# Patient Record
Sex: MALE | Race: WHITE | NOT HISPANIC OR LATINO | Employment: OTHER | URBAN - METROPOLITAN AREA
[De-identification: names, ages, dates, MRNs, and addresses within clinical notes are randomized per-mention and may not be internally consistent; named-entity substitution may affect disease eponyms.]

---

## 2017-05-01 ENCOUNTER — GENERIC CONVERSION - ENCOUNTER (OUTPATIENT)
Dept: OTHER | Facility: OTHER | Age: 77
End: 2017-05-01

## 2017-05-12 ENCOUNTER — ALLSCRIPTS OFFICE VISIT (OUTPATIENT)
Dept: OTHER | Facility: OTHER | Age: 77
End: 2017-05-12

## 2018-01-12 VITALS
HEIGHT: 63 IN | BODY MASS INDEX: 25.18 KG/M2 | SYSTOLIC BLOOD PRESSURE: 110 MMHG | WEIGHT: 142.13 LBS | DIASTOLIC BLOOD PRESSURE: 68 MMHG | HEART RATE: 68 BPM

## 2018-01-17 NOTE — RESULT NOTES
Verified Results  ECHO COMPLETE WITH CONTRAST IF INDICATED [id number] 01:49PM Elisabeth Lopez     Test Name Result Flag Reference   ECHO COMPLETE WITH CONTRAST IF INDICATED (Report)     [facility]   [address]  [place], [address]   [phone]     Transthoracic Echocardiogram   2D, M-mode, Doppler, and Color Doppler     Study date: 2016     Patient: Jerry Bernal   MR number: [id number]   Account number: [de-identified]   : 1940   Age: 67 years   Gender: Male   Status: Outpatient   Location: [address] [place] [address]   Height: 63 in   Weight: 13 lb   BP: 120/ 80 mmHg     Indications: Follow up aortic stenosis  Evaluate suspected aortic   insufficiency  Diagnoses: I35 0 - Nonrheumatic aortic (valve) stenosis     Sonographer: MARLENA Mancuso   Primary Physician: Petey Jeffries MD   Referring Physician: Yobany Lantigua MD   Group: [facility]   Interpreting Physician: Yobany Lantigua MD     SUMMARY     LEFT VENTRICLE:   Systolic function was normal  Ejection fraction was estimated in the range of   55 % to 65 %  There were no regional wall motion abnormalities  Wall thickness was mildly increased  MITRAL VALVE:   There was mild regurgitation  AORTIC VALVE:   The valve was trileaflet  Leaflets exhibited moderately to markedly increased   thickness  There was moderate to severe stenosis  Peak AVV is 3 8 M/S  Sara Lightning SON 1 2 cm2   There was mild regurgitation  TRICUSPID VALVE:   There was mild regurgitation  PULMONIC VALVE:   There was mild regurgitation  HISTORY: PRIOR HISTORY: Patient has no history of cardiovascular disease  PROCEDURE: The study was performed in the [address] [place] [address]  This   was a routine study  The transthoracic approach was used  The study included   complete 2D imaging, M-mode, complete spectral Doppler, and color Doppler  The   heart rate was 55 bpm, at the start of the study  Images were obtained from the   parasternal, apical, subcostal, and suprasternal notch acoustic windows  Echocardiographic views were limited due to poor acoustic window availability,   decreased penetration, and lung interference  This was a technically difficult   study  LEFT VENTRICLE: Size was normal  Systolic function was normal  Ejection   fraction was estimated in the range of 55 % to 65 %  There were no regional   wall motion abnormalities  Wall thickness was mildly increased  DOPPLER: There   was an increased relative contribution of atrial contraction to ventricular   filling  The deceleration time of the early transmitral flow velocity was   increased  RIGHT VENTRICLE: The size was normal  Systolic function was normal  Wall   thickness was normal      LEFT ATRIUM: Size was normal      RIGHT ATRIUM: Size was normal      MITRAL VALVE: There was annular calcification  DOPPLER: There was no evidence   for stenosis  There was mild regurgitation  AORTIC VALVE: The valve was trileaflet  Leaflets exhibited moderately to   markedly increased thickness  DOPPLER: There was moderate to severe stenosis  Peak AVV is 3 8 M/S  Yamini Cunning SON 1 2 cm2 There was mild regurgitation  TRICUSPID VALVE: The valve structure was normal  There was normal leaflet   separation  DOPPLER: The transtricuspid velocity was within the normal range  There was no evidence for stenosis  There was mild regurgitation  Pulmonary   artery systolic pressure was within the normal range  Estimated peak PA   pressure was 25 mmHg  PULMONIC VALVE: Leaflets exhibited normal thickness, no calcification, and   normal cuspal separation  DOPPLER: The transpulmonic velocity was within the   normal range  There was mild regurgitation  PERICARDIUM: There was no pericardial effusion  The pericardium was normal in   appearance  AORTA: The root exhibited normal size       SYSTEMIC VEINS: IVC: The inferior vena cava was not well visualized  SYSTEM MEASUREMENT TABLES     2D   Ao Diam: 3 5 cm   IVSd: 1 1 cm   LA Diam: 3 1 cm   LVIDd: 4 2 cm   LVIDs: 2 7 cm   LVPWd: 1 1 cm     CW   AR Dec Telfair: 1 4 m/s2   AR Dec Time:  1 ms   AR PHT: 605 ms   AR Vmax: 3 m/s   AR maxP 5 mmHg   AV Env  Ti: 304 5 ms   AV VTI: 78 cm   AV Vmax: 3 8 m/s   AV Vmean: 2 6 m/s   AV maxP 7 mmHg   AV meanP 9 mmHg     PW   SON (VTI): 1 3 cm2   SON Vmax: 1 2 cm2   E/E': 17 1   LVOT Env  Ti: 323 ms   LVOT VTI: 31 8 cm   LVOT Vmax: 1 4 m/s   LVOT Vmean: 1 m/s   LVOT maxP 7 mmHg   LVOT meanP 4 mmHg   LVSV Dopp: 100 6 ml   MV A Jaime: 1 1 m/s   MV Dec Telfair: 2 6 m/s2   MV DecT: 282 ms   MV E Jaime: 0 7 m/s   MV E/A Ratio: 0 6     [employer] Accredited Echocardiography Laboratory     Prepared and electronically signed by     Carmita Veloz MD   Signed [id number] 15:22:49

## 2018-04-29 DIAGNOSIS — I35.0 NONRHEUMATIC AORTIC VALVE STENOSIS: Primary | ICD-10-CM

## 2018-05-25 ENCOUNTER — HOSPITAL ENCOUNTER (OUTPATIENT)
Dept: NON INVASIVE DIAGNOSTICS | Facility: CLINIC | Age: 78
Discharge: HOME | End: 2018-05-25
Payer: MEDICARE

## 2018-05-25 ENCOUNTER — OFFICE VISIT (OUTPATIENT)
Dept: CARDIOLOGY CLINIC | Facility: CLINIC | Age: 78
End: 2018-05-25
Payer: MEDICARE

## 2018-05-25 VITALS
BODY MASS INDEX: 25.76 KG/M2 | HEART RATE: 68 BPM | RESPIRATION RATE: 16 BRPM | WEIGHT: 140 LBS | HEIGHT: 62 IN | SYSTOLIC BLOOD PRESSURE: 112 MMHG | DIASTOLIC BLOOD PRESSURE: 62 MMHG

## 2018-05-25 DIAGNOSIS — I35.0 NONRHEUMATIC AORTIC VALVE STENOSIS: ICD-10-CM

## 2018-05-25 DIAGNOSIS — R00.2 PALPITATIONS: ICD-10-CM

## 2018-05-25 DIAGNOSIS — I35.0 NONRHEUMATIC AORTIC VALVE STENOSIS: Primary | ICD-10-CM

## 2018-05-25 PROBLEM — N40.0 BPH (BENIGN PROSTATIC HYPERPLASIA): Status: ACTIVE | Noted: 2018-05-25

## 2018-05-25 PROBLEM — G40.109: Status: ACTIVE | Noted: 2018-05-25

## 2018-05-25 PROBLEM — M81.0 OSTEOPOROSIS: Status: ACTIVE | Noted: 2018-05-25

## 2018-05-25 PROCEDURE — 93306 TTE W/DOPPLER COMPLETE: CPT

## 2018-05-25 PROCEDURE — 93306 TTE W/DOPPLER COMPLETE: CPT | Performed by: INTERNAL MEDICINE

## 2018-05-25 PROCEDURE — 99213 OFFICE O/P EST LOW 20 MIN: CPT | Performed by: INTERNAL MEDICINE

## 2018-05-25 RX ORDER — LEVETIRACETAM 250 MG/1
250 TABLET ORAL 2 TIMES DAILY
COMMUNITY

## 2018-05-25 RX ORDER — LANOLIN ALCOHOL/MO/W.PET/CERES
1 CREAM (GRAM) TOPICAL DAILY
COMMUNITY
End: 2020-02-03

## 2018-05-25 RX ORDER — ALENDRONATE SODIUM 70 MG/1
70 TABLET ORAL
COMMUNITY
Start: 2018-04-04 | End: 2019-11-08

## 2018-05-25 RX ORDER — CHLORHEXIDINE/GLYCERIN/HE-CELL
1 JELLY (GRAM) TOPICAL DAILY
COMMUNITY
End: 2020-02-03

## 2018-05-25 RX ORDER — ASPIRIN 81 MG/1
81 TABLET ORAL DAILY
COMMUNITY
End: 2020-01-11 | Stop reason: HOSPADM

## 2018-05-25 RX ORDER — FINASTERIDE 5 MG/1
5 TABLET, FILM COATED ORAL
COMMUNITY
Start: 2018-04-04

## 2018-05-25 RX ORDER — TAMSULOSIN HYDROCHLORIDE 0.4 MG/1
0.4 CAPSULE ORAL
COMMUNITY
Start: 2018-04-04

## 2018-05-25 NOTE — PROGRESS NOTES
Cardiology Follow Up    Sydney Martinez  1940  [id number]  [facility] [id number]    Reason for visit: Patient here for FU of AS and palpitations  1  Nonrheumatic aortic valve stenosis     2  Palpitations         Interval History: Since the patient last visit he does get some exertional fatigue which is intermittent  He denies chest pain, shortness of breath or peripheral edema  He does get some lightheadedness at times  He has palpitations which occur more with activity  Patient Active Problem List   Diagnosis    BPH (benign prostatic hyperplasia)    Osteoporosis    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[facility] [address] )     No past medical history on file  Social History     Social History    Marital status: /Civil Union     Spouse name: N/A    Number of children: N/A    Years of education: N/A     Occupational History    Not on file  Social History Main Topics    Smoking status: Never Smoker    Smokeless tobacco: Never Used    Alcohol use Not on file    Drug use: Unknown    Sexual activity: Not on file     Other Topics Concern    Not on file     Social History Narrative    No narrative on file      No family history on file  No past surgical history on file      Current Outpatient Prescriptions:     levETIRAcetam (KEPPRA) 250 mg tablet, Take 250 mg by mouth 2 (two) times a day, Disp: , Rfl:     alendronate (FOSAMAX) 70 mg tablet, , Disp: , Rfl:     aspirin (ECOTRIN LOW STRENGTH) 81 mg EC tablet, Take 1 tablet by mouth daily, Disp: , Rfl:     calcium citrate-vitamin D (CITRACAL+D) 315-200 MG-UNIT per tablet, Take by mouth, Disp: , Rfl:     finasteride (PROSCAR) 5 mg tablet, , Disp: , Rfl:     Multiple Vitamin (DAILY VALUE MULTIVITAMIN PO), Take 1 tablet by mouth daily, Disp: , Rfl:     Omega-3 Fatty Acids ([employer] FISH OIL) 1000 MG CAPS, Take by mouth, Disp: , Rfl:     tamsulosin (FLOMAX) 0 4 mg, , Disp: , Rfl:   No Known Allergies      Review of Systems:  Review of Systems   Constitutional: Positive for fatigue  Respiratory: Negative for cough and shortness of breath  Cardiovascular: Positive for palpitations  Negative for chest pain and leg swelling  Genitourinary: Positive for frequency  Neurological: Positive for light-headedness  Psychiatric/Behavioral: Negative for agitation, behavioral problems, confusion and decreased concentration  Physical Exam:  Vitals:    05/25/18 1345   BP: 112/62   Pulse: 68   Resp: 16   Weight: 63 5 kg (140 lb)   Height: 5' 2" (1 575 m)       Physical Exam   Constitutional: He appears well-developed and well-nourished  No distress  HENT:   Head: Normocephalic and atraumatic  Mouth/Throat: No oropharyngeal exudate  Eyes: Conjunctivae are normal  No scleral icterus  Neck: Neck supple  Decreased carotid pulses present  No JVD present  Carotid bruit is present (transmitted murmur)  No thyromegaly present  Cardiovascular: Normal rate, regular rhythm and intact distal pulses  Exam reveals no gallop and no friction rub  Murmur heard  Crescendo decrescendo systolic murmur is present with a grade of 3/6    No diastolic murmur is present   Pulmonary/Chest: Breath sounds normal  He has no wheezes  He has no rhonchi  He has no rales  Abdominal: Soft  He exhibits no mass  There is no hepatosplenomegaly  There is no tenderness  Musculoskeletal: He exhibits no edema  Discussion/Summary:  1  Aortic stenosis  Approaching severity however not much changed from 2016  Having no clear-cut symptoms of critical aortic stenosis  Review the symptoms with the patient today  He will report increasing dyspnea on exertion or the occurrence of angina with activity  Will repeat echocardiogram in 1 year with office visit   2  Palpitations  Relatively quiet and fairly limited to activities      Follow-up 1 year with echo     Louis Durham MD

## 2018-06-21 LAB — HBA1C MFR BLD HPLC: 6.1 %

## 2018-10-09 ENCOUNTER — TRANSCRIBE ORDERS (OUTPATIENT)
Dept: ADMINISTRATIVE | Facility: HOSPITAL | Age: 78
End: 2018-10-09

## 2018-10-09 ENCOUNTER — HOSPITAL ENCOUNTER (OUTPATIENT)
Dept: RADIOLOGY | Facility: IMAGING CENTER | Age: 78
Discharge: HOME | End: 2018-10-09
Payer: MEDICARE

## 2018-10-09 DIAGNOSIS — M25.512 LEFT SHOULDER PAIN, UNSPECIFIED CHRONICITY: ICD-10-CM

## 2018-10-09 DIAGNOSIS — M25.512 LEFT SHOULDER PAIN, UNSPECIFIED CHRONICITY: Primary | ICD-10-CM

## 2018-10-09 PROCEDURE — 73030 X-RAY EXAM OF SHOULDER: CPT

## 2018-10-12 ENCOUNTER — TRANSCRIBE ORDERS (OUTPATIENT)
Dept: ADMINISTRATIVE | Facility: HOSPITAL | Age: 78
End: 2018-10-12

## 2018-10-12 DIAGNOSIS — S43.422S SPRAIN OF LEFT ROTATOR CUFF CAPSULE, SEQUELA: Primary | ICD-10-CM

## 2018-10-15 ENCOUNTER — HOSPITAL ENCOUNTER (OUTPATIENT)
Dept: RADIOLOGY | Facility: IMAGING CENTER | Age: 78
Discharge: HOME | End: 2018-10-15
Payer: MEDICARE

## 2018-10-15 DIAGNOSIS — S43.422S SPRAIN OF LEFT ROTATOR CUFF CAPSULE, SEQUELA: ICD-10-CM

## 2018-10-15 PROCEDURE — 73221 MRI JOINT UPR EXTREM W/O DYE: CPT

## 2018-10-16 ENCOUNTER — EVALUATION (OUTPATIENT)
Dept: PHYSICAL THERAPY | Facility: REHABILITATION | Age: 78
End: 2018-10-16
Payer: MEDICARE

## 2018-10-16 DIAGNOSIS — M25.512 ACUTE PAIN OF LEFT SHOULDER: Primary | ICD-10-CM

## 2018-10-16 DIAGNOSIS — S43.422A SPRAIN OF LEFT ROTATOR CUFF CAPSULE, INITIAL ENCOUNTER: ICD-10-CM

## 2018-10-16 PROCEDURE — G8990 OTHER PT/OT CURRENT STATUS: HCPCS | Performed by: PHYSICAL THERAPIST

## 2018-10-16 PROCEDURE — G8991 OTHER PT/OT GOAL STATUS: HCPCS | Performed by: PHYSICAL THERAPIST

## 2018-10-16 PROCEDURE — 97161 PT EVAL LOW COMPLEX 20 MIN: CPT | Performed by: PHYSICAL THERAPIST

## 2018-10-16 PROCEDURE — 97112 NEUROMUSCULAR REEDUCATION: CPT | Performed by: PHYSICAL THERAPIST

## 2018-10-16 NOTE — PROGRESS NOTES
PT Evaluation     Today's date: 10/16/2018  Patient name: Maxim Snyder  : 1940  MRN: [id number]  Referring provider: Christina Weaver MD  Dx:   Encounter Diagnosis     ICD-10-CM    1  Acute pain of left shoulder M25 512    2  Sprain of left rotator cuff capsule, initial encounter S43 422A                   Assessment  Impairments: abnormal or restricted ROM, abnormal movement, impaired physical strength, lacks appropriate home exercise program, pain with function and poor posture   Functional limitations: unable to sleep through the night, unable to dress without pain, unable to bath without pain  Assessment details: Pt is a 77y o  year old male that presents to PT with a chief complaint of L shoulder pain secondary to a L supraspinatus full thickness tear  PT eval revealed the above mentioned impairments which is consistent with referring diagnosis and MRI results  He is limited by pain with the majority of his movements, despite his increased pain with movement he was able to complete ROM with minimal to moderate losses t/o range  Pt demonstrated poor posture t/o session  Pt was instructed on a comprehensive HEP that focuses on postural correction with strengthening of the LUE  Pt verbalized and demonstrated understanding  Pt would benefit from skilled outpatient PT to address pain, strength, and ROM in order to maximize his level of function  Understanding of Dx/Px/POC: good   Prognosis: good    Goals  ST  Independent with HEP in 2 weeks  2  Pt will have verbal report of improvement in symptoms by >/=25% in 2 weeks   3  Pt will improve LUE ROM measuremeants >/= 5 deg in 2 weeks      LT  Pt will improve FOTO score by >/= 4 points in 6 weeks  2  Pt will improve FOTO score to >/= 70 by visit # 11  3  Pt will be able to reach a shelf that is at shoulder height with little to no difficulty in 6 weeks   4  Pt will be able to sleep through the night with </= 1 disturbances in 6 weeks   5  Pt will be able to independently dress with little to no difficulty in 6 weeks       Plan  Patient would benefit from: skilled physical therapy  Planned modality interventions: thermotherapy: hydrocollator packs and cryotherapy  Other planned modality interventions: other modalities prn   Planned therapy interventions: home exercise program, therapeutic exercise, patient education, neuromuscular re-education, joint mobilization, manual therapy, massage, Molina taping, postural training, strengthening, stretching, functional ROM exercises and flexibility  Frequency: 2x week  Duration in weeks: 8  Plan of Care beginning date: 10/16/2018  Treatment plan discussed with: patient        Subjective Evaluation    History of Present Illness  Date of onset: 10/6/2018  Mechanism of injury: trauma  Mechanism of injury: Pt reports that he fell down off a ladder and landed on his left shoulder  Which he had an MRI for yesterday   Today he got the results of a full thickness supraspinatus tear  Pain  Current pain ratin  At best pain ratin  At worst pain ratin  Location: LUT to mid arm   Quality: dull ache  Relieving factors: ice  Aggravating factors: overhead activity  Progression: improved      Diagnostic Tests  X-ray: abnormal  MRI studies: abnormal  Patient Goals  Patient goals for therapy: decreased pain and independence with ADLs/IADLs  Patient goal: to be able to reach overhead, dress without pain, sleep through the night         Objective     Observations     Additional Observation Details  L shoulder depressed     Active Range of Motion   Left Shoulder   Flexion: 120 degrees with pain  Extension: 75 degrees with pain  Abduction: 115 degrees with pain  External rotation BTH: C2   Internal rotation BTB: T9     Right Shoulder   Flexion: 170 degrees   Extension: 88 degrees   Abduction: 128 degrees   External rotation BTH: C4   Internal rotation BTB: T9     Passive Range of Motion     Additional Passive Range of Motion Details  Unable to get PROM measurements due to high muscle guarding    Strength/Myotome Testing     Left Shoulder     Planes of Motion   Flexion: 4-   Extension: 5   Abduction: 4-   External rotation at 0°: 4-   Internal rotation at 0°: 4-     Right Shoulder     Planes of Motion   Flexion: 4   Extension: 5   Abduction: 4+   External rotation at 0°: 5   Internal rotation at 0°: 4     Additional Strength Details  All motions painful       Flowsheet Rows      Most Recent Value   PT/OT G-Codes   Current Score  50   Projected Score  70   FOTO information reviewed  Yes   Assessment Type  Evaluation   G code set  Other PT/OT Primary   Other PT Primary Current Status ()  CK   Other PT Primary Goal Status ()  CJ          Precautions: osteoporosis       Manuals 10/16                                                                                                           Exercise Diary                         UBE                        posterior shoulder rolls x30                     scap retraction x30                     Bicep curls  #3 3x10                      AAROM standing 1x0                      TB ext                         TB rows                         TB ER                        wrist flex/ext #3 3x10                                                                                                                                                                     Modalities                                                                                             X= performed

## 2018-10-18 ENCOUNTER — OFFICE VISIT (OUTPATIENT)
Dept: PHYSICAL THERAPY | Facility: REHABILITATION | Age: 78
End: 2018-10-18
Payer: MEDICARE

## 2018-10-18 DIAGNOSIS — S43.422A SPRAIN OF LEFT ROTATOR CUFF CAPSULE, INITIAL ENCOUNTER: ICD-10-CM

## 2018-10-18 DIAGNOSIS — M25.512 ACUTE PAIN OF LEFT SHOULDER: Primary | ICD-10-CM

## 2018-10-18 PROCEDURE — 97140 MANUAL THERAPY 1/> REGIONS: CPT | Performed by: PHYSICAL THERAPIST

## 2018-10-18 PROCEDURE — 97112 NEUROMUSCULAR REEDUCATION: CPT | Performed by: PHYSICAL THERAPIST

## 2018-10-18 PROCEDURE — 97110 THERAPEUTIC EXERCISES: CPT | Performed by: PHYSICAL THERAPIST

## 2018-10-18 NOTE — PROGRESS NOTES
Daily Note     Today's date: 10/18/2018  Patient name: Lillian Matias  : 1940  MRN: [id number]  Referring provider: Huan Hines MD  Dx:   Encounter Diagnosis     ICD-10-CM    1  Acute pain of left shoulder M25 512    2  Sprain of left rotator cuff capsule, initial encounter K93 941U                   Subjective: Pt reports that he was sore after last time and that the exercises didn't really help that he still has pain  Objective: See treatment diary below      Assessment: Pt was educated on the healing process and that it will take some time with practice of the exercises to have some relief  Pt verbalized understanding  Pt had muscular fatigue with exercises and required frequent rest breaks  Pt was challenged with scapular strengthening and required frequent tactile Pt was less guarded today and PROM was able to be completed  PROM is full at this point in time  Pt had some relief with ice post tx session  Pt would benefit from skilled outpatient PT to address pain, strength, and posture in order to maximize his level of function  Plan: Continue per plan of care  Progress treatment as tolerated        Precautions: osteoporosis       Manuals 10/16 10/18                   PROM  KAB                                                                                     Exercise Diary                         UBE    x10 min                    posterior shoulder rolls x30  x30                   scap retraction x30  x30 6 s holds                   Bicep curls  #3 3x10  #3 3x10                    AAROM standing x10  x10                     TB ext     Y TB 2X10                    TB rows     Y TB  2x10                    TB ER looped band   Y TB  2x10                    wrist flex/ext #3 3x10  #3 3x10                                                                                                                                                                   Modalities                       Ice to L shoulder  x10 min                                                                    X= performed

## 2018-10-22 ENCOUNTER — OFFICE VISIT (OUTPATIENT)
Dept: PHYSICAL THERAPY | Facility: REHABILITATION | Age: 78
End: 2018-10-22
Payer: MEDICARE

## 2018-10-22 DIAGNOSIS — M25.512 ACUTE PAIN OF LEFT SHOULDER: Primary | ICD-10-CM

## 2018-10-22 DIAGNOSIS — S43.422A SPRAIN OF LEFT ROTATOR CUFF CAPSULE, INITIAL ENCOUNTER: ICD-10-CM

## 2018-10-22 PROCEDURE — 97110 THERAPEUTIC EXERCISES: CPT | Performed by: PHYSICAL THERAPIST

## 2018-10-22 PROCEDURE — 97150 GROUP THERAPEUTIC PROCEDURES: CPT | Performed by: PHYSICAL THERAPIST

## 2018-10-22 PROCEDURE — 97112 NEUROMUSCULAR REEDUCATION: CPT | Performed by: PHYSICAL THERAPIST

## 2018-10-22 PROCEDURE — 97140 MANUAL THERAPY 1/> REGIONS: CPT | Performed by: PHYSICAL THERAPIST

## 2018-10-22 NOTE — PROGRESS NOTES
Daily Note     Today's date: 10/22/2018  Patient name: Ady Duncan  : 1940  MRN: [id number]  Referring provider: Christina Silverman MD  Dx:   Encounter Diagnosis     ICD-10-CM    1  Acute pain of left shoulder M25 512    2  Sprain of left rotator cuff capsule, initial encounter D16 456T                   Subjective: Pt reports that he was sore after last time  He continues to have trouble sleeping at night  Objective: See treatment diary below      Assessment:  Pt had muscular fatigue with exercises and required frequent rest breaks  He was able to tolerate progressions in exercise program with no additional c/o of pain  AROM was only performed to 90 degrees to decrease UT substitution  Pt had  relief with ice post tx session  Pt would benefit from skilled outpatient PT to address pain, strength, and posture in order to maximize his level of function  Plan: Continue per plan of care  Progress treatment as tolerated        Precautions: osteoporosis       Manuals 10/16 10/18  10/22                 PROM  KAB  KAB                                                                                   Exercise Diary                         UBE    x10 min  x10 min backwards                  posterior shoulder rolls x30  x30  x30                 scap retraction x30  x30 6 s holds  x30                 Bicep curls  #3 3x10  #3 3x10  #3 3x10                  AAROM standing x10  x10   x15 and abd                  TB ext     Y TB 2X10  Y TB 2X10                  TB rows     Y TB  2x10  Y TB  2x10                  TB ER looped band   Y TB  2x10  Y TB  2x10                  wrist flex/ext #3 3x10  #3 3x10  #3 3x10                 AROM flex/abd to 90 deg      #1 2x10                                                                                                                                         Modalities                       Ice to L shoulder  x10 min  x10 min                                                                  X= performed

## 2018-10-25 ENCOUNTER — OFFICE VISIT (OUTPATIENT)
Dept: PHYSICAL THERAPY | Facility: REHABILITATION | Age: 78
End: 2018-10-25
Payer: MEDICARE

## 2018-10-25 DIAGNOSIS — S43.422A SPRAIN OF LEFT ROTATOR CUFF CAPSULE, INITIAL ENCOUNTER: ICD-10-CM

## 2018-10-25 DIAGNOSIS — M25.512 ACUTE PAIN OF LEFT SHOULDER: Primary | ICD-10-CM

## 2018-10-25 PROCEDURE — 97110 THERAPEUTIC EXERCISES: CPT

## 2018-10-25 PROCEDURE — 97112 NEUROMUSCULAR REEDUCATION: CPT

## 2018-10-25 NOTE — PROGRESS NOTES
Daily Note     Today's date: 10/25/2018  Patient name: Deana Kaplan  : 1940  MRN: [id number]  Referring provider: Ankit Moreno MD  Dx:   Encounter Diagnosis     ICD-10-CM    1  Acute pain of left shoulder M25 512    2  Sprain of left rotator cuff capsule, initial encounter S43 422A                   Subjective: Pt reports that he had an injection 2 days ago & is feeling a little better  He has been sleeping better    Objective: See treatment diary below      Assessment:  Sore with ex, feels better after ice    Plan: Continue per plan of care  Progress treatment as tolerated        Precautions: osteoporosis       Manuals 10/16 10/18  10/22  10-25               PROM  KAB  KAB  x                                                                                 Exercise Diary                         UBE    x10 min  x10 min backwards  x                posterior shoulder rolls x30  x30  x30  x               scap retraction x30  x30 6 s holds  x30  x               Bicep curls  #3 3x10  #3 3x10  #3 3x10  x                AAROM standing x10  x10   x15 and abd  x                TB ext     Y TB 2X10  Y TB 2X10  x                TB rows     Y TB  2x10  Y TB  2x10  x                TB ER looped 10-25band   Y TB  2x10  Y TB  2x10  x                wrist flex/ext #3 3x10  #3 3x10  #3 3x10  x               AROM flex/abd to 90 deg      #1 2x10  x                                                                                                                                       Modalities                       Ice to L shoulder  x10 min  x10 min  x                                                                X= performed

## 2018-10-29 ENCOUNTER — OFFICE VISIT (OUTPATIENT)
Dept: PHYSICAL THERAPY | Facility: REHABILITATION | Age: 78
End: 2018-10-29
Payer: MEDICARE

## 2018-10-29 DIAGNOSIS — M25.512 ACUTE PAIN OF LEFT SHOULDER: Primary | ICD-10-CM

## 2018-10-29 DIAGNOSIS — S43.422A SPRAIN OF LEFT ROTATOR CUFF CAPSULE, INITIAL ENCOUNTER: ICD-10-CM

## 2018-10-29 PROCEDURE — 97112 NEUROMUSCULAR REEDUCATION: CPT | Performed by: PHYSICAL THERAPIST

## 2018-10-29 PROCEDURE — G8991 OTHER PT/OT GOAL STATUS: HCPCS | Performed by: PHYSICAL THERAPIST

## 2018-10-29 PROCEDURE — 97110 THERAPEUTIC EXERCISES: CPT | Performed by: PHYSICAL THERAPIST

## 2018-10-29 PROCEDURE — 97140 MANUAL THERAPY 1/> REGIONS: CPT | Performed by: PHYSICAL THERAPIST

## 2018-10-29 PROCEDURE — G8990 OTHER PT/OT CURRENT STATUS: HCPCS | Performed by: PHYSICAL THERAPIST

## 2018-10-29 NOTE — PROGRESS NOTES
Daily Note     Today's date: 10/29/2018  Patient name: Maxim Snyder  : 1940  MRN: [id number]  Referring provider: Christina Weaver MD  Dx:   Encounter Diagnosis     ICD-10-CM    1  Acute pain of left shoulder M25 512    2  Sprain of left rotator cuff capsule, initial encounter I45 813I                   Subjective: Pt reports that he was sore from over the weekend  He was able to do more house work with his affected extremity such as washing windows and carrying outdoor furniture  Objective: See treatment diary below    Assessment:  Pt was able to tolerate progressions in exercises with no c/o of pain  He was able to progress his flexion/abd to past 90 degrees with no UT substitution  His PROM at this point is most limited in ER  Will integrate more ER stretches into next session  Pt had verbal reports of improvement of pain post exercises  Pt would benefit from skilled outpatient PT to address pain, strength, and posture in order to maximize his level of function  Plan: Continue per plan of care  Progress treatment as tolerated        Precautions: osteoporosis       Manuals 10/16 10/18  10/22  10-25  10/29             PROM  KAB  KAB  x  KAB                                                                               Exercise Diary                         UBE backwards    x10 min  x10 min backwards  x  x10 min              posterior shoulder rolls x30  x30  x30  x  HEP             scap retraction x30  x30 6 s holds  x30  x  HEP             Bicep curls  #3 3x10  #3 3x10  #3 3x10  x  #4 3x10              AAROM standing x10  x10   x15 and abd  x  x15 abd/ flex              TB ext     Y TB 2X10  Y TB 2X10  x  R TB 3x10              TB rows     Y TB  2x10  Y TB  2x10  x  R TB 3x10              TB ER looped band   Y TB  2x10  Y TB  2x10  x  R TB  2x10              wrist flex/ext #3 3x10  #3 3x10  #3 3x10  x  #5 3x10             AROM flex/abd to 90 deg      #1 2x10  x  #1 2x10              AAROM ER         NV                                                                                                             Modalities                       Ice to L shoulder  x10 min  x10 min  x  x10 min                                                              X= performed

## 2018-11-01 ENCOUNTER — OFFICE VISIT (OUTPATIENT)
Dept: PHYSICAL THERAPY | Facility: REHABILITATION | Age: 78
End: 2018-11-01
Payer: MEDICARE

## 2018-11-01 DIAGNOSIS — M25.512 ACUTE PAIN OF LEFT SHOULDER: Primary | ICD-10-CM

## 2018-11-01 DIAGNOSIS — S43.422A SPRAIN OF LEFT ROTATOR CUFF CAPSULE, INITIAL ENCOUNTER: ICD-10-CM

## 2018-11-01 PROCEDURE — 97150 GROUP THERAPEUTIC PROCEDURES: CPT | Performed by: PHYSICAL THERAPIST

## 2018-11-01 PROCEDURE — 97140 MANUAL THERAPY 1/> REGIONS: CPT | Performed by: PHYSICAL THERAPIST

## 2018-11-01 PROCEDURE — 97112 NEUROMUSCULAR REEDUCATION: CPT | Performed by: PHYSICAL THERAPIST

## 2018-11-01 PROCEDURE — 97110 THERAPEUTIC EXERCISES: CPT | Performed by: PHYSICAL THERAPIST

## 2018-11-01 NOTE — PROGRESS NOTES
Daily Note     Today's date: 2018  Patient name: Albert Bassett  : 1940  MRN: [id number]  Referring provider: Héctor Mendez MD  Dx:   Encounter Diagnosis     ICD-10-CM    1  Acute pain of left shoulder M25 512    2  Sprain of left rotator cuff capsule, initial encounter R52 [id number]                   Subjective: Pt reports that he was not to sore from last session  However he noted that last night he was in pain  Pt stated "sometimes I have good days, and sometimes I have bad days  I always feel better after therapy though"  Objective: See treatment diary below    Assessment:  Pt was fatigued at end of session with the progression of exercises  Pt had no c/o of pain during exercises  No c/o of soreness post session  He is most challenged with overhead lifting, will continue to increase as able  ROM continued to be maintain most restrictions in ER  ER stretch with PVC was added today to address this  Pt would benefit from skilled outpatient PT to address pain, strength, and posture in order to maximize his level of function  Plan: Continue per plan of care  Progress treatment as tolerated        Precautions: osteoporosis       Manuals 10/16 10/18  10/22  10-25  10/29  11/1           PROM  KAB  KAB  x  KAB  KAB                                                                             Exercise Diary                         UBE backwards    x10 min  x10 min backwards  x  x10 min x10 min            posterior shoulder rolls x30  x30  x30  x  HEP             scap retraction x30  x30 6 s holds  x30  x  HEP             Bicep curls  #3 3x10  #3 3x10  #3 3x10  x  #4 3x10   #4 3x10            AAROM standing x10  x10   x15 and abd  x  x15 abd/ flex              TB ext     Y TB 2X10  Y TB 2X10  x  R TB 3x10  R TB 3x10            TB rows     Y TB  2x10  Y TB  2x10  x  R TB 3x10  R TB 3x10            TB ER looped band   Y TB  2x10  Y TB  2x10  x  R TB  2x10  R TB 3x10            wrist flex/ext #3 3x10  #3 3x10  #3 3x10  x  #5 3x10  #5 3x10           AROM flex/abd to 90 deg      #1 2x10  x  #1 2x10   #1 2x10            AAROM ER         NV  x30            supine serratus punches           #1 x20            bent over rows           #5 x15            serratus ball push on wall clock/ counter           To fatigue each way                                   Modalities                       Ice to L shoulder  x10 min  x10 min  x  x10 min  x10 min                                                            X= performed

## 2018-11-06 ENCOUNTER — APPOINTMENT (OUTPATIENT)
Dept: PHYSICAL THERAPY | Facility: REHABILITATION | Age: 78
End: 2018-11-06
Payer: MEDICARE

## 2018-11-06 ENCOUNTER — OFFICE VISIT (OUTPATIENT)
Dept: SURGERY | Facility: CLINIC | Age: 78
End: 2018-11-06
Payer: MEDICARE

## 2018-11-06 VITALS
HEART RATE: 76 BPM | HEIGHT: 63 IN | RESPIRATION RATE: 16 BRPM | SYSTOLIC BLOOD PRESSURE: 112 MMHG | WEIGHT: 138.5 LBS | BODY MASS INDEX: 24.54 KG/M2 | DIASTOLIC BLOOD PRESSURE: 74 MMHG

## 2018-11-06 DIAGNOSIS — K40.90 RIGHT INGUINAL HERNIA: Primary | ICD-10-CM

## 2018-11-06 PROCEDURE — 99204 OFFICE O/P NEW MOD 45 MIN: CPT | Performed by: SPECIALIST

## 2018-11-06 RX ORDER — METHOCARBAMOL 750 MG/1
750 TABLET, FILM COATED ORAL EVERY 8 HOURS PRN
Refills: 0 | COMMUNITY
Start: 2018-10-12 | End: 2018-11-15

## 2018-11-06 RX ORDER — TRAMADOL HYDROCHLORIDE 50 MG/1
TABLET ORAL
Refills: 0 | COMMUNITY
Start: 2018-10-12 | End: 2018-11-15

## 2018-11-06 NOTE — H&P
Chief Complaint:  Right inguinal hernia      History of Present Illness:   Patient is a 67-year-old white male born in [facility] in [facility] which were my people or from  He has a long history of a right inguinal hernia  He was recently seen in the office in 2006 but was essentially asymptomatic and was not her to have it repaired  Return to the office 2009 same thing  Return to the office 2011 and once again he chose not to have repaired  In 2015 the situation was similar although it started to cause him some discomfort occasionally  At that time we showed him how to reduce it because it would occasionally be difficult to reduce  At that time we fairly strongly recommended that Siria repaired and is here today November 2018 to I believe it talk seriously about having it repaired  It is getting larger, becoming more difficult to reduce, and starting to cause him some discomfort  He denies nausea vomiting diarrhea constipation  He is quite healthy spends half the year in [place] and would like to have this reduced prior to leaving for [place] after [place]  Past Medical History:   Past Medical History:   Diagnosis Date    Osteoporosis          Past Surgical History:  No past surgical history on file        Allergies:  No Known Allergies      Medications:    Current Outpatient Prescriptions:     alendronate (FOSAMAX) 70 mg tablet, , Disp: , Rfl:     aspirin (ECOTRIN LOW STRENGTH) 81 mg EC tablet, Take 1 tablet by mouth daily, Disp: , Rfl:     calcium citrate-vitamin D (CITRACAL+D) 315-200 MG-UNIT per tablet, Take by mouth, Disp: , Rfl:     finasteride (PROSCAR) 5 mg tablet, , Disp: , Rfl:     levETIRAcetam (KEPPRA) 250 mg tablet, Take 250 mg by mouth 2 (two) times a day, Disp: , Rfl:     Multiple Vitamin (DAILY VALUE MULTIVITAMIN PO), Take 1 tablet by mouth daily, Disp: , Rfl:     Omega-3 Fatty Acids ([employer] FISH OIL) 1000 MG CAPS, Take by mouth, Disp: , Rfl:     tamsulosin (FLOMAX) 0 4 mg, , Disp: , Rfl:     methocarbamol (ROBAXIN) 750 mg tablet, Take 750 mg by mouth every 8 (eight) hours as needed, Disp: , Rfl: 0    traMADol (ULTRAM) 50 mg tablet, TAKE 1 TABLET BY MOUTH EVERY 8 HOURS AS NEEDED FOR SHOULDER PAIN , Disp: , Rfl: 0      Social History:  Social History     History   Alcohol Use    Yes     Comment: occ     History   Drug Use No     History   Smoking Status    Never Smoker   Smokeless Tobacco    Never Used         Family History:    Family History   Problem Relation Age of Onset    Coronary artery disease Family          Review of Systems:    negative    Vitals:  Vitals:    11/06/18 1130   BP: 112/74   Pulse: 76   Resp: 16       Physical Exam:  Elderly white male speaks with an [place] accent who is quite healthy appearing who is awake alert no distress  Vital signs as above  Skin warm dry  Head normocephalic and atraumatic  Eyes VICENTE a m  intact  Ears and nose within normal normal limits  Throat gag reflex intact  Neck no masses thyromegaly  Back no CVA or spinal tenderness  Lungs clear to a and P  Cor regular rate and rhythm  The patient has a holosystolic murmur across the precordium  This is projected up into the carotids  No obvious carotid bruits are noted  Abdomen soft flat firm nontender left groin shows no hernia  Right groin shows an obvious hernia is reducible primarily with the patient in supine position  It is minimally tender to palpation  Extremities negative CCE  Neurologically A&O x3 cranial nerves 2-12 intact      Lab Results: I have personally reviewed pertinent reports  See below  Imaging: I have personally reviewed pertinent reports  EKG, Pathology, and Other Studies: I have personally reviewed pertinent reports  No visits with results within 1 Day(s) from this visit     Latest known visit with results is:   Orders Only on 06/21/2018   Component Date Value    Hemoglobin A1C 06/20/2018 6 1          Impression:  Persistent right inguinal hernia    Plan: At this point he would like to undergo repair  Will try to perform this prior to his leaving for [place] in December  We planned local with IV sedation in mesh    Course she has shown a piece of mesh and he understands

## 2018-11-07 PROBLEM — K40.90 RIGHT INGUINAL HERNIA: Status: ACTIVE | Noted: 2018-11-07

## 2018-11-07 PROCEDURE — 1124F ACP DISCUSS-NO DSCNMKR DOCD: CPT | Performed by: SPECIALIST

## 2018-11-09 ENCOUNTER — OFFICE VISIT (OUTPATIENT)
Dept: PHYSICAL THERAPY | Facility: REHABILITATION | Age: 78
End: 2018-11-09
Payer: MEDICARE

## 2018-11-09 DIAGNOSIS — M25.512 ACUTE PAIN OF LEFT SHOULDER: Primary | ICD-10-CM

## 2018-11-09 DIAGNOSIS — S43.422A SPRAIN OF LEFT ROTATOR CUFF CAPSULE, INITIAL ENCOUNTER: ICD-10-CM

## 2018-11-09 PROCEDURE — 97112 NEUROMUSCULAR REEDUCATION: CPT | Performed by: PHYSICAL THERAPIST

## 2018-11-09 PROCEDURE — 97110 THERAPEUTIC EXERCISES: CPT | Performed by: PHYSICAL THERAPIST

## 2018-11-09 NOTE — PROGRESS NOTES
Daily Note     Today's date: 2018  Patient name: Donavon Nair  : 1940  MRN: [id number]  Referring provider: Elizabeth Guillen MD  Dx:   Encounter Diagnosis     ICD-10-CM    1  Acute pain of left shoulder M25 512    2  Sprain of left rotator cuff capsule, initial encounter A83 212I                   Subjective: Pt reports he feels that he is getting stronger but will continue to have pain near the elbow without doing anything  He does note although he is still getting pain he is getting it less often now  Objective: See treatment diary below    Assessment:  Pt was fatigued with progressions and additions to program  He continued to need verbal and tactile cues for appropriate exercise completion  Pt was able to complete exercises with no c/o of pain  His ER was improved from last session  Will continue to integrate overhead activities as able  Pt would benefit from skilled outpatient PT to address pain, strength, and posture in order to maximize his level of function  Plan: Continue per plan of care  Progress treatment as tolerated        Precautions: osteoporosis       Manuals 10/16 10/18  10/22  10-25  10/29  11/1  11/9         PROM  KAB  KAB  x  KAB  KAB  KAB                                                                           Exercise Diary                         UBE backwards    x10 min  x10 min backwards  x  x10 min x10 min  x10min          posterior shoulder rolls x30  x30  x30  x  HEP             scap retraction x30  x30 6 s holds  x30  x  HEP             Bicep curls  #3 3x10  #3 3x10  #3 3x10  x  #4 3x10   #4 3x10  #4 3x10          AAROM standing x10  x10   x15 and abd  x  x15 abd/ flex              TB ext     Y TB 2X10  Y TB 2X10  x  R TB 3x10  R TB 3x10  R TB 3x10          TB rows     Y TB  2x10  Y TB  2x10  x  R TB 3x10  R TB 3x10  R TB 3x10          TB ER looped band   Y TB  2x10  Y TB  2x10  x  R TB  2x10  R TB 3x10  R TB 3x10          wrist flex/ext #3 3x10  #3 3x10  #3 3x10  x  #5 3x10  #5 3x10  #5 3x10         AROM flex/abd to 90 deg      #1 2x10  x  #1 2x10   #1 2x10  #1 2x10          AAROM ER         NV  x30  x30          supine serratus punches           #1 x20  #1 x30          bent over rows           #5 x15  #5 x20          serratus ball push on wall clock/ counter           To fatigue each way  To fatigue each way          prone I's & T's              x10 each         Lifting weights to cabinet shelf       NV                   Modalities                       Ice to L shoulder  x10 min  x10 min  x  x10 min  x10 min  x10 min                                                          X= performed

## 2018-11-13 ENCOUNTER — OFFICE VISIT (OUTPATIENT)
Dept: PHYSICAL THERAPY | Facility: REHABILITATION | Age: 78
End: 2018-11-13
Payer: MEDICARE

## 2018-11-13 DIAGNOSIS — M25.512 ACUTE PAIN OF LEFT SHOULDER: Primary | ICD-10-CM

## 2018-11-13 DIAGNOSIS — S43.422A SPRAIN OF LEFT ROTATOR CUFF CAPSULE, INITIAL ENCOUNTER: ICD-10-CM

## 2018-11-13 PROCEDURE — 97110 THERAPEUTIC EXERCISES: CPT

## 2018-11-13 PROCEDURE — 97140 MANUAL THERAPY 1/> REGIONS: CPT

## 2018-11-13 PROCEDURE — 97112 NEUROMUSCULAR REEDUCATION: CPT

## 2018-11-13 NOTE — PROGRESS NOTES
Daily Note     Today's date: 2018  Patient name: Oren Valerio  : 1940  MRN: [id number]  Referring provider: Sue Herndon MD  Dx:   Encounter Diagnosis     ICD-10-CM    1  Acute pain of left shoulder M25 512    2   Sprain of left rotator cuff capsule, initial encounter S43 422A                   Subjective: Pt reports lifting things is difficult yet    Objective: See treatment diary below    Assessment:  He struggles with scap control  Plan:  Cont to progress strength     Precautions: osteoporosis       Manuals 10/16 10/18  10/22  10-25  10/29  11/1  11/9  11-13       PROM  KAB  KAB  x  KAB  KAB  KAB  x                                                                         Exercise Diary                         UBE backwards    x10 min  x10 min backwards  x  x10 min x10 min  x10min  x        posterior shoulder rolls x30  x30  x30  x  HEP             scap retraction x30  x30 6 s holds  x30  x  HEP             Bicep curls  #3 3x10  #3 3x10  #3 3x10  x  #4 3x10   #4 3x10  #4 3x10  x        AAROM standing x10  x10   x15 and abd  x  x15 abd/ flex              TB ext     Y TB 2X10  Y TB 2X10  x  R TB 3x10  R TB 3x10  R TB 3x10  x        TB rows     Y TB  2x10  Y TB  2x10  x  R TB 3x10  R TB 3x10  R TB 3x10  x        TB ER looped band   Y TB  2x10  Y TB  2x10  x  R TB  2x10  R TB 3x10  R TB 3x10  x        wrist flex/ext #3 3x10  #3 3x10  #3 3x10  x  #5 3x10  #5 3x10  #5 3x10  x       AROM flex/abd to 90 deg      #1 2x10  x  #1 2x10   #1 2x10  #1 2x10  x        AAROM ER         NV  x30  x30  x        supine serratus punches           #1 x20  #1 x30  2# 20x        bent over rows           #5 x15  #5 x20  x        serratus ball push on wall clock/ counter           To fatigue each way  To fatigue each way  x        prone I's & T's              x10 each  x       Lifting weights to cabinet shelf       NV 2#   2 levels 2x5                  Modalities                       Ice to L shoulder  x10 min  x10 min  x  x10 min  x10 min  x10 min  x                                                        X= performed

## 2018-11-15 ENCOUNTER — APPOINTMENT (OUTPATIENT)
Dept: PHYSICAL THERAPY | Facility: REHABILITATION | Age: 78
End: 2018-11-15
Payer: MEDICARE

## 2018-11-15 ENCOUNTER — ANESTHESIA EVENT (OUTPATIENT)
Dept: PERIOP | Facility: HOSPITAL | Age: 78
End: 2018-11-15
Payer: MEDICARE

## 2018-11-15 NOTE — ANESTHESIA PREPROCEDURE EVALUATION
Review of Systems/Medical History  Patient summary reviewed  Chart reviewed      Cardiovascular  Valvular heart disease (area 1 0 cm pt denies cp, sycope, palpiations) , aortic valve stenosis, No angina ,    Pulmonary  Negative pulmonary ROS        GI/Hepatic  Negative GI/hepatic ROS          Prostatic disorder, benign prostatic hyperplasia       Endo/Other  Diabetes (prediabetes) ,      GYN       Hematology  Negative hematology ROS      Musculoskeletal    Comment: Osteoporosis, left shoulder pain secondary to fall (rotator cuff injury)      Neurology  Seizures (on keppra) well controlled,     Psychology         Physical Exam    Airway    Mallampati score: II  TM Distance: >3 FB  Neck ROM: full     Dental       Cardiovascular  Rhythm: regular, Rate: normal, Murmur (4/6 PADMINI),     Pulmonary  Breath sounds clear to auscultation,     Other Findings        Anesthesia Plan  ASA Score- 3     Anesthesia Type- IV sedation with anesthesia with ASA Monitors  Additional Monitors:   Airway Plan:         Plan Factors-    Induction-     Postoperative Plan-     Informed Consent- Anesthetic plan and risks discussed with patient (risks of anesthesia explained including stroke, cardiac arrythmias, ICU care )  I personally reviewed this patient with the CRNA  Discussed and agreed on the Anesthesia Plan with the CRNA  Marina Ham

## 2018-11-16 ENCOUNTER — APPOINTMENT (OUTPATIENT)
Dept: PHYSICAL THERAPY | Facility: REHABILITATION | Age: 78
End: 2018-11-16
Payer: MEDICARE

## 2018-11-16 ENCOUNTER — ANESTHESIA (OUTPATIENT)
Dept: PERIOP | Facility: HOSPITAL | Age: 78
End: 2018-11-16
Payer: MEDICARE

## 2018-11-16 ENCOUNTER — HOSPITAL ENCOUNTER (OUTPATIENT)
Facility: HOSPITAL | Age: 78
Setting detail: OUTPATIENT SURGERY
Discharge: HOME | End: 2018-11-16
Attending: SPECIALIST | Admitting: SPECIALIST
Payer: MEDICARE

## 2018-11-16 VITALS
HEIGHT: 63 IN | SYSTOLIC BLOOD PRESSURE: 125 MMHG | BODY MASS INDEX: 24.45 KG/M2 | RESPIRATION RATE: 20 BRPM | DIASTOLIC BLOOD PRESSURE: 57 MMHG | HEART RATE: 49 BPM | OXYGEN SATURATION: 95 % | TEMPERATURE: 97.7 F | WEIGHT: 138 LBS

## 2018-11-16 DIAGNOSIS — K40.90 RIGHT INGUINAL HERNIA: Primary | ICD-10-CM

## 2018-11-16 PROCEDURE — 49505 PRP I/HERN INIT REDUC >5 YR: CPT | Performed by: SPECIALIST

## 2018-11-16 PROCEDURE — C1781 MESH (IMPLANTABLE): HCPCS | Performed by: SPECIALIST

## 2018-11-16 DEVICE — POLYPROPYLENE NONABSORBABLE SYNTHETIC SURGICAL MESH
Type: IMPLANTABLE DEVICE | Site: INGUINAL | Status: FUNCTIONAL
Brand: PROLENE

## 2018-11-16 RX ORDER — MIDAZOLAM HYDROCHLORIDE 1 MG/ML
INJECTION INTRAMUSCULAR; INTRAVENOUS AS NEEDED
Status: DISCONTINUED | OUTPATIENT
Start: 2018-11-16 | End: 2018-11-16 | Stop reason: SURG

## 2018-11-16 RX ORDER — FENTANYL CITRATE 50 UG/ML
INJECTION, SOLUTION INTRAMUSCULAR; INTRAVENOUS AS NEEDED
Status: DISCONTINUED | OUTPATIENT
Start: 2018-11-16 | End: 2018-11-16 | Stop reason: SURG

## 2018-11-16 RX ORDER — KETOROLAC TROMETHAMINE 30 MG/ML
INJECTION, SOLUTION INTRAMUSCULAR; INTRAVENOUS AS NEEDED
Status: DISCONTINUED | OUTPATIENT
Start: 2018-11-16 | End: 2018-11-16 | Stop reason: SURG

## 2018-11-16 RX ORDER — BUPIVACAINE HYDROCHLORIDE 5 MG/ML
INJECTION, SOLUTION PERINEURAL AS NEEDED
Status: DISCONTINUED | OUTPATIENT
Start: 2018-11-16 | End: 2018-11-16 | Stop reason: HOSPADM

## 2018-11-16 RX ORDER — MAGNESIUM HYDROXIDE 1200 MG/15ML
LIQUID ORAL AS NEEDED
Status: DISCONTINUED | OUTPATIENT
Start: 2018-11-16 | End: 2018-11-16 | Stop reason: HOSPADM

## 2018-11-16 RX ORDER — PROPOFOL 10 MG/ML
INJECTION, EMULSION INTRAVENOUS CONTINUOUS PRN
Status: DISCONTINUED | OUTPATIENT
Start: 2018-11-16 | End: 2018-11-16 | Stop reason: SURG

## 2018-11-16 RX ORDER — GLYCOPYRROLATE 0.2 MG/ML
INJECTION INTRAMUSCULAR; INTRAVENOUS AS NEEDED
Status: DISCONTINUED | OUTPATIENT
Start: 2018-11-16 | End: 2018-11-16 | Stop reason: SURG

## 2018-11-16 RX ORDER — ONDANSETRON 2 MG/ML
INJECTION INTRAMUSCULAR; INTRAVENOUS AS NEEDED
Status: DISCONTINUED | OUTPATIENT
Start: 2018-11-16 | End: 2018-11-16 | Stop reason: SURG

## 2018-11-16 RX ORDER — DIPHENHYDRAMINE HYDROCHLORIDE 50 MG/ML
12.5 INJECTION INTRAMUSCULAR; INTRAVENOUS ONCE AS NEEDED
Status: DISCONTINUED | OUTPATIENT
Start: 2018-11-16 | End: 2018-11-16 | Stop reason: HOSPADM

## 2018-11-16 RX ORDER — PROPOFOL 10 MG/ML
INJECTION, EMULSION INTRAVENOUS AS NEEDED
Status: DISCONTINUED | OUTPATIENT
Start: 2018-11-16 | End: 2018-11-16 | Stop reason: SURG

## 2018-11-16 RX ORDER — HYDROMORPHONE HCL/PF 1 MG/ML
0.5 SYRINGE (ML) INJECTION
Status: DISCONTINUED | OUTPATIENT
Start: 2018-11-16 | End: 2018-11-16 | Stop reason: HOSPADM

## 2018-11-16 RX ORDER — FENTANYL CITRATE/PF 50 MCG/ML
25 SYRINGE (ML) INJECTION
Status: DISCONTINUED | OUTPATIENT
Start: 2018-11-16 | End: 2018-11-16 | Stop reason: HOSPADM

## 2018-11-16 RX ORDER — LIDOCAINE HYDROCHLORIDE 10 MG/ML
INJECTION, SOLUTION INFILTRATION; PERINEURAL AS NEEDED
Status: DISCONTINUED | OUTPATIENT
Start: 2018-11-16 | End: 2018-11-16 | Stop reason: SURG

## 2018-11-16 RX ORDER — TRAMADOL HYDROCHLORIDE 50 MG/1
50 TABLET ORAL EVERY 6 HOURS PRN
Qty: 20 TABLET | Refills: 0 | Status: SHIPPED | OUTPATIENT
Start: 2018-11-16 | End: 2018-11-26

## 2018-11-16 RX ORDER — SODIUM CHLORIDE, SODIUM LACTATE, POTASSIUM CHLORIDE, CALCIUM CHLORIDE 600; 310; 30; 20 MG/100ML; MG/100ML; MG/100ML; MG/100ML
125 INJECTION, SOLUTION INTRAVENOUS CONTINUOUS
Status: DISCONTINUED | OUTPATIENT
Start: 2018-11-16 | End: 2018-11-16 | Stop reason: HOSPADM

## 2018-11-16 RX ORDER — LIDOCAINE HYDROCHLORIDE 10 MG/ML
INJECTION, SOLUTION EPIDURAL; INFILTRATION; INTRACAUDAL; PERINEURAL AS NEEDED
Status: DISCONTINUED | OUTPATIENT
Start: 2018-11-16 | End: 2018-11-16 | Stop reason: HOSPADM

## 2018-11-16 RX ORDER — TRAMADOL HYDROCHLORIDE 50 MG/1
50 TABLET ORAL EVERY 4 HOURS PRN
Status: DISCONTINUED | OUTPATIENT
Start: 2018-11-16 | End: 2018-11-16 | Stop reason: HOSPADM

## 2018-11-16 RX ADMIN — SODIUM CHLORIDE, POTASSIUM CHLORIDE, SODIUM LACTATE AND CALCIUM CHLORIDE: 600; 310; 30; 20 INJECTION, SOLUTION INTRAVENOUS at 10:33

## 2018-11-16 RX ADMIN — ONDANSETRON HYDROCHLORIDE 4 MG: 2 INJECTION, SOLUTION INTRAMUSCULAR; INTRAVENOUS at 09:36

## 2018-11-16 RX ADMIN — LIDOCAINE HYDROCHLORIDE 50 MG: 10 INJECTION, SOLUTION INFILTRATION; PERINEURAL at 09:03

## 2018-11-16 RX ADMIN — SODIUM CHLORIDE, POTASSIUM CHLORIDE, SODIUM LACTATE AND CALCIUM CHLORIDE 125 ML/HR: 600; 310; 30; 20 INJECTION, SOLUTION INTRAVENOUS at 08:44

## 2018-11-16 RX ADMIN — KETOROLAC TROMETHAMINE 15 MG: 30 INJECTION, SOLUTION INTRAMUSCULAR; INTRAVENOUS at 10:31

## 2018-11-16 RX ADMIN — SODIUM CHLORIDE, POTASSIUM CHLORIDE, SODIUM LACTATE AND CALCIUM CHLORIDE: 600; 310; 30; 20 INJECTION, SOLUTION INTRAVENOUS at 08:59

## 2018-11-16 RX ADMIN — MIDAZOLAM HYDROCHLORIDE 2 MG: 1 INJECTION, SOLUTION INTRAMUSCULAR; INTRAVENOUS at 09:01

## 2018-11-16 RX ADMIN — CEFAZOLIN SODIUM 2000 MG: 2 SOLUTION INTRAVENOUS at 09:04

## 2018-11-16 RX ADMIN — GLYCOPYRROLATE 0.1 MG: 0.2 INJECTION, SOLUTION INTRAMUSCULAR; INTRAVENOUS at 09:50

## 2018-11-16 RX ADMIN — PROPOFOL 75 MCG/KG/MIN: 10 INJECTION, EMULSION INTRAVENOUS at 09:03

## 2018-11-16 RX ADMIN — FENTANYL CITRATE 50 MCG: 50 INJECTION INTRAMUSCULAR; INTRAVENOUS at 09:03

## 2018-11-16 RX ADMIN — PROPOFOL 20 MG: 10 INJECTION, EMULSION INTRAVENOUS at 09:27

## 2018-11-16 RX ADMIN — PROPOFOL 30 MG: 10 INJECTION, EMULSION INTRAVENOUS at 09:03

## 2018-11-16 NOTE — NURSING NOTE
Patient returned from procedure  No noted distress  Denies discomfort  Surgical tape intact less than dime sized drainage noted

## 2018-11-16 NOTE — H&P (VIEW-ONLY)
Chief Complaint:  Right inguinal hernia      History of Present Illness:   Patient is a 48-year-old white male born in [facility] in [facility] which were my people or from  He has a long history of a right inguinal hernia  He was recently seen in the office in 2006 but was essentially asymptomatic and was not her to have it repaired  Return to the office 2009 same thing  Return to the office 2011 and once again he chose not to have repaired  In 2015 the situation was similar although it started to cause him some discomfort occasionally  At that time we showed him how to reduce it because it would occasionally be difficult to reduce  At that time we fairly strongly recommended that Siria repaired and is here today November 2018 to I believe it talk seriously about having it repaired  It is getting larger, becoming more difficult to reduce, and starting to cause him some discomfort  He denies nausea vomiting diarrhea constipation  He is quite healthy spends half the year in [place] and would like to have this reduced prior to leaving for [place] after [place]  Past Medical History:   Past Medical History:   Diagnosis Date    Osteoporosis          Past Surgical History:  No past surgical history on file        Allergies:  No Known Allergies      Medications:    Current Outpatient Prescriptions:     alendronate (FOSAMAX) 70 mg tablet, , Disp: , Rfl:     aspirin (ECOTRIN LOW STRENGTH) 81 mg EC tablet, Take 1 tablet by mouth daily, Disp: , Rfl:     calcium citrate-vitamin D (CITRACAL+D) 315-200 MG-UNIT per tablet, Take by mouth, Disp: , Rfl:     finasteride (PROSCAR) 5 mg tablet, , Disp: , Rfl:     levETIRAcetam (KEPPRA) 250 mg tablet, Take 250 mg by mouth 2 (two) times a day, Disp: , Rfl:     Multiple Vitamin (DAILY VALUE MULTIVITAMIN PO), Take 1 tablet by mouth daily, Disp: , Rfl:     Omega-3 Fatty Acids ([employer]S FISH OIL) 1000 MG CAPS, Take by mouth, Disp: , Rfl:     tamsulosin (FLOMAX) 0 4 mg, , Disp: , Rfl:     methocarbamol (ROBAXIN) 750 mg tablet, Take 750 mg by mouth every 8 (eight) hours as needed, Disp: , Rfl: 0    traMADol (ULTRAM) 50 mg tablet, TAKE 1 TABLET BY MOUTH EVERY 8 HOURS AS NEEDED FOR SHOULDER PAIN , Disp: , Rfl: 0      Social History:  Social History     History   Alcohol Use    Yes     Comment: occ     History   Drug Use No     History   Smoking Status    Never Smoker   Smokeless Tobacco    Never Used         Family History:    Family History   Problem Relation Age of Onset    Coronary artery disease Family          Review of Systems:    negative    Vitals:  Vitals:    11/06/18 1130   BP: 112/74   Pulse: 76   Resp: 16       Physical Exam:  Elderly white male speaks with an [place] accent who is quite healthy appearing who is awake alert no distress  Vital signs as above  Skin warm dry  Head normocephalic and atraumatic  Eyes VICENTE a m  intact  Ears and nose within normal normal limits  Throat gag reflex intact  Neck no masses thyromegaly  Back no CVA or spinal tenderness  Lungs clear to a and P  Cor regular rate and rhythm  The patient has a holosystolic murmur across the precordium  This is projected up into the carotids  No obvious carotid bruits are noted  Abdomen soft flat firm nontender left groin shows no hernia  Right groin shows an obvious hernia is reducible primarily with the patient in supine position  It is minimally tender to palpation  Extremities negative CCE  Neurologically A&O x3 cranial nerves 2-12 intact      Lab Results: I have personally reviewed pertinent reports  See below  Imaging: I have personally reviewed pertinent reports  EKG, Pathology, and Other Studies: I have personally reviewed pertinent reports  No visits with results within 1 Day(s) from this visit     Latest known visit with results is:   Orders Only on 06/21/2018   Component Date Value    Hemoglobin A1C 06/20/2018 6 1          Impression:  Persistent right inguinal hernia    Plan: At this point he would like to undergo repair  Will try to perform this prior to his leaving for [place] in December  We planned local with IV sedation in mesh    Course she has shown a piece of mesh and he understands

## 2018-11-16 NOTE — ANESTHESIA POSTPROCEDURE EVALUATION
Post-Op Assessment Note      CV Status:  Stable    Mental Status:  Somnolent    Hydration Status:  Stable    PONV Controlled:  None    Airway Patency:  Patent    Post Op Vitals Reviewed: Yes          Staff: CRNA           BP 93/53 (11/16/18 1035)    Temp (!) 97 4 °F (36 3 °C) (11/16/18 1035)    Pulse (!) 46 (11/16/18 1035)   Resp 20 (11/16/18 1035)    SpO2 96 % (11/16/18 1035)

## 2018-11-16 NOTE — DISCHARGE INSTRUCTIONS
Inguinal Hernia   WHAT YOU NEED TO KNOW:   An inguinal hernia happens when organs or abdominal tissue push through a weak spot in the abdominal wall  The abdominal wall is made of fat and muscle  It holds the intestines in place  The hernia may contain fluid, tissue from the abdomen, or part of an organ (such as an intestine)  DISCHARGE INSTRUCTIONS:   Return to the emergency department if:   · You have severe abdominal pain with nausea and vomiting  · Your abdomen is larger than usual      · Your hernia gets bigger or is purple or blue  · You see blood in your bowel movements  · You feel weak, dizzy, or faint  Contact your healthcare provider if:   · You have a fever  · You have questions or concerns about your condition or care  Medicine: You may  need the following:  · NSAIDs , such as ibuprofen, help decrease swelling, pain, and fever  NSAIDs can cause stomach bleeding or kidney problems in certain people  If you take blood thinner medicine, always ask your healthcare provider if NSAIDs are safe for you  Always read the medicine label and follow directions  · Take your medicine as directed  Contact your healthcare provider if you think your medicine is not helping or if you have side effects  Tell him or her if you are allergic to any medicine  Keep a list of the medicines, vitamins, and herbs you take  Include the amounts, and when and why you take them  Bring the list or the pill bottles to follow-up visits  Carry your medicine list with you in case of an emergency  Follow up with your healthcare provider as directed:  Write down your questions so you remember to ask them during your visits  Manage your symptoms and prevent another hernia:   · Do not lift anything heavy  Heavy lifting can make your hernia worse or cause another hernia  Ask your healthcare provider how much is safe for you to lift  · Drink liquids as directed    Liquids may prevent constipation and straining during a bowel movement  Ask how much liquid to drink each day and which liquids are best for you  · Eat foods high in fiber  Fiber may prevent constipation and straining during a bowel movement  Foods that contain fiber include fruits, vegetables, beans, lentils, and whole grains  · Maintain a healthy weight  If you are overweight, weight loss may prevent your hernia from getting worse  It may also prevent another hernia  Talk to your healthcare provider about exercise and how to lose weight safely if you are overweight  · Do not smoke  Nicotine and other chemicals in cigarettes and cigars can weaken the abdominal wall  This may increase your risk for another hernia  Ask your healthcare provider for information if you currently smoke and need help to quit  E-cigarettes or smokeless tobacco still contain nicotine  Talk to your healthcare provider before you use these products  © 2017 [employer] Information is for End User's use only and may not be sold, redistributed or otherwise used for commercial purposes  All illustrations and images included in CareNotes® are the copyrighted property of [employer]  or [employer]  The above information is an  only  It is not intended as medical advice for individual conditions or treatments  Talk to your doctor, nurse or pharmacist before following any medical regimen to see if it is safe and effective for you

## 2018-11-16 NOTE — OP NOTE
OPERATIVE REPORT  PATIENT NAME: Andrés Griggs    :  1940  MRN: [id number]  Pt Location:  OR ROOM 07    SURGERY DATE: 2018    Surgeon(s) and Role:     * Luz Marina Pringle MD - Primary    Preop Diagnosis:  Right inguinal hernia [K40 90]    Post-Op Diagnosis Codes:     * Right inguinal hernia [K40 90]    Procedure(s) (LRB):  REPAIR RIGHT INGUINAL HERNIA WITH MESH (Right)    Specimen(s):  * No specimens in log *    Estimated Blood Loss:   Minimal    Drains:       Anesthesia Type:   IV Sedation with Anesthesia    Operative Indications:  Right inguinal hernia [K40 90]      Operative Findings:      Complications:   None    Procedure and Technique:  Patient brought to the operating room placed operative table in a supine position  Under adequate IV sedation in the right lower quadrant and groin were shaved prepped and draped in usual sterile fashion  1% lidocaine was used to infiltrate the ilioinguinal nerve in the right inguinal area  A line of incision was infiltrated over the inguinal canal   A 15  Scalp lesions neck incision directly over this  His carried on subcutaneous tissue using the Bovie  Bleeders were cauterized at that time  Valerie's fascia was identified and divided direction of the incision  Rich is retractors were inserted in the external oblique aponeurosis was identified  He was cleared of overlying fascia and then infiltrated with some once 1% lidocaine  Inguinal canal was then opened with a scalpel and Metzenbaum scissors  This radical was carefully dissected in surrounding tissue and loop with a soft Penrose drain  There appeared to be a large direct inguinal hernia adherent to the cord was carefully dissected free  The cord was inspected for any indirect component and then was present  At this time the direct inguinal hernia was addressed  It was cleared of the surrounding tissue in freed up    The transversalis fascia was incised circumferentially fascia at the base of the bulge   The preperitoneal fat was then liberated and reduced back into the preperitoneal space  The sponges placed it to further developed the preperitoneal space  At that point  Chest mesh was obtained  The sponges movement was passed through the defect on the preperitoneal component was deployed in the usual fashion  This effectively repair of the hernia  The overlying fat was then freed up with past under the external oblique aponeurosis above the internal ring covering this area  A nick was made in the patch and records test to this  The mesh was reapproximated with 2 applied suture  The mesh was on hold and cover the entire floor of the canal quite well  The distal levels is sutured in the general sent to the pubic tubercle with a 2 0 Prolene suture  Mesh covered the entire floor of the canal at that time  There was inspected for bleeding no bleeding was noted  There was infiltrated with 0 5% Marcaine  Once again the areas vitamin-D no bleeding was noted  Tenderness drain was then removed from the cord structures and the port was placed back into the into the inguinal canal   The external oblique aponeurosis was reapproximated with 3 0 Vicryl in a running fashion  Valerie's fascia and subcu was reapproximated 3 0 Vicryl interrupted fashion  Skin was closed for Monocryl in running subcuticular fashion  Benzoin Steri-Strips were applied    Patient tolerated the procedure well he was delivered to the recovery room stable condition   I was present for the entire procedure    Patient Disposition:  PACU     SIGNATURE: Omega Nicholas MD  DATE: November 16, 2018  TIME: 10:44 AM

## 2018-11-17 ENCOUNTER — TELEPHONE (OUTPATIENT)
Dept: OTHER | Facility: OTHER | Age: 78
End: 2018-11-17

## 2018-11-17 NOTE — TELEPHONE ENCOUNTER
Had Surgery yesterday I am in a lot of pain    @1000 Paged   LCJOVQ@ 1020 Paged Dr Rojas Short Patients Phone number, he will call back

## 2018-11-27 ENCOUNTER — OFFICE VISIT (OUTPATIENT)
Dept: PHYSICAL THERAPY | Facility: REHABILITATION | Age: 78
End: 2018-11-27
Payer: MEDICARE

## 2018-11-27 DIAGNOSIS — S43.422A SPRAIN OF LEFT ROTATOR CUFF CAPSULE, INITIAL ENCOUNTER: ICD-10-CM

## 2018-11-27 DIAGNOSIS — M25.512 ACUTE PAIN OF LEFT SHOULDER: Primary | ICD-10-CM

## 2018-11-27 PROCEDURE — 97112 NEUROMUSCULAR REEDUCATION: CPT | Performed by: PHYSICAL THERAPIST

## 2018-11-27 PROCEDURE — G8990 OTHER PT/OT CURRENT STATUS: HCPCS | Performed by: PHYSICAL THERAPIST

## 2018-11-27 PROCEDURE — 97110 THERAPEUTIC EXERCISES: CPT | Performed by: PHYSICAL THERAPIST

## 2018-11-27 PROCEDURE — G8991 OTHER PT/OT GOAL STATUS: HCPCS | Performed by: PHYSICAL THERAPIST

## 2018-11-27 NOTE — PROGRESS NOTES
Daily Note     Today's date: 2018  Patient name: Bianca Kumar  : 1940  MRN: [id number]  Referring provider: Dario Mortensen MD  Dx:   Encounter Diagnosis     ICD-10-CM    1  Acute pain of left shoulder M25 512    2  Sprain of left rotator cuff capsule, initial encounter D93 289K                   Subjective: Pt reports that his shoulder is doing well  He notes that he sometimes gets pain into his mid arm but other than that no other c/o  Pt     Objective: See treatment diary below    Assessment:  Session was modified secondary to pt had recently undergone a hernia repair and was painful with different movements  Most likely why FOTO scores decreased  He was challenged with OH reaching with some pain in mid arm  Cervical retraction was completed which decreased arm pain with OH reaching  Will continue to integrate more OH as able  Will continue to treat conservatively until fully healed from surgery  Pt would benefit from skilled outpatient PT to address pain, strength, and posture in order to maximize his level of function      Plan:  Cont to progress strength     Precautions: osteoporosis       Manuals 10/16 10/18  10/22  10-25  10/29  11/1  11/9  11-13  11/27     PROM  KAB  KAB  x  KAB  KAB  KAB  x                                                                         Exercise Diary                         UBE backwards    x10 min  x10 min backwards  x  x10 min x10 min  x10min  x  x10min       posterior shoulder rolls x30  x30  x30  x  HEP             scap retraction x30  x30 6 s holds  x30  x  HEP             Bicep curls  #3 3x10  #3 3x10  #3 3x10  x  #4 3x10   #4 3x10  #4 3x10  x  #5 x30      AAROM standing x10  x10   x15 and abd  x  x15 abd/ flex              TB ext     Y TB 2X10  Y TB 2X10  x  R TB 3x10  R TB 3x10  R TB 3x10  x  G TB 3x10      TB rows     Y TB  2x10  Y TB  2x10  x  R TB 3x10  R TB 3x10  R TB 3x10  x   G TB 3x10      TB ER looped band   Y TB  2x10  Y TB  2x10  x  R TB  2x10  R TB 3x10  R TB 3x10  x   G TB 3x10      wrist flex/ext #3 3x10  #3 3x10  #3 3x10  x  #5 3x10  #5 3x10  #5 3x10  x       AROM flex/abd to 90 deg      #1 2x10  x  #1 2x10   #1 2x10  #1 2x10  x        AAROM ER         NV  x30  x30  x        supine serratus punches           #1 x20  #1 x30  2# 20x        bent over rows           #5 x15  #5 x20  x  #5 x30      serratus ball push on wall clock/ counter           To fatigue each way  To fatigue each way  x  To fatigue each way      prone I's & T's              x10 each  x       Lifting weights to cabinet shelf       NV 2#   2 levels 2x5 #4 2 levels x10    Cathay carry L arm          #10 30ft x2    Modalities                       Ice to L shoulder  x10 min  x10 min  x  x10 min  x10 min  x10 min  x                                                        X= performed

## 2018-11-28 ENCOUNTER — OFFICE VISIT (OUTPATIENT)
Dept: SURGERY | Facility: CLINIC | Age: 78
End: 2018-11-28

## 2018-11-28 VITALS
BODY MASS INDEX: 24.98 KG/M2 | DIASTOLIC BLOOD PRESSURE: 84 MMHG | HEART RATE: 76 BPM | RESPIRATION RATE: 16 BRPM | WEIGHT: 141 LBS | HEIGHT: 63 IN | SYSTOLIC BLOOD PRESSURE: 118 MMHG

## 2018-11-28 DIAGNOSIS — K40.90 RIGHT INGUINAL HERNIA: Primary | ICD-10-CM

## 2018-11-28 PROCEDURE — 99024 POSTOP FOLLOW-UP VISIT: CPT | Performed by: SPECIALIST

## 2018-11-28 NOTE — PROGRESS NOTES
Owen Engel presents 12 days postop from repair of right inguinal hernia with mesh  He has a little discomfort over the area  If he turns the wrong way he has some discomfort  Other than that he is doing well  He is tolerating his diet has normal GI function  Physical exam:  Elderly [place] white male awake alert no distress    Abdomen:  Soft flat firm nontender  Left groin no hernia  Right groin has a visible fairly fresh scar present  Steri-Strips removed  Incision is healing well  Normal postoperative edema is noted  No evidence of infection or recurrence  Excellent cosmetic result  Impression:  Status post right inguinal hernia with mesh    Plan:  Return 2 weeks for re-evaluation  Slowly increase his activity  He has questions about how much he can lift  20 lb is the cut off for now

## 2018-12-03 ENCOUNTER — OFFICE VISIT (OUTPATIENT)
Dept: PHYSICAL THERAPY | Facility: REHABILITATION | Age: 78
End: 2018-12-03
Payer: MEDICARE

## 2018-12-03 DIAGNOSIS — M25.512 ACUTE PAIN OF LEFT SHOULDER: Primary | ICD-10-CM

## 2018-12-03 DIAGNOSIS — S43.422A SPRAIN OF LEFT ROTATOR CUFF CAPSULE, INITIAL ENCOUNTER: ICD-10-CM

## 2018-12-03 PROCEDURE — 97112 NEUROMUSCULAR REEDUCATION: CPT | Performed by: PHYSICAL THERAPIST

## 2018-12-03 PROCEDURE — 97110 THERAPEUTIC EXERCISES: CPT | Performed by: PHYSICAL THERAPIST

## 2018-12-03 NOTE — PROGRESS NOTES
PT Re-Evaluation     Today's date: 12/3/2018  Patient name: Aleksander Merchant  : 1940  MRN: [id number]  Referring provider: Lois Marcus MD  Dx:   Encounter Diagnosis     ICD-10-CM    1  Acute pain of left shoulder M25 512    2  Sprain of left rotator cuff capsule, initial encounter Z68 795Y                   Assessment/Plan Pt has made good functional progress thus far as demonstrated by improved MMT grades and improved ROM measurements  He continues to have pain with OH activity  He likely has postural musculature weakness however is unable to maintain the MMT position secondary to hernia repair surgery  He does report that he has been feeling better since starting PT  However cannot differentiate if he feels better from the therapy or from the injection that he last received  He would benefit form continued PT to progress his functional strengthening and postural strengthening to decrease pain and improve his overall function of affected UE in order to perform his ADLs with no difficulty  Plan  Patient would benefit from: skilled physical therapy  Planned modality interventions: thermotherapy: hydrocollator packs and cryotherapy  Other planned modality interventions: other modalities prn   Planned therapy interventions: home exercise program, therapeutic exercise, patient education, neuromuscular re-education, joint mobilization, manual therapy, massage, Molina taping, postural training, strengthening, stretching, functional ROM exercises and flexibility  Frequency: 2x week  Duration in weeks: 4  Plan of Care beginning date: 10/16/2018  Treatment plan discussed with: patient      Subjective     Pain  Current pain ratin  At best pain ratin  At worst pain ratin  Location: LUT to mid arm   Quality: dull ache  Relieving factors: ice  Aggravating factors: overhead activity  Progression: improved    Goals  ST  Independent with HEP in 2 weeks - ONGOING   2   Pt will have verbal report of improvement in symptoms by >/=25% in 2 weeks - MET  3  Pt will improve LUE ROM measuremeants >/= 5 deg in 2 weeks  - MET     LT  Pt will improve FOTO score by >/= 4 points in 6 weeks - MET   2  Pt will improve FOTO score to >/= 70 by visit # 11 - NOT MET   3  Pt will be able to reach a shelf that is at shoulder height with little to no difficulty in 6 weeks - PARTIALLY MET  4  Pt will be able to sleep through the night with </= 1 disturbances in 6 weeks - MET   5  Pt will be able to independently dress with little to no difficulty in 6 weeks - MET    PROGRESS TO   1  Pt will be able to complete heavy household chores with little to no difficulty  2  Pt will be able to carry an object over 10lbs with affected UE  In order to complete household chores/ activities with little to no difficulty  3   Pt will be able to reach shelves that are at shoulder height for ease of daily activities around the home with no difficulty     Patient Goals  Patient goals for therapy: decreased pain and independence with ADLs/IADLs  Patient goal: to be able to reach overhead, dress without pain, sleep through the night     Objective       Active Range of Motion   Left Shoulder   Flexion: 145 degrees with little pain  Extension: 80 degrees with pain  Abduction: 145 degrees with pain  External rotation BTH: C2   Internal rotation BTB: T8     Passive Range of Motion     Full L PROM    Strength/Myotome Testing     Left Shoulder     Planes of Motion   Flexion: 4   Extension: 5   Abduction: 4  External rotation at 0°: 4   Internal rotation at 0°: 5       Precautions: osteoporosis       Manuals 10/16 10/18  10/22  10-25  10/29  11/1  11/9  11-13  11/27  12/3   PROM  KAB  KAB  x  KAB  KAB  KAB  x                                                                         Exercise Diary                         UBE backwards    x10 min  x10 min backwards  x  x10 min x10 min  x10min  x  x10min   x10 min    posterior shoulder rolls x30  x30  x30  x  HEP             scap retraction x30  x30 6 s holds  x30  x  HEP             Bicep curls  #3 3x10  #3 3x10  #3 3x10  x  #4 3x10   #4 3x10  #4 3x10  x  #5 x30  #10 3x10    AAROM standing x10  x10   x15 and abd  x  x15 abd/ flex              TB ext     Y TB 2X10  Y TB 2X10  x  R TB 3x10  R TB 3x10  R TB 3x10  x  G TB 3x10  G TB 2x10    TB rows     Y TB  2x10  Y TB  2x10  x  R TB 3x10  R TB 3x10  R TB 3x10  x   G TB 3x10  G TB 2x10    TB ER looped band   Y TB  2x10  Y TB  2x10  x  R TB  2x10  R TB 3x10  R TB 3x10  x   G TB 3x10  G TB 2x10    wrist flex/ext #3 3x10  #3 3x10  #3 3x10  x  #5 3x10  #5 3x10  #5 3x10  x       AROM flex/abd to 90 deg      #1 2x10  x  #1 2x10   #1 2x10  #1 2x10  x    #1 3x10    AAROM ER         NV  x30  x30  x        supine serratus punches           #1 x20  #1 x30  2# 20x    #2 x30    bent over rows           #5 x15  #5 x20  x  #5 x30  #10 x30    serratus ball push on wall clock/ counter           To fatigue each way  To fatigue each way  x  To fatigue each way  To fatigue each way    prone I's & T's              x10 each  x       Lifting weights to cabinet shelf       NV 2#   2 levels 2x5 #4 2 levels x10 #4 2 levels 2x10   Dowling carry L arm          #10 30ft x2 #10 30ft x2   Modalities                       Ice to L shoulder  x10 min  x10 min  x  x10 min  x10 min  x10 min  x                                                        X= performed

## 2018-12-03 NOTE — LETTER
December 3, 2018    Gwen Aguilar, [address] [address] [address]    Patient: Sugey Anne   YOB: 1940   Date of Visit: 12/3/2018     Encounter Diagnosis     ICD-10-CM    1  Acute pain of left shoulder M25 512    2  Sprain of left rotator cuff capsule, initial encounter N17 288B        Dear Dr Lindsey Moreno:    Please review the attached Plan of Care from [address] [place] [address] recent visit  Please verify that you agree therapy should continue by signing the attached document and sending it back to our office  If you have any questions or concerns, please don't hesitate to call  Sincerely,    Alberto Quinonez PT      Referring Provider:      I certify that I have read the below Plan of Care and certify the need for these services furnished under this plan of treatment while under my care  Gwen Aguilar MD  [place] [address]  VIA Facsimile: [phone]          PT Re-Evaluation     Today's date: 12/3/2018  Patient name: Sugey Anne  : 1940  MRN: [id number]  Referring provider: Kodi Stover MD  Dx:   Encounter Diagnosis     ICD-10-CM    1  Acute pain of left shoulder M25 512    2  Sprain of left rotator cuff capsule, initial encounter D57 561K                   Assessment/Plan Pt has made good functional progress thus far as demonstrated by improved MMT grades and improved ROM measurements  He continues to have pain with OH activity  He likely has postural musculature weakness however is unable to maintain the MMT position secondary to hernia repair surgery  He does report that he has been feeling better since starting PT  However cannot differentiate if he feels better from the therapy or from the injection that he last received   He would benefit form continued PT to progress his functional strengthening and postural strengthening to decrease pain and improve his overall function of affected UE in order to perform his ADLs with no difficulty  Plan  Patient would benefit from: skilled physical therapy  Planned modality interventions: thermotherapy: hydrocollator packs and cryotherapy  Other planned modality interventions: other modalities prn   Planned therapy interventions: home exercise program, therapeutic exercise, patient education, neuromuscular re-education, joint mobilization, manual therapy, massage, Molina taping, postural training, strengthening, stretching, functional ROM exercises and flexibility  Frequency: 2x week  Duration in weeks: 4  Plan of Care beginning date: 10/16/2018  Treatment plan discussed with: patient      Subjective     Pain  Current pain ratin  At best pain ratin  At worst pain ratin  Location: LUT to mid arm   Quality: dull ache  Relieving factors: ice  Aggravating factors: overhead activity  Progression: improved    Goals  ST  Independent with HEP in 2 weeks - ONGOING   2  Pt will have verbal report of improvement in symptoms by >/=25% in 2 weeks - MET  3  Pt will improve LUE ROM measuremeants >/= 5 deg in 2 weeks  - MET     LT  Pt will improve FOTO score by >/= 4 points in 6 weeks - MET   2  Pt will improve FOTO score to >/= 70 by visit # 11 - NOT MET   3  Pt will be able to reach a shelf that is at shoulder height with little to no difficulty in 6 weeks - PARTIALLY MET  4  Pt will be able to sleep through the night with </= 1 disturbances in 6 weeks - MET   5  Pt will be able to independently dress with little to no difficulty in 6 weeks - MET    PROGRESS TO   1  Pt will be able to complete heavy household chores with little to no difficulty  2  Pt will be able to carry an object over 10lbs with affected UE  In order to complete household chores/ activities with little to no difficulty  3   Pt will be able to reach shelves that are at shoulder height for ease of daily activities around the home with no difficulty     Patient Goals  Patient goals for therapy: decreased pain and independence with ADLs/IADLs  Patient goal: to be able to reach overhead, dress without pain, sleep through the night     Objective       Active Range of Motion   Left Shoulder   Flexion: 145 degrees with little pain  Extension: 80 degrees with pain  Abduction: 145 degrees with pain  External rotation BTH: C2   Internal rotation BTB: T8     Passive Range of Motion     Full L PROM    Strength/Myotome Testing     Left Shoulder     Planes of Motion   Flexion: 4   Extension: 5   Abduction: 4  External rotation at 0°:  4   Internal rotation at 0°:  5       Precautions: osteoporosis       Manuals 10/16 10/18  10/22  10-25  10/29  11/1  11/9  11-13  11/27  12/3   PROM  KAB  KAB  x  KAB  KAB  KAB  x                                                                         Exercise Diary                         UBE backwards    x10 min  x10 min backwards  x  x10 min x10 min  x10min  x  x10min   x10 min    posterior shoulder rolls x30  x30  x30  x  HEP             scap retraction x30  x30 6 s holds  x30  x  HEP             Bicep curls  #3 3x10  #3 3x10  #3 3x10  x  #4 3x10   #4 3x10  #4 3x10  x  #5 x30  #10 3x10    AAROM standing x10  x10   x15 and abd  x  x15 abd/ flex              TB ext     Y TB 2X10  Y TB 2X10  x  R TB 3x10  R TB 3x10  R TB 3x10  x  G TB 3x10  G TB 2x10    TB rows     Y TB  2x10  Y TB  2x10  x  R TB 3x10  R TB 3x10  R TB 3x10  x   G TB 3x10  G TB 2x10    TB ER looped band   Y TB  2x10  Y TB  2x10  x  R TB  2x10  R TB 3x10  R TB 3x10  x   G TB 3x10  G TB 2x10    wrist flex/ext #3 3x10  #3 3x10  #3 3x10  x  #5 3x10  #5 3x10  #5 3x10  x       AROM flex/abd to 90 deg      #1 2x10  x  #1 2x10   #1 2x10  #1 2x10  x    #1 3x10    AAROM ER         NV  x30  x30  x        supine serratus punches           #1 x20  #1 x30  2# 20x    #2 x30    bent over rows           #5 x15  #5 x20  x  #5 x30  #10 x30    serratus ball push on wall clock/ counter           To fatigue each way  To fatigue each way  x  To fatigue each way  To fatigue each way    prone I's & T's              x10 each  x       Lifting weights to cabinet shelf       NV 2#   2 levels 2x5 #4 2 levels x10 #4 2 levels 2x10   Floweree carry L arm          #10 30ft x2 #10 30ft x2   Modalities                       Ice to L shoulder  x10 min  x10 min  x  x10 min  x10 min  x10 min  x                                                        X= performed

## 2018-12-06 ENCOUNTER — OFFICE VISIT (OUTPATIENT)
Dept: PHYSICAL THERAPY | Facility: REHABILITATION | Age: 78
End: 2018-12-06
Payer: MEDICARE

## 2018-12-06 ENCOUNTER — TRANSCRIBE ORDERS (OUTPATIENT)
Dept: PHYSICAL THERAPY | Facility: REHABILITATION | Age: 78
End: 2018-12-06

## 2018-12-06 DIAGNOSIS — M25.512 ACUTE PAIN OF LEFT SHOULDER: Primary | ICD-10-CM

## 2018-12-06 DIAGNOSIS — S43.422A SPRAIN OF LEFT ROTATOR CUFF CAPSULE, INITIAL ENCOUNTER: ICD-10-CM

## 2018-12-06 PROCEDURE — 97110 THERAPEUTIC EXERCISES: CPT

## 2018-12-06 PROCEDURE — 97112 NEUROMUSCULAR REEDUCATION: CPT

## 2018-12-06 NOTE — PROGRESS NOTES
Daily Note     Today's date: 2018  Patient name: Andrés Griggs  : 1940  MRN: [id number]  Referring provider: Morris Macedo MD  Dx:   Encounter Diagnosis     ICD-10-CM    1  Acute pain of left shoulder M25 512    2  Sprain of left rotator cuff capsule, initial encounter D13 804G                   Subjective:  Saw MD, he does not have to return to MD again  He is to cont PT & HEP      Objective: See treatment diary below    Precautions: osteop    Daily Treatment Diary     Manual  12-6                                                                                 Exercise Diary  12-6            UBE BW 10'            TB: row Gr 20x            ext Gr 20x            ER Gr 20x                         Elbow curls 10# 20x            AROM flx, abd 1# 30x            Ball on wall to fatigue 2#  2x            FW bent row 10# 20x            Wt to shelves 4# 30x            [place] carry 10# 2x                         Supine punch 2# 20x            T stand facing wall 20x                                                                                              Modalities                                                       X=same as last visit    Assessment: Tolerated treatment well  Patient would benefit from continued PT  Cont to need frequent cuing  He is primarily R handed with ADL      Plan: Continue per plan of care

## 2018-12-10 ENCOUNTER — APPOINTMENT (OUTPATIENT)
Dept: PHYSICAL THERAPY | Facility: REHABILITATION | Age: 78
End: 2018-12-10
Payer: MEDICARE

## 2018-12-11 ENCOUNTER — OFFICE VISIT (OUTPATIENT)
Dept: PHYSICAL THERAPY | Facility: REHABILITATION | Age: 78
End: 2018-12-11
Payer: MEDICARE

## 2018-12-11 DIAGNOSIS — M25.512 ACUTE PAIN OF LEFT SHOULDER: Primary | ICD-10-CM

## 2018-12-11 DIAGNOSIS — S43.422A SPRAIN OF LEFT ROTATOR CUFF CAPSULE, INITIAL ENCOUNTER: ICD-10-CM

## 2018-12-11 PROCEDURE — 97110 THERAPEUTIC EXERCISES: CPT | Performed by: PHYSICAL THERAPIST

## 2018-12-11 PROCEDURE — 97112 NEUROMUSCULAR REEDUCATION: CPT | Performed by: PHYSICAL THERAPIST

## 2018-12-11 NOTE — PROGRESS NOTES
Daily Note     Today's date: 2018  Patient name: Albert Bassett  : 1940  MRN: [id number]  Referring provider: Héctor Mendez MD  Dx:   Encounter Diagnosis     ICD-10-CM    1  Acute pain of left shoulder M25 512    2  Sprain of left rotator cuff capsule, initial encounter C79 625Y                   Subjective:  Pt reports that he was mildy sore after last visit  Objective: See treatment diary below    Precautions: osteop    Daily Treatment Diary     Manual                                                                                  Exercise Diary             UBE BW 10' 10'           TB: row Gr 20x Gr x30           ext Gr 20x Gr x30           ER Gr 20x Gr x30                        Elbow curls 10# 20x 10# 20x           AROM flx, abd 1# 30x #2 x20           Ball on wall to fatigue 2#  2x 2#  2x           FW bent row 10# 20x 10# 30x           Wt to shelves 4# 30x 4# 30x           Bertin Raphael carry 10# 2x #10 30ft x4                        Supine punch 2# 20x #2x20           T stand facing wall 20x x20                                                                                             Modalities                                                       X=same as last visit    Assessment: Pt was challenged with active abduction with the #2lb weight and had UT substitution  When motion was decreased he did not have UT substitution  He was able to tolerate additions in his program with no c/o of additional pain just had some muscular soreness post tx session  Plan: Continue per plan of care  Progress treatment as tolerated

## 2018-12-12 ENCOUNTER — OFFICE VISIT (OUTPATIENT)
Dept: SURGERY | Facility: CLINIC | Age: 78
End: 2018-12-12

## 2018-12-12 VITALS
BODY MASS INDEX: 25.95 KG/M2 | SYSTOLIC BLOOD PRESSURE: 122 MMHG | DIASTOLIC BLOOD PRESSURE: 66 MMHG | WEIGHT: 141 LBS | HEIGHT: 62 IN | RESPIRATION RATE: 16 BRPM | HEART RATE: 76 BPM

## 2018-12-12 DIAGNOSIS — K40.90 RIGHT INGUINAL HERNIA: Primary | ICD-10-CM

## 2018-12-12 PROCEDURE — 99024 POSTOP FOLLOW-UP VISIT: CPT | Performed by: SPECIALIST

## 2018-12-12 NOTE — PROGRESS NOTES
Owen Engel presents today for 2nd follow-up visit status post repair of a large right inguinal hernia with mesh  Today in the office she has no pain in the area  He has an occasional twinge  Other that he is tolerating his diet is good GI function  Physical exam:  Elderly white male awake alert no distress    Abdomen soft flat firm nontender right groin is healing with no evidence of infection or recurrence  Some mild residual edema is noted  He has excellent cosmetic result  Impression:  Continues to do well status post right inguinal herniorrhaphy with mesh  Plan: At this point he is discharged the office told turned her p r n  He is told notify the office any other surgical issues need to be addressed  He is always welcome in the office  He is a fellow [employer]

## 2018-12-13 ENCOUNTER — OFFICE VISIT (OUTPATIENT)
Dept: PHYSICAL THERAPY | Facility: REHABILITATION | Age: 78
End: 2018-12-13
Payer: MEDICARE

## 2018-12-13 DIAGNOSIS — S43.422A SPRAIN OF LEFT ROTATOR CUFF CAPSULE, INITIAL ENCOUNTER: ICD-10-CM

## 2018-12-13 DIAGNOSIS — M25.512 ACUTE PAIN OF LEFT SHOULDER: Primary | ICD-10-CM

## 2018-12-13 PROCEDURE — 97110 THERAPEUTIC EXERCISES: CPT | Performed by: PHYSICAL THERAPIST

## 2018-12-13 PROCEDURE — 97112 NEUROMUSCULAR REEDUCATION: CPT | Performed by: PHYSICAL THERAPIST

## 2018-12-17 ENCOUNTER — OFFICE VISIT (OUTPATIENT)
Dept: PHYSICAL THERAPY | Facility: REHABILITATION | Age: 78
End: 2018-12-17
Payer: MEDICARE

## 2018-12-17 DIAGNOSIS — S43.422A SPRAIN OF LEFT ROTATOR CUFF CAPSULE, INITIAL ENCOUNTER: ICD-10-CM

## 2018-12-17 DIAGNOSIS — M25.512 ACUTE PAIN OF LEFT SHOULDER: Primary | ICD-10-CM

## 2018-12-17 PROCEDURE — 97112 NEUROMUSCULAR REEDUCATION: CPT | Performed by: PHYSICAL THERAPIST

## 2018-12-17 PROCEDURE — 97110 THERAPEUTIC EXERCISES: CPT | Performed by: PHYSICAL THERAPIST

## 2018-12-17 NOTE — PROGRESS NOTES
Daily Note     Today's date: 2018  Patient name: Albert Bassett  : 1940  MRN: [id number]  Referring provider: Héctor Mendez MD  Dx:   Encounter Diagnosis     ICD-10-CM    1  Acute pain of left shoulder M25 512    2  Sprain of left rotator cuff capsule, initial encounter [id number]                   Subjective:  Pt reports that he is about 80% better  Since adding the neck exercise he has been able to sleep the last 2 nights with no difficulty  Objective: See treatment diary below    Precautions: osteop    Daily Treatment Diary     Manual                                                                                Exercise Diary           UBE BW 10' 10' 10' 10'         TB: row Gr 20x Gr x30 Blue x30 Blue x30         ext Gr 20x Gr x30 Blue x30 Blue x30         ER Gr 20x Gr x30 Gr x30 Gr x30                      Elbow curls 10# 20x 10# 20x #10 3x10 #10 3x10         AROM flx, abd 1# 30x #2 x20 #2 x20 #2 x20         Ball on wall to fatigue 2#  2x 2#  2x 2#  2x 2#  2x         FW bent row 10# 20x 10# 30x 10# 30x 10# 30x         Wt to shelves 4# 30x 4# 30x 4# 30x #5 x30         [place]lap carry 10# 2x #10 30ft x4 #10 30ft x6 #15 30ft x6                      Supine punch 2# 20x #2x20 #2x20 #3x30         T stand facing wall 20x x20           Cervical ret self OP    x15 x15                                                                              Modalities              Ice at end L shoulder   x10min x10min                                     X=same as last visit    Assessment: Pt continued to have a favorable response to cervical retraction biased movements, pt was instructed to continue to complete these at home  Pt was able to tolerate progressions with no increases in pain  It was discussed with patient that he will be d/c to home program next visit  Pt verbalized understanding and is ok with the transition         Plan: D/C to home program [place]

## 2018-12-20 ENCOUNTER — OFFICE VISIT (OUTPATIENT)
Dept: PHYSICAL THERAPY | Facility: REHABILITATION | Age: 78
End: 2018-12-20
Payer: MEDICARE

## 2018-12-20 DIAGNOSIS — M25.512 ACUTE PAIN OF LEFT SHOULDER: Primary | ICD-10-CM

## 2018-12-20 DIAGNOSIS — S43.422A SPRAIN OF LEFT ROTATOR CUFF CAPSULE, INITIAL ENCOUNTER: ICD-10-CM

## 2018-12-20 PROCEDURE — 97110 THERAPEUTIC EXERCISES: CPT

## 2018-12-20 PROCEDURE — G8991 OTHER PT/OT GOAL STATUS: HCPCS

## 2018-12-20 PROCEDURE — 97112 NEUROMUSCULAR REEDUCATION: CPT

## 2018-12-20 PROCEDURE — G8990 OTHER PT/OT CURRENT STATUS: HCPCS

## 2018-12-27 NOTE — PROGRESS NOTES
PT Discharge    Today's date: 2018  Patient name: Maxim Snyder  : 1940  MRN: [id number]  Referring provider: Christina Weaver MD  Dx:   Encounter Diagnosis     ICD-10-CM    1  Acute pain of left shoulder M25 512    2  Sprain of left rotator cuff capsule, initial encounter S43 422A        Start Time: 1020  Stop Time: 1115  Total time in clinic (min): 55 minutes    Assessment/Plan Patient has met their goals for PT, improved FOTO scores, and has a verbal report of improvement  he will be d/c to a home program  Pt was instructed that if any questions come up that they can contact our office       Subjective    Objective    Flowsheet Rows      Most Recent Value   PT/OT G-Codes   Current Score  71   Projected Score  70   FOTO information reviewed  Yes   Assessment Type  Re-evaluation   G code set  Other PT/OT Primary   Other PT Primary Current Status ()  CJ   Other PT Primary Goal Status ()  CJ

## 2019-05-29 PROBLEM — I35.0 NONRHEUMATIC AORTIC VALVE STENOSIS: Status: ACTIVE | Noted: 2019-05-29

## 2019-05-29 PROBLEM — R00.2 PALPITATIONS: Status: ACTIVE | Noted: 2019-05-29

## 2019-05-30 ENCOUNTER — HOSPITAL ENCOUNTER (OUTPATIENT)
Dept: NON INVASIVE DIAGNOSTICS | Facility: CLINIC | Age: 79
Discharge: HOME | End: 2019-05-30
Payer: MEDICARE

## 2019-05-30 ENCOUNTER — OFFICE VISIT (OUTPATIENT)
Dept: CARDIOLOGY CLINIC | Facility: CLINIC | Age: 79
End: 2019-05-30
Payer: MEDICARE

## 2019-05-30 VITALS
OXYGEN SATURATION: 96 % | DIASTOLIC BLOOD PRESSURE: 84 MMHG | HEIGHT: 62 IN | HEART RATE: 48 BPM | WEIGHT: 131.6 LBS | BODY MASS INDEX: 24.22 KG/M2 | SYSTOLIC BLOOD PRESSURE: 120 MMHG

## 2019-05-30 DIAGNOSIS — R00.1 SINUS BRADYCARDIA: ICD-10-CM

## 2019-05-30 DIAGNOSIS — I35.0 NONRHEUMATIC AORTIC VALVE STENOSIS: Primary | ICD-10-CM

## 2019-05-30 DIAGNOSIS — I35.0 NONRHEUMATIC AORTIC VALVE STENOSIS: ICD-10-CM

## 2019-05-30 PROCEDURE — 93306 TTE W/DOPPLER COMPLETE: CPT | Performed by: INTERNAL MEDICINE

## 2019-05-30 PROCEDURE — 93225 XTRNL ECG REC<48 HRS REC: CPT

## 2019-05-30 PROCEDURE — 99213 OFFICE O/P EST LOW 20 MIN: CPT | Performed by: INTERNAL MEDICINE

## 2019-05-30 PROCEDURE — 1124F ACP DISCUSS-NO DSCNMKR DOCD: CPT | Performed by: INTERNAL MEDICINE

## 2019-05-30 PROCEDURE — 93306 TTE W/DOPPLER COMPLETE: CPT

## 2019-05-30 PROCEDURE — 93226 XTRNL ECG REC<48 HR SCAN A/R: CPT

## 2019-06-05 PROCEDURE — 93227 XTRNL ECG REC<48 HR R&I: CPT | Performed by: INTERNAL MEDICINE

## 2019-11-01 ENCOUNTER — OFFICE VISIT (OUTPATIENT)
Dept: CARDIOLOGY CLINIC | Facility: CLINIC | Age: 79
End: 2019-11-01
Payer: MEDICARE

## 2019-11-01 VITALS
SYSTOLIC BLOOD PRESSURE: 90 MMHG | DIASTOLIC BLOOD PRESSURE: 70 MMHG | HEIGHT: 62 IN | HEART RATE: 62 BPM | WEIGHT: 130.8 LBS | BODY MASS INDEX: 24.07 KG/M2

## 2019-11-01 DIAGNOSIS — I35.0 NONRHEUMATIC AORTIC VALVE STENOSIS: Primary | ICD-10-CM

## 2019-11-01 DIAGNOSIS — R00.2 PALPITATIONS: ICD-10-CM

## 2019-11-01 DIAGNOSIS — R00.1 SINUS BRADYCARDIA: ICD-10-CM

## 2019-11-01 PROCEDURE — 93000 ELECTROCARDIOGRAM COMPLETE: CPT | Performed by: INTERNAL MEDICINE

## 2019-11-01 PROCEDURE — 99214 OFFICE O/P EST MOD 30 MIN: CPT | Performed by: INTERNAL MEDICINE

## 2019-11-01 NOTE — PROGRESS NOTES
Cardiology Follow Up    Harika Nowak  1940  [id number]  [address] [facility] [address]  [phone]    Reason for visit: 6 month FU for AS and palpitations    Also has SB      1  Nonrheumatic aortic valve stenosis  POCT ECG   2  Sinus bradycardia  POCT ECG   3  Palpitations         Interval History:  Since the patient's last visit, he has had some exertional chest discomfort which can radiate into his throat and some shortness of breath  This generally occurs with exertion but has occurred at rest   He does get palpitations at times    He denies dizziness or peripheral edema      Patient Active Problem List   Diagnosis    BPH (benign prostatic hyperplasia)    Osteoporosis    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[facility] [address] )    Right inguinal hernia    Nonrheumatic aortic valve stenosis    Palpitations    Sinus bradycardia     Past Medical History:   Diagnosis Date    BPH (benign prostatic hyperplasia)     [facility]     pre    Irregular heart beat     murmur    Osteoporosis     Seizures (HCC)     partial sensory      Social History     Socioeconomic History    Marital status: /Civil Union     Spouse name: Not on file    Number of children: Not on file    Years of education: Not on file    Highest education level: Not on file   Occupational History    Not on file   Social Needs    Financial resource strain: Not on file    Food insecurity:     Worry: Not on file     Inability: Not on file    Transportation needs:     Medical: Not on file     Non-medical: Not on file   Tobacco Use    Smoking status: Never Smoker    Smokeless tobacco: Never Used   Substance and Sexual Activity    Alcohol use: Yes     Comment: occ    Drug use: No    Sexual activity: Not on file   Lifestyle    Physical activity:     Days per week: Not on file     Minutes per session: Not on file    Stress: Not on file   Relationships    Social connections:     Talks on phone: Not on file     Gets together: Not on file     Attends Sikh service: Not on file     Active member of club or organization: Not on file     Attends meetings of clubs or organizations: Not on file     Relationship status: Not on file    Intimate partner violence:     Fear of current or ex partner: Not on file     Emotionally abused: Not on file     Physically abused: Not on file     Forced sexual activity: Not on file   Other Topics Concern    Not on file   Social History Narrative    Not on file      Family History   Problem Relation Age of Onset    Coronary artery disease Family     Heart disease Family      Past Surgical History:   Procedure Laterality Date    COLONOSCOPY      HERNIA REPAIR      [place] REPAIR ING HERNIA,5+Y/O,REDUCIBL Right 11/16/2018    Procedure: REPAIR RIGHT INGUINAL HERNIA WITH MESH;  Surgeon: Trinh Guillen MD;  Location: [facility];  Service: General       Current Outpatient Medications:     aspirin (ECOTRIN LOW STRENGTH) 81 mg EC tablet, Take 81 mg by mouth daily  , Disp: , Rfl:     calcium citrate-vitamin D (CITRACAL+D) 315-200 MG-UNIT per tablet, Take 1 tablet by mouth daily  , Disp: , Rfl:     finasteride (PROSCAR) 5 mg tablet, Take 5 mg by mouth  , Disp: , Rfl:     levETIRAcetam (KEPPRA) 250 mg tablet, Take 250 mg by mouth 2 (two) times a day, Disp: , Rfl:     Multiple Vitamin (DAILY VALUE MULTIVITAMIN PO), Take 1 tablet by mouth daily, Disp: , Rfl:     Omega-3 Fatty Acids ([employer] FISH OIL) 1000 MG CAPS, Take 1 capsule by mouth daily  , Disp: , Rfl:     tamsulosin (FLOMAX) 0 4 mg, Take 0 4 mg by mouth daily with dinner  , Disp: , Rfl:     alendronate (FOSAMAX) 70 mg tablet, Take 70 mg by mouth every 7 days , Disp: , Rfl:   No Known Allergies    Review of Systems:  Review of Systems   Constitutional: Positive for activity change and fatigue  Negative for appetite change and unexpected weight change  Respiratory: Positive for shortness of breath  Negative for cough, chest tightness and wheezing  Cardiovascular: Positive for chest pain  Negative for palpitations and leg swelling  Gastrointestinal: Positive for blood in stool (when he forces can see some blood) and constipation  Negative for abdominal pain and diarrhea  Genitourinary: Positive for frequency  Negative for dysuria, hematuria and urgency  Musculoskeletal: Negative for arthralgias, back pain, gait problem and joint swelling  Neurological: Negative for dizziness, light-headedness, numbness and headaches  Psychiatric/Behavioral: Negative for agitation, behavioral problems, confusion and decreased concentration  Physical Exam:  Vitals:    11/01/19 0825   BP: 90/70   BP Location: Left arm   Patient Position: Sitting   Cuff Size: Standard   Pulse: 62   Weight: 59 3 kg (130 lb 12 8 oz)   Height: 5' 2" (1 575 m)       Physical Exam   Constitutional: He is oriented to person, place, and time  He appears well-developed and well-nourished  No distress  thin   HENT:   Head: Normocephalic and atraumatic  Mouth/Throat: Oropharynx is clear and moist  No oropharyngeal exudate  Eyes: Conjunctivae are normal  No scleral icterus  Neck: Neck supple  Decreased carotid pulses present  No JVD present  Carotid bruit is present (Transmitted murmur)  No thyromegaly present  Cardiovascular: Normal rate, regular rhythm and intact distal pulses  Exam reveals no gallop and no friction rub  Murmur ( grade 3 systolic murmur at the base with diminished heart sound  No diastolic murmur) heard  Pulmonary/Chest: Breath sounds normal  He has no wheezes  He has no rhonchi  He has no rales  Abdominal: Soft  He exhibits no mass  There is no hepatosplenomegaly  There is no tenderness  Musculoskeletal: He exhibits deformity (kyphosis)  He exhibits no edema or tenderness  Neurological: He is alert and oriented to person, place, and time   He has normal strength  No cranial nerve deficit or sensory deficit  Skin: Skin is warm and dry  No rash noted  No erythema  No pallor  Psychiatric: He has a normal mood and affect  His behavior is normal  Judgment and thought content normal        Discussion/Summary:  1  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  Patient now having some exertional symptoms  Likely related to advancing aortic stenosis  Did recommend proceeding with cardiac catheterization to define coronary anatomy with evaluation for either TAVR or SAVR to follow    Will order requisite blood work    Discussed CC including risks death, myocardial infarction, stroke, vascular injury, bleeding, thrombosis, pseudoaneurysm, allergy, infection and neural injury  Patient understood this and consented to the procedure  I did suggest that he get his cardiac catheterization in the near future and get a surgical evaluation underway  He does want to go to [place] but perhaps can abbreviatehis stay down there to come back for surgery in the winter  2  Palpitations  Relatively quiet at this time  Is not requiring medications  Fairly scarce ectopic beats on Holter monitor earlier this year  3  Sinus bradycardia  Minimum heart rate on Holter 43 beats per minute with longest R interval 1 8 seconds        Ann-Marie Gallegos MD

## 2019-11-02 ENCOUNTER — TRANSCRIBE ORDERS (OUTPATIENT)
Dept: ADMINISTRATIVE | Facility: HOSPITAL | Age: 79
End: 2019-11-02

## 2019-11-02 ENCOUNTER — APPOINTMENT (OUTPATIENT)
Dept: LAB | Facility: IMAGING CENTER | Age: 79
End: 2019-11-02
Payer: MEDICARE

## 2019-11-02 DIAGNOSIS — I35.0 NONRHEUMATIC AORTIC VALVE STENOSIS: ICD-10-CM

## 2019-11-02 LAB
ANION GAP SERPL CALCULATED.3IONS-SCNC: 6 MMOL/L (ref 4–13)
BUN SERPL-MCNC: 19 MG/DL (ref 5–25)
CALCIUM SERPL-MCNC: 9.6 MG/DL (ref 8.3–10.1)
CHLORIDE SERPL-SCNC: 104 MMOL/L (ref 100–108)
CO2 SERPL-SCNC: 29 MMOL/L (ref 21–32)
CREAT SERPL-MCNC: 0.98 MG/DL (ref 0.6–1.3)
ERYTHROCYTE [DISTWIDTH] IN BLOOD BY AUTOMATED COUNT: 13 % (ref 11.6–15.1)
GFR SERPL CREATININE-BSD FRML MDRD: 73 ML/MIN/1.73SQ M
GLUCOSE P FAST SERPL-MCNC: 108 MG/DL (ref 65–99)
HCT VFR BLD AUTO: 46.6 % (ref 36.5–49.3)
HGB BLD-MCNC: 15 G/DL (ref 12–17)
INR PPP: 1.14 (ref 0.84–1.19)
MCH RBC QN AUTO: 30.1 PG (ref 26.8–34.3)
MCHC RBC AUTO-ENTMCNC: 32.2 G/DL (ref 31.4–37.4)
MCV RBC AUTO: 93 FL (ref 82–98)
PLATELET # BLD AUTO: 130 THOUSANDS/UL (ref 149–390)
PMV BLD AUTO: 13.8 FL (ref 8.9–12.7)
POTASSIUM SERPL-SCNC: 4.5 MMOL/L (ref 3.5–5.3)
PROTHROMBIN TIME: 14.2 SECONDS (ref 11.6–14.5)
RBC # BLD AUTO: 4.99 MILLION/UL (ref 3.88–5.62)
SODIUM SERPL-SCNC: 139 MMOL/L (ref 136–145)
WBC # BLD AUTO: 6.02 THOUSAND/UL (ref 4.31–10.16)

## 2019-11-02 PROCEDURE — 85610 PROTHROMBIN TIME: CPT

## 2019-11-02 PROCEDURE — 80048 BASIC METABOLIC PNL TOTAL CA: CPT

## 2019-11-02 PROCEDURE — 85027 COMPLETE CBC AUTOMATED: CPT

## 2019-11-02 PROCEDURE — 36415 COLL VENOUS BLD VENIPUNCTURE: CPT

## 2019-11-05 RX ORDER — ASPIRIN 81 MG/1
162 TABLET, CHEWABLE ORAL DAILY
Status: CANCELLED | OUTPATIENT
Start: 2019-11-05

## 2019-11-05 RX ORDER — SODIUM CHLORIDE 9 MG/ML
100 INJECTION, SOLUTION INTRAVENOUS CONTINUOUS
Status: CANCELLED | OUTPATIENT
Start: 2019-11-05

## 2019-11-06 ENCOUNTER — TELEPHONE (OUTPATIENT)
Dept: CARDIOLOGY CLINIC | Facility: CLINIC | Age: 79
End: 2019-11-06

## 2019-11-06 NOTE — TELEPHONE ENCOUNTER
Pt called wants to speak to a doctor about upcoming cath he states is scheduled  He wanted to come to office today to speak to Dr Elissa Morales (who is off today) or even another doctor he has questions and is not willing to reveal what they are  Pt states will not take long can I just come and speak to a doctor  Told him would message Dr Elissa Morales  Please call  Thank you  Told him if not today he would return call tomorrow when in office

## 2019-11-06 NOTE — TELEPHONE ENCOUNTER
Called PT   ALEC Seo told him to come in between 1 and 5 and I can talk to him    Told him that is not necessary and I will try to call him in AM

## 2019-11-07 ENCOUNTER — TELEPHONE (OUTPATIENT)
Dept: NON INVASIVE DIAGNOSTICS | Facility: HOSPITAL | Age: 79
End: 2019-11-07

## 2019-11-07 ENCOUNTER — TELEPHONE (OUTPATIENT)
Dept: SURGERY | Facility: HOSPITAL | Age: 79
End: 2019-11-07

## 2019-11-08 ENCOUNTER — HOSPITAL ENCOUNTER (OUTPATIENT)
Dept: NON INVASIVE DIAGNOSTICS | Facility: HOSPITAL | Age: 79
Discharge: HOME | End: 2019-11-08
Attending: INTERNAL MEDICINE
Payer: MEDICARE

## 2019-11-08 VITALS
WEIGHT: 130 LBS | RESPIRATION RATE: 17 BRPM | BODY MASS INDEX: 23.92 KG/M2 | DIASTOLIC BLOOD PRESSURE: 55 MMHG | SYSTOLIC BLOOD PRESSURE: 116 MMHG | HEIGHT: 62 IN | HEART RATE: 55 BPM | TEMPERATURE: 96.4 F | OXYGEN SATURATION: 97 %

## 2019-11-08 DIAGNOSIS — I35.0 NONRHEUMATIC AORTIC VALVE STENOSIS: ICD-10-CM

## 2019-11-08 LAB
ATRIAL RATE: 63 BPM
P AXIS: 48 DEGREES
PR INTERVAL: 156 MS
QRS AXIS: 11 DEGREES
QRSD INTERVAL: 88 MS
QT INTERVAL: 410 MS
QTC INTERVAL: 419 MS
T WAVE AXIS: 82 DEGREES
VENTRICULAR RATE: 63 BPM

## 2019-11-08 PROCEDURE — C1769 GUIDE WIRE: HCPCS | Performed by: INTERNAL MEDICINE

## 2019-11-08 PROCEDURE — 93010 ELECTROCARDIOGRAM REPORT: CPT | Performed by: INTERNAL MEDICINE

## 2019-11-08 PROCEDURE — 99152 MOD SED SAME PHYS/QHP 5/>YRS: CPT | Performed by: INTERNAL MEDICINE

## 2019-11-08 PROCEDURE — 93454 CORONARY ARTERY ANGIO S&I: CPT | Performed by: INTERNAL MEDICINE

## 2019-11-08 PROCEDURE — 93005 ELECTROCARDIOGRAM TRACING: CPT

## 2019-11-08 PROCEDURE — C1894 INTRO/SHEATH, NON-LASER: HCPCS | Performed by: INTERNAL MEDICINE

## 2019-11-08 RX ORDER — FENTANYL CITRATE 50 UG/ML
INJECTION, SOLUTION INTRAMUSCULAR; INTRAVENOUS CODE/TRAUMA/SEDATION MEDICATION
Status: COMPLETED | OUTPATIENT
Start: 2019-11-08 | End: 2019-11-08

## 2019-11-08 RX ORDER — SIMVASTATIN 10 MG
10 TABLET ORAL
COMMUNITY

## 2019-11-08 RX ORDER — SODIUM CHLORIDE 9 MG/ML
125 INJECTION, SOLUTION INTRAVENOUS CONTINUOUS
Status: DISCONTINUED | OUTPATIENT
Start: 2019-11-08 | End: 2019-11-09 | Stop reason: HOSPADM

## 2019-11-08 RX ORDER — VERAPAMIL HCL 2.5 MG/ML
AMPUL (ML) INTRAVENOUS CODE/TRAUMA/SEDATION MEDICATION
Status: COMPLETED | OUTPATIENT
Start: 2019-11-08 | End: 2019-11-08

## 2019-11-08 RX ORDER — MIDAZOLAM HYDROCHLORIDE 2 MG/2ML
INJECTION, SOLUTION INTRAMUSCULAR; INTRAVENOUS CODE/TRAUMA/SEDATION MEDICATION
Status: COMPLETED | OUTPATIENT
Start: 2019-11-08 | End: 2019-11-08

## 2019-11-08 RX ORDER — LIDOCAINE HYDROCHLORIDE 10 MG/ML
INJECTION, SOLUTION EPIDURAL; INFILTRATION; INTRACAUDAL; PERINEURAL CODE/TRAUMA/SEDATION MEDICATION
Status: COMPLETED | OUTPATIENT
Start: 2019-11-08 | End: 2019-11-08

## 2019-11-08 RX ORDER — ASPIRIN 81 MG/1
162 TABLET, CHEWABLE ORAL DAILY
Status: DISCONTINUED | OUTPATIENT
Start: 2019-11-08 | End: 2019-11-09 | Stop reason: HOSPADM

## 2019-11-08 RX ORDER — OMEGA-3S/DHA/EPA/FISH OIL/D3 300MG-1000
2000 CAPSULE ORAL DAILY
COMMUNITY

## 2019-11-08 RX ORDER — SODIUM CHLORIDE 9 MG/ML
100 INJECTION, SOLUTION INTRAVENOUS CONTINUOUS
Status: DISCONTINUED | OUTPATIENT
Start: 2019-11-08 | End: 2019-11-08 | Stop reason: DRUGHIGH

## 2019-11-08 RX ORDER — NITROGLYCERIN 20 MG/100ML
INJECTION INTRAVENOUS CODE/TRAUMA/SEDATION MEDICATION
Status: COMPLETED | OUTPATIENT
Start: 2019-11-08 | End: 2019-11-08

## 2019-11-08 RX ORDER — HEPARIN SODIUM 1000 [USP'U]/ML
INJECTION, SOLUTION INTRAVENOUS; SUBCUTANEOUS CODE/TRAUMA/SEDATION MEDICATION
Status: COMPLETED | OUTPATIENT
Start: 2019-11-08 | End: 2019-11-08

## 2019-11-08 RX ADMIN — FENTANYL CITRATE 50 MCG: 50 INJECTION, SOLUTION INTRAMUSCULAR; INTRAVENOUS at 10:29

## 2019-11-08 RX ADMIN — IOHEXOL 50 ML: 350 INJECTION, SOLUTION INTRAVENOUS at 10:53

## 2019-11-08 RX ADMIN — NITROGLYCERIN 200 MCG: 20 INJECTION INTRAVENOUS at 10:41

## 2019-11-08 RX ADMIN — SODIUM CHLORIDE 100 ML/HR: 0.9 INJECTION, SOLUTION INTRAVENOUS at 08:10

## 2019-11-08 RX ADMIN — Medication 1.25 MG: at 10:40

## 2019-11-08 RX ADMIN — MIDAZOLAM 2 MG: 1 INJECTION INTRAMUSCULAR; INTRAVENOUS at 10:29

## 2019-11-08 RX ADMIN — ASPIRIN 81 MG 162 MG: 81 TABLET ORAL at 07:50

## 2019-11-08 RX ADMIN — LIDOCAINE HYDROCHLORIDE 1 ML: 10 INJECTION, SOLUTION EPIDURAL; INFILTRATION; INTRACAUDAL; PERINEURAL at 10:38

## 2019-11-08 RX ADMIN — HEPARIN SODIUM 4000 UNITS: 1000 INJECTION INTRAVENOUS; SUBCUTANEOUS at 10:41

## 2019-11-08 NOTE — DISCHARGE INSTRUCTIONS
Heart Catheterization   WHAT YOU NEED TO KNOW:   A heart catheterization is a procedure to look at your heart and its blood vessels  Healthcare providers can measure oxygen levels and pressures in your heart  They can also fix problems with your valves, blood vessels, or the walls of your heart  You may need this procedure if you have chest pain, heart disease, or your heart is not working properly  DISCHARGE INSTRUCTIONS:   Call 911 for any of the following:   · You have any of the following signs of a heart attack:      ¨ Squeezing, pressure, or pain in your chest that lasts longer than 5 minutes or returns    ¨ Discomfort or pain in your back, neck, jaw, stomach, or arm     ¨ Trouble breathing    ¨ Nausea or vomiting    ¨ Lightheadedness or a sudden cold sweat, especially with chest pain or trouble breathing    · You have any of the following signs of a stroke:      ¨ Numbness or drooping on one side of your face     ¨ Weakness in an arm or leg    ¨ Confusion or difficulty speaking    ¨ Dizziness, a severe headache, or vision loss    · You feel lightheaded, short of breath, and have chest pain  · You cough up blood  · You have trouble breathing  · You cannot stop the bleeding from your wound even after you hold firm pressure for 10 minutes  Seek care immediately if:   · Blood soaks through your bandage  · Your stitches come apart  · Your arm or leg feels numb, cool, or looks pale  · Your wound gets swollen quickly  Contact your healthcare provider if:   · You have a fever or chills  · Your wound is red, swollen, or draining pus  · Your wound looks more bruised or there is new bruising on the side of your leg or arm  · You have nausea or are vomiting  · Your skin is itchy, swollen, or you have a rash  · You have questions or concerns about your condition or care  Medicines: You may need any of the following:  · Blood thinners  help prevent blood clots   You may need to take blood thinners after your procedure if your healthcare provider finds problems in your heart or blood vessels  You may also need them if he replaces one of your heart valves  Examples of blood thinners include heparin and warfarin  Clots can cause strokes, heart attacks, and death  The following are general safety guidelines to follow while you are taking a blood thinner:    ¨ Watch for bleeding and bruising while you take blood thinners  Watch for bleeding from your gums or nose  Watch for blood in your urine and bowel movements  Use a soft washcloth on your skin, and a soft toothbrush to brush your teeth  This can keep your skin and gums from bleeding  If you shave, use an electric shaver  Do not play contact sports  ¨ Tell your dentist and other healthcare providers that you take anticoagulants  Wear a bracelet or necklace that says you take this medicine  ¨ Do not start or stop any medicines unless your healthcare provider tells you to  Many medicines cannot be used with blood thinners  ¨ Tell your healthcare provider right away if you forget to take the medicine, or if you take too much  ¨ Warfarin  is a blood thinner that you may need to take  The following are things you should be aware of if you take warfarin  § Foods and medicines can affect the amount of warfarin in your blood  Do not make major changes to your diet while you take warfarin  Warfarin works best when you eat about the same amount of vitamin K every day  Vitamin K is found in green leafy vegetables and certain other foods  Ask for more information about what to eat when you are taking warfarin  § You will need to see your healthcare provider for follow-up visits when you are on warfarin  You will need regular blood tests  These tests are used to decide how much medicine you need  · Acetaminophen  helps decrease your pain and fever  This medicine is available without a doctor's order   Ask how much medicine is safe to take, and how often to take it  Acetaminophen can cause liver damage if not taken correctly  · Take your medicine as directed  Contact your healthcare provider if you think your medicine is not helping or if you have side effects  Tell him or her if you are allergic to any medicine  Keep a list of the medicines, vitamins, and herbs you take  Include the amounts, and when and why you take them  Bring the list or the pill bottles to follow-up visits  Carry your medicine list with you in case of an emergency  Bathing: You may be able to shower the day after your procedure  Remove your pressure bandage before you shower  Do not take baths or go in hot tubs or pools  Carefully wash the wound with soap and water  Pat the area dry  Care for your wound as directed:  Change your bandage when it gets wet or dirty  A small band-aid can be placed on your wound after you remove the pressure bandage  Monitor your wound everyday for signs of infection such as redness, swelling, or pus  Mild bruising is normal and expected  Do not put powders, lotions, or creams on your wound  Self-care:   · Apply firm , steady pressure if bleeding occurs  A small amount of bleeding from your wound is possible  Apply pressure with a clean gauze or towel for 5 to 10 minutes  Call 911 if bleeding becomes heavy or does not stop  · Do not lift anything heavier than 5 pounds  until directed by your healthcare provider  Heavy lifting can put stress on your wound and cause bleeding  Do not push or pull with the arm that was used for the procedure  · Do not do vigorous activity for at least 48 hours  Vigorous activity may cause bleeding from your wound  Rest and do quiet activities  Short walks to the bathroom and around the house are okay  Ask your healthcare provider when you can return to your normal activities  · Limit stair climbing  to prevent bleeding from your wound   Plan activities on one floor and use stairs 2 times a day or less  · Drink liquids  to flush the contrast liquid from your body and prevent blood clots  Ask how much liquid to drink each day and which liquids are best for you  · Restart your blood thinners as directed  Your healthcare provider may tell you to start taking your blood thinners after your procedure or he may have you wait a few days  Follow up with your healthcare provider as directed:  Write down your questions so you remember to ask them during your visits  © 2017 [address] [employer] Information is for End User's use only and may not be sold, redistributed or otherwise used for commercial purposes  All illustrations and images included in CareNotes® are the copyrighted property of [employer]  or [employer]  The above information is an  only  It is not intended as medical advice for individual conditions or treatments  Talk to your doctor, nurse or pharmacist before following any medical regimen to see if it is safe and effective for you

## 2019-11-08 NOTE — PROGRESS NOTES
D/C instructions reviewed w/ pt & spouse, verbalized understanding  R radial TR BAND remains intact during air removal process  Denies pain  BP running low since return to [place], asymptomatic  Dr Raven Balloon called & made aware  To continue IVF & monitor  Probable response to meds received  Given extra dressing for home  Reminded to limit use of R arm 24hrs

## 2019-11-09 ENCOUNTER — HOSPITAL ENCOUNTER (EMERGENCY)
Facility: HOSPITAL | Age: 79
Discharge: HOME | End: 2019-11-09
Attending: EMERGENCY MEDICINE
Payer: MEDICARE

## 2019-11-09 VITALS
SYSTOLIC BLOOD PRESSURE: 152 MMHG | HEART RATE: 76 BPM | TEMPERATURE: 97.7 F | DIASTOLIC BLOOD PRESSURE: 65 MMHG | BODY MASS INDEX: 24.07 KG/M2 | OXYGEN SATURATION: 99 % | RESPIRATION RATE: 16 BRPM | WEIGHT: 131.61 LBS

## 2019-11-09 DIAGNOSIS — M79.89 SWELLING OF RIGHT HAND: Primary | ICD-10-CM

## 2019-11-09 PROCEDURE — 99283 EMERGENCY DEPT VISIT LOW MDM: CPT

## 2019-11-09 PROCEDURE — 99282 EMERGENCY DEPT VISIT SF MDM: CPT | Performed by: PHYSICIAN ASSISTANT

## 2019-11-09 RX ORDER — ALENDRONATE SODIUM 70 MG/1
70 TABLET ORAL
COMMUNITY
End: 2019-11-27

## 2019-11-09 NOTE — ED PROVIDER NOTES
History  Chief Complaint   Patient presents with    Post-op Problem     pt presents with swelling to right hand s/p cardiac catheterization yesterday  pt denies pain, bleeding, numbness, or tingling  Patient is an 26-year-old male with a past medical history of diabetes, seizure disorder, aortic stenosis who presents with right hand swelling for 1 day  Patient states that he had a cardiac catheterization yesterday with access in his right radial artery  He states the procedure went well and he is now a candidate for aortic valve replacement  He states he was instructed to leave bandage on for 24 hours on right wrist with limited manipulation of right wrist   He states he followed instructions, but noted increasing swelling in his right hand  He states he noted mild swelling yesterday while at the hospital, but things have worsened  He denies any bloody drainage from the bandage, numbness, tingling, decreased range of motion of his fingers, color change, rash, lymphatic streaking up the arm  Patient states he otherwise feels well and denies any current chest pain, shortness of breath, palpitations, fevers, chills, diaphoresis  Patient states he has his intermittent symptoms of chest pain and shortness of breath with exertion are unchanged from prior to catheterization  Patient also denies any headaches, vision changes, neck pain or stiffness, congestion, cough, abdominal pain, nausea, vomiting, diarrhea, urinary changes  Prior to Admission Medications   Prescriptions Last Dose Informant Patient Reported? Taking?    Multiple Vitamin (DAILY VALUE MULTIVITAMIN PO)  Self Yes Yes   Sig: Take 1 tablet by mouth daily   Omega-3 Fatty Acids ([employer] FISH OIL) 1000 MG CAPS  Self Yes Yes   Sig: Take 1 capsule by mouth daily     ascorbic acid (VITAMIN C) 1000 MG tablet  Self Yes No   Sig: Take 1,000 mg by mouth daily   aspirin (ECOTRIN LOW STRENGTH) 81 mg EC tablet  Self Yes Yes   Sig: Take 81 mg by mouth daily calcium citrate-vitamin D (CITRACAL+D) 315-200 MG-UNIT per tablet  Self Yes Yes   Sig: Take 1 tablet by mouth daily     cholecalciferol (VITAMIN D3) 400 units tablet  Self Yes No   Sig: Take 2,000 Units by mouth daily   finasteride (PROSCAR) 5 mg tablet  Self Yes Yes   Sig: Take 5 mg by mouth     levETIRAcetam (KEPPRA) 250 mg tablet  Self Yes Yes   Sig: Take 250 mg by mouth 2 (two) times a day   simvastatin (ZOCOR) 10 mg tablet  Self Yes No   Sig: Take 10 mg by mouth daily at bedtime   tamsulosin (FLOMAX) 0 4 mg  Self Yes Yes   Sig: Take 0 4 mg by mouth daily with dinner        Facility-Administered Medications: None       Past Medical History:   Diagnosis Date    BPH (benign prostatic hyperplasia)     Diabetes mellitus (HCC)     pre    Irregular heart beat     murmur    Osteoporosis     Seizures (HCC)     partial sensory   never had seizure       Past Surgical History:   Procedure Laterality Date    COLONOSCOPY      HERNIA REPAIR      IN REPAIR ING HERNIA,5+Y/O,REDUCIBL Right 11/16/2018    Procedure: REPAIR RIGHT INGUINAL HERNIA WITH MESH;  Surgeon: Perry Najera MD;  Location: [address] [place] [address];  Service: General       Family History   Problem Relation Age of Onset    Coronary artery disease Family     Heart disease Family      I have reviewed and agree with the history as documented  Social History     Tobacco Use    Smoking status: Never Smoker    Smokeless tobacco: Never Used   Substance Use Topics    Alcohol use: Yes     Comment: occ    Drug use: No        Review of Systems   Constitutional: Negative for chills, diaphoresis and fever  HENT: Negative for congestion, rhinorrhea and sore throat  Eyes: Negative for visual disturbance  Respiratory: Negative for cough, shortness of breath, wheezing and stridor  Cardiovascular: Negative for chest pain, palpitations and leg swelling  Gastrointestinal: Negative for abdominal pain, diarrhea, nausea and vomiting     Genitourinary: Negative for difficulty urinating and dysuria  Musculoskeletal: Negative for myalgias, neck pain and neck stiffness  Skin: Negative for color change, pallor and rash  Right hand swelling   Neurological: Negative for dizziness, weakness, light-headedness, numbness and headaches  All other systems reviewed and are negative  Physical Exam  Physical Exam   Constitutional: He is oriented to person, place, and time  Vital signs are normal  He appears well-developed and well-nourished  He is active and cooperative  Non-toxic appearance  He does not have a sickly appearance  He does not appear ill  No distress  HENT:   Head: Normocephalic and atraumatic  Right Ear: External ear normal    Left Ear: External ear normal    Nose: Nose normal    Mouth/Throat: Uvula is midline, oropharynx is clear and moist and mucous membranes are normal    Eyes: Pupils are equal, round, and reactive to light  Conjunctivae and EOM are normal    Neck: Normal range of motion  Neck supple  Cardiovascular: Normal rate, regular rhythm, intact distal pulses and normal pulses  Murmur heard  Systolic murmur is present  Pulses:       Radial pulses are 2+ on the right side, and 2+ on the left side  Pulmonary/Chest: Effort normal and breath sounds normal  No stridor  No respiratory distress  He has no wheezes  Abdominal: Soft  Bowel sounds are normal  He exhibits no distension  There is no tenderness  Musculoskeletal: Normal range of motion  Right wrist: Normal         Right forearm: Normal         Right hand: He exhibits swelling  He exhibits normal range of motion, no tenderness, normal capillary refill and no deformity  Normal sensation noted  Normal strength noted  Patient's right hand is diffusely mildly swollen, with no erythema, skin changes, decreased range of motion or pain, lymphatic streaking, decreased sensation noted  Radial pulses palpable, 2+  Neurovascularly intact, 5/5 strength    Patient with tight bandage noted over right wrist with gauze  No bleeding or open wounds noted  Neurological: He is alert and oriented to person, place, and time  Skin: Skin is warm and dry  Capillary refill takes less than 2 seconds  He is not diaphoretic  Nursing note and vitals reviewed  Vital Signs  ED Triage Vitals   Temperature Pulse Respirations Blood Pressure SpO2   11/09/19 0957 11/09/19 0957 11/09/19 0957 11/09/19 0957 11/09/19 0957   97 7 °F (36 5 °C) 69 19 125/59 99 %      Temp Source Heart Rate Source Patient Position - Orthostatic VS BP Location FiO2 (%)   11/09/19 0957 11/09/19 0957 11/09/19 0957 11/09/19 0957 --   Temporal Monitor Sitting Right arm       Pain Score       11/09/19 1103       No Pain           Vitals:    11/09/19 0957 11/09/19 1103   BP: 125/59 152/65   Pulse: 69 76   Patient Position - Orthostatic VS: Sitting Lying         Visual Acuity      ED Medications  Medications - No data to display    Diagnostic Studies  Results Reviewed     None                 No orders to display              Procedures  Procedures       ED Course  ED Course as of Dec 02 1437   Sat Nov 09, 2019   1026 Paged cardiology to come evaluate patient's hand swelling  [id number] Cardiology evaluated patient and states patient's wound can just be cleaned and bandaged and patient is stable for discharge home  MDM  Number of Diagnoses or Management Options  Swelling of right hand:       Disposition  Final diagnoses:   Swelling of right hand     Time reflects when diagnosis was documented in both MDM as applicable and the Disposition within this note     Time User Action Codes Description Comment    11/9/2019 10:58 AM Kary Mckenzie Add [M79 [id number]] Swelling of right hand       ED Disposition     ED Disposition Condition Date/Time Comment    Discharge Stable Sat Nov 9, 2019 10:58 AM Elijah Gomes discharge to home/self care              Follow-up Information     Follow up With Specialties Details Why Contact Info Additional Information    Keep your follow-up appointment with cardiology as scheduled         [address] [facility] Emergency Medicine  If symptoms worsen [facility] [id number]  [place] [address] ED, [address]  , [place], [place], [address]          Discharge Medication List as of 11/9/2019 10:59 AM      CONTINUE these medications which have NOT CHANGED    Details   aspirin (ECOTRIN LOW STRENGTH) 81 mg EC tablet Take 81 mg by mouth daily  , Historical Med      calcium citrate-vitamin D (CITRACAL+D) 315-200 MG-UNIT per tablet Take 1 tablet by mouth daily  , Historical Med      finasteride (PROSCAR) 5 mg tablet Take 5 mg by mouth  , Starting Wed 4/4/2018, Historical Med      levETIRAcetam (KEPPRA) 250 mg tablet Take 250 mg by mouth 2 (two) times a day, Historical Med      Multiple Vitamin (DAILY VALUE MULTIVITAMIN PO) Take 1 tablet by mouth daily, Historical Med      Omega-3 Fatty Acids ([employer] FISH OIL) 1000 MG CAPS Take 1 capsule by mouth daily  , Historical Med      tamsulosin (FLOMAX) 0 4 mg Take 0 4 mg by mouth daily with dinner  , Starting Wed 4/4/2018, Historical Med      alendronate (FOSAMAX) 70 mg tablet Take 70 mg by mouth every 7 days, Historical Med      ascorbic acid (VITAMIN C) 1000 MG tablet Take 1,000 mg by mouth daily, Historical Med      cholecalciferol (VITAMIN D3) 400 units tablet Take 2,000 Units by mouth daily, Historical Med      simvastatin (ZOCOR) 10 mg tablet Take 10 mg by mouth daily at bedtime, Historical Med           No discharge procedures on file      ED Provider  Electronically Signed by           Erika Tillman PA-C  12/02/19 2535

## 2019-11-09 NOTE — DISCHARGE INSTRUCTIONS
Keep the area clean and dry and protected  Follow-up with Cardiology as instructed  Return to ED if symptoms worsen

## 2019-11-09 NOTE — QUICK NOTE
Progress Note - Cardiology   Chriss Campbell [de-identified] y o  male MRN: [id number]  Unit/Bed#: ED 17 Encounter: [id number]          Asessment/Plan:  1  Right hand edema:   --Status post cardiac catheterization via right radial artery access   --Mild edema likely due to dressing which was removed with visible decongestion of the hand   --Patient instructed to dress this with only a simple Band-Aid which can be remove for bathing and reapplied x48 hours  --no other DX/Tx is required      Subjective:  14-year-old gentleman who yesterday had cardiac catheterization for assessment of coronary vessels as part of ongoing workup for TAVR  Angiography showed normal epicardial vessels  Today the patient comes to emergency department having developed some swelling (edema) of his right hand  He denies pain or obvious dysesthesias  It is noted that his wrist was dressed with Elastoplast which remains intact at the time of my visit    Vitals:  Vitals:    11/09/19 0957   Weight: 59 7 kg (131 lb 9 8 oz)   ,  Vitals:    11/09/19 0957   BP: 125/59   BP Location: Right arm   Pulse: 69   Resp: 19   Temp: 97 7 °F (36 5 °C)   TempSrc: Temporal   SpO2: 99%   Weight: 59 7 kg (131 lb 9 8 oz)       Exam:  General:  Pleasant normally conversant elderly gentleman  Right hand:  Right wrist covered with circumferential and overlapping layers of Elastoplast   Right hand and fingers mildly edematous with normal capillary refill and pulse without ecchymosis or hematoma  The bandage was removed and several minutes later the hand is reassess noting some improvement in edema and coloring

## 2019-11-10 DIAGNOSIS — I35.0 NONRHEUMATIC AORTIC VALVE STENOSIS: Primary | ICD-10-CM

## 2019-11-13 ENCOUNTER — TRANSCRIBE ORDERS (OUTPATIENT)
Dept: ADMINISTRATIVE | Facility: HOSPITAL | Age: 79
End: 2019-11-13

## 2019-11-13 DIAGNOSIS — J84.9 INTERSTITIAL LUNG DISEASE (HCC): Primary | ICD-10-CM

## 2019-11-14 ENCOUNTER — HOSPITAL ENCOUNTER (OUTPATIENT)
Dept: CT IMAGING | Facility: HOSPITAL | Age: 79
Discharge: HOME | End: 2019-11-14
Payer: MEDICARE

## 2019-11-14 DIAGNOSIS — J84.9 INTERSTITIAL LUNG DISEASE (HCC): ICD-10-CM

## 2019-11-14 PROCEDURE — 71260 CT THORAX DX C+: CPT

## 2019-11-14 RX ADMIN — IOHEXOL 100 ML: 350 INJECTION, SOLUTION INTRAVENOUS at 13:52

## 2019-11-27 ENCOUNTER — OFFICE VISIT (OUTPATIENT)
Dept: CARDIAC SURGERY | Facility: CLINIC | Age: 79
End: 2019-11-27
Payer: MEDICARE

## 2019-11-27 VITALS
HEIGHT: 62 IN | SYSTOLIC BLOOD PRESSURE: 122 MMHG | OXYGEN SATURATION: 97 % | BODY MASS INDEX: 24.11 KG/M2 | DIASTOLIC BLOOD PRESSURE: 62 MMHG | RESPIRATION RATE: 14 BRPM | TEMPERATURE: 97.8 F | HEART RATE: 62 BPM | WEIGHT: 131 LBS

## 2019-11-27 DIAGNOSIS — I35.0 NONRHEUMATIC AORTIC VALVE STENOSIS: Primary | ICD-10-CM

## 2019-11-27 PROCEDURE — 99204 OFFICE O/P NEW MOD 45 MIN: CPT | Performed by: NURSE PRACTITIONER

## 2019-11-27 NOTE — PROGRESS NOTES
Consultation - Cardiothoracic Surgery   Landy Hernandez [de-identified] y o  male MRN: [id number]    Physician Requesting Consult: Dr Issac Stephens    Reason for Consult / Principal Problem: Aortic stenosis, Non-Rheumatic    History of Present Illness: Landy Hernandez is a [de-identified]y o  year old male who presents for initial outpatient surgical consultation for severe symptomatic aortic stenosis  He has had a heart murmur for approximately 10 years  He has been followed by cardiology for several years with serial echocardiograms  Echo in 2015 demonstrated moderate to severe aortic stenosis  Most recent echocardiogram performed in May of this year demonstrates severe aortic stenosis, moderate aortic insufficiency, SON 0 7 cm2, MG 49 & PG 86 mm Hg  Recent cardiac catheterization demonstrates normal coronary arteries  Patient has been experiencing mild lightheadedness, fatigue and occasional exertional dyspnea  He states he walks on a regular basis, either outside or on his treadmill  He states his pace has slowed down slightly  He denies chest pain, syncope, palpitations, edema, weight gain, PND or orthopnea  Patient is a lifelong non-smoker  He obtains routine dental care       Past Medical History:  Past Medical History:   Diagnosis Date    BPH (benign prostatic hyperplasia)     Diabetes mellitus (HCC)     pre    Irregular heart beat     murmur    Osteoporosis     Seizures (HCC)     partial sensory   never had seizure         Past Surgical History:   Past Surgical History:   Procedure Laterality Date    COLONOSCOPY      HERNIA REPAIR      MS REPAIR ING HERNIA,5+Y/O,REDUCIBL Right 11/16/2018    Procedure: REPAIR RIGHT INGUINAL HERNIA WITH MESH;  Surgeon: Dorian Carter MD;  Location: [address] [place] [address];  Service: General         Family History:  Family History   Problem Relation Age of Onset    Coronary artery disease Family     Heart disease Family          Social History:    Social History     Substance and Sexual Activity   Alcohol Use Yes    Comment: occ     Social History     Substance and Sexual Activity   Drug Use No     Social History     Tobacco Use   Smoking Status Never Smoker   Smokeless Tobacco Never Used         Home Medications:   Prior to Admission medications    Medication Sig Start Date End Date Taking?  Authorizing Provider   ascorbic acid (VITAMIN C) 1000 MG tablet Take 1,000 mg by mouth daily   Yes Historical Provider, MD   aspirin (ECOTRIN LOW STRENGTH) 81 mg EC tablet Take 81 mg by mouth daily     Yes Historical Provider, MD   calcium citrate-vitamin D (CITRACAL+D) 315-200 MG-UNIT per tablet Take 1 tablet by mouth daily     Yes Historical Provider, MD   cholecalciferol (VITAMIN D3) 400 units tablet Take 2,000 Units by mouth daily   Yes Historical Provider, MD   finasteride (PROSCAR) 5 mg tablet Take 5 mg by mouth   4/4/18  Yes Historical Provider, MD   levETIRAcetam (KEPPRA) 250 mg tablet Take 250 mg by mouth 2 (two) times a day   Yes Historical Provider, MD   Multiple Vitamin (DAILY VALUE MULTIVITAMIN PO) Take 1 tablet by mouth daily   Yes Historical Provider, MD   Omega-3 Fatty Acids ([employer] FISH OIL) 1000 MG CAPS Take 1 capsule by mouth daily     Yes Historical Provider, MD   simvastatin (ZOCOR) 10 mg tablet Take 10 mg by mouth daily at bedtime   Yes Historical Provider, MD   tamsulosin (FLOMAX) 0 4 mg Take 0 4 mg by mouth daily with dinner   4/4/18  Yes Historical Provider, MD   alendronate (FOSAMAX) 70 mg tablet Take 70 mg by mouth every 7 days  11/27/19  Historical Provider, MD       Allergies:  No Known Allergies    Review of Systems:  Review of Systems - History obtained from chart review and the patient  General ROS: positive for  - fatigue  negative for - chills, fever, sleep disturbance or weight gain  Psychological ROS: negative  Ophthalmic ROS: positive for - uses glasses  ENT ROS: negative  Hematological and Lymphatic ROS: negative for - bleeding problems, blood clots, bruising or jaundice  Respiratory ROS: no cough, shortness of breath, or wheezing  Cardiovascular ROS: positive for - murmur and occasional dyspnea on exertion  negative for - chest pain, edema, irregular heartbeat, loss of consciousness, orthopnea, palpitations, paroxysmal nocturnal dyspnea or rapid heart rate  Gastrointestinal ROS: no abdominal pain, change in bowel habits, or black or bloody stools  Genito-Urinary ROS: no dysuria, trouble voiding, or hematuria  Musculoskeletal ROS: negative  Neurological ROS: mild lightheadedness, no TIA or CVA symptoms   Dermatological ROS: negative    Vital Signs:     Vitals:    11/27/19 0800 11/27/19 0823   BP: 120/58 122/62   BP Location: Left arm Right arm   Cuff Size: Adult Adult   Pulse: 62    Resp: 14    Temp: 97 8 °F (36 6 °C)    TempSrc: Tympanic    SpO2: 97%    Weight: 59 4 kg (131 lb)    Height: 5' 2" (1 575 m)        Physical Exam:    General: Alert, oriented, well developed, no acute distress  HEENT/NECK:  PERRLA  No jugular venous distention  Cardiac:Regular rate and rhythm, IV/VI harsh systolic murmur RUSB   Carotid arteries: 1+ pulses, delayed upstrokes, cardiac murmur radiates to neck  Pulmonary:  Breath sounds clear bilaterally  Abdomen:  Non-tender, Non-distended  Positive bowel sounds  Upper extremities: 2+ radial pulses; brisk capillary refill  Lower extremities: Extremities warm/dry  1+ femoral pulses; PT/DP pulses 2+ bilaterally  No edema B/L  Neuro: Alert and oriented X 3  Sensation is grossly intact  No focal deficits  Musculoskeletal: MAEE, stable gait  Skin: Warm/Dry, without rashes or lesions        Lab Results:   Lab Results   Component Value Date    WBC 6 02 11/02/2019    HGB 15 0 11/02/2019    HCT 46 6 11/02/2019    MCV 93 11/02/2019     (L) 11/02/2019      Lab Results   Component Value Date    SODIUM 139 11/02/2019    K 4 5 11/02/2019     11/02/2019    CO2 29 11/02/2019    BUN 19 11/02/2019    CREATININE 0 98 11/02/2019    CALCIUM 9 6 11/02/2019     Lab Results Component Value Date    HGBA1C 6 1 06/20/2018       Imaging Studies:     Echocardiogram:   LEFT VENTRICLE:  Systolic function was normal  Ejection fraction was estimated to be 65 %  There were no regional wall motion abnormalities  Wall thickness was mildly increased      MITRAL VALVE:  There was mild regurgitation      AORTIC VALVE:  The valve was trileaflet  Leaflets exhibited markedly increased thickness  There was severe stenosis  AV Velocity max=4 6 M/S  SON 0 7 cm2  There was moderate regurgitation      TRICUSPID VALVE:  There was mild regurgitation      PULMONIC VALVE:  There was mild regurgitation      Cardiac catheterization:  Left main: Normal   LAD: Normal  The diagonal branches were also normal   Circumflex: Normal  The OM branches were also normal l  RCA: Dominant; normal     I have personally reviewed pertinent films in PACS    TAVR evaluation Assessment:     [address] 122: II    5 Meter Walk: 5 sec, 6 sec, 6 sec    STS risk score (preliminary): 2 25%    KCCQ-12 completed    Assessment:  Patient Active Problem List    Diagnosis Date Noted    Sinus bradycardia 05/30/2019    Nonrheumatic aortic valve stenosis 05/29/2019    Palpitations 05/29/2019    Right inguinal hernia 11/07/2018    BPH (benign prostatic hyperplasia) 05/25/2018    Osteoporosis 05/25/2018    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[address] ) 05/25/2018     Severe aortic stenosis; Ongoing TAVR workup    Plan:    Yuan Best has severe symptomatic aortic stenosis  Based on their STS risk assessment, they will undergo the following testing for transcatheter aortic valve replacement: Gated CTA of the chest/abdomen/pelvis, dental clearance, pulmonary function tests with ABG, and carotid artery ultrasound  Once these studies have been completed, Yuan Best will follow up in our office to review the results and to be evaluated by a second surgeon to confirm the suitability of proceeding with transcatheter aortic valve replacment  Mauricio Johnson was comfortable with our recommendations, and their questions were answered to their satisfaction  Thank you for allowing us to participate in the care of this patient       Routine referral to gastroenterology for colonoscopy screening was not indicated, as the patient is over 75years old    SIGNATURE: JACOBO Faria  DATE: November 27, 2019  TIME: 8:50 AM

## 2019-11-27 NOTE — LETTER
November 27, 2019     Latonia Fajardo MD  [address]    Patient: Emily Liu   YOB: 1940   Date of Visit: 11/27/2019       Dear Dr Luis Alberto Najera: Thank you for referring Emily Liu to me for evaluation  Below are my notes for this consultation  If you have questions, please do not hesitate to call me  I look forward to following your patient along with you  Sincerely,        Eliana Orantes, DO        CC: Tracie Hugo MD  [address]  11/27/2019  9:11 AM  Attested  Consultation - Cardiothoracic Surgery   Emily Liu [de-identified] y o  male MRN: [id number]    Physician Requesting Consult: Dr Luis Alberto Najera    Reason for Consult / Principal Problem: Aortic stenosis, Non-Rheumatic    History of Present Illness: Emily Liu is a [de-identified]y o  year old male who presents for initial outpatient surgical consultation for severe symptomatic aortic stenosis  He has had a heart murmur for approximately 10 years  He has been followed by cardiology for several years with serial echocardiograms  Echo in 2015 demonstrated moderate to severe aortic stenosis  Most recent echocardiogram performed in May of this year demonstrates severe aortic stenosis, moderate aortic insufficiency, SON 0 7 cm2, MG 49 & PG 86 mm Hg  Recent cardiac catheterization demonstrates normal coronary arteries  Patient has been experiencing mild lightheadedness, fatigue and occasional exertional dyspnea  He states he walks on a regular basis, either outside or on his treadmill  He states his pace has slowed down slightly  He denies chest pain, syncope, palpitations, edema, weight gain, PND or orthopnea  Patient is a lifelong non-smoker  He obtains routine dental care       Past Medical History:  Past Medical History:   Diagnosis Date    BPH (benign prostatic hyperplasia)     Diabetes mellitus ([address] )     pre    Irregular heart beat     murmur    Osteoporosis     Seizures (HCC)     partial sensory   never had seizure         Past Surgical History:   Past Surgical History:   Procedure Laterality Date    COLONOSCOPY      HERNIA REPAIR      AL REPAIR ING HERNIA,5+Y/O,REDUCIBL Right 11/16/2018    Procedure: REPAIR RIGHT INGUINAL HERNIA WITH MESH;  Surgeon: Trinh Guillen MD;  Location: [facility] MAIN OR;  Service: General         Family History:  Family History   Problem Relation Age of Onset    Coronary artery disease Family     Heart disease Family          Social History:    Social History     Substance and Sexual Activity   Alcohol Use Yes    Comment: occ     Social History     Substance and Sexual Activity   Drug Use No     Social History     Tobacco Use   Smoking Status Never Smoker   Smokeless Tobacco Never Used         Home Medications:   Prior to Admission medications    Medication Sig Start Date End Date Taking?  Authorizing Provider   ascorbic acid (VITAMIN C) 1000 MG tablet Take 1,000 mg by mouth daily   Yes Historical Provider, MD   aspirin (ECOTRIN LOW STRENGTH) 81 mg EC tablet Take 81 mg by mouth daily     Yes Historical Provider, MD   calcium citrate-vitamin D (CITRACAL+D) 315-200 MG-UNIT per tablet Take 1 tablet by mouth daily     Yes Historical Provider, MD   cholecalciferol (VITAMIN D3) 400 units tablet Take 2,000 Units by mouth daily   Yes Historical Provider, MD   finasteride (PROSCAR) 5 mg tablet Take 5 mg by mouth   4/4/18  Yes Historical Provider, MD   levETIRAcetam (KEPPRA) 250 mg tablet Take 250 mg by mouth 2 (two) times a day   Yes Historical Provider, MD   Multiple Vitamin (DAILY VALUE MULTIVITAMIN PO) Take 1 tablet by mouth daily   Yes Historical Provider, MD   Omega-3 Fatty Acids ([employer] FISH OIL) 1000 MG CAPS Take 1 capsule by mouth daily     Yes Historical Provider, MD   simvastatin (ZOCOR) 10 mg tablet Take 10 mg by mouth daily at bedtime   Yes Historical Provider, MD   tamsulosin (FLOMAX) 0 4 mg Take 0 4 mg by mouth daily with dinner   4/4/18  Yes Historical Provider, MD alendronate (FOSAMAX) 70 mg tablet Take 70 mg by mouth every 7 days  11/27/19  Historical Provider, MD       Allergies:  No Known Allergies    Review of Systems:  Review of Systems - History obtained from chart review and the patient  General ROS: positive for  - fatigue  negative for - chills, fever, sleep disturbance or weight gain  Psychological ROS: negative  Ophthalmic ROS: positive for - uses glasses  ENT ROS: negative  Hematological and Lymphatic ROS: negative for - bleeding problems, blood clots, bruising or jaundice  Respiratory ROS: no cough, shortness of breath, or wheezing  Cardiovascular ROS: positive for - murmur and occasional dyspnea on exertion  negative for - chest pain, edema, irregular heartbeat, loss of consciousness, orthopnea, palpitations, paroxysmal nocturnal dyspnea or rapid heart rate  Gastrointestinal ROS: no abdominal pain, change in bowel habits, or black or bloody stools  Genito-Urinary ROS: no dysuria, trouble voiding, or hematuria  Musculoskeletal ROS: negative  Neurological ROS: mild lightheadedness, no TIA or CVA symptoms   Dermatological ROS: negative    Vital Signs:     Vitals:    11/27/19 0800 11/27/19 0823   BP: 120/58 122/62   BP Location: Left arm Right arm   Cuff Size: Adult Adult   Pulse: 62    Resp: 14    Temp: 97 8 °F (36 6 °C)    TempSrc: Tympanic    SpO2: 97%    Weight: 59 4 kg (131 lb)    Height: 5' 2" (1 575 m)        Physical Exam:    General: Alert, oriented, well developed, no acute distress  HEENT/NECK:  PERRLA  No jugular venous distention  Cardiac:Regular rate and rhythm, IV/VI harsh systolic murmur RUSB   Carotid arteries: 1+ pulses, delayed upstrokes, cardiac murmur radiates to neck  Pulmonary:  Breath sounds clear bilaterally  Abdomen:  Non-tender, Non-distended  Positive bowel sounds  Upper extremities: 2+ radial pulses; brisk capillary refill  Lower extremities: Extremities warm/dry  1+ femoral pulses; PT/DP pulses 2+ bilaterally    No edema B/L  Neuro: Alert and oriented X 3  Sensation is grossly intact  No focal deficits  Musculoskeletal: MAEE, stable gait  Skin: Warm/Dry, without rashes or lesions  Lab Results:   Lab Results   Component Value Date    WBC 6 02 11/02/2019    HGB 15 0 11/02/2019    HCT 46 6 11/02/2019    MCV 93 11/02/2019     (L) 11/02/2019      Lab Results   Component Value Date    SODIUM 139 11/02/2019    K 4 5 11/02/2019     11/02/2019    CO2 29 11/02/2019    BUN 19 11/02/2019    CREATININE 0 98 11/02/2019    CALCIUM 9 6 11/02/2019     Lab Results   Component Value Date    HGBA1C 6 1 06/20/2018       Imaging Studies:     Echocardiogram:   LEFT VENTRICLE:  Systolic function was normal  Ejection fraction was estimated to be 65 %  There were no regional wall motion abnormalities  Wall thickness was mildly increased      MITRAL VALVE:  There was mild regurgitation      AORTIC VALVE:  The valve was trileaflet  Leaflets exhibited markedly increased thickness  There was severe stenosis  AV Velocity max=4 6 M/S  SON 0 7 cm2  There was moderate regurgitation      TRICUSPID VALVE:  There was mild regurgitation      PULMONIC VALVE:  There was mild regurgitation      Cardiac catheterization:  Left main: Normal   LAD: Normal  The diagonal branches were also normal   Circumflex: Normal  The OM branches were also normal l  RCA: Dominant; normal     I have personally reviewed pertinent films in PACS    TAVR evaluation Assessment:     [address]: II    5 Meter Walk: 5 sec, 6 sec, 6 sec    STS risk score (preliminary): 2 25%    KCCQ-12 completed    Assessment:  Patient Active Problem List    Diagnosis Date Noted    Sinus bradycardia 05/30/2019    Nonrheumatic aortic valve stenosis 05/29/2019    Palpitations 05/29/2019    Right inguinal hernia 11/07/2018    BPH (benign prostatic hyperplasia) 05/25/2018    Osteoporosis 05/25/2018    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[address] ) 05/25/2018     Severe aortic stenosis;  Ongoing TAVR workup    Plan:    Dorothy Aburto has severe symptomatic aortic stenosis  Based on their STS risk assessment, they will undergo the following testing for transcatheter aortic valve replacement: Gated CTA of the chest/abdomen/pelvis, dental clearance, pulmonary function tests with ABG, and carotid artery ultrasound  Once these studies have been completed, Dorothy Aburto will follow up in our office to review the results and to be evaluated by a second surgeon to confirm the suitability of proceeding with transcatheter aortic valve replacment  Dorothy Aburto was comfortable with our recommendations, and their questions were answered to their satisfaction  Thank you for allowing us to participate in the care of this patient  Routine referral to gastroenterology for colonoscopy screening was not indicated, as the patient is over 75years old    SIGNATURE: Yamileth Alegria, [address] [place] [address] Avenue: November 27, 2019  TIME: 8:50 AM  Attestation signed by Ammy Riddle DO at 11/27/2019  9:41 AM:  The patient was seen and examined, and I agree with the midlevel's history, physical exam, assessment and plan with the following additions:    Ryan Amezquita, was seen in the office today accompanied by his family  His echocardiogram was reviewed by myself personally and discussed with him  This demonstrates severe aortic stenosis  The risks, benefits, and alternatives to TAVR been discussed in detail with the patient he wishes to proceed with the preoperative testing for TAVR

## 2019-12-12 ENCOUNTER — HOSPITAL ENCOUNTER (OUTPATIENT)
Dept: NON INVASIVE DIAGNOSTICS | Facility: HOSPITAL | Age: 79
Discharge: HOME | End: 2019-12-12
Payer: MEDICARE

## 2019-12-12 ENCOUNTER — HOSPITAL ENCOUNTER (OUTPATIENT)
Dept: PULMONOLOGY | Facility: HOSPITAL | Age: 79
Discharge: HOME | End: 2019-12-12
Payer: MEDICARE

## 2019-12-12 ENCOUNTER — HOSPITAL ENCOUNTER (OUTPATIENT)
Dept: RADIOLOGY | Facility: HOSPITAL | Age: 79
Discharge: HOME | End: 2019-12-12
Payer: MEDICARE

## 2019-12-12 DIAGNOSIS — I35.0 NONRHEUMATIC AORTIC VALVE STENOSIS: ICD-10-CM

## 2019-12-12 LAB
ARTERIAL PATENCY WRIST A: ABNORMAL
BASE EXCESS BLDA CALC-SCNC: 1 MMOL/L (ref -2–3)
CA-I BLD-SCNC: 1.45 MMOL/L (ref 1.12–1.32)
FIO2 GAS DIL.REBREATH: 21 L
GLUCOSE SERPL-MCNC: 105 MG/DL (ref 65–140)
HCO3 BLDA-SCNC: 26.1 MMOL/L (ref 22–28)
HCT VFR BLD CALC: 44 % (ref 36.5–49.3)
HGB BLDA-MCNC: 15 G/DL (ref 12–17)
PCO2 BLD: 27 MMOL/L (ref 21–32)
PCO2 BLD: 40.7 MM HG (ref 36–44)
PH BLD: 7.41 [PH] (ref 7.35–7.45)
PO2 BLD: 81 MM HG (ref 75–129)
POTASSIUM BLD-SCNC: 4.4 MMOL/L (ref 3.5–5.3)
SAMPLE SITE: ABNORMAL
SAO2 % BLD FROM PO2: 96 % (ref 60–85)
SODIUM BLD-SCNC: 140 MMOL/L (ref 136–145)
SPECIMEN SOURCE: ABNORMAL

## 2019-12-12 PROCEDURE — 82803 BLOOD GASES ANY COMBINATION: CPT

## 2019-12-12 PROCEDURE — 94060 EVALUATION OF WHEEZING: CPT | Performed by: INTERNAL MEDICINE

## 2019-12-12 PROCEDURE — 94729 DIFFUSING CAPACITY: CPT

## 2019-12-12 PROCEDURE — 94060 EVALUATION OF WHEEZING: CPT

## 2019-12-12 PROCEDURE — 75572 CT HRT W/3D IMAGE: CPT

## 2019-12-12 PROCEDURE — 84132 ASSAY OF SERUM POTASSIUM: CPT

## 2019-12-12 PROCEDURE — 94726 PLETHYSMOGRAPHY LUNG VOLUMES: CPT | Performed by: INTERNAL MEDICINE

## 2019-12-12 PROCEDURE — 93880 EXTRACRANIAL BILAT STUDY: CPT

## 2019-12-12 PROCEDURE — 82947 ASSAY GLUCOSE BLOOD QUANT: CPT

## 2019-12-12 PROCEDURE — 85014 HEMATOCRIT: CPT

## 2019-12-12 PROCEDURE — 36600 WITHDRAWAL OF ARTERIAL BLOOD: CPT

## 2019-12-12 PROCEDURE — 82330 ASSAY OF CALCIUM: CPT

## 2019-12-12 PROCEDURE — 94729 DIFFUSING CAPACITY: CPT | Performed by: INTERNAL MEDICINE

## 2019-12-12 PROCEDURE — 84295 ASSAY OF SERUM SODIUM: CPT

## 2019-12-12 PROCEDURE — 94726 PLETHYSMOGRAPHY LUNG VOLUMES: CPT

## 2019-12-12 PROCEDURE — 74174 CTA ABD&PLVS W/CONTRAST: CPT

## 2019-12-12 RX ORDER — ALBUTEROL SULFATE 2.5 MG/3ML
2.5 SOLUTION RESPIRATORY (INHALATION) ONCE
Status: COMPLETED | OUTPATIENT
Start: 2019-12-12 | End: 2019-12-12

## 2019-12-12 RX ADMIN — IODIXANOL 120 ML: 320 INJECTION, SOLUTION INTRAVASCULAR at 09:49

## 2019-12-12 RX ADMIN — ALBUTEROL SULFATE 2.5 MG: 2.5 SOLUTION RESPIRATORY (INHALATION) at 09:09

## 2019-12-13 PROCEDURE — 93880 EXTRACRANIAL BILAT STUDY: CPT | Performed by: SURGERY

## 2019-12-27 ENCOUNTER — TRANSCRIBE ORDERS (OUTPATIENT)
Dept: ADMINISTRATIVE | Facility: HOSPITAL | Age: 79
End: 2019-12-27

## 2019-12-27 DIAGNOSIS — M79.604 RIGHT LEG PAIN: ICD-10-CM

## 2019-12-27 DIAGNOSIS — D37.6 NEOPLASM OF UNCERTAIN BEHAVIOR OF LIVER: Primary | ICD-10-CM

## 2019-12-27 LAB — HBA1C MFR BLD HPLC: 6.1 %

## 2019-12-31 ENCOUNTER — HOSPITAL ENCOUNTER (OUTPATIENT)
Dept: RADIOLOGY | Facility: HOSPITAL | Age: 79
Discharge: HOME | End: 2019-12-31
Payer: MEDICARE

## 2019-12-31 DIAGNOSIS — D37.6 NEOPLASM OF UNCERTAIN BEHAVIOR OF LIVER: ICD-10-CM

## 2019-12-31 PROCEDURE — A9585 GADOBUTROL INJECTION: HCPCS | Performed by: RADIOLOGY

## 2019-12-31 PROCEDURE — 74183 MRI ABD W/O CNTR FLWD CNTR: CPT

## 2019-12-31 RX ADMIN — GADOBUTROL 6 ML: 604.72 INJECTION INTRAVENOUS at 16:18

## 2020-01-02 ENCOUNTER — HOSPITAL ENCOUNTER (OUTPATIENT)
Dept: NON INVASIVE DIAGNOSTICS | Facility: HOSPITAL | Age: 80
Discharge: HOME | End: 2020-01-02
Payer: MEDICARE

## 2020-01-02 DIAGNOSIS — M79.604 RIGHT LEG PAIN: ICD-10-CM

## 2020-01-02 PROBLEM — I35.0 SEVERE AORTIC STENOSIS: Status: ACTIVE | Noted: 2020-01-02

## 2020-01-02 PROCEDURE — 1123F ACP DISCUSS/DSCN MKR DOCD: CPT | Performed by: INTERNAL MEDICINE

## 2020-01-02 PROCEDURE — 93925 LOWER EXTREMITY STUDY: CPT | Performed by: SURGERY

## 2020-01-02 PROCEDURE — 93925 LOWER EXTREMITY STUDY: CPT

## 2020-01-02 PROCEDURE — 93923 UPR/LXTR ART STDY 3+ LVLS: CPT

## 2020-01-02 PROCEDURE — 93922 UPR/L XTREMITY ART 2 LEVELS: CPT | Performed by: SURGERY

## 2020-01-03 ENCOUNTER — OFFICE VISIT (OUTPATIENT)
Dept: CARDIAC SURGERY | Facility: CLINIC | Age: 80
End: 2020-01-03
Payer: MEDICARE

## 2020-01-03 ENCOUNTER — APPOINTMENT (OUTPATIENT)
Dept: LAB | Facility: HOSPITAL | Age: 80
End: 2020-01-03
Payer: MEDICARE

## 2020-01-03 VITALS
BODY MASS INDEX: 24.48 KG/M2 | HEIGHT: 62 IN | WEIGHT: 133 LBS | OXYGEN SATURATION: 98 % | RESPIRATION RATE: 14 BRPM | TEMPERATURE: 97.4 F | SYSTOLIC BLOOD PRESSURE: 112 MMHG | HEART RATE: 57 BPM | DIASTOLIC BLOOD PRESSURE: 64 MMHG

## 2020-01-03 DIAGNOSIS — I35.0 SEVERE AORTIC STENOSIS: Primary | ICD-10-CM

## 2020-01-03 DIAGNOSIS — I35.0 SEVERE AORTIC STENOSIS: ICD-10-CM

## 2020-01-03 DIAGNOSIS — I35.0 NONRHEUMATIC AORTIC VALVE STENOSIS: ICD-10-CM

## 2020-01-03 LAB
ABO GROUP BLD: NORMAL
ALBUMIN SERPL BCP-MCNC: 3.5 G/DL (ref 3.5–5)
ALP SERPL-CCNC: 53 U/L (ref 46–116)
ALT SERPL W P-5'-P-CCNC: 40 U/L (ref 12–78)
ANION GAP SERPL CALCULATED.3IONS-SCNC: 4 MMOL/L (ref 4–13)
AST SERPL W P-5'-P-CCNC: 27 U/L (ref 5–45)
BASOPHILS # BLD AUTO: 0.04 THOUSANDS/ΜL (ref 0–0.1)
BASOPHILS NFR BLD AUTO: 1 % (ref 0–1)
BILIRUB SERPL-MCNC: 0.52 MG/DL (ref 0.2–1)
BILIRUB UR QL STRIP: NEGATIVE
BLD GP AB SCN SERPL QL: NEGATIVE
BUN SERPL-MCNC: 19 MG/DL (ref 5–25)
CALCIUM ALBUM COR SERPL-MCNC: 10.7 MG/DL (ref 8.3–10.1)
CALCIUM SERPL-MCNC: 10.3 MG/DL (ref 8.3–10.1)
CHLORIDE SERPL-SCNC: 105 MMOL/L (ref 100–108)
CLARITY UR: CLEAR
CO2 SERPL-SCNC: 27 MMOL/L (ref 21–32)
COLOR UR: YELLOW
CREAT SERPL-MCNC: 0.89 MG/DL (ref 0.6–1.3)
EOSINOPHIL # BLD AUTO: 1.05 THOUSAND/ΜL (ref 0–0.61)
EOSINOPHIL NFR BLD AUTO: 14 % (ref 0–6)
ERYTHROCYTE [DISTWIDTH] IN BLOOD BY AUTOMATED COUNT: 13 % (ref 11.6–15.1)
GFR SERPL CREATININE-BSD FRML MDRD: 81 ML/MIN/1.73SQ M
GLUCOSE SERPL-MCNC: 82 MG/DL (ref 65–140)
GLUCOSE UR STRIP-MCNC: NEGATIVE MG/DL
HCT VFR BLD AUTO: 43.6 % (ref 36.5–49.3)
HGB BLD-MCNC: 14 G/DL (ref 12–17)
HGB UR QL STRIP.AUTO: NEGATIVE
IMM GRANULOCYTES # BLD AUTO: 0.02 THOUSAND/UL (ref 0–0.2)
IMM GRANULOCYTES NFR BLD AUTO: 0 % (ref 0–2)
INR PPP: 1.1 (ref 0.84–1.19)
KETONES UR STRIP-MCNC: NEGATIVE MG/DL
LEUKOCYTE ESTERASE UR QL STRIP: NEGATIVE
LYMPHOCYTES # BLD AUTO: 1.99 THOUSANDS/ΜL (ref 0.6–4.47)
LYMPHOCYTES NFR BLD AUTO: 27 % (ref 14–44)
MCH RBC QN AUTO: 29.9 PG (ref 26.8–34.3)
MCHC RBC AUTO-ENTMCNC: 32.1 G/DL (ref 31.4–37.4)
MCV RBC AUTO: 93 FL (ref 82–98)
MONOCYTES # BLD AUTO: 0.66 THOUSAND/ΜL (ref 0.17–1.22)
MONOCYTES NFR BLD AUTO: 9 % (ref 4–12)
NEUTROPHILS # BLD AUTO: 3.65 THOUSANDS/ΜL (ref 1.85–7.62)
NEUTS SEG NFR BLD AUTO: 49 % (ref 43–75)
NITRITE UR QL STRIP: NEGATIVE
NRBC BLD AUTO-RTO: 0 /100 WBCS
PH UR STRIP.AUTO: 5 [PH]
PLATELET # BLD AUTO: 123 THOUSANDS/UL (ref 149–390)
PMV BLD AUTO: 12.1 FL (ref 8.9–12.7)
POTASSIUM SERPL-SCNC: 4.3 MMOL/L (ref 3.5–5.3)
PROT SERPL-MCNC: 8.1 G/DL (ref 6.4–8.2)
PROT UR STRIP-MCNC: NEGATIVE MG/DL
PROTHROMBIN TIME: 13.8 SECONDS (ref 11.6–14.5)
RBC # BLD AUTO: 4.68 MILLION/UL (ref 3.88–5.62)
RH BLD: NEGATIVE
SODIUM SERPL-SCNC: 136 MMOL/L (ref 136–145)
SP GR UR STRIP.AUTO: 1.01 (ref 1–1.03)
SPECIMEN EXPIRATION DATE: NORMAL
UROBILINOGEN UR QL STRIP.AUTO: 0.2 E.U./DL
WBC # BLD AUTO: 7.41 THOUSAND/UL (ref 4.31–10.16)

## 2020-01-03 PROCEDURE — 81003 URINALYSIS AUTO W/O SCOPE: CPT | Performed by: THORACIC SURGERY (CARDIOTHORACIC VASCULAR SURGERY)

## 2020-01-03 PROCEDURE — 80053 COMPREHEN METABOLIC PANEL: CPT

## 2020-01-03 PROCEDURE — 87081 CULTURE SCREEN ONLY: CPT

## 2020-01-03 PROCEDURE — 86901 BLOOD TYPING SEROLOGIC RH(D): CPT

## 2020-01-03 PROCEDURE — 99214 OFFICE O/P EST MOD 30 MIN: CPT | Performed by: PHYSICIAN ASSISTANT

## 2020-01-03 PROCEDURE — 85025 COMPLETE CBC W/AUTO DIFF WBC: CPT

## 2020-01-03 PROCEDURE — 86850 RBC ANTIBODY SCREEN: CPT

## 2020-01-03 PROCEDURE — 36415 COLL VENOUS BLD VENIPUNCTURE: CPT

## 2020-01-03 PROCEDURE — 86900 BLOOD TYPING SEROLOGIC ABO: CPT

## 2020-01-03 PROCEDURE — 85610 PROTHROMBIN TIME: CPT

## 2020-01-03 RX ORDER — CHLORHEXIDINE GLUCONATE 0.12 MG/ML
15 RINSE ORAL EVERY 12 HOURS SCHEDULED
Status: CANCELLED | OUTPATIENT
Start: 2020-01-03

## 2020-01-03 RX ORDER — AMOXICILLIN 500 MG/1
CAPSULE ORAL
Refills: 3 | COMMUNITY
Start: 2019-11-27

## 2020-01-03 RX ORDER — CEFAZOLIN SODIUM 1 G/50ML
1000 SOLUTION INTRAVENOUS ONCE
Status: CANCELLED | OUTPATIENT
Start: 2020-01-03 | End: 2020-01-03

## 2020-01-03 NOTE — H&P (VIEW-ONLY)
Consultation - Cardiothoracic Surgery   Kathye Mohs [de-identified] y o  male MRN: [id number]      Reason for Consult / Principal Problem: Aortic stenosis, Non-Rheumatic    History of Present Illness: Kathye Mohs is a [de-identified]y o  year old male who was previously evaluated in our office by NICOL Chow  for transcatheter aortic valve replacement  During this initial consultation, arrangements were made for the following studies to be completed: Gated CTA of the chest/abdomen/pelvis,  dental clearance, pulmonary function tests with ABG, and carotid artery ultrasound  In review;  he has had a heart murmur for approximately 10 years  He has been followed by cardiology for several years with serial echocardiograms  Echo in 2015 demonstrated moderate to severe aortic stenosis  Most recent echocardiogram performed in May of this year demonstrates severe aortic stenosis, moderate aortic insufficiency, SON 0 7 cm2, MG 49 & PG 86 mm Hg  Recent cardiac catheterization demonstrates normal coronary arteries  Kathye Mohs now presents to review the results of these tests and confirm the suitability of proceeding with transcatheter aortic valve replacment          Past Medical History:  Past Medical History:   Diagnosis Date    BPH (benign prostatic hyperplasia)     Diabetes mellitus (HCC)     pre    Irregular heart beat     murmur    Osteoporosis     Seizures (HCC)     partial sensory   never had seizure         Past Surgical History:   Past Surgical History:   Procedure Laterality Date    COLONOSCOPY      HERNIA REPAIR      SD REPAIR ING HERNIA,5+Y/O,REDUCIBL Right 11/16/2018    Procedure: REPAIR RIGHT INGUINAL HERNIA WITH MESH;  Surgeon: Kaycee Root MD;  Location: [facility] MAIN OR;  Service: General         Family History:  Family History   Problem Relation Age of Onset    Coronary artery disease Family     Heart disease Family          Social History:    Social History     Substance and Sexual Activity Alcohol Use Yes    Comment: occ     Social History     Substance and Sexual Activity   Drug Use No     Social History     Tobacco Use   Smoking Status Never Smoker   Smokeless Tobacco Never Used       Home Medications:   Prior to Admission medications    Medication Sig Start Date End Date Taking? Authorizing Provider   ascorbic acid (VITAMIN C) 1000 MG tablet Take 1,000 mg by mouth daily    Historical Provider, MD   aspirin (ECOTRIN LOW STRENGTH) 81 mg EC tablet Take 81 mg by mouth daily      Historical Provider, MD   calcium citrate-vitamin D (CITRACAL+D) 315-200 MG-UNIT per tablet Take 1 tablet by mouth daily      Historical Provider, MD   cholecalciferol (VITAMIN D3) 400 units tablet Take 2,000 Units by mouth daily    Historical Provider, MD   finasteride (PROSCAR) 5 mg tablet Take 5 mg by mouth   4/4/18   Historical Provider, MD   levETIRAcetam (KEPPRA) 250 mg tablet Take 250 mg by mouth 2 (two) times a day    Historical Provider, MD   Multiple Vitamin (DAILY VALUE MULTIVITAMIN PO) Take 1 tablet by mouth daily    Historical Provider, MD   Omega-3 Fatty Acids ([employer] FISH OIL) 1000 MG CAPS Take 1 capsule by mouth daily      Historical Provider, MD   simvastatin (ZOCOR) 10 mg tablet Take 10 mg by mouth daily at bedtime    Historical Provider, MD   tamsulosin (FLOMAX) 0 4 mg Take 0 4 mg by mouth daily with dinner   4/4/18   Historical Provider, MD       Allergies:  No Known Allergies    Review of Systems:  Review of Systems   Constitutional: Positive for activity change and fatigue  Negative for fever and unexpected weight change  HENT: Negative for congestion, dental problem and trouble swallowing  Respiratory: Negative for apnea, chest tightness, shortness of breath and wheezing  Cardiovascular: Negative for chest pain, palpitations and leg swelling  Gastrointestinal: Negative for abdominal distention, abdominal pain, blood in stool, constipation and diarrhea     Genitourinary: Negative for difficulty urinating, dysuria, enuresis and hematuria  Musculoskeletal: Negative for arthralgias, joint swelling and myalgias  Skin: Negative for color change, pallor, rash and wound  Neurological: Positive for light-headedness  Negative for dizziness, seizures, syncope and headaches  Psychiatric/Behavioral: Negative for agitation, behavioral problems and confusion  The patient is not nervous/anxious  Vital Signs: There were no vitals filed for this visit  Physical Exam:  Physical Exam   Constitutional: He is oriented to person, place, and time  He appears well-developed and well-nourished  No distress  HENT:   Head: Normocephalic and atraumatic  Mouth/Throat: No oropharyngeal exudate  Eyes: Pupils are equal, round, and reactive to light  EOM are normal  No scleral icterus  Neck: Normal range of motion  Neck supple  No JVD present  Cardiovascular: Normal rate and intact distal pulses  Exam reveals no gallop and no friction rub  Murmur heard  Pulmonary/Chest: Effort normal and breath sounds normal  No respiratory distress  He has no wheezes  Abdominal: Soft  Bowel sounds are normal  He exhibits no distension and no mass  There is no rebound  Musculoskeletal: Normal range of motion  He exhibits no edema or tenderness  Lymphadenopathy:     He has no cervical adenopathy  Neurological: He is alert and oriented to person, place, and time  No cranial nerve deficit  Skin: Skin is warm and dry  He is not diaphoretic  Psychiatric: He has a normal mood and affect  His behavior is normal  Judgment and thought content normal    Nursing note and vitals reviewed  Lab Results:               Invalid input(s): LABGLOM      Lab Results   Component Value Date    HGBA1C 6 1 12/27/2019     No results found for: CKTOTAL, CKMB, CKMBINDEX, TROPONINI    Imaging Studies:     Echocardiogram:   LEFT VENTRICLE:  Systolic function was normal  Ejection fraction was estimated to be 65 %    There were no regional wall motion abnormalities  Wall thickness was mildly increased      MITRAL VALVE:  There was mild regurgitation      AORTIC VALVE:  The valve was trileaflet  Leaflets exhibited markedly increased thickness  There was severe stenosis  AV Velocity max=4 6 M/S  SON 0 7 cm2  There was moderate regurgitation      TRICUSPID VALVE:  There was mild regurgitation      PULMONIC VALVE:  There was mild regurgitation    Gated CTA of the chest/abdomen/pelvis:   IMPRESSION:     TVAR measurements as above     Hypervascular hepatic nodule which does not meet the criteria for benign lesion, but may represent a atypical hemangioma  Recommend a CT liver protocol for additional evaluation      Stable pulmonary nodules for one month  Based on current Fleischner Society 2017 Guidelines on incidental pulmonary nodule, no routine follow-up is needed if the patient is considered low risk for lung cancer  If the patient is considered high risk for   lung cancer, 12 month follow-up non-contrast chest CT is recommended  3D POOL: not required    Left and right cardiac catheterization:  Left main: Normal   LAD: Normal  The diagonal branches were also normal   Circumflex: Normal  The OM branches were also normal l  RCA: Dominant; normal     Pulmonary function tests:   FEV1- 2 13L (97%)  DLCO- 79%    Arterial Blood gas: 7 41/40 7/81/26 1/96%    Carotid artery ultrasound:   No significant bilateral carotid disease  I have personally reviewed pertinent reports        TAVR evaluation Assessment:     5 Meter Walk Test:      Attempt 1: 5   Attempt 2: 6   Attempt 3: 6    STS Risk Score: 2 25 %, mortality risk    [facility]HC: II    KCCQ-12 was completed    Assessment:  Patient Active Problem List    Diagnosis Date Noted    Severe aortic stenosis 01/02/2020    Sinus bradycardia 05/30/2019    Nonrheumatic aortic valve stenosis 05/29/2019    Palpitations 05/29/2019    Right inguinal hernia 11/07/2018    BPH (benign prostatic hyperplasia) 05/25/2018    Osteoporosis 05/25/2018    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[address] ) 05/25/2018     Severe aortic stenosis; Ongoing TAVR workup    Plan:    Pepe Schaefer has severe symptomatic aortic stenosis  Based on their STS risk assessment, they have undergone testing for transcatheter aortic valve replacement  The results of these studies have been interpreted by FRANK Haynes       The risks, benefits, and alternatives to TAVR were discussed in detail with the patient today  They understand and wish to proceed with transfemoral transcatheter aortic valve replacement  Informed consent was obtained and a date for the intervention has been set  Pepe Schaefer was comfortable with our recommendations, and their questions were answered to their satisfaction  The following preoperative instructions were provided at the conclusion of their consultation:     1  You will receive a phone call from the hospital between 2:00 PM and 8:00 PM the day prior to surgery to confirm arrival time and location  For surgery on Mondays, you will receive a call on Friday  2  Do not drink or eat anything after midnight the night before surgery  That includes no water, candy, gum, lozenges, Lifesavers, etc  We recommend you not eat any "junk" food, consume alcohol or smoke the night before surgery  3  Continue taking aspirin but only 81 mg daily  4  If you take Coumadin and/or Plavix, discontinue it 5 days before surgery  5  If you are diabetic, do not take any of your diabetic pills the morning of surgery  If you take Lantus insulin, you may take it at your regularly scheduled time the day before surgery  Do not take any other insulins the morning of surgery  6  The 2 nights before surgery, take a shower each night using the special antiseptic soap or soap sponges you received from the office or hospital  Yaniv Salas your hair with regular shampoo and rinse completely before using the antiseptic sponges   Use the sponge to wash from your neck down, with special attention to the armpits and groin area  Do not use any other soap or cleanser on your skin  Do not use lotions, powder, deodorant or perfume of any kind on your skin after you shower  Use clean bed linens and wear clean pajamas after your shower  7  You will be prescribed Mupirocin nasal ointment  Apply to both nostrils twice a day for 5 days prior to surgery  8  Do not take a shower the morning of her surgery; you'll be given a special" bath" at the hospital   9  Notify the CT surgery office if you develop a cold, sore throat, cough, fever or other health issues before your surgery  10  Other medication changes included the following: hold vitamin C, fish oil and Omega-3 for 7 days prior to procedure        SIGNATURE: Manisha Razo PA-C  DATE: January 3, 2020  TIME: 10:31 AM

## 2020-01-03 NOTE — PROGRESS NOTES
Consultation - Cardiothoracic Surgery   Nemesio Freed [de-identified] y o  male MRN: [id number]      Reason for Consult / Principal Problem: Aortic stenosis, Non-Rheumatic    History of Present Illness: Nemesio Freed is a [de-identified]y o  year old male who was previously evaluated in our office by NICOL Akers  for transcatheter aortic valve replacement  During this initial consultation, arrangements were made for the following studies to be completed: Gated CTA of the chest/abdomen/pelvis,  dental clearance, pulmonary function tests with ABG, and carotid artery ultrasound  In review;  he has had a heart murmur for approximately 10 years  He has been followed by cardiology for several years with serial echocardiograms  Echo in 2015 demonstrated moderate to severe aortic stenosis  Most recent echocardiogram performed in May of this year demonstrates severe aortic stenosis, moderate aortic insufficiency, SON 0 7 cm2, MG 49 & PG 86 mm Hg  Recent cardiac catheterization demonstrates normal coronary arteries  Nemesio Freed now presents to review the results of these tests and confirm the suitability of proceeding with transcatheter aortic valve replacment          Past Medical History:  Past Medical History:   Diagnosis Date    BPH (benign prostatic hyperplasia)     Diabetes mellitus (HCC)     pre    Irregular heart beat     murmur    Osteoporosis     Seizures (HCC)     partial sensory   never had seizure         Past Surgical History:   Past Surgical History:   Procedure Laterality Date    COLONOSCOPY      HERNIA REPAIR      MO REPAIR ING HERNIA,5+Y/O,REDUCIBL Right 11/16/2018    Procedure: REPAIR RIGHT INGUINAL HERNIA WITH MESH;  Surgeon: Vidal Galvez MD;  Location: [facility] MAIN OR;  Service: General         Family History:  Family History   Problem Relation Age of Onset    Coronary artery disease Family     Heart disease Family          Social History:    Social History     Substance and Sexual Activity Alcohol Use Yes    Comment: occ     Social History     Substance and Sexual Activity   Drug Use No     Social History     Tobacco Use   Smoking Status Never Smoker   Smokeless Tobacco Never Used       Home Medications:   Prior to Admission medications    Medication Sig Start Date End Date Taking? Authorizing Provider   ascorbic acid (VITAMIN C) 1000 MG tablet Take 1,000 mg by mouth daily    Historical Provider, MD   aspirin (ECOTRIN LOW STRENGTH) 81 mg EC tablet Take 81 mg by mouth daily      Historical Provider, MD   calcium citrate-vitamin D (CITRACAL+D) 315-200 MG-UNIT per tablet Take 1 tablet by mouth daily      Historical Provider, MD   cholecalciferol (VITAMIN D3) 400 units tablet Take 2,000 Units by mouth daily    Historical Provider, MD   finasteride (PROSCAR) 5 mg tablet Take 5 mg by mouth   4/4/18   Historical Provider, MD   levETIRAcetam (KEPPRA) 250 mg tablet Take 250 mg by mouth 2 (two) times a day    Historical Provider, MD   Multiple Vitamin (DAILY VALUE MULTIVITAMIN PO) Take 1 tablet by mouth daily    Historical Provider, MD   Omega-3 Fatty Acids ([employer] FISH OIL) 1000 MG CAPS Take 1 capsule by mouth daily      Historical Provider, MD   simvastatin (ZOCOR) 10 mg tablet Take 10 mg by mouth daily at bedtime    Historical Provider, MD   tamsulosin (FLOMAX) 0 4 mg Take 0 4 mg by mouth daily with dinner   4/4/18   Historical Provider, MD       Allergies:  No Known Allergies    Review of Systems:  Review of Systems   Constitutional: Positive for activity change and fatigue  Negative for fever and unexpected weight change  HENT: Negative for congestion, dental problem and trouble swallowing  Respiratory: Negative for apnea, chest tightness, shortness of breath and wheezing  Cardiovascular: Negative for chest pain, palpitations and leg swelling  Gastrointestinal: Negative for abdominal distention, abdominal pain, blood in stool, constipation and diarrhea     Genitourinary: Negative for difficulty urinating, dysuria, enuresis and hematuria  Musculoskeletal: Negative for arthralgias, joint swelling and myalgias  Skin: Negative for color change, pallor, rash and wound  Neurological: Positive for light-headedness  Negative for dizziness, seizures, syncope and headaches  Psychiatric/Behavioral: Negative for agitation, behavioral problems and confusion  The patient is not nervous/anxious  Vital Signs: There were no vitals filed for this visit  Physical Exam:  Physical Exam   Constitutional: He is oriented to person, place, and time  He appears well-developed and well-nourished  No distress  HENT:   Head: Normocephalic and atraumatic  Mouth/Throat: No oropharyngeal exudate  Eyes: Pupils are equal, round, and reactive to light  EOM are normal  No scleral icterus  Neck: Normal range of motion  Neck supple  No JVD present  Cardiovascular: Normal rate and intact distal pulses  Exam reveals no gallop and no friction rub  Murmur heard  Pulmonary/Chest: Effort normal and breath sounds normal  No respiratory distress  He has no wheezes  Abdominal: Soft  Bowel sounds are normal  He exhibits no distension and no mass  There is no rebound  Musculoskeletal: Normal range of motion  He exhibits no edema or tenderness  Lymphadenopathy:     He has no cervical adenopathy  Neurological: He is alert and oriented to person, place, and time  No cranial nerve deficit  Skin: Skin is warm and dry  He is not diaphoretic  Psychiatric: He has a normal mood and affect  His behavior is normal  Judgment and thought content normal    Nursing note and vitals reviewed  Lab Results:               Invalid input(s): LABGLOM      Lab Results   Component Value Date    HGBA1C 6 1 12/27/2019     No results found for: CKTOTAL, CKMB, CKMBINDEX, TROPONINI    Imaging Studies:     Echocardiogram:   LEFT VENTRICLE:  Systolic function was normal  Ejection fraction was estimated to be 65 %    There were no regional wall motion abnormalities  Wall thickness was mildly increased      MITRAL VALVE:  There was mild regurgitation      AORTIC VALVE:  The valve was trileaflet  Leaflets exhibited markedly increased thickness  There was severe stenosis  AV Velocity max=4 6 M/S  SON 0 7 cm2  There was moderate regurgitation      TRICUSPID VALVE:  There was mild regurgitation      PULMONIC VALVE:  There was mild regurgitation    Gated CTA of the chest/abdomen/pelvis:   IMPRESSION:     TVAR measurements as above     Hypervascular hepatic nodule which does not meet the criteria for benign lesion, but may represent a atypical hemangioma  Recommend a CT liver protocol for additional evaluation      Stable pulmonary nodules for one month  Based on current Fleischner Society 2017 Guidelines on incidental pulmonary nodule, no routine follow-up is needed if the patient is considered low risk for lung cancer  If the patient is considered high risk for   lung cancer, 12 month follow-up non-contrast chest CT is recommended  3D POOL: not required    Left and right cardiac catheterization:  Left main: Normal   LAD: Normal  The diagonal branches were also normal   Circumflex: Normal  The OM branches were also normal l  RCA: Dominant; normal     Pulmonary function tests:   FEV1- 2 13L (97%)  DLCO- 79%    Arterial Blood gas: 7 41/40 7/81/26 1/96%    Carotid artery ultrasound:   No significant bilateral carotid disease  I have personally reviewed pertinent reports        TAVR evaluation Assessment:     5 Meter Walk Test:      Attempt 1: 5   Attempt 2: 6   Attempt 3: 6    STS Risk Score: 2 25 %, mortality risk    NYHC: II    KCCQ-12 was completed    Assessment:  Patient Active Problem List    Diagnosis Date Noted    Severe aortic stenosis 01/02/2020    Sinus bradycardia 05/30/2019    Nonrheumatic aortic valve stenosis 05/29/2019    Palpitations 05/29/2019    Right inguinal hernia 11/07/2018    BPH (benign prostatic hyperplasia) 05/25/2018    Osteoporosis 05/25/2018    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[address] ) 05/25/2018     Severe aortic stenosis; Ongoing TAVR workup    Plan:    Christelle Hankins has severe symptomatic aortic stenosis  Based on their STS risk assessment, they have undergone testing for transcatheter aortic valve replacement  The results of these studies have been interpreted by FRANK Jasso       The risks, benefits, and alternatives to TAVR were discussed in detail with the patient today  They understand and wish to proceed with transfemoral transcatheter aortic valve replacement  Informed consent was obtained and a date for the intervention has been set  Christelle Hankins was comfortable with our recommendations, and their questions were answered to their satisfaction  The following preoperative instructions were provided at the conclusion of their consultation:     1  You will receive a phone call from the hospital between 2:00 PM and 8:00 PM the day prior to surgery to confirm arrival time and location  For surgery on Mondays, you will receive a call on Friday  2  Do not drink or eat anything after midnight the night before surgery  That includes no water, candy, gum, lozenges, Lifesavers, etc  We recommend you not eat any "junk" food, consume alcohol or smoke the night before surgery  3  Continue taking aspirin but only 81 mg daily  4  If you take Coumadin and/or Plavix, discontinue it 5 days before surgery  5  If you are diabetic, do not take any of your diabetic pills the morning of surgery  If you take Lantus insulin, you may take it at your regularly scheduled time the day before surgery  Do not take any other insulins the morning of surgery  6  The 2 nights before surgery, take a shower each night using the special antiseptic soap or soap sponges you received from the office or [facility]  [place] your hair with regular shampoo and rinse completely before using the antiseptic sponges   Use the sponge to wash from your neck down, with special attention to the armpits and groin area  Do not use any other soap or cleanser on your skin  Do not use lotions, powder, deodorant or perfume of any kind on your skin after you shower  Use clean bed linens and wear clean pajamas after your shower  7  You will be prescribed Mupirocin nasal ointment  Apply to both nostrils twice a day for 5 days prior to surgery  8  Do not take a shower the morning of her surgery; you'll be given a special" bath" at the hospital   9  Notify the CT surgery office if you develop a cold, sore throat, cough, fever or other health issues before your surgery  10  Other medication changes included the following: hold vitamin C, fish oil and Omega-3 for 7 days prior to procedure        SIGNATURE: [facility] PA-C  DATE: January 3, 2020  TIME: 10:31 AM

## 2020-01-03 NOTE — PRE-PROCEDURE INSTRUCTIONS
No outpatient medications have been marked as taking for the 1/7/20 encounter [facility] Encounter)  Spoke to pt  Confirmed he received all medication & showering instruction from surgeon office   All questions answered

## 2020-01-03 NOTE — PATIENT INSTRUCTIONS
Take Keppra the morning of surgery with small sip of water  Hold vitamin C, fish oil and Omega-3 for 7 days prior to procedure

## 2020-01-03 NOTE — H&P
Consultation - Cardiothoracic Surgery   Mauricio Johnson [de-identified] y o  male MRN: [id number]      Reason for Consult / Principal Problem: Aortic stenosis, Non-Rheumatic    History of Present Illness: Mauricio Johnson is a [de-identified]y o  year old male who was previously evaluated in our office by NICOL Gagnon  for transcatheter aortic valve replacement  During this initial consultation, arrangements were made for the following studies to be completed: Gated CTA of the chest/abdomen/pelvis,  dental clearance, pulmonary function tests with ABG, and carotid artery ultrasound  In review;  john fleming has had a heart murmur for approximately 10 years  He has been followed by cardiology for several years with serial echocardiograms  Echo in 2015 demonstrated moderate to severe aortic stenosis  Most recent echocardiogram performed in May of this year demonstrates severe aortic stenosis, moderate aortic insufficiency, SON 0 7 cm2, MG 49 & PG 86 mm Hg  Recent cardiac catheterization demonstrates normal coronary arteries  Mauricio Johnson now presents to review the results of these tests and confirm the suitability of proceeding with transcatheter aortic valve replacment          Past Medical History:  Past Medical History:   Diagnosis Date    BPH (benign prostatic hyperplasia)     Diabetes mellitus (HCC)     pre    Irregular heart beat     murmur    Osteoporosis     Seizures (HCC)     partial sensory   never had seizure         Past Surgical History:   Past Surgical History:   Procedure Laterality Date    COLONOSCOPY      HERNIA REPAIR      ID REPAIR ING HERNIA,5+Y/O,REDUCIBL Right 11/16/2018    Procedure: REPAIR RIGHT INGUINAL HERNIA WITH MESH;  Surgeon: Jocelyne Garcia MD;  Location: [address] [place] [address];  Service: General         Family History:  Family History   Problem Relation Age of Onset    Coronary artery disease Family     Heart disease Family          Social History:    Social History     Substance and Sexual Activity Alcohol Use Yes    Comment: occ     Social History     Substance and Sexual Activity   Drug Use No     Social History     Tobacco Use   Smoking Status Never Smoker   Smokeless Tobacco Never Used       Home Medications:   Prior to Admission medications    Medication Sig Start Date End Date Taking? Authorizing Provider   ascorbic acid (VITAMIN C) 1000 MG tablet Take 1,000 mg by mouth daily    Historical Provider, MD   aspirin (ECOTRIN LOW STRENGTH) 81 mg EC tablet Take 81 mg by mouth daily      Historical Provider, MD   calcium citrate-vitamin D (CITRACAL+D) 315-200 MG-UNIT per tablet Take 1 tablet by mouth daily      Historical Provider, MD   cholecalciferol (VITAMIN D3) 400 units tablet Take 2,000 Units by mouth daily    Historical Provider, MD   finasteride (PROSCAR) 5 mg tablet Take 5 mg by mouth   4/4/18   Historical Provider, MD   levETIRAcetam (KEPPRA) 250 mg tablet Take 250 mg by mouth 2 (two) times a day    Historical Provider, MD   Multiple Vitamin (DAILY VALUE MULTIVITAMIN PO) Take 1 tablet by mouth daily    Historical Provider, MD   Omega-3 Fatty Acids ([employer] FISH OIL) 1000 MG CAPS Take 1 capsule by mouth daily      Historical Provider, MD   simvastatin (ZOCOR) 10 mg tablet Take 10 mg by mouth daily at bedtime    Historical Provider, MD   tamsulosin (FLOMAX) 0 4 mg Take 0 4 mg by mouth daily with dinner   4/4/18   Historical Provider, MD       Allergies:  No Known Allergies    Review of Systems:  Review of Systems   Constitutional: Positive for activity change and fatigue  Negative for fever and unexpected weight change  HENT: Negative for congestion, dental problem and trouble swallowing  Respiratory: Negative for apnea, chest tightness, shortness of breath and wheezing  Cardiovascular: Negative for chest pain, palpitations and leg swelling  Gastrointestinal: Negative for abdominal distention, abdominal pain, blood in stool, constipation and diarrhea     Genitourinary: Negative for difficulty urinating, dysuria, enuresis and hematuria  Musculoskeletal: Negative for arthralgias, joint swelling and myalgias  Skin: Negative for color change, pallor, rash and wound  Neurological: Positive for light-headedness  Negative for dizziness, seizures, syncope and headaches  Psychiatric/Behavioral: Negative for agitation, behavioral problems and confusion  The patient is not nervous/anxious  Vital Signs: There were no vitals filed for this visit  Physical Exam:  Physical Exam   Constitutional: He is oriented to person, place, and time  He appears well-developed and well-nourished  No distress  HENT:   Head: Normocephalic and atraumatic  Mouth/Throat: No oropharyngeal exudate  Eyes: Pupils are equal, round, and reactive to light  EOM are normal  No scleral icterus  Neck: Normal range of motion  Neck supple  No JVD present  Cardiovascular: Normal rate and intact distal pulses  Exam reveals no gallop and no friction rub  Murmur heard  Pulmonary/Chest: Effort normal and breath sounds normal  No respiratory distress  He has no wheezes  Abdominal: Soft  Bowel sounds are normal  He exhibits no distension and no mass  There is no rebound  Musculoskeletal: Normal range of motion  He exhibits no edema or tenderness  Lymphadenopathy:     He has no cervical adenopathy  Neurological: He is alert and oriented to person, place, and time  No cranial nerve deficit  Skin: Skin is warm and dry  He is not diaphoretic  Psychiatric: He has a normal mood and affect  His behavior is normal  Judgment and thought content normal    Nursing note and vitals reviewed  Lab Results:               Invalid input(s): LABGLOM      Lab Results   Component Value Date    HGBA1C 6 1 12/27/2019     No results found for: CKTOTAL, CKMB, CKMBINDEX, TROPONINI    Imaging Studies:     Echocardiogram:   LEFT VENTRICLE:  Systolic function was normal  Ejection fraction was estimated to be 65 %    There were no regional wall motion abnormalities  Wall thickness was mildly increased      MITRAL VALVE:  There was mild regurgitation      AORTIC VALVE:  The valve was trileaflet  Leaflets exhibited markedly increased thickness  There was severe stenosis  AV Velocity max=4 6 M/S  SON 0 7 cm2  There was moderate regurgitation      TRICUSPID VALVE:  There was mild regurgitation      PULMONIC VALVE:  There was mild regurgitation    Gated CTA of the chest/abdomen/pelvis:   IMPRESSION:     TVAR measurements as above     Hypervascular hepatic nodule which does not meet the criteria for benign lesion, but may represent a atypical hemangioma  Recommend a CT liver protocol for additional evaluation      Stable pulmonary nodules for one month  Based on current Fleischner Society 2017 Guidelines on incidental pulmonary nodule, no routine follow-up is needed if the patient is considered low risk for lung cancer  If the patient is considered high risk for   lung cancer, 12 month follow-up non-contrast chest CT is recommended  3D POOL: not required    Left and right cardiac catheterization:  Left main: Normal   LAD: Normal  The diagonal branches were also normal   Circumflex: Normal  The OM branches were also normal l  RCA: Dominant; normal     Pulmonary function tests:   FEV1- 2 13L (97%)  DLCO- 79%    Arterial Blood gas: 7 41/40 7/81/26 1/96%    Carotid artery ultrasound:   No significant bilateral carotid disease  I have personally reviewed pertinent reports        TAVR evaluation Assessment:     5 Meter Walk Test:      Attempt 1: 5   Attempt 2: 6   Attempt 3: 6    STS Risk Score: 2 25 %, mortality risk    NYHC: II    KCCQ-12 was completed    Assessment:  Patient Active Problem List    Diagnosis Date Noted    Severe aortic stenosis 01/02/2020    Sinus bradycardia 05/30/2019    Nonrheumatic aortic valve stenosis 05/29/2019    Palpitations 05/29/2019    Right inguinal hernia 11/07/2018    BPH (benign prostatic hyperplasia) 05/25/2018    Osteoporosis 05/25/2018    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[address] ) 05/25/2018     Severe aortic stenosis; Ongoing TAVR workup    Plan:    Delano Dacosta has severe symptomatic aortic stenosis  Based on their STS risk assessment, they have undergone testing for transcatheter aortic valve replacement  The results of these studies have been interpreted by FRANK Reed       The risks, benefits, and alternatives to TAVR were discussed in detail with the patient today  They understand and wish to proceed with transfemoral transcatheter aortic valve replacement  Informed consent was obtained and a date for the intervention has been set  Delano Dacosta was comfortable with our recommendations, and their questions were answered to their satisfaction  The following preoperative instructions were provided at the conclusion of their consultation:     1  You will receive a phone call from the hospital between 2:00 PM and 8:00 PM the day prior to surgery to confirm arrival time and location  For surgery on Mondays, you will receive a call on Friday  2  Do not drink or eat anything after midnight the night before surgery  That includes no water, candy, gum, lozenges, Lifesavers, etc  We recommend you not eat any "junk" food, consume alcohol or smoke the night before surgery  3  Continue taking aspirin but only 81 mg daily  4  If you take Coumadin and/or Plavix, discontinue it 5 days before surgery  5  If you are diabetic, do not take any of your diabetic pills the morning of surgery  If you take Lantus insulin, you may take it at your regularly scheduled time the day before surgery  Do not take any other insulins the morning of surgery  6  The 2 nights before surgery, take a shower each night using the special antiseptic soap or soap sponges you received from the office or [facility] your hair with regular shampoo and rinse completely before using the antiseptic sponges   Use the sponge to wash from your neck down, with special attention to the armpits and groin area  Do not use any other soap or cleanser on your skin  Do not use lotions, powder, deodorant or perfume of any kind on your skin after you shower  Use clean bed linens and wear clean pajamas after your shower  7  You will be prescribed Mupirocin nasal ointment  Apply to both nostrils twice a day for 5 days prior to surgery  8  Do not take a shower the morning of her surgery; you'll be given a special" bath" at the hospital   9  Notify the CT surgery office if you develop a cold, sore throat, cough, fever or other health issues before your surgery  10  Other medication changes included the following: hold vitamin C, fish oil and Omega-3 for 7 days prior to procedure        SIGNATURE: Becca Smallwood PA-C  DATE: January 3, 2020  TIME: 10:31 AM

## 2020-01-03 NOTE — LETTER
January 3, 2020     Heaven Miguel MD  [address]    Patient: Isabelle Xiong   YOB: 1940   Date of Visit: 1/3/2020       Dear Dr Jose Alberto Fagan: Thank you for referring Isabelle Xiong to me for evaluation  Below are my notes for this consultation  If you have questions, please do not hesitate to call me  I look forward to following your patient along with you  Sincerely,        Vicente Moreno MD        CC: MD Arcelia Gaston PA-C  1/3/2020 11:23 AM  Attested  Consultation - Cardiothoracic Surgery   Isabelle Xiong [de-identified] y o  male MRN: [id number]      Reason for Consult / Principal Problem: Aortic stenosis, Non-Rheumatic    History of Present Illness: Isabelle Xiong is a [de-identified]y o  year old male who was previously evaluated in our office by NICOL Batista  for transcatheter aortic valve replacement  During this initial consultation, arrangements were made for the following studies to be completed: Gated CTA of the chest/abdomen/pelvis,  dental clearance, pulmonary function tests with ABG, and carotid artery ultrasound  In review;  john fleming has had a heart murmur for approximately 10 years  He has been followed by cardiology for several years with serial echocardiograms  Echo in 2015 demonstrated moderate to severe aortic stenosis  Most recent echocardiogram performed in May of this year demonstrates severe aortic stenosis, moderate aortic insufficiency, SON 0 7 cm2, MG 49 & PG 86 mm Hg  Recent cardiac catheterization demonstrates normal coronary arteries  Isabelle Xiong now presents to review the results of these tests and confirm the suitability of proceeding with transcatheter aortic valve replacment          Past Medical History:  Past Medical History:   Diagnosis Date    BPH (benign prostatic hyperplasia)     Diabetes mellitus ([address] )     pre    Irregular heart beat     murmur    Osteoporosis     Seizures (HCC)     partial sensory never had seizure         Past Surgical History:   Past Surgical History:   Procedure Laterality Date    COLONOSCOPY      HERNIA REPAIR      MT REPAIR ING HERNIA,5+Y/O,REDUCIBL Right 11/16/2018    Procedure: REPAIR RIGHT INGUINAL HERNIA WITH MESH;  Surgeon: Steph Stein MD;  Location: [facility] MAIN OR;  Service: General         Family History:  Family History   Problem Relation Age of Onset    Coronary artery disease Family     Heart disease Family          Social History:    Social History     Substance and Sexual Activity   Alcohol Use Yes    Comment: occ     Social History     Substance and Sexual Activity   Drug Use No     Social History     Tobacco Use   Smoking Status Never Smoker   Smokeless Tobacco Never Used       Home Medications:   Prior to Admission medications    Medication Sig Start Date End Date Taking?  Authorizing Provider   ascorbic acid (VITAMIN C) 1000 MG tablet Take 1,000 mg by mouth daily    Historical Provider, MD   aspirin (ECOTRIN LOW STRENGTH) 81 mg EC tablet Take 81 mg by mouth daily      Historical Provider, MD   calcium citrate-vitamin D (CITRACAL+D) 315-200 MG-UNIT per tablet Take 1 tablet by mouth daily      Historical Provider, MD   cholecalciferol (VITAMIN D3) 400 units tablet Take 2,000 Units by mouth daily    Historical Provider, MD   finasteride (PROSCAR) 5 mg tablet Take 5 mg by mouth   4/4/18   Historical Provider, MD   levETIRAcetam (KEPPRA) 250 mg tablet Take 250 mg by mouth 2 (two) times a day    Historical Provider, MD   Multiple Vitamin (DAILY VALUE MULTIVITAMIN PO) Take 1 tablet by mouth daily    Historical Provider, MD   Omega-3 Fatty Acids ([employer] FISH OIL) 1000 MG CAPS Take 1 capsule by mouth daily      Historical Provider, MD   simvastatin (ZOCOR) 10 mg tablet Take 10 mg by mouth daily at bedtime    Historical Provider, MD   tamsulosin (FLOMAX) 0 4 mg Take 0 4 mg by mouth daily with dinner   4/4/18   Historical Provider, MD       Allergies:  No Known Allergies    Review of Systems:  Review of Systems   Constitutional: Positive for activity change and fatigue  Negative for fever and unexpected weight change  HENT: Negative for congestion, dental problem and trouble swallowing  Respiratory: Negative for apnea, chest tightness, shortness of breath and wheezing  Cardiovascular: Negative for chest pain, palpitations and leg swelling  Gastrointestinal: Negative for abdominal distention, abdominal pain, blood in stool, constipation and diarrhea  Genitourinary: Negative for difficulty urinating, dysuria, enuresis and hematuria  Musculoskeletal: Negative for arthralgias, joint swelling and myalgias  Skin: Negative for color change, pallor, rash and wound  Neurological: Positive for light-headedness  Negative for dizziness, seizures, syncope and headaches  Psychiatric/Behavioral: Negative for agitation, behavioral problems and confusion  The patient is not nervous/anxious  Vital Signs: There were no vitals filed for this visit  Physical Exam:  Physical Exam   Constitutional: He is oriented to person, place, and time  He appears well-developed and well-nourished  No distress  HENT:   Head: Normocephalic and atraumatic  Mouth/Throat: No oropharyngeal exudate  Eyes: Pupils are equal, round, and reactive to light  EOM are normal  No scleral icterus  Neck: Normal range of motion  Neck supple  No JVD present  Cardiovascular: Normal rate and intact distal pulses  Exam reveals no gallop and no friction rub  Murmur heard  Pulmonary/Chest: Effort normal and breath sounds normal  No respiratory distress  He has no wheezes  Abdominal: Soft  Bowel sounds are normal  He exhibits no distension and no mass  There is no rebound  Musculoskeletal: Normal range of motion  He exhibits no edema or tenderness  Lymphadenopathy:     He has no cervical adenopathy  Neurological: He is alert and oriented to person, place, and time  No cranial nerve deficit     Skin: Skin is warm and dry  He is not diaphoretic  Psychiatric: He has a normal mood and affect  His behavior is normal  Judgment and thought content normal    Nursing note and vitals reviewed  Lab Results:               Invalid input(s): LABGLOM      Lab Results   Component Value Date    HGBA1C 6 1 12/27/2019     No results found for: CKTOTAL, CKMB, CKMBINDEX, TROPONINI    Imaging Studies:     Echocardiogram:   LEFT VENTRICLE:  Systolic function was normal  Ejection fraction was estimated to be 65 %  There were no regional wall motion abnormalities  Wall thickness was mildly increased      MITRAL VALVE:  There was mild regurgitation      AORTIC VALVE:  The valve was trileaflet  Leaflets exhibited markedly increased thickness  There was severe stenosis  AV Velocity max=4 6 M/S  SON 0 7 cm2  There was moderate regurgitation      TRICUSPID VALVE:  There was mild regurgitation      PULMONIC VALVE:  There was mild regurgitation    Gated CTA of the chest/abdomen/pelvis:   IMPRESSION:     TVAR measurements as above     Hypervascular hepatic nodule which does not meet the criteria for benign lesion, but may represent a atypical hemangioma  Recommend a CT liver protocol for additional evaluation      Stable pulmonary nodules for one month  Based on current Fleischner Society 2017 Guidelines on incidental pulmonary nodule, no routine follow-up is needed if the patient is considered low risk for lung cancer  If the patient is considered high risk for   lung cancer, 12 month follow-up non-contrast chest CT is recommended  3D POOL: not required    Left and right cardiac catheterization:  Left main: Normal   LAD: Normal  The diagonal branches were also normal   Circumflex: Normal  The OM branches were also normal l  RCA: Dominant; normal     Pulmonary function tests:   FEV1- 2 13L (97%)  DLCO- 79%    Arterial Blood gas: 7 41/40 7/81/26 1/96%    Carotid artery ultrasound:   No significant bilateral carotid disease      I have personally reviewed pertinent reports  TAVR evaluation Assessment:     5 Meter Walk Test:      Attempt 1: 5   Attempt 2: 6   Attempt 3: 6    STS Risk Score: 2 25 %, mortality risk    [facility]: II    KCCQ-12 was completed    Assessment:  Patient Active Problem List    Diagnosis Date Noted    Severe aortic stenosis 01/02/2020    Sinus bradycardia 05/30/2019    Nonrheumatic aortic valve stenosis 05/29/2019    Palpitations 05/29/2019    Right inguinal hernia 11/07/2018    BPH (benign prostatic hyperplasia) 05/25/2018    Osteoporosis 05/25/2018    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[facility] [address] ) 05/25/2018     Severe aortic stenosis; Ongoing TAVR workup    Plan:    Gunnar Coffey has severe symptomatic aortic stenosis  Based on their STS risk assessment, they have undergone testing for transcatheter aortic valve replacement  The results of these studies have been interpreted by FRANK Luo       The risks, benefits, and alternatives to TAVR were discussed in detail with the patient today  They understand and wish to proceed with transfemoral transcatheter aortic valve replacement  Informed consent was obtained and a date for the intervention has been set  Gunnar Coffey was comfortable with our recommendations, and their questions were answered to their satisfaction  The following preoperative instructions were provided at the conclusion of their consultation:     1  You will receive a phone call from the hospital between 2:00 PM and 8:00 PM the day prior to surgery to confirm arrival time and location  For surgery on Mondays, you will receive a call on Friday  2  Do not drink or eat anything after midnight the night before surgery  That includes no water, candy, gum, lozenges, Lifesavers, etc  We recommend you not eat any "junk" food, consume alcohol or smoke the night before surgery  3  Continue taking aspirin but only 81 mg daily    4  If you take Coumadin and/or Plavix, discontinue it 5 days before surgery  5  If you are diabetic, do not take any of your diabetic pills the morning of surgery  If you take Lantus insulin, you may take it at your regularly scheduled time the day before surgery  Do not take any other insulins the morning of surgery  6  The 2 nights before surgery, take a shower each night using the special antiseptic soap or soap sponges you received from the office or hospital  [place] your hair with regular shampoo and rinse completely before using the antiseptic sponges  Use the sponge to wash from your neck down, with special attention to the armpits and groin area  Do not use any other soap or cleanser on your skin  Do not use lotions, powder, deodorant or perfume of any kind on your skin after you shower  Use clean bed linens and wear clean pajamas after your shower  7  You will be prescribed Mupirocin nasal ointment  Apply to both nostrils twice a day for 5 days prior to surgery  8  Do not take a shower the morning of her surgery; you'll be given a special" bath" at the hospital   9  Notify the CT surgery office if you develop a cold, sore throat, cough, fever or other health issues before your surgery  10  Other medication changes included the following: hold vitamin C, fish oil and Omega-3 for 7 days prior to procedure  SIGNATURE: Bethany Blanco PA-C  DATE: January 3, 2020  TIME: 10:31 AM  Attestation signed by Nel Adhikari MD at 1/3/2020 11:46 AM:  Pt seen and examined with PA  I agree with the above assessment and plan  It was my pleasure to evaluate Chriss Campbell today for Severe Aortic Stenosis  He is referred for consideration of transcatheter aortic valve replacement  I reviewed all of the available testing and I had a lengthy discussion with the patient and wife and have recommended transcatheter aortic valve replacement  Routine preoperative testing has been completed and reviewed and he is a good candidate for TAVR    His liver imaging results were requested from radiology today  Informed consent was obtained and surgery is planned for 1/7/2020        SIGNATUREBal Ibrahim MD  DATE: January 3, 2020  TIME: 11:44 AM

## 2020-01-04 LAB — MRSA NOSE QL CULT: NORMAL

## 2020-01-06 ENCOUNTER — ANESTHESIA EVENT (INPATIENT)
Dept: PERIOP | Facility: HOSPITAL | Age: 80
DRG: 267 | End: 2020-01-06
Payer: MEDICARE

## 2020-01-06 DIAGNOSIS — I35.0 SEVERE AORTIC STENOSIS: Primary | ICD-10-CM

## 2020-01-06 RX ORDER — HEPARIN SODIUM 1000 [USP'U]/ML
400 INJECTION, SOLUTION INTRAVENOUS; SUBCUTANEOUS ONCE
Status: CANCELLED | OUTPATIENT
Start: 2020-01-07

## 2020-01-06 RX ORDER — HEPARIN SODIUM 1000 [USP'U]/ML
10000 INJECTION, SOLUTION INTRAVENOUS; SUBCUTANEOUS ONCE
Status: CANCELLED | OUTPATIENT
Start: 2020-01-07

## 2020-01-07 ENCOUNTER — APPOINTMENT (INPATIENT)
Dept: NON INVASIVE DIAGNOSTICS | Facility: HOSPITAL | Age: 80
DRG: 267 | End: 2020-01-07
Payer: MEDICARE

## 2020-01-07 ENCOUNTER — APPOINTMENT (INPATIENT)
Dept: RADIOLOGY | Facility: HOSPITAL | Age: 80
DRG: 267 | End: 2020-01-07
Payer: MEDICARE

## 2020-01-07 ENCOUNTER — HOSPITAL ENCOUNTER (INPATIENT)
Facility: HOSPITAL | Age: 80
LOS: 4 days | Discharge: HOME HEALTH | DRG: 267 | End: 2020-01-11
Attending: THORACIC SURGERY (CARDIOTHORACIC VASCULAR SURGERY) | Admitting: THORACIC SURGERY (CARDIOTHORACIC VASCULAR SURGERY)
Payer: MEDICARE

## 2020-01-07 ENCOUNTER — ANESTHESIA (INPATIENT)
Dept: PERIOP | Facility: HOSPITAL | Age: 80
DRG: 267 | End: 2020-01-07
Payer: MEDICARE

## 2020-01-07 DIAGNOSIS — I35.9 NONRHEUMATIC AORTIC VALVE DISORDER: ICD-10-CM

## 2020-01-07 DIAGNOSIS — Z95.2 S/P TAVR (TRANSCATHETER AORTIC VALVE REPLACEMENT): Primary | ICD-10-CM

## 2020-01-07 DIAGNOSIS — I35.0 SEVERE AORTIC STENOSIS: ICD-10-CM

## 2020-01-07 DIAGNOSIS — D69.6 THROMBOCYTOPENIA (HCC): ICD-10-CM

## 2020-01-07 DIAGNOSIS — Z86.79 HISTORY OF AORTIC STENOSIS: ICD-10-CM

## 2020-01-07 PROBLEM — E87.8 HYPERCHLOREMIA: Status: ACTIVE | Noted: 2020-01-07

## 2020-01-07 LAB
ABO GROUP BLD: NORMAL
ANION GAP SERPL CALCULATED.3IONS-SCNC: 4 MMOL/L (ref 4–13)
ATRIAL RATE: 72 BPM
BASE EXCESS BLDA CALC-SCNC: -1 MMOL/L (ref -2–3)
BASE EXCESS BLDA CALC-SCNC: 0 MMOL/L (ref -2–3)
BUN SERPL-MCNC: 17 MG/DL (ref 5–25)
CA-I BLD-SCNC: 1.25 MMOL/L (ref 1.12–1.32)
CA-I BLD-SCNC: 1.32 MMOL/L (ref 1.12–1.32)
CALCIUM SERPL-MCNC: 9 MG/DL (ref 8.3–10.1)
CHLORIDE SERPL-SCNC: 110 MMOL/L (ref 100–108)
CO2 SERPL-SCNC: 26 MMOL/L (ref 21–32)
CREAT SERPL-MCNC: 0.88 MG/DL (ref 0.6–1.3)
GFR SERPL CREATININE-BSD FRML MDRD: 81 ML/MIN/1.73SQ M
GLUCOSE SERPL-MCNC: 102 MG/DL (ref 65–140)
GLUCOSE SERPL-MCNC: 109 MG/DL (ref 65–140)
GLUCOSE SERPL-MCNC: 114 MG/DL (ref 65–140)
GLUCOSE SERPL-MCNC: 118 MG/DL (ref 65–140)
GLUCOSE SERPL-MCNC: 96 MG/DL (ref 65–140)
GLUCOSE SERPL-MCNC: 97 MG/DL (ref 65–140)
GLUCOSE SERPL-MCNC: 97 MG/DL (ref 65–140)
HCO3 BLDA-SCNC: 23.1 MMOL/L (ref 24–30)
HCO3 BLDA-SCNC: 24.3 MMOL/L (ref 22–28)
HCT VFR BLD AUTO: 38.8 % (ref 36.5–49.3)
HCT VFR BLD AUTO: 39.7 % (ref 36.5–49.3)
HCT VFR BLD CALC: 34 % (ref 36.5–49.3)
HCT VFR BLD CALC: 35 % (ref 36.5–49.3)
HGB BLD-MCNC: 12.7 G/DL (ref 12–17)
HGB BLD-MCNC: 13.1 G/DL (ref 12–17)
HGB BLDA-MCNC: 11.6 G/DL (ref 12–17)
HGB BLDA-MCNC: 11.9 G/DL (ref 12–17)
KCT BLD-ACNC: 126 SEC (ref 89–137)
KCT BLD-ACNC: 303 SEC (ref 89–137)
P AXIS: 59 DEGREES
PCO2 BLD: 24 MMOL/L (ref 21–32)
PCO2 BLD: 25 MMOL/L (ref 21–32)
PCO2 BLD: 37.3 MM HG (ref 42–50)
PCO2 BLD: 38.1 MM HG (ref 36–44)
PH BLD: 7.4 [PH] (ref 7.3–7.4)
PH BLD: 7.41 [PH] (ref 7.35–7.45)
PLATELET # BLD AUTO: 94 THOUSANDS/UL (ref 149–390)
PMV BLD AUTO: 12.4 FL (ref 8.9–12.7)
PO2 BLD: 104 MM HG (ref 35–45)
PO2 BLD: 72 MM HG (ref 75–129)
POTASSIUM BLD-SCNC: 3.9 MMOL/L (ref 3.5–5.3)
POTASSIUM BLD-SCNC: 4 MMOL/L (ref 3.5–5.3)
POTASSIUM SERPL-SCNC: 4 MMOL/L (ref 3.5–5.3)
PR INTERVAL: 154 MS
QRS AXIS: 41 DEGREES
QRSD INTERVAL: 92 MS
QT INTERVAL: 379 MS
QTC INTERVAL: 415 MS
RH BLD: NEGATIVE
SAO2 % BLD FROM PO2: 94 % (ref 60–85)
SAO2 % BLD FROM PO2: 98 % (ref 60–85)
SODIUM BLD-SCNC: 139 MMOL/L (ref 136–145)
SODIUM BLD-SCNC: 140 MMOL/L (ref 136–145)
SODIUM SERPL-SCNC: 140 MMOL/L (ref 136–145)
SPECIMEN SOURCE: ABNORMAL
SPECIMEN SOURCE: NORMAL
T WAVE AXIS: 99 DEGREES
VENTRICULAR RATE: 72 BPM

## 2020-01-07 PROCEDURE — 02RF38Z REPLACEMENT OF AORTIC VALVE WITH ZOOPLASTIC TISSUE, PERCUTANEOUS APPROACH: ICD-10-PCS | Performed by: THORACIC SURGERY (CARDIOTHORACIC VASCULAR SURGERY)

## 2020-01-07 PROCEDURE — C1769 GUIDE WIRE: HCPCS | Performed by: THORACIC SURGERY (CARDIOTHORACIC VASCULAR SURGERY)

## 2020-01-07 PROCEDURE — 84295 ASSAY OF SERUM SODIUM: CPT

## 2020-01-07 PROCEDURE — 85014 HEMATOCRIT: CPT | Performed by: PHYSICIAN ASSISTANT

## 2020-01-07 PROCEDURE — 80048 BASIC METABOLIC PNL TOTAL CA: CPT | Performed by: PHYSICIAN ASSISTANT

## 2020-01-07 PROCEDURE — 33361 REPLACE AORTIC VALVE PERQ: CPT | Performed by: THORACIC SURGERY (CARDIOTHORACIC VASCULAR SURGERY)

## 2020-01-07 PROCEDURE — 84132 ASSAY OF SERUM POTASSIUM: CPT

## 2020-01-07 PROCEDURE — 94760 N-INVAS EAR/PLS OXIMETRY 1: CPT

## 2020-01-07 PROCEDURE — 82330 ASSAY OF CALCIUM: CPT

## 2020-01-07 PROCEDURE — 33361 REPLACE AORTIC VALVE PERQ: CPT | Performed by: INTERNAL MEDICINE

## 2020-01-07 PROCEDURE — C1894 INTRO/SHEATH, NON-LASER: HCPCS | Performed by: THORACIC SURGERY (CARDIOTHORACIC VASCULAR SURGERY)

## 2020-01-07 PROCEDURE — 76377 3D RENDER W/INTRP POSTPROCES: CPT

## 2020-01-07 PROCEDURE — C1760 CLOSURE DEV, VASC: HCPCS | Performed by: THORACIC SURGERY (CARDIOTHORACIC VASCULAR SURGERY)

## 2020-01-07 PROCEDURE — 71045 X-RAY EXAM CHEST 1 VIEW: CPT

## 2020-01-07 PROCEDURE — 93010 ELECTROCARDIOGRAM REPORT: CPT | Performed by: INTERNAL MEDICINE

## 2020-01-07 PROCEDURE — 85014 HEMATOCRIT: CPT

## 2020-01-07 PROCEDURE — 86900 BLOOD TYPING SEROLOGIC ABO: CPT | Performed by: THORACIC SURGERY (CARDIOTHORACIC VASCULAR SURGERY)

## 2020-01-07 PROCEDURE — 82803 BLOOD GASES ANY COMBINATION: CPT

## 2020-01-07 PROCEDURE — 85018 HEMOGLOBIN: CPT | Performed by: PHYSICIAN ASSISTANT

## 2020-01-07 PROCEDURE — NC001 PR NO CHARGE: Performed by: PHYSICIAN ASSISTANT

## 2020-01-07 PROCEDURE — 85049 AUTOMATED PLATELET COUNT: CPT | Performed by: PHYSICIAN ASSISTANT

## 2020-01-07 PROCEDURE — 82948 REAGENT STRIP/BLOOD GLUCOSE: CPT

## 2020-01-07 PROCEDURE — 93312 ECHO TRANSESOPHAGEAL: CPT

## 2020-01-07 PROCEDURE — 82947 ASSAY GLUCOSE BLOOD QUANT: CPT

## 2020-01-07 PROCEDURE — 86901 BLOOD TYPING SEROLOGIC RH(D): CPT | Performed by: THORACIC SURGERY (CARDIOTHORACIC VASCULAR SURGERY)

## 2020-01-07 PROCEDURE — 85347 COAGULATION TIME ACTIVATED: CPT

## 2020-01-07 PROCEDURE — 02HV33Z INSERTION OF INFUSION DEVICE INTO SUPERIOR VENA CAVA, PERCUTANEOUS APPROACH: ICD-10-PCS | Performed by: ANESTHESIOLOGY

## 2020-01-07 PROCEDURE — 86920 COMPATIBILITY TEST SPIN: CPT

## 2020-01-07 PROCEDURE — 93005 ELECTROCARDIOGRAM TRACING: CPT

## 2020-01-07 DEVICE — TAVR SAPIEN 3 CMNDR DLV SYS 23MM: Type: IMPLANTABLE DEVICE | Site: AORTA | Status: FUNCTIONAL

## 2020-01-07 DEVICE — PERCLOSE PROGLIDE™ SUTURE-MEDIATED CLOSURE SYSTEM
Type: IMPLANTABLE DEVICE | Site: GROIN | Status: FUNCTIONAL
Brand: PERCLOSE PROGLIDE™

## 2020-01-07 RX ORDER — CEFAZOLIN SODIUM 2 G/50ML
2000 SOLUTION INTRAVENOUS ONCE
Status: DISCONTINUED | OUTPATIENT
Start: 2020-01-07 | End: 2020-01-07 | Stop reason: SDUPTHER

## 2020-01-07 RX ORDER — CHLORHEXIDINE GLUCONATE 0.12 MG/ML
15 RINSE ORAL EVERY 12 HOURS SCHEDULED
Status: DISCONTINUED | OUTPATIENT
Start: 2020-01-07 | End: 2020-01-11 | Stop reason: HOSPADM

## 2020-01-07 RX ORDER — NEOSTIGMINE METHYLSULFATE 1 MG/ML
INJECTION INTRAVENOUS AS NEEDED
Status: DISCONTINUED | OUTPATIENT
Start: 2020-01-07 | End: 2020-01-07 | Stop reason: SURG

## 2020-01-07 RX ORDER — BISACODYL 10 MG
10 SUPPOSITORY, RECTAL RECTAL DAILY PRN
Status: DISCONTINUED | OUTPATIENT
Start: 2020-01-07 | End: 2020-01-11 | Stop reason: HOSPADM

## 2020-01-07 RX ORDER — ACETAMINOPHEN 325 MG/1
650 TABLET ORAL EVERY 4 HOURS PRN
Status: DISCONTINUED | OUTPATIENT
Start: 2020-01-07 | End: 2020-01-11 | Stop reason: HOSPADM

## 2020-01-07 RX ORDER — HEPARIN SODIUM 1000 [USP'U]/ML
INJECTION, SOLUTION INTRAVENOUS; SUBCUTANEOUS AS NEEDED
Status: DISCONTINUED | OUTPATIENT
Start: 2020-01-07 | End: 2020-01-07 | Stop reason: SURG

## 2020-01-07 RX ORDER — CEFAZOLIN SODIUM 2 G/50ML
2000 SOLUTION INTRAVENOUS EVERY 8 HOURS
Status: COMPLETED | OUTPATIENT
Start: 2020-01-07 | End: 2020-01-08

## 2020-01-07 RX ORDER — PROPOFOL 10 MG/ML
INJECTION, EMULSION INTRAVENOUS AS NEEDED
Status: DISCONTINUED | OUTPATIENT
Start: 2020-01-07 | End: 2020-01-07 | Stop reason: SURG

## 2020-01-07 RX ORDER — ONDANSETRON 2 MG/ML
4 INJECTION INTRAMUSCULAR; INTRAVENOUS ONCE AS NEEDED
Status: DISCONTINUED | OUTPATIENT
Start: 2020-01-07 | End: 2020-01-07 | Stop reason: HOSPADM

## 2020-01-07 RX ORDER — HEPARIN SODIUM 5000 [USP'U]/ML
5000 INJECTION, SOLUTION INTRAVENOUS; SUBCUTANEOUS EVERY 8 HOURS SCHEDULED
Status: DISCONTINUED | OUTPATIENT
Start: 2020-01-08 | End: 2020-01-08

## 2020-01-07 RX ORDER — SODIUM CHLORIDE 9 MG/ML
INJECTION, SOLUTION INTRAVENOUS CONTINUOUS PRN
Status: DISCONTINUED | OUTPATIENT
Start: 2020-01-07 | End: 2020-01-07 | Stop reason: SURG

## 2020-01-07 RX ORDER — PRAVASTATIN SODIUM 20 MG
20 TABLET ORAL
Status: DISCONTINUED | OUTPATIENT
Start: 2020-01-07 | End: 2020-01-11 | Stop reason: HOSPADM

## 2020-01-07 RX ORDER — FENTANYL CITRATE 50 UG/ML
INJECTION, SOLUTION INTRAMUSCULAR; INTRAVENOUS AS NEEDED
Status: DISCONTINUED | OUTPATIENT
Start: 2020-01-07 | End: 2020-01-07 | Stop reason: SURG

## 2020-01-07 RX ORDER — LEVETIRACETAM 500 MG/1
250 TABLET ORAL EVERY 12 HOURS SCHEDULED
Status: DISCONTINUED | OUTPATIENT
Start: 2020-01-07 | End: 2020-01-11 | Stop reason: HOSPADM

## 2020-01-07 RX ORDER — GLYCOPYRROLATE 0.2 MG/ML
INJECTION INTRAMUSCULAR; INTRAVENOUS AS NEEDED
Status: DISCONTINUED | OUTPATIENT
Start: 2020-01-07 | End: 2020-01-07 | Stop reason: SURG

## 2020-01-07 RX ORDER — LIDOCAINE HYDROCHLORIDE 10 MG/ML
INJECTION, SOLUTION EPIDURAL; INFILTRATION; INTRACAUDAL; PERINEURAL
Status: COMPLETED | OUTPATIENT
Start: 2020-01-07 | End: 2020-01-07

## 2020-01-07 RX ORDER — CLOPIDOGREL BISULFATE 75 MG/1
75 TABLET ORAL DAILY
Status: DISCONTINUED | OUTPATIENT
Start: 2020-01-07 | End: 2020-01-09

## 2020-01-07 RX ORDER — ONDANSETRON 2 MG/ML
INJECTION INTRAMUSCULAR; INTRAVENOUS AS NEEDED
Status: DISCONTINUED | OUTPATIENT
Start: 2020-01-07 | End: 2020-01-07 | Stop reason: SURG

## 2020-01-07 RX ORDER — PANTOPRAZOLE SODIUM 40 MG/1
40 TABLET, DELAYED RELEASE ORAL
Status: DISCONTINUED | OUTPATIENT
Start: 2020-01-08 | End: 2020-01-11 | Stop reason: HOSPADM

## 2020-01-07 RX ORDER — MIDAZOLAM HYDROCHLORIDE 2 MG/2ML
INJECTION, SOLUTION INTRAMUSCULAR; INTRAVENOUS AS NEEDED
Status: DISCONTINUED | OUTPATIENT
Start: 2020-01-07 | End: 2020-01-07 | Stop reason: SURG

## 2020-01-07 RX ORDER — ASPIRIN 81 MG/1
81 TABLET, CHEWABLE ORAL DAILY
Status: DISCONTINUED | OUTPATIENT
Start: 2020-01-07 | End: 2020-01-09

## 2020-01-07 RX ORDER — TAMSULOSIN HYDROCHLORIDE 0.4 MG/1
0.4 CAPSULE ORAL
Status: DISCONTINUED | OUTPATIENT
Start: 2020-01-07 | End: 2020-01-11 | Stop reason: HOSPADM

## 2020-01-07 RX ORDER — POLYETHYLENE GLYCOL 3350 17 G/17G
17 POWDER, FOR SOLUTION ORAL DAILY
Status: DISCONTINUED | OUTPATIENT
Start: 2020-01-07 | End: 2020-01-11 | Stop reason: HOSPADM

## 2020-01-07 RX ORDER — ROCURONIUM BROMIDE 10 MG/ML
INJECTION, SOLUTION INTRAVENOUS AS NEEDED
Status: DISCONTINUED | OUTPATIENT
Start: 2020-01-07 | End: 2020-01-07 | Stop reason: SURG

## 2020-01-07 RX ORDER — CEFAZOLIN SODIUM 1 G/3ML
INJECTION, POWDER, FOR SOLUTION INTRAMUSCULAR; INTRAVENOUS AS NEEDED
Status: DISCONTINUED | OUTPATIENT
Start: 2020-01-07 | End: 2020-01-07 | Stop reason: SURG

## 2020-01-07 RX ORDER — CEFAZOLIN SODIUM 1 G/50ML
1000 SOLUTION INTRAVENOUS ONCE
Status: DISCONTINUED | OUTPATIENT
Start: 2020-01-07 | End: 2020-01-07

## 2020-01-07 RX ORDER — PROTAMINE SULFATE 10 MG/ML
INJECTION, SOLUTION INTRAVENOUS AS NEEDED
Status: DISCONTINUED | OUTPATIENT
Start: 2020-01-07 | End: 2020-01-07 | Stop reason: SURG

## 2020-01-07 RX ORDER — SODIUM CHLORIDE, SODIUM LACTATE, POTASSIUM CHLORIDE, CALCIUM CHLORIDE 600; 310; 30; 20 MG/100ML; MG/100ML; MG/100ML; MG/100ML
INJECTION, SOLUTION INTRAVENOUS CONTINUOUS PRN
Status: DISCONTINUED | OUTPATIENT
Start: 2020-01-07 | End: 2020-01-07 | Stop reason: SURG

## 2020-01-07 RX ORDER — SODIUM CHLORIDE, SODIUM GLUCONATE, SODIUM ACETATE, POTASSIUM CHLORIDE, MAGNESIUM CHLORIDE, SODIUM PHOSPHATE, DIBASIC, AND POTASSIUM PHOSPHATE .53; .5; .37; .037; .03; .012; .00082 G/100ML; G/100ML; G/100ML; G/100ML; G/100ML; G/100ML; G/100ML
50 INJECTION, SOLUTION INTRAVENOUS CONTINUOUS
Status: DISPENSED | OUTPATIENT
Start: 2020-01-07 | End: 2020-01-08

## 2020-01-07 RX ORDER — FENTANYL CITRATE/PF 50 MCG/ML
12.5 SYRINGE (ML) INJECTION
Status: DISCONTINUED | OUTPATIENT
Start: 2020-01-07 | End: 2020-01-07 | Stop reason: HOSPADM

## 2020-01-07 RX ORDER — CHLORHEXIDINE GLUCONATE 0.12 MG/ML
15 RINSE ORAL ONCE
Status: COMPLETED | OUTPATIENT
Start: 2020-01-07 | End: 2020-01-07

## 2020-01-07 RX ORDER — ATORVASTATIN CALCIUM 20 MG/1
20 TABLET, FILM COATED ORAL
Status: DISCONTINUED | OUTPATIENT
Start: 2020-01-07 | End: 2020-01-07

## 2020-01-07 RX ORDER — ONDANSETRON 2 MG/ML
4 INJECTION INTRAMUSCULAR; INTRAVENOUS EVERY 6 HOURS PRN
Status: DISCONTINUED | OUTPATIENT
Start: 2020-01-07 | End: 2020-01-11 | Stop reason: HOSPADM

## 2020-01-07 RX ORDER — SODIUM CHLORIDE, SODIUM LACTATE, POTASSIUM CHLORIDE, CALCIUM CHLORIDE 600; 310; 30; 20 MG/100ML; MG/100ML; MG/100ML; MG/100ML
20 INJECTION, SOLUTION INTRAVENOUS CONTINUOUS
Status: DISCONTINUED | OUTPATIENT
Start: 2020-01-07 | End: 2020-01-08

## 2020-01-07 RX ADMIN — ASPIRIN 81 MG 81 MG: 81 TABLET ORAL at 16:22

## 2020-01-07 RX ADMIN — FENTANYL CITRATE 100 MCG: 50 INJECTION, SOLUTION INTRAMUSCULAR; INTRAVENOUS at 11:30

## 2020-01-07 RX ADMIN — CEFAZOLIN 1000 MG: 1 INJECTION, POWDER, FOR SOLUTION INTRAVENOUS at 11:45

## 2020-01-07 RX ADMIN — CEFAZOLIN SODIUM 2000 MG: 2 SOLUTION INTRAVENOUS at 21:19

## 2020-01-07 RX ADMIN — METOPROLOL TARTRATE 12.5 MG: 25 TABLET ORAL at 16:54

## 2020-01-07 RX ADMIN — CHLORHEXIDINE GLUCONATE 0.12% ORAL RINSE 15 ML: 1.2 LIQUID ORAL at 21:04

## 2020-01-07 RX ADMIN — LIDOCAINE HYDROCHLORIDE 5 ML: 10 INJECTION, SOLUTION EPIDURAL; INFILTRATION; INTRACAUDAL; PERINEURAL at 12:07

## 2020-01-07 RX ADMIN — CHLORHEXIDINE GLUCONATE 0.12% ORAL RINSE 15 ML: 1.2 LIQUID ORAL at 08:21

## 2020-01-07 RX ADMIN — GLYCOPYRROLATE 0.6 MG: 0.2 INJECTION, SOLUTION INTRAMUSCULAR; INTRAVENOUS at 13:30

## 2020-01-07 RX ADMIN — MIDAZOLAM 2 MG: 1 INJECTION INTRAMUSCULAR; INTRAVENOUS at 11:23

## 2020-01-07 RX ADMIN — PROPOFOL 150 MG: 10 INJECTION, EMULSION INTRAVENOUS at 11:30

## 2020-01-07 RX ADMIN — IOHEXOL 37 ML: 350 INJECTION, SOLUTION INTRAVENOUS at 14:02

## 2020-01-07 RX ADMIN — SODIUM CHLORIDE 2.5 MG/HR: 0.9 INJECTION, SOLUTION INTRAVENOUS at 17:41

## 2020-01-07 RX ADMIN — LEVETIRACETAM 250 MG: 500 TABLET, FILM COATED ORAL at 21:04

## 2020-01-07 RX ADMIN — ROCURONIUM BROMIDE 50 MG: 50 INJECTION, SOLUTION INTRAVENOUS at 11:30

## 2020-01-07 RX ADMIN — PRAVASTATIN SODIUM 20 MG: 20 TABLET ORAL at 16:22

## 2020-01-07 RX ADMIN — NEOSTIGMINE METHYLSULFATE 4 MG: 1 INJECTION INTRAVENOUS at 13:30

## 2020-01-07 RX ADMIN — PROTAMINE SULFATE 12 MG: 10 INJECTION, SOLUTION INTRAVENOUS at 13:36

## 2020-01-07 RX ADMIN — PHENYLEPHRINE HYDROCHLORIDE 100 MCG: 10 INJECTION INTRAVENOUS at 13:39

## 2020-01-07 RX ADMIN — CEFAZOLIN 1000 MG: 1 INJECTION, POWDER, FOR SOLUTION INTRAVENOUS at 13:11

## 2020-01-07 RX ADMIN — ONDANSETRON 4 MG: 2 INJECTION INTRAMUSCULAR; INTRAVENOUS at 13:30

## 2020-01-07 RX ADMIN — ACETAMINOPHEN 650 MG: 325 TABLET ORAL at 21:04

## 2020-01-07 RX ADMIN — Medication 1 APPLICATION: at 21:05

## 2020-01-07 RX ADMIN — SODIUM CHLORIDE, SODIUM GLUCONATE, SODIUM ACETATE, POTASSIUM CHLORIDE, MAGNESIUM CHLORIDE, SODIUM PHOSPHATE, DIBASIC, AND POTASSIUM PHOSPHATE 50 ML/HR: .53; .5; .37; .037; .03; .012; .00082 INJECTION, SOLUTION INTRAVENOUS at 14:30

## 2020-01-07 RX ADMIN — MUPIROCIN 1 APPLICATION: 20 OINTMENT TOPICAL at 08:22

## 2020-01-07 RX ADMIN — PHENYLEPHRINE HYDROCHLORIDE 200 MCG: 10 INJECTION INTRAVENOUS at 13:41

## 2020-01-07 RX ADMIN — SODIUM CHLORIDE, SODIUM LACTATE, POTASSIUM CHLORIDE, AND CALCIUM CHLORIDE: .6; .31; .03; .02 INJECTION, SOLUTION INTRAVENOUS at 11:22

## 2020-01-07 RX ADMIN — HEPARIN SODIUM 12000 UNITS: 1000 INJECTION INTRAVENOUS; SUBCUTANEOUS at 13:22

## 2020-01-07 RX ADMIN — TAMSULOSIN HYDROCHLORIDE 0.4 MG: 0.4 CAPSULE ORAL at 16:27

## 2020-01-07 RX ADMIN — CLOPIDOGREL BISULFATE 75 MG: 75 TABLET ORAL at 16:22

## 2020-01-07 RX ADMIN — SODIUM CHLORIDE: 0.9 INJECTION, SOLUTION INTRAVENOUS at 11:36

## 2020-01-07 RX ADMIN — SODIUM CHLORIDE 5 MG/HR: 0.9 INJECTION, SOLUTION INTRAVENOUS at 13:45

## 2020-01-07 RX ADMIN — PHENYLEPHRINE HYDROCHLORIDE 40 MCG/MIN: 10 INJECTION INTRAVENOUS at 11:32

## 2020-01-07 NOTE — OP NOTE
OPERATIVE REPORT  PATIENT NAME: Elijah Gomes    :  1940  MRN: [id number]  Pt Location: [facility]BR[facility] OR ROOM 02    SURGERY DATE: 2020    SURGEON: Vanesa Gar MD     ASSISTANT: Laura Felix MD    CO-SURGEON: Suhail Castellon DO     PREOPERATIVE DIAGNOSIS Symptomatic severe aortic stenosis  POSTOPERATIVE DIAGNOSIS Symptomatic severe aortic stenosis  NYHA CLASS: 3    CCS CLASS: 1    PROCEDURE Transcatheter aortic valve replacement with a 23 mm Myers KATHLEEN 3 bioprosthetic valve via a percutaneous left transfemoral approach  ANESTHESIA Dr Sanjuana Cook  general endotracheal anesthesia with transesophageal echocardiogram guidance  CARDIOPULMONARY BYPASS TIME 0  PACKS/TUBES/DRAINS None  ESTIMATED BLOOD LOSS: 25 mL    OPERATIVE TECHNIQUE The patient was taken to the operating room and placed supine on the operating table  Following the satisfactory induction of general anesthesia and placement of monitoring lines, the patient was prepped and draped in the usual sterile fashion  A time-out procedure was performed  The right common femoral artery was accessed percutaneously using Seldinger technique and fluoroscopy  The right common femoral vein was accessed in a similar fashion and was cannulated with a 6 [place] Kayla sheath  The femoral artery was cannulated with a 7 Bengali sheath  A pigtail catheter was inserted and advanced through the right common femoral artery sheath into the right coronary cusp  Using a series of injections, the angle of deployment was determined  Through the right common femoral vein sheath, a balloon tip temporary pacing catheter was inserted into the right ventricle and its capture was confirmed  The left common femoral artery was accessed percutaneously using Seldinger technique and fluoroscopy  Two (2) Perclose sutures were deployed in the standard fashion The patient was systemically heparinized   The left common femoral artery was then accessed with an extra-stiff wire and the sheath was inserted through the left common femoral artery and advanced into the aorta  A 23 mm KATHLEEN 3 valve was then advanced through the sheath into the aorta and positioned onto the deployment balloon in the aorta and then advanced around the aortic arch and through the annulus of the aortic valve to the appropriate level  At this point, the catheter was desheathed in the standard fashion  The valve was positioned appropriately using a combination of fluoroscopy and transesophageal echocardiogram guidance  During an episode of rapid pacing, balloon deployment of the 23 mm KATHLEEN 3 valve was performed  Following deployment, the position of the valve was confirmed by fluoroscopy and echocardiography and its position appeared appropriate with trace perivalvular leak  The valve delivery system was subsequently removed and the sheath was removed from the left common femoral artery while the Perclose sutures were secured and direct pressure was held  Protamine was administered with normalization of the ACT  Pressure was released from the left groin and there was no active bleeding and no evidence of hematoma development  The common femoral artery sheath was removed from the right following deployment of a closure device  The common femoral vein sheath was removed from the right and pressure was held  Sponge, needle, and instrument counts were reported as correct by the nursing staff  There was no qualified surgical resident available to assist  As the attending surgeon, I was present and scrubbed for all critical portions of this procedure  Final transesophageal echocardiogram demonstrated a well-positioned bioprosthetic transcatheter aortic valve with normal function and trace perivalvular leak           SIGNATURE: Mallory De León MD  DATE: January 7, 2020  TIME: 5:16 PM

## 2020-01-07 NOTE — RESPIRATORY THERAPY NOTE
RT Protocol Note  Kathye Mohs [de-identified] y o  male MRN: [id number]  Unit/Bed#: [facility] 403-01 Encounter: [id number]    Assessment    Principal Problem:    Severe aortic stenosis  Active Problems:    S/P TAVR (transcatheter aortic valve replacement)    Thrombocytopenia (HCC)    Hyperchloremia      Home Pulmonary Medications:  None         Past Medical History:   Diagnosis Date    BPH (benign prostatic hyperplasia)     CAD (coronary artery disease)     Diabetes mellitus ([facility])     type 2, diet controlled    Diastolic CHF ([place] [address] )     Epilepsy ([place] [address] )     History of mycosis fungoides     Hyperlipidemia     Hypertension     ILD (interstitial lung disease) ([place] [address] )     Osteoporosis     Seizures ([facility])     partial sensory    Severe aortic stenosis      Social History     Socioeconomic History    Marital status: /Civil Union     Spouse name: None    Number of children: None    Years of education: None    Highest education level: None   Occupational History    None   Social Needs    Financial resource strain: None    Food insecurity:     Worry: None     Inability: None    Transportation needs:     Medical: None     Non-medical: None   Tobacco Use    Smoking status: Never Smoker    Smokeless tobacco: Never Used   Substance and Sexual Activity    Alcohol use: Yes     Frequency: 4 or more times a week     Comment: 1 glass of wine with dinner     Drug use: No    Sexual activity: Not Currently   Lifestyle    Physical activity:     Days per week: None     Minutes per session: None    Stress: None   Relationships    Social connections:     Talks on phone: None     Gets together: None     Attends Judaism service: None     Active member of club or organization: None     Attends meetings of clubs or organizations: None     Relationship status: None    Intimate partner violence:     Fear of current or ex partner: None     Emotionally abused: None     Physically abused: None     Forced sexual activity: None   Other Topics Concern    None   Social History Narrative    None       Subjective         Objective    Physical Exam:   Assessment Type: Assess only  General Appearance: Alert, Awake  Respiratory Pattern: Normal  Chest Assessment: Chest expansion symmetrical  Bilateral Breath Sounds: Clear, Diminished    Vitals:  Blood pressure 127/52, pulse 60, temperature 98 1 °F (36 7 °C), resp  rate (!) 11, SpO2 100 %  Imaging and other studies: I have personally reviewed pertinent reports  Plan    Respiratory Plan: Discontinue Protocol  Airway Clearance Plan: Incentive Spirometer     Resp Comments: Pt S/P TAVR procedure  Pt is in no apparent resp distress  He has no pulmonary hx and takes no resp medications at home  IS was instructed per ACP at this time  pt gives good effort and unsderstands the use and frequency of device  Will continue to monitor per ACP and resp will be d/c at this time

## 2020-01-07 NOTE — ANESTHESIA PROCEDURE NOTES
Arterial Line Insertion  Performed by: George Roth CRNA  Authorized by: Jonah Morales MD   Consent: Verbal consent obtained  Written consent obtained  Risks and benefits: risks, benefits and alternatives were discussed  Consent given by: patient  Patient understanding: patient states understanding of the procedure being performed  Patient consent: the patient's understanding of the procedure matches consent given  Procedure consent: procedure consent matches procedure scheduled  Relevant documents: relevant documents present and verified  Required items: required blood products, implants, devices, and special equipment available  Patient identity confirmed: verbally with patient and arm band  Time out: Immediately prior to procedure a "time out" was called to verify the correct patient, procedure, equipment, support staff and site/side marked as required  Preparation: Patient was prepped and draped in the usual sterile fashion    Indications: hemodynamic monitoring  Orientation:  Right  Location: radial arterylidocaine (PF) (XYLOCAINE-MPF) 1 % infiltration, 5 mL  Sedation:  Patient sedated: no    Procedure Details:  John's test normal: yes  Needle gauge: 20  Seldinger technique: Seldinger technique used  Number of attempts: 1    Post-procedure:  Post-procedure: dressing applied  Waveform: good waveform and waveform confirmed  Post-procedure CNS: normal  Patient tolerance: Patient tolerated the procedure well with no immediate complications

## 2020-01-07 NOTE — OP NOTE
OPERATIVE REPORT  PATIENT NAME: Chriss Campbell    :  1940  MRN: [id number]  Pt Location: [facility] OR ROOM 02    SURGERY DATE: 2020    SURGEON: Finesse Aguirre DO    CO-SURGEON: Adonis Hannon MD    ASSISTANT: Mili Villalba MD    ADDITIONAL ASSISTANT: Alondra Hsu PA-C    PREOPERATIVE DIAGNOSIS: Symptomatic severe aortic stenosis  POSTOPERATIVE DIAGNOSIS: Symptomatic severe aortic stenosis  NYHA Class: 3  CCS Class: 3    PROCEDURE:   Transcatheter aortic valve replacement with a 23 mm Myers KATHLEEN 3 bioprosthetic valve via right transfemoral approach  CARDIOPULMONARY BYPASS TIME: 0 minutes  ANESTHESIA Dr Adolph Rosenberg, general endotracheal anesthesia with transesophageal echocardiogram guidance  INDICATIONS:  The patient is a [de-identified]y o  year-old male with clinical and echocardiographic findings consistent with severe aortic stenosis  FINDINGS:  1  Intraoperative transesophageal echocardiogram revealed severe aortic stenosis  2  Final transesophageal echocardiogram demonstrated prosthetic valve with normal function trace perivalvular leak  OPERATIVE TECHNIQUE:    The patient was taken to the operating room and placed supine on the operating table  Following the satisfactory induction of general anesthesia and placement of monitoring lines, the patient was prepped and draped in the usual sterile fashion  A time-out procedure was performed  The right common femoral artery and right common femoral vein were accessed percutaneously using Seldinger technique and fluoroscopy  A pigtail catheter was inserted and advanced to the right coronary cusp, an aortogram was performed to determine proper angle and orientation for valve deployment   Through the right common femoral vein sheath, a balloon-tip temporary pacing catheter was inserted and advanced into the right ventricle and its capture was confirmed       The left common femoral artery was accessed percutaneously using Seldinger technique and fluoroscopy  Two (2) Perclose sutures were deployed in the standard fashion  The patient was systemically heparinized  The left common femoral artery was then accessed, a [employer] extra-stiff wire was positioned in the ascending aorta over an exchange catheter  The sheath for the delivery system was inserted through the left common femoral artery and advanced into the aorta  A 6 Divehi Sean right 4 coronary catheter was advanced through the delivery sheath to the aortic valve  The aortic valve was crossed with a 0 035 straight-tip wire, the right coronary catheter was advanced into the left ventricular cavity, this was then exchanged for a curved tip Amplatzer extra stiff wire  A 23 mm KATHLEEN 3 valve delivery system was advanced through the delivery sheath into the aorta, the delivery system was configured for deployment  The valve on the delivery system was then advanced and the aortic valve was crossed  At this point, the catheter was desheathed in the standard fashion  The valve was positioned appropriately using a combination of fluoroscopy and transesophageal echocardiogram guidance  During an episode of rapid pacing, balloon deployment of the valve was performed  Following deployment, the position of the valve was confirmed by fluoroscopy and echocardiography and its position appeared appropriate with trace perivalvular leak  The valve delivery system was subsequently removed followed by removal of the sheath while the Perclose sutures were secured and direct pressure was held  Protamine was administered with normalization of the ACT  Pressure was released, without evidence of active bleeding  The left common femoral artery sheath was removed followed by deployment of a closure device  The right common femoral vein sheath was removed and pressure was held  COMPLICATIONS: None    PACKS/TUBES/DRAINS: None  EBL: 25mL  TRANSFUSION: None  SPECIMENS: None      As the attending surgeon, I was present and scrubbed for all critical portions of this procedure  There was no qualified surgical resident available  Sponge, needle and instrument counts were reported as correct by the nursing staff       SIGNATURE: Juanis Rashid DO  DATE: January 7, 2020  TIME: 1:41 PM

## 2020-01-07 NOTE — ANESTHESIA PROCEDURE NOTES
Central Line Insertion  Performed by: Yashira Stanton CRNA  Authorized by: Caty Bledsoe MD   Date/Time: 1/7/2020 11:38 AM  Catheter Type:  triple lumen  Consent: Verbal consent obtained  Written consent obtained  Risks and benefits: risks, benefits and alternatives were discussed  Consent given by: patient  Patient understanding: patient states understanding of the procedure being performed  Patient consent: the patient's understanding of the procedure matches consent given  Procedure consent: procedure consent matches procedure scheduled  Relevant documents: relevant documents present and verified  Required items: required blood products, implants, devices, and special equipment available  Patient identity confirmed: arm band and hospital-assigned identification number  Time out: Immediately prior to procedure a "time out" was called to verify the correct patient, procedure, equipment, support staff and site/side marked as required  Indications: vascular access  Catheter size: 7 Fr  Patient position: Trendelenburg    Sedation:  Patient sedated: geta      Assessment: blood return through all ports  Preparation: skin prepped with 2% chlorhexidine and Chloraprep  Skin prep agent dried: skin prep agent completely dried prior to procedure  Sterile barriers: all five maximum sterile barriers used - cap, mask, sterile gown, sterile gloves, and large sterile sheet  Hand hygiene: hand hygiene performed prior to central venous catheter insertion  Ultrasound guidance: yes  sterile gel and probe cover used in ultrasound-guided central venous catheter insertionultrasound permanent image saved  Number of attempts: 1  Successful placement: yes  Post-procedure: line sutured and dressing applied  Patient tolerance: Patient tolerated the procedure well with no immediate complications

## 2020-01-07 NOTE — INTERVAL H&P NOTE
H&P reviewed  After examining the patient I find no changes in the patients condition since the H&P had been written  Vitals:    01/07/20 0803   BP: 121/64   Pulse: 66   Resp: 16   Temp: (!) 97 °F (36 1 °C)   SpO2: 96%     Anticipated Length of Stay:  Patient will be admitted on an Inpatient basis with an anticipated length of stay of  greater than 2 midnights  Justification for Hospital Stay:  Post surgical recovery following open heart surgery

## 2020-01-07 NOTE — ANESTHESIA PREPROCEDURE EVALUATION
Review of Systems/Medical History  Patient summary reviewed  Chart reviewed      Cardiovascular  Hyperlipidemia, Hypertension , Valvular heart disease , aortic valve stenosis, CAD , CHF ,   Comment: LEFT VENTRICLE: Size was normal  Systolic function was normal  Ejection fraction was estimated to be 65 %  There were no regional wall motion abnormalities  Wall thickness was mildly increased  DOPPLER: The ratio of early ventricular  filling to atrial contraction velocities was within the normal range  The deceleration time of the early transmitral flow velocity was normal      RIGHT VENTRICLE: The size was normal  Systolic function was normal  Wall thickness was normal      LEFT ATRIUM: Size was at the upper limits of normal      RIGHT ATRIUM: Size was normal      MITRAL VALVE: There was annular calcification  DOPPLER: There was no evidence for stenosis  There was mild regurgitation      AORTIC VALVE: The valve was trileaflet  Leaflets exhibited markedly increased thickness  DOPPLER: There was severe stenosis  AV Velocity max=4 6 M/S  SON 0 7 cm2 There was moderate regurgitation      TRICUSPID VALVE: The valve structure was normal  There was normal leaflet separation  DOPPLER: The transtricuspid velocity was within the normal range  There was no evidence for stenosis  There was mild regurgitation  Pulmonary artery  systolic pressure was within the normal range  Estimated peak PA pressure was 27 mmHg      PULMONIC VALVE: Leaflets exhibited normal thickness, no calcification, and normal cuspal separation  DOPPLER: The transpulmonic velocity was within the normal range  There was mild regurgitation      PERICARDIUM: There was no pericardial effusion  The pericardium was normal in appearance      AORTA: The root exhibited normal size      SYSTEMIC VEINS: IVC: The inferior vena cava was normal in size and course   Respirophasic changes were normal    ,  Pulmonary       GI/Hepatic            Endo/Other  Diabetes well controlled type 2 Oral agent,      GYN       Hematology   Musculoskeletal       Neurology  Seizures well controlled,     Psychology           Physical Exam    Airway    Mallampati score: I  TM Distance: >3 FB  Neck ROM: full     Dental       Cardiovascular      Pulmonary      Other Findings        Anesthesia Plan  ASA Score- 4     Anesthesia Type- general with ASA Monitors  Additional Monitors: central venous line and arterial line  Airway Plan: ETT  Plan Factors-    Induction- intravenous  Postoperative Plan-   Planned trial extubation    Informed Consent- Anesthetic plan and risks discussed with patient  I personally reviewed this patient with the CRNA  Discussed and agreed on the Anesthesia Plan with the CRNA  Imani Issa

## 2020-01-07 NOTE — ANESTHESIA POSTPROCEDURE EVALUATION
Post-Op Assessment Note    CV Status:  Stable  Pain Score: 0    Pain management: adequate     Mental Status:  Alert   Hydration Status:  Stable   PONV Controlled:  None   Airway Patency:  Patent   Post Op Vitals Reviewed: Yes      Staff: CRNA           BP   108/62   Temp  97 2   Pulse  80   Resp   16   SpO2   99

## 2020-01-07 NOTE — ANESTHESIA PROCEDURE NOTES
Procedure Performed: POOL Anesthesia  Start Time:         Preanesthesia Checklist    Patient identified, IV assessed, risks and benefits discussed, monitors and equipment assessed, procedure being performed at surgeon's request and anesthesia consent obtained  Procedure    Type of POOL: interventional POOL with real time guidance of intracardiac procedure  Images Saved: ultrasound permanent image saved  Physician Requesting Echo: Kelly Medina DO  Location performed: OR  Intubated  Bite block not placed  Heart visualized  Insertion of POOL Probe:  Atraumatic  Probe Type:  Multiplane  Modalities:  3D, color flow mapping and continuous wave Doppler  Echocardiographic and Doppler Measurements    PREPROCEDURE    LEFT VENTRICLE:  Systolic Function: normal  Ejection Fraction: 65%  Cavity size: normal      RIGHT VENTRICLE:  Systolic Function: normal   Cavity size normal  No hypertrophy              AORTIC VALVE:  Leaflets: trileaflet  Leaflet motions restricted  Stenosis: severe  Regurgitation: moderate  MITRAL VALVE:  Leaflets: normal  Leaflet Motions: normal  Regurgitation: mild  Stenosis: none  TRICUSPID VALVE:  Leaflets: normal  Leaflet Motions: normal  Stenosis: none  Regurgitation: mild  ASCENDING AORTA:  Size:  normal  Dissection not present  AORTIC ARCH:  Size:  normal  dissection not present  Grade 1: normal to mild intimal thickening  DESCENDING AORTA:  Size: normal  Dissection not present  Grade 1: normal to mild intimal thickening  RIGHT ATRIUM:  Size:  normal  No spontaneous echo contrast     LEFT ATRIUM:  Size: normal  No spontaneous echo contrast     LEFT ATRIAL APPENDAGE:  Size: normal  No spontaneous echo contrast         ATRIAL SEPTUM:  Intra-atrial septal morphology: normal          VENTRICULAR SEPTUM:  Intra-ventricular septum morphology: normal                  POSTPROCEDURE    LEFT VENTRICLE: Unchanged   RIGHT VENTRICLE: Unchanged             AORTIC VALVE:   Leaflets: No PVL and bioprosthetic  Regurgitation: none  MITRAL VALVE: Unchanged   TRICUSPID VALVE: Unchanged   ATRIA: Unchanged   Left Atrial Appendage Ligate: No        AORTA: Unchanged   REMOVAL:  Probe Removal: atraumatic

## 2020-01-08 ENCOUNTER — APPOINTMENT (INPATIENT)
Dept: NON INVASIVE DIAGNOSTICS | Facility: HOSPITAL | Age: 80
DRG: 267 | End: 2020-01-08
Payer: MEDICARE

## 2020-01-08 ENCOUNTER — APPOINTMENT (INPATIENT)
Dept: RADIOLOGY | Facility: HOSPITAL | Age: 80
DRG: 267 | End: 2020-01-08
Payer: MEDICARE

## 2020-01-08 LAB
ABO GROUP BLD BPU: NORMAL
ANION GAP SERPL CALCULATED.3IONS-SCNC: 5 MMOL/L (ref 4–13)
ATRIAL RATE: 50 BPM
BPU ID: NORMAL
BUN SERPL-MCNC: 14 MG/DL (ref 5–25)
CALCIUM SERPL-MCNC: 9.6 MG/DL (ref 8.3–10.1)
CHLORIDE SERPL-SCNC: 109 MMOL/L (ref 100–108)
CO2 SERPL-SCNC: 28 MMOL/L (ref 21–32)
CREAT SERPL-MCNC: 0.94 MG/DL (ref 0.6–1.3)
CROSSMATCH: NORMAL
ERYTHROCYTE [DISTWIDTH] IN BLOOD BY AUTOMATED COUNT: 13.1 % (ref 11.6–15.1)
GFR SERPL CREATININE-BSD FRML MDRD: 76 ML/MIN/1.73SQ M
GLUCOSE SERPL-MCNC: 104 MG/DL (ref 65–140)
GLUCOSE SERPL-MCNC: 109 MG/DL (ref 65–140)
GLUCOSE SERPL-MCNC: 122 MG/DL (ref 65–140)
GLUCOSE SERPL-MCNC: 131 MG/DL (ref 65–140)
GLUCOSE SERPL-MCNC: 75 MG/DL (ref 65–140)
HCT VFR BLD AUTO: 40.3 % (ref 36.5–49.3)
HGB BLD-MCNC: 13 G/DL (ref 12–17)
MAGNESIUM SERPL-MCNC: 2.2 MG/DL (ref 1.6–2.6)
MCH RBC QN AUTO: 30 PG (ref 26.8–34.3)
MCHC RBC AUTO-ENTMCNC: 32.3 G/DL (ref 31.4–37.4)
MCV RBC AUTO: 93 FL (ref 82–98)
P AXIS: 54 DEGREES
PLATELET # BLD AUTO: 87 THOUSANDS/UL (ref 149–390)
PMV BLD AUTO: 12.5 FL (ref 8.9–12.7)
POTASSIUM SERPL-SCNC: 4 MMOL/L (ref 3.5–5.3)
PR INTERVAL: 154 MS
QRS AXIS: 47 DEGREES
QRSD INTERVAL: 84 MS
QT INTERVAL: 436 MS
QTC INTERVAL: 397 MS
RBC # BLD AUTO: 4.33 MILLION/UL (ref 3.88–5.62)
SODIUM SERPL-SCNC: 142 MMOL/L (ref 136–145)
T WAVE AXIS: 113 DEGREES
UNIT DISPENSE STATUS: NORMAL
UNIT PRODUCT CODE: NORMAL
UNIT RH: NORMAL
VENTRICULAR RATE: 50 BPM
WBC # BLD AUTO: 8.41 THOUSAND/UL (ref 4.31–10.16)

## 2020-01-08 PROCEDURE — 97167 OT EVAL HIGH COMPLEX 60 MIN: CPT

## 2020-01-08 PROCEDURE — 93306 TTE W/DOPPLER COMPLETE: CPT

## 2020-01-08 PROCEDURE — 85027 COMPLETE CBC AUTOMATED: CPT | Performed by: THORACIC SURGERY (CARDIOTHORACIC VASCULAR SURGERY)

## 2020-01-08 PROCEDURE — 82948 REAGENT STRIP/BLOOD GLUCOSE: CPT

## 2020-01-08 PROCEDURE — 93005 ELECTROCARDIOGRAM TRACING: CPT

## 2020-01-08 PROCEDURE — 83735 ASSAY OF MAGNESIUM: CPT | Performed by: THORACIC SURGERY (CARDIOTHORACIC VASCULAR SURGERY)

## 2020-01-08 PROCEDURE — 99221 1ST HOSP IP/OBS SF/LOW 40: CPT | Performed by: INTERNAL MEDICINE

## 2020-01-08 PROCEDURE — 99232 SBSQ HOSP IP/OBS MODERATE 35: CPT | Performed by: THORACIC SURGERY (CARDIOTHORACIC VASCULAR SURGERY)

## 2020-01-08 PROCEDURE — 71045 X-RAY EXAM CHEST 1 VIEW: CPT

## 2020-01-08 PROCEDURE — 80048 BASIC METABOLIC PNL TOTAL CA: CPT | Performed by: THORACIC SURGERY (CARDIOTHORACIC VASCULAR SURGERY)

## 2020-01-08 PROCEDURE — 97163 PT EVAL HIGH COMPLEX 45 MIN: CPT

## 2020-01-08 PROCEDURE — 97535 SELF CARE MNGMENT TRAINING: CPT

## 2020-01-08 PROCEDURE — 93010 ELECTROCARDIOGRAM REPORT: CPT | Performed by: INTERNAL MEDICINE

## 2020-01-08 RX ORDER — FONDAPARINUX SODIUM 2.5 MG/.5ML
2.5 INJECTION SUBCUTANEOUS EVERY 24 HOURS
Status: DISCONTINUED | OUTPATIENT
Start: 2020-01-09 | End: 2020-01-11 | Stop reason: HOSPADM

## 2020-01-08 RX ADMIN — ASPIRIN 81 MG 81 MG: 81 TABLET ORAL at 08:03

## 2020-01-08 RX ADMIN — Medication 1 APPLICATION: at 08:03

## 2020-01-08 RX ADMIN — CEFAZOLIN SODIUM 2000 MG: 2 SOLUTION INTRAVENOUS at 05:15

## 2020-01-08 RX ADMIN — CHLORHEXIDINE GLUCONATE 0.12% ORAL RINSE 15 ML: 1.2 LIQUID ORAL at 08:04

## 2020-01-08 RX ADMIN — CLOPIDOGREL BISULFATE 75 MG: 75 TABLET ORAL at 08:03

## 2020-01-08 RX ADMIN — CHLORHEXIDINE GLUCONATE 0.12% ORAL RINSE 15 ML: 1.2 LIQUID ORAL at 21:35

## 2020-01-08 RX ADMIN — LEVETIRACETAM 250 MG: 500 TABLET, FILM COATED ORAL at 21:32

## 2020-01-08 RX ADMIN — LEVETIRACETAM 250 MG: 500 TABLET, FILM COATED ORAL at 08:03

## 2020-01-08 RX ADMIN — HEPARIN SODIUM 5000 UNITS: 5000 INJECTION INTRAVENOUS; SUBCUTANEOUS at 05:11

## 2020-01-08 RX ADMIN — PRAVASTATIN SODIUM 20 MG: 20 TABLET ORAL at 17:06

## 2020-01-08 RX ADMIN — POLYETHYLENE GLYCOL 3350 17 G: 17 POWDER, FOR SOLUTION ORAL at 08:04

## 2020-01-08 RX ADMIN — CEFAZOLIN SODIUM 2000 MG: 2 SOLUTION INTRAVENOUS at 12:05

## 2020-01-08 RX ADMIN — TAMSULOSIN HYDROCHLORIDE 0.4 MG: 0.4 CAPSULE ORAL at 17:06

## 2020-01-08 RX ADMIN — METOPROLOL TARTRATE 12.5 MG: 25 TABLET ORAL at 21:32

## 2020-01-08 RX ADMIN — PANTOPRAZOLE SODIUM 40 MG: 40 TABLET, DELAYED RELEASE ORAL at 05:11

## 2020-01-08 RX ADMIN — Medication 1 APPLICATION: at 21:35

## 2020-01-08 NOTE — SOCIAL WORK
CM spoke with pt today who is alert and oriented, independent ADLS, retired, drives  He is POD#1 TAVR and plan is to discharge home tomorrow with family  He resides in [place] with his wife Dorian Gonzalez, home phone [phone] or cell [phone]  They live in 2 story home with 1 DENNY, kulwinder entry has no steps  Pt  Has supportive family, son and daughter who are co-POAs, copy requested  Son is Willa Mayer, cell [phone]  He has no hx of DME, rehab or HHC  He has no hx of mental illness or D&A  His PCP is Dr Mackenzie Clemente and he uses [facility] in [place]  He would like d/c meds sent to [facility]  Family will transport at discharge tomorrow  No HHC needs  Per Freda TRUJILLO CTS does not need home PT  CM reviewed d/c planning process including the following: identifying help at home, patient preference for d/c planning needs, Discharge Lounge, [facility] Meds to Bed program, availability of treatment team to discuss questions or concerns patient and/or family may have regarding understanding medications and recognizing signs and symptoms once discharged  CM also encouraged patient to follow up with all recommended appointments after discharge  Patient advised of importance for patient and family to participate in managing patients medical well being  Patient/caregiver received discharge checklist  Content reviewed  Patient/caregiver encouraged to participate in discharge plan of care prior to discharge home

## 2020-01-08 NOTE — CONSULTS
Consultation - Geriatrics   Lm Owen [de-identified] y o  male MRN: [id number]  Unit/Bed#: [facility] 403-01 Encounter: [id number]      Assessment/Plan  1  Aortic stenosis  S/p TAVR  CT surgery following    2  BPH  Pt takes flomax and proscar  Recommend monitor for orthostatic hypotension  Monitor for urinary retention  Limit caffiene intake  Avoid alcohol, anticholinergics, opoids, sedatives, benzodiazepines  Avoid extremes of fluid intake (< 32 ounces or >64 ounces)  Encourage scheduled tolieting q2 hours  May benefit from bladder training  Monitor for constipation    3  Memory loss  Pt states he has issues with memory  Mini cog 4/5 recall 3/3, clock draw 1/2, did not place hand on clock correctly  Recommend follow up as outpatient at  [facility] for [facility] for formal comprehensive assessment and supportive resources  Recommend check TSH, vitamin B12    4 Decondiitiong  Reports unplanned weight loss 5-6 pounds this year  Albumin 3 5  Recommend protein supplementation    5  Constipation  Last bowel movement 2 days ago  Continue MiraLax daily  Continue to monitor    7  Delirium precautions  Provide frequent redirection, reorientation, distraction techniques  Avoid deliriogenic medications such as tramadol, benzodiazepines, anticholinergics,  Benadryl  Treat pain, See geriatric pain protocol  Monitor for constipation and urinary retention  Encourage early and frequent moblization, OOB  Encourage Hydration/ Nutrition  Implement sleep hygiene, limit night time interuptions, group activities    8  Insomnia   Related to urinary frequency and hospitalization  First line is behavioral therapy  Avoid sedative hypnotics such as benzodiazepines and benadryl  Encourage staying awake during the day  Encourage daytime activity, morning exercise  Decrease or eliminate day time naps  Avoid caffiene, alcohol especially during late afternoon and evening hours  Establish a night time routine  Recommend melatonin 3mg po QHS    9  Seizures  Follows with [facility] as outpatient  Continue Keppra 250 mg po BID    10  Ambulatory dysfunction  With history of previous fall greater than 2 years ago  Continue calcium and vitamin D 3 supplementation                        History of Present Illness   Physician Requesting Consult: Kelly Medina DO  Reason for Consult / Principal Problem: TAVR  Hx and PE limited by: NA  HPI: Gina Sánchez is a [de-identified]y o  year old male who presents with aortic stenosis  He presents for elective transcatheter aortic valve replacement  He has had a heart murmur for approximately 10 years  Echocardiogram done in May demonstrated severe aortic stenosis, moderate aortic insufficiency  Has a past medical history of CAD, BPH, diabetes mellitus 2, heart failure, epilepsy, hyperlipidemia, hypertension, osteoporosis, aortic stenosis  Prior to arrival patient lives at home with his wife  He drives  He is independent with IADLs and ADLs  He ambulates independently   He denies falls within the last year   He denies weakness lightheadedness or issues  He wears glasses  He denies difficulty with hearing  He denies issues with dentition  He states that he 5 to last year he is why  He states he is physically, he likes to 30 minutes a day either outside or on the treadmill  States this is not new he has been doing this for years  In the winter he like to go to [place] and visit with friends  He states that he has issues remembering things  He denies having issues with bills or forgetting where he is going when he is driving  He states he forgets details of things that his wife is able to remember  He complains of urinary frequency needing to get up in the middle night to urinate  He states it has been improved with medications that he is on  He states that he has had difficulty with constipation, more recently  He denies change in diet  Upon exam patient is lying in bed  He is alert orient x3   He states his last BM was yesterday  Inpatient consult to Gerontology  Consult performed by: JACOBO Daniels  Consult ordered by: Mile Lopez PA-C          Review of Systems   Constitutional: Negative for unexpected weight change  HENT: Negative for hearing loss  Eyes: Negative for visual disturbance  Respiratory: Negative for shortness of breath  Gastrointestinal: Positive for constipation  Genitourinary: Positive for frequency  Musculoskeletal: Negative for gait problem  Neurological: Negative for dizziness  Psychiatric/Behavioral: Positive for sleep disturbance         Historical Information   Past Medical History:   Diagnosis Date    BPH (benign prostatic hyperplasia)     CAD (coronary artery disease)     Diabetes mellitus (HCC)     type 2, diet controlled    [facility]     [facility]     History of mycosis fungoides     Hyperlipidemia     Hypertension     [facility]     Osteoporosis     Seizures ([facility])     partial sensory    Severe aortic stenosis      Past Surgical History:   Procedure Laterality Date    CARDIAC CATHETERIZATION      COLONOSCOPY      PA REPAIR ING HERNIA,5+Y/O,REDUCIBL Right 11/16/2018    Procedure: REPAIR RIGHT INGUINAL HERNIA WITH MESH;  Surgeon: Kaycee Root MD;  [facility] MAIN OR;  Service: General     Social History   Social History     Substance and Sexual Activity   Alcohol Use Yes    Frequency: 4 or more times a week    Comment: 1 glass of wine with dinner      Social History     Substance and Sexual Activity   Drug Use No     Social History     Tobacco Use   Smoking Status Never Smoker   Smokeless Tobacco Never Used         Family History:   Family History   Problem Relation Age of Onset    Coronary artery disease Family     Heart disease Family        Meds/Allergies   Current meds:   Current Facility-Administered Medications   Medication Dose Route Frequency    acetaminophen (TYLENOL) rectal suppository 650 mg  650 mg Rectal Q4H PRN    acetaminophen (TYLENOL) tablet 650 mg  650 mg Oral Q4H PRN    aspirin chewable tablet 81 mg  81 mg Oral Daily    bisacodyl (DULCOLAX) rectal suppository 10 mg  10 mg Rectal Daily PRN    ceFAZolin (ANCEF) IVPB (premix) 2,000 mg  2,000 mg Intravenous Q8H    chlorhexidine (PERIDEX) 0 12 % oral rinse 15 mL  15 mL Mouth/Throat Q12H [facility]    clopidogrel (PLAVIX) tablet 75 mg  75 mg Oral Daily    heparin (porcine) subcutaneous injection 5,000 Units  5,000 Units Subcutaneous Q8H [facility]    insulin lispro (HumaLOG) 100 units/mL subcutaneous injection 1-5 Units  1-5 Units Subcutaneous TID AC    insulin lispro (HumaLOG) 100 units/mL subcutaneous injection 1-5 Units  1-5 Units Subcutaneous HS    levETIRAcetam (KEPPRA) tablet 250 mg  250 mg Oral Q12H [facility]    metoprolol tartrate (LOPRESSOR) partial tablet 12 5 mg  12 5 mg Oral Q12H [facility]    mupirocin (BACTROBAN) 2 % nasal ointment 1 application  1 application Nasal D58G [facility]    ondansetron (ZOFRAN) injection 4 mg  4 mg Intravenous Q6H PRN    pantoprazole (PROTONIX) EC tablet 40 mg  40 mg Oral Early Morning    polyethylene glycol (MIRALAX) packet 17 g  17 g Oral Daily    pravastatin (PRAVACHOL) tablet 20 mg  20 mg Oral Daily With Dinner    tamsulosin (FLOMAX) capsule 0 4 mg  0 4 mg Oral Daily With Dinner      Current PTA meds:  Medications Prior to Admission   Medication    ascorbic acid (VITAMIN C) 1000 MG tablet    aspirin (ECOTRIN LOW STRENGTH) 81 mg EC tablet    calcium citrate-vitamin D (CITRACAL+D) 315-200 MG-UNIT per tablet    cholecalciferol (VITAMIN D3) 400 units tablet    finasteride (PROSCAR) 5 mg tablet    levETIRAcetam (KEPPRA) 250 mg tablet    Multiple Vitamin (DAILY VALUE MULTIVITAMIN PO)    mupirocin (BACTROBAN) 2 % ointment    Omega-3 Fatty Acids ([employer] FISH OIL) 1000 MG CAPS    simvastatin (ZOCOR) 10 mg tablet    tamsulosin (FLOMAX) 0 4 mg    amoxicillin (AMOXIL) 500 mg capsule        No Known Allergies    Objective   Vitals: Blood pressure 103/52, pulse (!) 51, temperature 97 9 °F (36 6 °C), temperature source Oral, resp  rate 18, weight 60 kg (132 lb 4 4 oz), SpO2 96 %  ,Body mass index is 24 19 kg/m²  Physical Exam   Constitutional: He is oriented to person, place, and time  He appears well-developed  No distress  HENT:   Head: Normocephalic  Eyes: Right eye exhibits no discharge  Left eye exhibits no discharge  Neck: Normal range of motion  Cardiovascular: Normal rate and regular rhythm  Exam reveals no gallop and no friction rub  No murmur heard  Pulmonary/Chest: Effort normal and breath sounds normal  No stridor  No respiratory distress  He has no wheezes  Abdominal: Soft  Bowel sounds are normal  He exhibits no distension  There is no tenderness  Musculoskeletal: Normal range of motion  He exhibits no edema or deformity  Neurological: He is alert and oriented to person, place, and time  Skin: Skin is warm and dry  He is not diaphoretic  Psychiatric: He has a normal mood and affect  Nursing note and vitals reviewed  Lab Results:   Results from last 7 days   Lab Units 01/08/20  0510  01/07/20  1419   WBC Thousand/uL 8 41  --   --    HEMOGLOBIN g/dL 13 0   < > 12 7   HEMATOCRIT % 40 3   < > 38 8   PLATELETS Thousands/uL  --   --  94*    < > = values in this interval not displayed  Results from last 7 days   Lab Units 01/08/20  0510  01/07/20  1328  01/03/20  1221   POTASSIUM mmol/L 4 0   < >  --   --  4 3   CHLORIDE mmol/L 109*   < >  --   --  105   CO2 mmol/L 28   < >  --   --  27   CO2, I-STAT mmol/L  --   --  25   < >  --    BUN mg/dL 14   < >  --   --  19   CREATININE mg/dL 0 94   < >  --   --  0 89   CALCIUM mg/dL 9 6   < >  --   --  10 3*   ALK PHOS U/L  --   --   --   --  53   ALT U/L  --   --   --   --  40   AST U/L  --   --   --   --  27   GLUCOSE, ISTAT mg/dl  --   --  97   < >  --     < > = values in this interval not displayed         Imaging Studies: I have personally reviewed pertinent reports  EKG, Pathology, and Other Studies: I have personally reviewed pertinent reports      VTE Prophylaxis: Sequential compression device (Venodyne)     Code Status: Level 1 - Full Code

## 2020-01-08 NOTE — RESTORATIVE TECHNICIAN NOTE
Restorative Specialist Mobility Note       Activity: Ambulate in caballero, Chair     Assistive Device: None

## 2020-01-08 NOTE — OCCUPATIONAL THERAPY NOTE
[address]  Evaluation     Patient Name: Efren Krabbe CURPZ'W Date: 1/8/2020  Problem List  Principal Problem:    Severe aortic stenosis  Active Problems:    S/P TAVR (transcatheter aortic valve replacement)    Thrombocytopenia (HCC)    Hyperchloremia    Past Medical History  Past Medical History:   Diagnosis Date    BPH (benign prostatic hyperplasia)     CAD (coronary artery disease)     Diabetes mellitus (HCC)     type 2, diet controlled    Diastolic [facility]     Epilepsy ([facility] )     History of mycosis fungoides     Hyperlipidemia     Hypertension     [facility]     Osteoporosis     Seizures ([facility])     partial sensory    Severe aortic stenosis      Past Surgical History  Past Surgical History:   Procedure Laterality Date    CARDIAC CATHETERIZATION      COLONOSCOPY      RI REPAIR ING HERNIA,5+Y/O,REDUCIBL Right 11/16/2018    Procedure: REPAIR RIGHT INGUINAL HERNIA WITH MESH;  Surgeon: Lavon oMser MD;  Location: [address] [place] [address];  Service: General         01/08/20 0844   Note Type   Note type Eval/Treat   Restrictions/Precautions   Weight Bearing Precautions Per Order No   Other Precautions Cardiac/sternal;Multiple lines;Telemetry   Pain Assessment   Pain Assessment No/denies pain   Pain Score No Pain   Home Living   Type of Home House   Home Layout Two level; Able to live on main level with bedroom/bathroom   Bathroom Shower/Tub Tub/shower unit   Bathroom Toilet Standard   Bathroom Equipment Grab bars in [facility] Walker;Cane   Prior Function   Level of [place] Independent with ADLs and functional mobility   Lives With [place] Help From Family   ADL Assistance Independent   IADLs Independent   Falls in the last 6 months 0   Vocational Retired   Lifestyle   Autonomy pta pt reports I in ADLs/IADls/functional mobility   Reciprocal Relationships supportive spouse   Service to Others retired   Intrinsic Gratification likes "not having to shovel snow"   Psychosocial   Psychosocial (WDL) WDL   Subjective   Subjective "You'll have to come back and tell my wife these things"   ADL   Where Assessed Chair   Eating Assistance 5  Supervision/Setup   Grooming Assistance 5  Supervision/Setup   UB Bathing Assistance 5  Supervision/Setup   LB Bathing Assistance 5  Supervision/Setup   UB Dressing Assistance 5  Supervision/Setup   LB Dressing Assistance 5  Supervision/Setup   Bed Mobility   Sit to Supine 5  Supervision   Additional items Increased time required   Transfers   Sit to Stand 5  Supervision   Additional items Increased time required   Stand to Sit 5  Supervision   Additional items Increased time required   Functional Mobility   Functional Mobility 5  Supervision   Additional items Rolling walker   Balance   Static Sitting Good   Static Standing Fair   Ambulatory Fair   Activity Tolerance   Activity Tolerance Patient tolerated treatment well   Nurse Made Aware okay to see per RN   RUE Assessment   RUE Assessment WFL   LUE Assessment   LUE Assessment WFL   Hand Function   Gross Motor Coordination Functional   Fine Motor Coordination Functional   Cognition   Overall Cognitive Status WFL   Arousal/Participation Cooperative   Attention Within functional limits   Orientation Level Oriented X4   Memory Within functional limits   Following Commands Follows all commands and directions without difficulty   Comments pt pleasant and cooperative   Assessment   Limitation Decreased ADL status; Decreased endurance;Decreased high-level ADLs   Prognosis Good   Assessment Pt is a [de-identified] y o  YO  male admitted to [facility] on 1/7/2020 w/ severe aortic stenosis s/p TAVR  Comorbidities include a h/o CAD, heart failure, DM, HLD, HTN, and epilepsy   Pt with active OT orders and ambulate  orders  Pt resides in a [facility] with spouse  Pt was I w/  ADLS and IADLS, (+) drove, & required no use of DME PTA  Currently pt is supervision for ADLs/IADLs/functional mobility   Pt is limited at this time 2*: endurance, activity tolerance, decreased I w/ ADLS/IADLS and decreased safety awareness  The following Occupational Performance Areas to address include: functional mobility, household maintenance and job performance/volunteering  Based on the aforementioned OT evaluation, functional performance deficits, and assessments, pt has been identified as a high complexity evaluation  From OT standpoint, anticipate d/c home with family support  Pt to continue to benefit from acute immediate OT services to address the following goals 1-2 sessions to  w/in 3-5 days: Melinda Pineda Goals   Patient Goals go home   STG Time Frame 3-5   Plan   Treatment Interventions Patient/family training;Cardiac education; Compensatory technique education   Goal Expiration Date 20   OT Treatment Day 1   OT Frequency 1-2x/wk   Additional Treatment Session   Start Time 8514   End Time 028   Treatment Assessment Pt participated in additional OT session focusing on cardiac education  Provided pt with Recovering After Minimally Invasive Cardiac Surgery packet and educated pt regarding; lifiting restrictions, safe activity engagement, energy conservation, lifestyle modifications, stress management and cardiac rehabilitation programs  Pt's questions were addressed after discussion of the packet  Provided pt with contact information for OT department if questions arrise  Pt is limited by decreased endurance and decreased ability to complete higher level ADLs, however his spouse will be home and able to assist as needed upon d/c  Rec pt cont participation in self care after s/u w/ nrsg staff and cont mobility w/ non OT staff while in the hospital  Pt denies any questions or concerns from an OT standpoint at this time  Rec pt return home w/ increased family support upon d/c  D/C OT     Recommendation   OT Discharge Recommendation Home with family support   OT - OK to Discharge Yes     Goals:    1 ) Pt/family will participate in cardiac education w/ G attn and carryover during functional/leisure activities      2 ) Pt will demonstrate 100% carryover of energy conservation techniques t/o functional I/ADL/leisure tasks w/o cues s/p skilled education      Tessa Watson MS, OTR/L

## 2020-01-08 NOTE — PHYSICAL THERAPY NOTE
Physical Therapy Evaluation     Patient's Name: Harika Nowak    Admitting Diagnosis  Severe aortic stenosis [I35 0]    Problem List  Patient Active Problem List   Diagnosis    BPH (benign prostatic hyperplasia)    Osteoporosis    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 )    Right inguinal hernia    Nonrheumatic aortic valve stenosis    Palpitations    Sinus bradycardia    Severe aortic stenosis    S/P TAVR (transcatheter aortic valve replacement)    Thrombocytopenia (HCC)    Hyperchloremia       Past Medical History  Past Medical History:   Diagnosis Date    BPH (benign prostatic hyperplasia)     CAD (coronary artery disease)     Diabetes mellitus ([place] [address] )     type 2, diet controlled    Diastolic CHF ([place] [address] )     Epilepsy ([place] [address] )     History of mycosis fungoides     Hyperlipidemia     Hypertension     ILD (interstitial lung disease) ([place] [address] )     Osteoporosis     Seizures (HCC)     partial sensory    Severe aortic stenosis        Past Surgical History  Past Surgical History:   Procedure Laterality Date    CARDIAC CATHETERIZATION      COLONOSCOPY      NV ECHO TRANSESOPHAG R-T 2D W/PRB IMG ACQUISJ I&R N/A 1/7/2020    Procedure: TRANSESOPHAGEAL ECHOCARDIOGRAM (POOL); Surgeon: Estrada Rosario DO;  [facility];  Service: Cardiac Surgery    NV REPAIR [address] HERNIA,5+Y/O,REDUCIBL Right 11/16/2018    Procedure: REPAIR RIGHT INGUINAL HERNIA WITH MESH;  Surgeon: Perry Najera MD;  Location: [facility] MAIN OR;  Service: General    NV REPLACE AORTIC VALVE OPENFEMORAL ARTERY APPROACH N/A 1/7/2020    Procedure: REPLACEMENT AORTIC VALVE TRANSCATHETER (TAVR) TRANSFEMORAL W/ 23 MM CARROLL KATHLEEN S3 VALVE (ACCESS ON LEFT);   Surgeon: Estrada Rosario DO;  [facility];  Service: Cardiac Surgery        01/08/20 0840   Note Type   Note type Eval only   Pain Assessment   Pain Assessment No/denies pain   Pain Score No Pain   Home Living   Type of [address] Two level;Stairs to enter without rails;Bed/bath upstairs; Able to live on main level with bedroom/bathroom  (1 [place], 12-13 steps w/ rail ^ bed/bath; can stay on 1st floor)   Bathroom Shower/Tub Tub/shower unit   Bathroom Toilet Standard   Bathroom Equipment Grab bars in [facility] Walker;Cane   Prior Function   Level of [place] Independent with ADLs and functional mobility  (w/out AD PTA)   Lives With Spouse   Receives Help From [facility] in the last 6 months 0   Vocational Retired   Comments pt reports that he does not typically lift anything heavy    Restrictions/Precautions   Weight Bearing Precautions Per Order No   Braces or Orthoses Other (Comment)  (denies)   Other Precautions Cardiac/sternal;Multiple lines;Telemetry   General   Additional Pertinent History cleared for assessment (spoke to nsnorberto)    Family/Caregiver Present No   Cognition   Overall Cognitive Status WFL   Arousal/Participation Cooperative   Attention Within functional limits   Orientation Level Oriented to person;Oriented to place;Oriented to situation   Memory Within functional limits   Following Commands Follows one step commands without difficulty   Comments pt seen in chair on arrival; pleasant and agreeable to work w/ therapy    RUE Assessment   RUE Assessment WFL  (AROM)   LUE Assessment   LUE Assessment WFL  (AROM)   RLE Assessment   RLE Assessment WFL  (AROM)   Strength RLE   RLE Overall Strength 4-/5  (grossly)   LLE Assessment   LLE Assessment WFL  (AROM)   Strength LLE   LLE Overall Strength 4-/5  (grossly)   Bed Mobility   Sit to Supine 5  Supervision   Additional items Increased time required   Additional Comments pt left supine in bed at end of session per pending echo; w/ SCDs on, call bell w/in reach    Transfers   Sit to Stand 5  Supervision   Additional items Increased time required;Verbal cues   Stand to Sit 5  Supervision   Additional items Increased time required   Ambulation/Elevation   Gait pattern Narrow ZAC; Short stride; Inconsistent anya; Excessively slow   Gait Assistance 5  Supervision  (SBAx1 for line management )   Additional items Verbal cues   Assistive Device Rolling walker;SPC  (First amb trial w/ RW -> 2nd w/ SPC)   Distance 180 ft w/ RW -> standing rest - > 180 ft w/ SPC  (at standing rest HR in 60s bpm, O2 sat at 98%)   Balance   Static Sitting Good   Static Standing Fair   Ambulatory Fair -   Activity Tolerance   Activity Tolerance Patient tolerated treatment well   Nurse Made Aware Cornelius Sun RN   Assessment   Prognosis Good   Problem List Decreased strength;Decreased endurance; Impaired balance;Decreased mobility   Assessment Pt is a [de-identified] y/o male w/ hx of exertional fatigue and lightheadedness admitted to [facility] on 01/07/20 for dx of severe aortic stenosis, moderate aortic insufficiency for elective transcatheter aortic valve replacement  Pt is 1 day s/p TAVR  PT now consulted for assessment of mobility and d/c needs  Pt's PMHx/co-morbidities affecting current course include HTN, CHF, DM, hx of heart murmur, hx of seizure, osteoporosis and personal factors of advanced age and a [place] home environment and steps to 2nd floor  Patient's clinical presentation is currently unstable/unpredictable due telemetry monitoring w/ general decr HR and on-going medical management/monitoring at this time  Pt presents with min generalized weakness of the LEs, decreased functional endurance and activity tolerance compared to baseline, and impaired balance and gait deviations requiring the uses of RW<-->SPC at this time (both used during today's session)  Will cont to follow pt in PT as medical status allows to progress tx targeting pt's mobility, functional endurance, level of (I), progression to stair navigation as well as pt safety in return to pt's PLOF   Otherwise, anticipate pt will return home w/ available support upon D/C provided he cont improving w/ mobility, safety, and endurance, stair(s) and when medically cleared; home PT follow up is recommended at this time pending progress w/ stairs; will follow  Barriers to Discharge Inaccessible home environment   Goals   Patient Goals to return home   STG Expiration Date 01/18/20   Short Term Goal #1 7-10 days  Pt will ambulate 350 ft with least restrictive device PRN mod (I) to facilitate safe return to home environment and community ambulatory distances  Pt will navigate 1 step w/o railing, but with least restrictive device PRN mod (I) to allow pt to enter/exit home environment safely followed by 12-13 steps w/ railing and SPC PRN (S) to access bedroom on 2nd floor of home  Pt will be (I) for transfers to allow pt increased (I), ability to navigate home set up, and safely transfer to and from desired locations in home environment  Pt will be (I) for bed mobility to allow pt to transition into bed and OOB safely  Pt will progress to balance and therapeutic exercises in order to continue building upon pt's strength, mobility, safety and (I)  PT Treatment Day 0   Plan   Treatment/Interventions Functional transfer training;LE strengthening/ROM; Elevations; Therapeutic exercise; Endurance training;Patient/family training;Equipment eval/education; Bed mobility;Gait training;Spoke to nursing;OT  (PT spoke to CM)   PT Frequency Other (Comment)  (4-6x/week)   Recommendation   Recommendation Home PT; Home with family support  (Home PT pending progress w/ stairs )   Equipment Recommended Walker;Cane  (At this time; will monitor progress )   Modified [place] Scale   Modified [place] Scale 3           Ryan Ontiveros

## 2020-01-08 NOTE — PROGRESS NOTES
Progress Note - Cardiothoracic Surgery   Efren Krabbe [de-identified] y o  male MRN: [id number]  Unit/Bed#: [facility] 403-01 Encounter: [id number]  Aortic stenosis, Non-Rheumatic  S/P transfemoral transcatheter aortic valve replacement; POD # 1      24 Hour Events: Patient with asymptomatic SB to the 50's overnight  HR improved when up and awake  Otherwise no issues  Medications:   Scheduled Meds:  Current Facility-Administered Medications:  acetaminophen 650 mg Rectal Q4H PRN Bertha El PA-C    acetaminophen 650 mg Oral Q4H PRN Inocente Tesfaye PA-C    aspirin 81 mg Oral Daily Bertha El PA-C    bisacodyl 10 mg Rectal Daily PRN Bertha El PA-C    cefazolin 2,000 mg Intravenous Q8H Bertha El PA-C Last Rate: 2,000 mg (01/08/20 0515)   chlorhexidine 15 mL Mouth/Throat Q12H [facility] Bertha El PA-C    clopidogrel 75 mg Oral Daily Bertha El PA-C    heparin (porcine) 5,000 Units Subcutaneous Q8H [facility] Bertha El PA-C    insulin lispro 1-5 Units Subcutaneous TID AC Bertha El PA-C    insulin lispro 1-5 Units Subcutaneous HS Bertha El PA-C    lactated ringers 20 mL/hr Intravenous Continuous Ashok RuyGRACIELA herring    levETIRAcetam 250 mg Oral Q12H [facility] Bertha El PA-C    metoprolol tartrate 12 5 mg Oral Q12H [facility] Bertha El PA-C    mupirocin 1 application Nasal G84X [facility] Bertha El PA-C    niCARdipine 1-15 mg/hr Intravenous Titrated Inocente Tesfaye PA-C Last Rate: Stopped (01/07/20 1916)   ondansetron 4 mg Intravenous Q6H PRN Bertha El PA-C    pantoprazole 40 mg Oral Early Morning Bertha El PA-C    polyethylene glycol 17 g Oral Daily Bertha El PA-C    pravastatin 20 mg Oral Daily With CECE Echavarria    tamsulosin 0 4 mg Oral Daily With CECE Echavarria      Continuous Infusions:  lactated ringers 20 mL/hr    niCARdipine 1-15 mg/hr Last Rate: Stopped (01/07/20 1916)     PRN Meds:   acetaminophen    acetaminophen    bisacodyl    ondansetron    Vitals:   Vitals:    01/08/20 0400 01/08/20 0500 01/08/20 0547 01/08/20 0702   BP: 113/50 113/54  103/52   BP Location: Left arm Left arm  Left arm   Pulse: (!) 50 (!) 50  (!) 51   Resp: 19 17 18   Temp:    97 9 °F (36 6 °C)   TempSrc:    Oral   SpO2: 98%   96%   Weight:   60 kg (132 lb 4 4 oz)        Telemetry: NSR; Heart Rate: 62    Respiratory:   SpO2: SpO2: 96 %; Room Air    Intake/Output:     Intake/Output Summary (Last 24 hours) at 1/8/2020 0724  Last data filed at 1/8/2020 0307  Gross per 24 hour   Intake 2127 5 ml   Output 1480 ml   Net 647 5 ml         Weights:   Weight (last 2 days)     Date/Time   Weight    01/08/20 0547   60 (132 28)            Admit weight: 60    Results:   Results from last 7 days   Lab Units 01/07/20  2308 01/07/20  1419 01/07/20  1328  01/03/20  1221   WBC Thousand/uL  --   --   --   --  7 41   HEMOGLOBIN g/dL 13 1 12 7  --   --  14 0   I STAT HEMOGLOBIN g/dl  --   --  11 6*   < >  --    HEMATOCRIT % 39 7 38 8  --   --  43 6   HEMATOCRIT, ISTAT %  --   --  34*   < >  --    PLATELETS Thousands/uL  --  94*  --   --  123*    < > = values in this interval not displayed  Results from last 7 days   Lab Units 01/08/20  0510 01/07/20  1419 01/07/20  1328  01/03/20  1221   POTASSIUM mmol/L 4 0 4 0  --   --  4 3   CHLORIDE mmol/L 109* 110*  --   --  105   CO2 mmol/L 28 26  --   --  27   CO2, I-STAT mmol/L  --   --  25   < >  --    BUN mg/dL 14 17  --   --  19   CREATININE mg/dL 0 94 0 88  --   --  0 89   GLUCOSE, ISTAT mg/dl  --   --  97   < >  --    CALCIUM mg/dL 9 6 9 0  --   --  10 3*    < > = values in this interval not displayed  Results from last 7 days   Lab Units 01/03/20  1221   INR  1 10       Studies:      TTE: done; results pending       Invasive Lines/Tubes:  Invasive Devices     Central Venous Catheter Line            CVC Central Lines 01/07/20 Triple less than 1 day          Peripheral Intravenous Line Peripheral IV 01/07/20 Left;Ventral (anterior) Wrist 1 day    Peripheral IV 01/07/20 Right Antecubital 1 day          Arterial Line            Arterial Line 01/07/20 Right Radial less than 1 day          Drain            Urethral Catheter Non-latex; Temperature probe 16 Fr  less than 1 day                Physical Exam:    HEENT/NECK:  PERRLA  No jugular venous distention  Cardiac: Regular rate and rhythm and No murmurs/rubs/gallops  Pulmonary:  Breath sounds clear bilaterally  Abdomen:  Non-tender, Non-distended and Normal bowel sounds  Incisions: Groin puncture sites dressed with sterile dressing  No hematoma, erythema, or drainage  Extremities: Extremities warm/dry and Radial pulses 2+ bilaterally  Neuro: Alert and oriented X 3, Sensation is grossly intact and No focal deficits  Skin: Warm/Dry, without rashes or lesions  Assessment:  Patient Active Problem List   Diagnosis    BPH (benign prostatic hyperplasia)    Osteoporosis    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    Right inguinal hernia    Nonrheumatic aortic valve stenosis    Palpitations    Sinus bradycardia    Severe aortic stenosis    S/P TAVR (transcatheter aortic valve replacement)    Thrombocytopenia (HCC)    Hyperchloremia       Aortic stenosis, Non-Rheumatic  S/P transfemoral transcatheter aortic valve replacement; POD # 1    Plan:    1  Cardiac:   NSR; HR/BP well-controlled  Lopressor 12 5 mg PO BID  Continue ASA, Plavix for antiplatelet regimen  LVEF- 65  Maintain central IV access today for 24h  Continue DVT prophylaxis    2  Surgical:    Groins CDI, soft, Non-tender  No signs of hematoma  Peripheral extremities neurovascularly intact  3  Pulmonary:   Good Room air oxygen saturation; Continue incentive spirometry/Coughing/Deep breathing exercises      4  Renal:   Intake/Output net: +800 mL/24 hours  Appears euvolemic, will hold diuretic  Post op Creatinine stable; Follow up labs prn    5   Neuro:  Neurologically intact; No active issues  Incisional pain well-controlled; Continue prn Percocet    6  GI:  Tolerating TLC 2 3 gm sodium diet  Maintain 1800 mL daily fluid restriction   Continue stool softeners and prn suppository  Continue GI prophylaxis    7  Endo:    No history of diabetes; Glucose well-controlled with sliding scale coverage    8  Hematology:   Hemoglobin and hematocrit stable; trend prn    9  Disposition:  Anticipate discharge to home tomorrow        VTE Pharmacologic Prophylaxis: Heparin  VTE Mechanical Prophylaxis: sequential compression device    Collaborative rounds completed with NICOL Campbell , and P4 RN    SIGNATURE: Ulysees Sandhoff, PA-C  DATE: January 8, 2020  TIME: 7:24 AM

## 2020-01-08 NOTE — PLAN OF CARE
Problem: PHYSICAL THERAPY ADULT  Goal: Performs mobility at highest level of function for planned discharge setting  See evaluation for individualized goals  Description  Treatment/Interventions: Functional transfer training, LE strengthening/ROM, Elevations, Therapeutic exercise, Endurance training, Patient/family training, Equipment eval/education, Bed mobility, Gait training, Spoke to nursing, OT(PT spoke to CM)  Equipment Recommended: Gabe Islas(At this time; will monitor progress to no AD )       See flowsheet documentation for full assessment, interventions and recommendations  Note:   Prognosis: Good  Problem List: Decreased strength, Decreased endurance, Impaired balance, Decreased mobility  Assessment: Pt is a [de-identified] y/o male w/ hx of exertional fatigue and lightheadedness admitted to [facility] on 01/07/20 for dx of severe aortic stenosis, moderate aortic insufficiency for elective transcatheter aortic valve replacement  Pt is 1 day s/p TAVR  PT now consulted for assessment of mobility and d/c needs  Pt's PMHx/co-morbidities affecting current course include HTN, CHF, DM, hx of heart murmur, hx of seizure, osteoporosis and personal factors of advanced age and a [place] home environment and steps to 2nd floor  Patient's clinical presentation is currently unstable/unpredictable due telemetry monitoring w/ general decr HR and on-going medical management/monitoring at this time  Pt presents with min generalized weakness of the LEs, decreased functional endurance and activity tolerance compared to baseline, and impaired balance and gait deviations requiring the uses of RW<-->SPC at this time (both used during today's session)  Will cont to follow pt in PT as medical status allows to progress tx targeting pt's mobility, functional endurance, level of (I), progression to stair navigation as well as pt safety in return to pt's PLOF   Otherwise, anticipate pt will return home w/ available support upon D/C provided he cont improving w/ mobility, safety, and endurance, stair(s) and when medically cleared; home PT follow up is recommended at this time pending progress w/ stairs; will follow  Barriers to Discharge: Inaccessible home environment     Recommendation: Home PT, Home with family support(Home PT pending progress w/ stairs )          See flowsheet documentation for full assessment

## 2020-01-08 NOTE — PLAN OF CARE
Problem: OCCUPATIONAL THERAPY ADULT  Goal: Performs self-care activities at highest level of function for planned discharge setting  See evaluation for individualized goals  Description  Treatment Interventions: Patient/family training, Cardiac education, Compensatory technique education          See flowsheet documentation for full assessment, interventions and recommendations  Outcome: Completed  Note:   Limitation: Decreased ADL status, Decreased endurance, Decreased high-level ADLs  Prognosis: Good  Assessment: Pt is a [de-identified] y o  YO  male admitted to [facility] on 2020 w/ severe aortic stenosis s/p TAVR  Comorbidities include a h/o CAD, heart failure, DM, HLD, HTN, and epilepsy   Pt with active OT orders and ambulate  orders  Pt resides in a [facility] with spouse  Pt was I w/  ADLS and IADLS, (+) drove, & required no use of DME PTA  Currently pt is supervision for ADLs/IADLs/functional mobility  Pt is limited at this time 2*: endurance, activity tolerance, decreased I w/ ADLS/IADLS and decreased safety awareness  The following Occupational Performance Areas to address include: functional mobility, household maintenance and job performance/volunteering  Based on the aforementioned OT evaluation, functional performance deficits, and assessments, pt has been identified as a high complexity evaluation  From OT standpoint, anticipate d/c home with family support  Pt to continue to benefit from acute immediate OT services to address the following goals 1-2 sessions to  w/in 3-5 days:  OT Discharge Recommendation: Home with family support  OT - OK to Discharge:  Yes

## 2020-01-08 NOTE — PLAN OF CARE
Problem: Potential for Falls  Goal: Patient will remain free of falls  Description  INTERVENTIONS:  - Assess patient frequently for physical needs  -  Identify cognitive and physical deficits and behaviors that affect risk of falls    -  [place] fall precautions as indicated by assessment   - Educate patient/family on patient safety including physical limitations  - Instruct patient to call for assistance with activity based on assessment  - Modify environment to reduce risk of injury  - Consider OT/PT consult to assist with strengthening/mobility  Outcome: Progressing     Problem: PAIN - ADULT  Goal: Verbalizes/displays adequate comfort level or baseline comfort level  Description  Interventions:  - Encourage patient to monitor pain and request assistance  - Assess pain using appropriate pain scale  - Administer analgesics based on type and severity of pain and evaluate response  - Implement non-pharmacological measures as appropriate and evaluate response  - Consider cultural and social influences on pain and pain management  - Notify physician/advanced practitioner if interventions unsuccessful or patient reports new pain  Outcome: Progressing     Problem: INFECTION - ADULT  Goal: Absence or prevention of progression during hospitalization  Description  INTERVENTIONS:  - Assess and monitor for signs and symptoms of infection  - Monitor lab/diagnostic results  - Monitor all insertion sites, i e  indwelling lines, tubes, and drains  - Monitor endotracheal if appropriate and nasal secretions for changes in amount and color  - [place] appropriate cooling/warming therapies per order  - Administer medications as ordered  - Instruct and encourage patient and family to use good hand hygiene technique  - Identify and instruct in appropriate isolation precautions for identified infection/condition  Outcome: Progressing  Goal: Absence of fever/infection during neutropenic period  Description  INTERVENTIONS:  - Monitor WBC    Outcome: Progressing     Problem: SAFETY ADULT  Goal: Maintain or return to baseline ADL function  Description  INTERVENTIONS:  -  Assess patient's ability to carry out ADLs; assess patient's baseline for ADL function and identify physical deficits which impact ability to perform ADLs (bathing, care of mouth/teeth, toileting, grooming, dressing, etc )  - Assess/evaluate cause of self-care deficits   - Assess range of motion  - Assess patient's mobility; develop plan if impaired  - Assess patient's need for assistive devices and provide as appropriate  - Encourage maximum independence but intervene and supervise when necessary  - Involve family in performance of ADLs  - Assess for home care needs following discharge   - Consider OT consult to assist with ADL evaluation and planning for discharge  - Provide patient education as appropriate  Outcome: Progressing  Goal: Maintain or return mobility status to optimal level  Description  INTERVENTIONS:  - Assess patient's baseline mobility status (ambulation, transfers, stairs, etc )    - Identify cognitive and physical deficits and behaviors that affect mobility  - Identify mobility aids required to assist with transfers and/or ambulation (gait belt, sit-to-stand, lift, walker, cane, etc )  - [place] fall precautions as indicated by assessment  - Record patient progress and toleration of activity level on Mobility SBAR; progress patient to next Phase/Stage  - Instruct patient to call for assistance with activity based on assessment  - Consider rehabilitation consult to assist with strengthening/weightbearing, etc   Outcome: Progressing     Problem: DISCHARGE PLANNING  Goal: Discharge to home or other facility with appropriate resources  Description  INTERVENTIONS:  - Identify barriers to discharge w/patient and caregiver  - Arrange for needed discharge resources and transportation as appropriate  - Identify discharge learning needs (meds, wound care, etc )  - Arrange for interpretive services to assist at discharge as needed  - Refer to Case Management Department for coordinating discharge planning if the patient needs post-hospital services based on physician/advanced practitioner order or complex needs related to functional status, cognitive ability, or social support system  Outcome: Progressing     Problem: Knowledge Deficit  Goal: Patient/family/caregiver demonstrates understanding of disease process, treatment plan, medications, and discharge instructions  Description  Complete learning assessment and assess knowledge base    Interventions:  - Provide teaching at level of understanding  - Provide teaching via preferred learning methods  Outcome: Progressing

## 2020-01-09 LAB
ERYTHROCYTE [DISTWIDTH] IN BLOOD BY AUTOMATED COUNT: 13.1 % (ref 11.6–15.1)
GLUCOSE SERPL-MCNC: 111 MG/DL (ref 65–140)
GLUCOSE SERPL-MCNC: 121 MG/DL (ref 65–140)
GLUCOSE SERPL-MCNC: 148 MG/DL (ref 65–140)
GLUCOSE SERPL-MCNC: 91 MG/DL (ref 65–140)
HCT VFR BLD AUTO: 38.7 % (ref 36.5–49.3)
HGB BLD-MCNC: 12.5 G/DL (ref 12–17)
MCH RBC QN AUTO: 30.1 PG (ref 26.8–34.3)
MCHC RBC AUTO-ENTMCNC: 32.3 G/DL (ref 31.4–37.4)
MCV RBC AUTO: 93 FL (ref 82–98)
PLATELET # BLD AUTO: 66 THOUSANDS/UL (ref 149–390)
PMV BLD AUTO: 12.7 FL (ref 8.9–12.7)
RBC # BLD AUTO: 4.15 MILLION/UL (ref 3.88–5.62)
WBC # BLD AUTO: 8.64 THOUSAND/UL (ref 4.31–10.16)

## 2020-01-09 PROCEDURE — 97116 GAIT TRAINING THERAPY: CPT

## 2020-01-09 PROCEDURE — 82948 REAGENT STRIP/BLOOD GLUCOSE: CPT

## 2020-01-09 PROCEDURE — 93306 TTE W/DOPPLER COMPLETE: CPT | Performed by: INTERNAL MEDICINE

## 2020-01-09 PROCEDURE — 99222 1ST HOSP IP/OBS MODERATE 55: CPT | Performed by: INTERNAL MEDICINE

## 2020-01-09 PROCEDURE — 85027 COMPLETE CBC AUTOMATED: CPT | Performed by: PHYSICIAN ASSISTANT

## 2020-01-09 PROCEDURE — 99232 SBSQ HOSP IP/OBS MODERATE 35: CPT | Performed by: THORACIC SURGERY (CARDIOTHORACIC VASCULAR SURGERY)

## 2020-01-09 RX ADMIN — Medication 1 APPLICATION: at 21:43

## 2020-01-09 RX ADMIN — TAMSULOSIN HYDROCHLORIDE 0.4 MG: 0.4 CAPSULE ORAL at 16:38

## 2020-01-09 RX ADMIN — Medication 1 APPLICATION: at 09:28

## 2020-01-09 RX ADMIN — FONDAPARINUX SODIUM 2.5 MG: 2.5 INJECTION, SOLUTION SUBCUTANEOUS at 09:28

## 2020-01-09 RX ADMIN — CHLORHEXIDINE GLUCONATE 0.12% ORAL RINSE 15 ML: 1.2 LIQUID ORAL at 09:28

## 2020-01-09 RX ADMIN — CHLORHEXIDINE GLUCONATE 0.12% ORAL RINSE 15 ML: 1.2 LIQUID ORAL at 21:44

## 2020-01-09 RX ADMIN — METOPROLOL TARTRATE 12.5 MG: 25 TABLET ORAL at 09:26

## 2020-01-09 RX ADMIN — LEVETIRACETAM 250 MG: 500 TABLET, FILM COATED ORAL at 21:44

## 2020-01-09 RX ADMIN — POLYETHYLENE GLYCOL 3350 17 G: 17 POWDER, FOR SOLUTION ORAL at 09:28

## 2020-01-09 RX ADMIN — PANTOPRAZOLE SODIUM 40 MG: 40 TABLET, DELAYED RELEASE ORAL at 05:44

## 2020-01-09 RX ADMIN — LEVETIRACETAM 250 MG: 500 TABLET, FILM COATED ORAL at 09:27

## 2020-01-09 RX ADMIN — PRAVASTATIN SODIUM 20 MG: 20 TABLET ORAL at 16:38

## 2020-01-09 NOTE — DISCHARGE INSTRUCTIONS
DO YOU OR SOMEONE YOU KNOW HAVE CONCERNS ABOUT FALLING? A MATTER OF BALANCE  Can help reduce the fear of falling and increase the activity levels of older adults who have concerns about falling! A Matter of Balance is a national, evidence-based, injury prevention program sponsored by [address] [place] [address], [address] [place], [place] [address]  A Matter of Balance is an 8-week course - 2-hour sessions, once a week for 8 weeks  Classes include mild exercise, discussion, problem-solving, role-playing, idea-sharing and also a 10-minute break with a healthy snack  Participants will learn to:  · view falls and fear of falling as controllable  · set realistic goals for increasing activity  · change their environment to reduce fall risk factors  · promote exercise to increase strength and balance  This is a FREE course offered to the community! Participants should be ambulatory (with or without assistance) and should have the ability to problem-solve  Registration is open and is limited to 14 participants  Classes fill up quickly! If you would like to register for a class, please call [employer] at [phone] ([phone])  SCHEDULE OF UPCOMING A MATTER OF BALANCE CLASSES  [facility]  [address] , [address]  Fridays 10am-12pm  June 7th - July 26th  [facility] [place] [facility]  [address] [facility] [place], [address]  Wednesdays 12-2pm  July 10th - August 28th  [address], [address] [place]  Fridays 1-3pm  July 12th - August 30th  [facility]  [address], [address] [place]  Tuesdays 2-4pm  August 6th - September 24th  [facility]  [address]  , [place], [place] [address]  Tuesdays 1-3pm  September 3rd - October 22nd  [address] [place], [place] [address]  Tuesdays 10am-12pm  September 17th - November 5th            Transcatheter Aortic Valve Replacement   WHAT YOU NEED TO KNOW:   · A TAVR is a procedure to replace your aortic valve  It is done without removing your old valve  The aortic valve is between the left ventricle and the aorta  The left ventricle is the lower left chamber of your heart  The aorta is a blood vessel that pumps blood to your brain and body  The aortic valve opens and closes to let blood flow from your heart to the rest of your body  · TAVR may be used to replace your aortic valve if open heart surgery is not safe for you  Your valve will be replaced with a tissue valve  The tissue may be taken from an animal, such as a pig or cow  DISCHARGE INSTRUCTIONS:   Call 911 for any of the following:   · You have any of the following signs of a heart attack:      ¨ Squeezing, pressure, or pain in your chest that lasts longer than 5 minutes or returns    ¨ Discomfort or pain in your back, neck, jaw, stomach, or arm     ¨ Trouble breathing    ¨ Nausea or vomiting    ¨ Lightheadedness or a sudden cold sweat, especially with chest pain or trouble breathing    · You have any of the following signs of a stroke:      ¨ Numbness or drooping on one side of your face     ¨ Weakness in an arm or leg    ¨ Confusion or difficulty speaking    ¨ Dizziness, a severe headache, or vision loss    · You feel lightheaded, short of breath, and have chest pain  · You cough up blood  · You have trouble breathing  Seek care immediately if:   · Blood soaks through your bandage  · Your stitches come apart  · Your wound gets swollen quickly  · Your heart is beating faster or slower than usual      · Your arm or leg feels numb, cool, or looks pale  · Your arm or leg feels warm, tender, and painful  It may look swollen and red  · You feel weak or dizzy  Contact your healthcare provider if:   · You have a fever or chills  · Your wound is red, swollen, or draining pus      · Your wound looks more bruised or there is new bruising near your wound  · You have nausea or are vomiting  · Your skin is itchy, swollen, or you have a rash  · You have questions or concerns about your condition or care  Medicines: You may need any of the following:  · Blood thinners    help prevent blood clots  Examples of blood thinners include heparin and warfarin  Clots can cause strokes, heart attacks, and death  The following are general safety guidelines to follow while you are taking a blood thinner:    ¨ Watch for bleeding and bruising while you take blood thinners  Watch for bleeding from your gums or nose  Watch for blood in your urine and bowel movements  Use a soft washcloth on your skin, and a soft toothbrush to brush your teeth  This can keep your skin and gums from bleeding  If you shave, use an electric shaver  Do not play contact sports  ¨ Tell your dentist and other healthcare providers that you take anticoagulants  Wear a bracelet or necklace that says you take this medicine  ¨ Do not start or stop any medicines unless your healthcare provider tells you to  Many medicines cannot be used with blood thinners  ¨ Tell your healthcare provider right away if you forget to take the medicine, or if you take too much  ¨ Warfarin  is a blood thinner that you may need to take  The following are things you should be aware of if you take warfarin  § Foods and medicines can affect the amount of warfarin in your blood  Do not make major changes to your diet while you take warfarin  Warfarin works best when you eat about the same amount of vitamin K every day  Vitamin K is found in green leafy vegetables and certain other foods  Ask for more information about what to eat when you are taking warfarin  § You will need to see your healthcare provider for follow-up visits when you are on warfarin  You will need regular blood tests  These tests are used to decide how much medicine you need      · Antiplatelets , such as aspirin, help prevent blood clots  Take your antiplatelet medicine exactly as directed  These medicines make it more likely for you to bleed or bruise  If you are told to take aspirin, do not take acetaminophen or ibuprofen instead  · Acetaminophen  helps decrease your pain  This medicine is available without a doctor's order  Ask how much medicine is safe to take, and how often to take it  Acetaminophen can cause liver damage if not taken correctly  · Take your medicine as directed  Contact your healthcare provider if you think your medicine is not helping or if you have side effects  Tell him or her if you are allergic to any medicine  Keep a list of the medicines, vitamins, and herbs you take  Include the amounts, and when and why you take them  Bring the list or the pill bottles to follow-up visits  Carry your medicine list with you in case of an emergency  Care for your wound as directed:  Ask your healthcare provider when your wound can get wet  Carefully wash around the wound with soap and water  Do not scrub your wound  You can let soap and water gently run over your wound  Gently pat dry the area and put on new, clean bandages as directed  Check your wound every day for signs of infection such as swelling, pus, or redness  Change your bandages when they get wet or dirty  Do not put lotions or powders on your wound  Do not take a bath or swim until your healthcare provider says it is okay  These activities can increase your risk for a wound infection  Activity:  Do not lift anything heavier than 5 pounds or do vigorous activities such as sports  These actions will put too much stress on your wound and heart  Take short walks around the house several times a day  This will help prevent blood clots  Ask your healthcare provider what other activities are safe for you to do  Also ask when you can return to your normal activities and work or school  Self-care:   · Eat heart healthy foods    You may need to eat foods that are low in salt, fat, or cholesterol  Healthy foods include fruits, vegetables, whole-grain breads, low-fat dairy products, beans, lean meats, and fish  Ask your healthcare provider for more information about a heart healthy diet  · Do not smoke  Nicotine and other chemicals in cigarettes and cigars can cause heart and lung damage  Nicotine can also slow healing  Ask your healthcare provider for information if you currently smoke and need help to quit  E-cigarettes or smokeless tobacco still contain nicotine  Talk to your healthcare provider before you use these products  · Do not drink alcohol  Ask your cardiologist if it is safe for you to drink alcohol  Alcohol can increase your risk for high blood pressure, diabetes, and coronary artery disease  · Maintain a healthy weight  Ask your healthcare provider how much you should weigh  Extra weight can increase the stress on your heart  Ask him to help you create a weight loss plan if you are overweight  · Exercise as directed after you recover  Your healthcare provider can help you create an exercise plan that is right for you  Exercise will help keep your heart healthy  Ask your healthcare provider if you need antibiotics before procedures:  Some procedures may allow bacteria to get into your blood and travel to your heart  This can cause an infection in your heart and prevent you from healing  You may need antibiotics before certain procedures that happen in the next 6 months to prevent infection  This may also include certain dental procedures  Ask your healthcare provider for more information  Follow up with your healthcare provider as directed: You may need to return for tests  These tests will make sure your valve is working correctly  Write down your questions so you remember to ask them during your visits    © 2017 [employer] Information is for End User's use only and may not be sold, redistributed or otherwise used for commercial purposes  All illustrations and images included in CareNotes® are the copyrighted property of A [employer] M [employer]  or [employer] C[employer]  The above information is an  only  It is not intended as medical advice for individual conditions or treatments  Talk to your doctor, nurse or pharmacist before following any medical regimen to see if it is safe and effective for you

## 2020-01-09 NOTE — CONSULTS
Consultation - Cardiology   Elijah Gomes [de-identified] y o  male MRN: [id number]  Unit/Bed#: [facility] 403-01 Encounter: [id number]    Assessment/Plan     Principal Problem:    Severe aortic stenosis  Active Problems:    S/P TAVR (transcatheter aortic valve replacement)    Thrombocytopenia (HCC)    Hyperchloremia      Assessment/Plan    1  Severe symptomatic AS s/p TAVR  Postop day 2  Hemodynamically stable  Telemetry- NSR 50-70's ,lowest HR 49 during sleep hours  Pre op EKG- NSR  Post op EKG- NSR I, AVL inverted T waves  2  Thrombocytopenia-platelet count dropping postop  66,000 today, previously 123  Heparin products d/c'ed  No obvious bleeding  F/U in am per CT surgery    3  Bradycardia-heart rate 50s to 70s, sinus rhythm  Currently on lopressor 12 5 BID ( one dose held )   All sinus rhythm  Patient with history of bradycardia on no AV moy blocking agent  Consider stopping BB altogether  History of Present Illness   Physician Requesting Consult: Suhail Castellon DO  Reason for Consult / Principal Problem: post op TAVR    HPI: Elijah Gomes is a [de-identified]y o  year old male with severe symptomatic aortic stenosis who underwent transfemoral transcatheter aortic valve replacement with 23 mm Myers Aleah 3 bioprosthetic valve  He has a history of  sinus bradycardia on no AV moy blockers  He is postop day 2  Final POOL demonstrated normal prosthetic valve with trace perivalvular leak  Pt without lightheadedness dizziness syncope  No chest pain shortness of breath  Discharge is being held due to low platelet count  Patient is followed by Dr Armando Walls  He had been experiencing shortness of breath and there was concern for progressive aortic stenosis  2D echo in May revealed a normal EF with severe AS and moderate AI  SON 0 7 centimeter squared  Preop cardiac catheterization demonstrated no CAD  History of bradycardia on no AV moy blocking agents    Patient had noted heart rate was 50's with occasional dizziness  Dr Isela Johnson  proceeded with 24 hour Holter monitor 05/2019 showed an average heart rate of 57 with a low of 43 and a maximal 102  No heart block or pauses exceeding 1 8 seconds  No indication for PPM at that time  Inpatient consult to Cardiology  Consult performed by: Lorrane Paget, CRNP  Consult ordered by: Nila Krishnamurthy PA-C          Review of Systems   Constitutional: Negative  HENT: Negative  Eyes: Negative  Respiratory: Positive for shortness of breath  Cardiovascular: Negative for chest pain, palpitations and leg swelling  Gastrointestinal: Negative  Endocrine: Negative  Genitourinary: Negative  Musculoskeletal: Negative  Skin: Negative  Allergic/Immunologic: Negative  Neurological: Negative  Hematological: Negative  Psychiatric/Behavioral: Negative  All other systems reviewed and are negative  Historical Information   Past Medical History:   Diagnosis Date    BPH (benign prostatic hyperplasia)     CAD (coronary artery disease)     Diabetes mellitus (HCC)     type 2, diet controlled    Diastolic [facility]     Epilepsy ([facility] )     History of mycosis fungoides     Hyperlipidemia     Hypertension     ILD [facility]interstitial [facility] disease) [facility]     Osteoporosis     Seizures ([facility])     partial sensory    Severe aortic stenosis      Past Surgical History:   Procedure Laterality Date    CARDIAC CATHETERIZATION      COLONOSCOPY      WI ECHO TRANSESOPHAG R-T 2D W/PRB IMG ACQUISJ I&R N/A 1/7/2020    Procedure: TRANSESOPHAGEAL ECHOCARDIOGRAM (POOL);   Surgeon: Oren Nichols DO;  Location:  MAIN OR;  Service: Cardiac Surgery    WI REPAIR [address] HERNIA,5+Y/O,REDUCIBL Right 11/16/2018    Procedure: REPAIR RIGHT INGUINAL HERNIA WITH MESH;  Surgeon: Rupert Swenson MD;  Location: [address] [place] [address] MAIN OR;  Service: General    WI REPLACE AORTIC VALVE OPENFEMORAL ARTERY APPROACH N/A 1/7/2020    Procedure: REPLACEMENT AORTIC VALVE TRANSCATHETER (TAVR) TRANSFEMORAL W/ 23 MM CARROLL KATHLEEN S3 VALVE (ACCESS ON LEFT);   Surgeon: Elin Horowitz DO;  Location: [facility] MAIN OR;  Service: Cardiac Surgery     Social History     Substance and Sexual Activity   Alcohol Use Yes    Frequency: 4 or more times a week    Comment: 1 glass of wine with dinner      Social History     Substance and Sexual Activity   Drug Use No     Social History     Tobacco Use   Smoking Status Never Smoker   Smokeless Tobacco Never Used     Family History:   Family History   Problem Relation Age of Onset    Coronary artery disease Family     Heart disease Family        Meds/Allergies   current meds:   Current Facility-Administered Medications   Medication Dose Route Frequency    acetaminophen (TYLENOL) rectal suppository 650 mg  650 mg Rectal Q4H PRN    acetaminophen (TYLENOL) tablet 650 mg  650 mg Oral Q4H PRN    bisacodyl (DULCOLAX) rectal suppository 10 mg  10 mg Rectal Daily PRN    chlorhexidine (PERIDEX) 0 12 % oral rinse 15 mL  15 mL Mouth/Throat Q12H [address]    fondaparinux (ARIXTRA) subcutaneous injection 2 5 mg  2 5 mg Subcutaneous Q24H    insulin lispro (HumaLOG) 100 units/mL subcutaneous injection 1-5 Units  1-5 Units Subcutaneous TID AC    insulin lispro (HumaLOG) 100 units/mL subcutaneous injection 1-5 Units  1-5 Units Subcutaneous HS    levETIRAcetam (KEPPRA) tablet 250 mg  250 mg Oral Q12H [address]    metoprolol tartrate (LOPRESSOR) partial tablet 12 5 mg  12 5 mg Oral Q12H [address]    mupirocin (BACTROBAN) 2 % nasal ointment 1 application  1 application Nasal Q67E [address]    ondansetron (ZOFRAN) injection 4 mg  4 mg Intravenous Q6H PRN    pantoprazole (PROTONIX) EC tablet 40 mg  40 mg Oral Early Morning    polyethylene glycol (MIRALAX) packet 17 g  17 g Oral Daily    pravastatin (PRAVACHOL) tablet 20 mg  20 mg Oral Daily With Dinner    tamsulosin (FLOMAX) capsule 0 4 mg  0 4 mg Oral Daily With Dinner    and PTA meds:    Medications Prior to Admission   Medication    ascorbic acid (VITAMIN C) 1000 MG tablet    aspirin (ECOTRIN LOW STRENGTH) 81 mg EC tablet    calcium citrate-vitamin D (CITRACAL+D) 315-200 MG-UNIT per tablet    cholecalciferol (VITAMIN D3) 400 units tablet    finasteride (PROSCAR) 5 mg tablet    levETIRAcetam (KEPPRA) 250 mg tablet    Multiple Vitamin (DAILY VALUE MULTIVITAMIN PO)    mupirocin (BACTROBAN) 2 % ointment    Omega-3 Fatty Acids ([employer] FISH OIL) 1000 MG CAPS    simvastatin (ZOCOR) 10 mg tablet    tamsulosin (FLOMAX) 0 4 mg    amoxicillin (AMOXIL) 500 mg capsule     No Known Allergies    Objective   Vitals: Blood pressure 120/58, pulse 60, temperature 98 3 °F (36 8 °C), temperature source Oral, resp  rate 18, height 5' 2" (1 575 m), weight 59 7 kg (131 lb 9 8 oz), SpO2 97 %    Orthostatic Blood Pressures      Most Recent Value   Blood Pressure  120/58 [Map 84] filed at 01/09/2020 0700   Patient Position - Orthostatic VS  Sitting filed at 01/09/2020 0700            Intake/Output Summary (Last 24 hours) at 1/9/2020 1011  Last data filed at 1/9/2020 0758  Gross per 24 hour   Intake 960 ml   Output 1525 ml   Net -565 ml       Invasive Devices     Central Venous Catheter Line            CVC Central Lines 01/07/20 Triple 1 day          Peripheral Intravenous Line            Peripheral IV 01/07/20 Left;Ventral (anterior) Wrist 2 days    Peripheral IV 01/07/20 Right Antecubital 2 days                Physical Exam: /58 (BP Location: Right arm) Comment: Map 84  Pulse 60   Temp 98 3 °F (36 8 °C) (Oral)   Resp 18   Ht 5' 2" (1 575 m)   Wt 59 7 kg (131 lb 9 8 oz)   SpO2 97%   BMI 24 07 kg/m²   General Appearance:    Alert, cooperative, no distress, appears stated age   Head:    Normocephalic, no scleral icterus   Eyes:    PERRL   Nose:   Nares normal, septum midline, mucosa normal, no drainage    Throat:   Lips, mucosa, and tongue normal   Neck:   Supple, symmetrical, trachea midline     no JVD       Lungs:     Clear to auscultation bilaterally, respirations unlabored   Chest Wall:    No tenderness or deformity    Heart:    Regular rate and rhythm, S1 and S2 normal, no murmur, rub   or gallop   Abdomen:     Soft, non-tender, bowel sounds active all four quadrants,     no masses, no organomegaly   Extremities:   Extremities normal, atraumatic, no cyanosis or edema   Pulses:   2+ and symmetric all extremities   Skin:   Skin color, texture, turgor normal, no rashes or lesions   Neurologic:   Alert and oriented to person place and time   No focal deficits       Lab Results:   Recent Results (from the past 72 hour(s))   Fingerstick Glucose (POCT)    Collection Time: 01/07/20  8:14 AM   Result Value Ref Range    POC Glucose 118 65 - 140 mg/dl   ABO/Rh    Collection Time: 01/07/20  8:28 AM   Result Value Ref Range    ABO Grouping B     Rh Factor Negative    POCT activated clotting time    Collection Time: 01/07/20 11:49 AM   Result Value Ref Range    Activated Clotting Time, i-STAT 126 89 - 137 sec    Specimen Type VENOUS    POCT Blood Gas (CG8+)    Collection Time: 01/07/20 11:49 AM   Result Value Ref Range    ph, Nahum ISTAT 7 400 7 300 - 7 400    pCO2, Nahum i-STAT 37 3 (L) 42 0 - 50 0 mm HG    pO2, Nahum i-STAT 104 0 (H) 35 0 - 45 0 mm HG    BE, i-STAT -1 -2 - 3 mmol/L    HCO3, Nahum i-STAT 23 1 (L) 24 0 - 30 0 mmol/L    CO2, i-STAT 24 21 - 32 mmol/L    O2 Sat, i-STAT 98 (H) 60 - 85 %    SODIUM, I-STAT 140 136 - 145 mmol/l    Potassium, i-STAT 3 9 3 5 - 5 3 mmol/L    Calcium, Ionized i-STAT 1 32 1 12 - 1 32 mmol/L    Hct, i-STAT 35 (L) 36 5 - 49 3 %    Hgb, i-STAT 11 9 (L) 12 0 - 17 0 g/dl    Glucose, i-STAT 97 65 - 140 mg/dl    Specimen Type VENOUS    POCT activated clotting time    Collection Time: 01/07/20  1:27 PM   Result Value Ref Range    Activated Clotting Time, i-STAT 303 (H) 89 - 137 sec    Specimen Type ARTERIAL    POCT Blood Gas (CG8+)    Collection Time: 01/07/20  1:28 PM   Result Value Ref Range    pH, Art i-STAT 7 412 7 350 - 7 450    pCO2, Art i-STAT 38 1 36 0 - 44 0 mm HG    pO2, ART i-STAT 72 0 (L) 75 0 - 129 0 mm HG    BE, i-STAT 0 -2 - 3 mmol/L    HCO3, Art i-STAT 24 3 22 0 - 28 0 mmol/L    CO2, i-STAT 25 21 - 32 mmol/L    O2 Sat, i-STAT 94 (H) 60 - 85 %    SODIUM, I-STAT 139 136 - 145 mmol/l    Potassium, i-STAT 4 0 3 5 - 5 3 mmol/L    Calcium, Ionized i-STAT 1 25 1 12 - 1 32 mmol/L    Hct, i-STAT 34 (L) 36 5 - 49 3 %    Hgb, i-STAT 11 6 (L) 12 0 - 17 0 g/dl    Glucose, i-STAT 97 65 - 140 mg/dl    Specimen Type ARTERIAL    ECG 12 lead    Collection Time: 01/07/20  2:14 PM   Result Value Ref Range    Ventricular Rate 72 BPM    Atrial Rate 72 BPM    UT Interval 154 ms    QRSD Interval 92 ms    QT Interval 379 ms    QTC Interval 415 ms    P [place] 59 degrees    QRS Axis 41 degrees    T Wave Axis 99 degrees   Basic metabolic panel    Collection Time: 01/07/20  2:19 PM   Result Value Ref Range    Sodium 140 136 - 145 mmol/L    Potassium 4 0 3 5 - 5 3 mmol/L    Chloride 110 (H) 100 - 108 mmol/L    CO2 26 21 - 32 mmol/L    ANION GAP 4 4 - 13 mmol/L    BUN 17 5 - 25 mg/dL    Creatinine 0 88 0 60 - 1 30 mg/dL    Glucose 102 65 - 140 mg/dL    Calcium 9 0 8 3 - 10 1 mg/dL    eGFR 81 ml/min/1 73sq m   Hemoglobin and hematocrit, blood    Collection Time: 01/07/20  2:19 PM   Result Value Ref Range    Hemoglobin 12 7 12 0 - 17 0 g/dL    Hematocrit 38 8 36 5 - 49 3 %   Platelet count    Collection Time: 01/07/20  2:19 PM   Result Value Ref Range    Platelets 94 (L) 743 - 390 Thousands/uL    MPV 12 4 8 9 - 12 7 fL   Fingerstick Glucose (POCT)    Collection Time: 01/07/20  2:21 PM   Result Value Ref Range    POC Glucose 109 65 - 140 mg/dl   Fingerstick Glucose (POCT)    Collection Time: 01/07/20  4:26 PM   Result Value Ref Range    POC Glucose 96 65 - 140 mg/dl   Fingerstick Glucose (POCT)    Collection Time: 01/07/20  9:00 PM   Result Value Ref Range    POC Glucose 114 65 - 140 mg/dl   Hemoglobin and hematocrit, blood    Collection Time: 01/07/20 11:08 PM   Result Value Ref Range    Hemoglobin 13 1 12 0 - 17 0 g/dL    Hematocrit 39 7 36 5 - 49 3 %   Basic metabolic panel    Collection Time: 01/08/20  5:10 AM   Result Value Ref Range    Sodium 142 136 - 145 mmol/L    Potassium 4 0 3 5 - 5 3 mmol/L    Chloride 109 (H) 100 - 108 mmol/L    CO2 28 21 - 32 mmol/L    ANION GAP 5 4 - 13 mmol/L    BUN 14 5 - 25 mg/dL    Creatinine 0 94 0 60 - 1 30 mg/dL    Glucose 104 65 - 140 mg/dL    Calcium 9 6 8 3 - 10 1 mg/dL    eGFR 76 ml/min/1 73sq m   CBC-AM POD #1    Collection Time: 01/08/20  5:10 AM   Result Value Ref Range    WBC 8 41 4 31 - 10 16 Thousand/uL    RBC 4 33 3 88 - 5 62 Million/uL    Hemoglobin 13 0 12 0 - 17 0 g/dL    Hematocrit 40 3 36 5 - 49 3 %    MCV 93 82 - 98 fL    MCH 30 0 26 8 - 34 3 pg    MCHC 32 3 31 4 - 37 4 g/dL    RDW 13 1 11 6 - 15 1 %    Platelets 87 (L) 339 - 390 Thousands/uL    MPV 12 5 8 9 - 12 7 fL   Magnesium    Collection Time: 01/08/20  5:10 AM   Result Value Ref Range    Magnesium 2 2 1 6 - 2 6 mg/dL   ECG 12 lead    Collection Time: 01/08/20  5:49 AM   Result Value Ref Range    Ventricular Rate 50 BPM    Atrial Rate 50 BPM    ME Interval 154 ms    QRSD Interval 84 ms    QT Interval 436 ms    QTC Interval 397 ms    P [place] 54 degrees    QRS Axis 47 degrees    T Wave Axis 113 degrees   Fingerstick Glucose (POCT)    Collection Time: 01/08/20  6:20 AM   Result Value Ref Range    POC Glucose 109 65 - 140 mg/dl   Prepare Leukoreduced RBC:Has consent been obtained? Yes; Date of Surgery: 1/7/2020; Where is the Surgery Scheduled?  [place], 3 Units    Collection Time: 01/08/20  7:22 AM   Result Value Ref Range    Unit Product Code P9109N76     Unit Number [id number]     Unit ABO B     Unit RH NEG     Crossmatch Compatible     Unit Dispense Status Return to [facility]     Unit Product Code K1001E23     Unit Number [id number]     Unit ABO B     Unit RH NEG     Crossmatch Compatible     Unit Dispense Status Return to [facility]     Unit Product Code H3477D61     Unit Number [id number]     Unit ABO B     Unit RH NEG     Crossmatch Compatible     Unit Dispense Status Return to [facility]     Unit Product Code O2842G57     Unit Number [id number]     Unit ABO B     Unit RH NEG     Crossmatch Compatible     Unit Dispense Status Return to [facility]    Fingerstick Glucose (POCT)    Collection Time: 20 10:57 AM   Result Value Ref Range    POC Glucose 75 65 - 140 mg/dl   Fingerstick Glucose (POCT)    Collection Time: 20  4:08 PM   Result Value Ref Range    POC Glucose 131 65 - 140 mg/dl   Fingerstick Glucose (POCT)    Collection Time: 20  9:07 PM   Result Value Ref Range    POC Glucose 122 65 - 140 mg/dl   CBC    Collection Time: 20  4:58 AM   Result Value Ref Range    WBC 8 64 4 31 - 10 16 Thousand/uL    RBC 4 15 3 88 - 5 62 Million/uL    Hemoglobin 12 5 12 0 - 17 0 g/dL    Hematocrit 38 7 36 5 - 49 3 %    MCV 93 82 - 98 fL    MCH 30 1 26 8 - 34 3 pg    MCHC 32 3 31 4 - 37 4 g/dL    RDW 13 1 11 6 - 15 1 %    Platelets 66 (L) 027 - 390 Thousands/uL    MPV 12 7 8 9 - 12 7 fL   Fingerstick Glucose (POCT)    Collection Time: 20  5:43 AM   Result Value Ref Range    POC Glucose 111 65 - 140 mg/dl     [facility]  [place], [address]   [phone]   [facility]   Invasive Cardiovascular Lab Complete Report   Patient: Naveen Santana   MR number: [id number]   Account number: [de-identified]   Study date: 2019   Gender: Male   : 1940   Height: 61 8 in   Weight: 130 5 lb   BSA: 1 59 mï¾²   Allergies: NO KNOWN ALLERGIES   Diagnostic Cardiologist: Marilu Mahmood MD   Primary Physician: Mango Tavera MD   SUMMARY   CORONARY CIRCULATION:   Left main: Normal    LAD: Normal  The diagonal branches were also normal    Circumflex: Normal  The OM branches were also normal l   RCA: Dominant; normal    Summary:   Normal coronary angiography  Plan: TAVR workup  Imaging: I have personally reviewed pertinent reports      EKG: NSR inverted T waves 1 , avl  VTE Prophylaxis: Fondaparinux (Arixtra)    Code Status: Level 1 - Full Code  Advance Directive and Living Will:      Power of :    POLST:      Counseling / Coordination of Care  Total floor / unit time spent today 35 minutes  Greater than 50% of total time was spent with the patient and / or family counseling and / or coordination of care

## 2020-01-09 NOTE — PHYSICAL THERAPY NOTE
Physical Therapy Progress Note     01/09/20 1040   Pain Assessment   Pain Assessment No/denies pain   Pain Score No Pain   Restrictions/Precautions   Other Precautions Cardiac/sternal;Telemetry   Subjective   Subjective The pt  states that he is frustrated with not being able to return home today  Transfers   Sit to Stand 7  Independent   Stand to Sit 7  Independent   Ambulation/Elevation   Gait pattern WNL   Gait Assistance 7  Independent   Assistive Device None   Distance 400 feet  Stair Management Assistance 7  Independent   Stair Management Technique No rails; Foreward;Reciprocal   Number of Stairs 2   Balance   Static Sitting Normal   Dynamic Sitting Normal   Static Standing Normal   Dynamic Standing Normal   Ambulatory Normal   Activity Tolerance   Activity Tolerance Patient tolerated treatment well   Nurse Made Aware Chino Fowler RN  Assessment   Prognosis Excellent   Assessment The pt  is independently ambulating around the unit without difficulty  He has no further skilled therapy needs, and he may return home at discharge  Barriers to Discharge None   Goals   Patient Goals To go home  STG Expiration Date 01/18/20   PT Treatment Day 1   Plan   Treatment/Interventions Functional transfer training;Elevations; Therapeutic exercise; Endurance training;Patient/family training;Bed mobility;Gait training   Progress Discontinue PT   Recommendation   Recommendation Home with family support   PT - OK to Discharge Yes     Vonda Rodriguez, PTA

## 2020-01-09 NOTE — PROGRESS NOTES
Progress Note - Cardiothoracic Surgery   Kayden Bullock [de-identified] y o  male MRN: [id number]  Unit/Bed#: [facility] 403-01 Encounter: [id number]  Aortic stenosis, Non-Rheumatic  S/P transfemoral transcatheter aortic valve replacement; POD # 2      24 Hour Events: Tolerated BB last evening without event  Ambulating well with PT/OT  Thrombocytopenia this AM    Medications:   Scheduled Meds:    Current Facility-Administered Medications:  acetaminophen 650 mg Rectal Q4H PRN Bertha El PA-C   acetaminophen 650 mg Oral Q4H PRN Bertha El PA-C   bisacodyl 10 mg Rectal Daily PRN Marlee El PA-C   chlorhexidine 15 mL Mouth/Throat Q12H [address] Bertha El PA-C   fondaparinux 2 5 mg Subcutaneous Q24H Soledad Bowen PA-C   insulin lispro 1-5 Units Subcutaneous TID AC Bertha El PA-C   insulin lispro 1-5 Units Subcutaneous HS Bertha El PA-C   levETIRAcetam 250 mg Oral Q12H [address] Bertha El PA-C   metoprolol tartrate 12 5 mg Oral Q12H [address] Bertha El PA-C   mupirocin 1 application Nasal E14O [address] Bertha El PA-C   ondansetron 4 mg Intravenous Q6H PRN Bertha El PA-C   pantoprazole 40 mg Oral Early Morning Bertha El PA-C   polyethylene glycol 17 g Oral Daily Bertha El PA-C   pravastatin 20 mg Oral Daily With CECE Echavarria   tamsulosin 0 4 mg Oral Daily With CECE Echavarria     Continuous Infusions:   PRN Meds:   acetaminophen    acetaminophen    bisacodyl    ondansetron    Vitals:   Vitals:    01/08/20 2308 01/09/20 0235 01/09/20 0600 01/09/20 0700   BP: 113/58 107/53  120/58   BP Location: Right arm Right arm  Right arm   Pulse: 60 60  60   Resp: 18 18  18   Temp: 98 9 °F (37 2 °C) 98 1 °F (36 7 °C)  98 3 °F (36 8 °C)   TempSrc: Oral Oral  Oral   SpO2: 94% 92%  97%   Weight:   59 7 kg (131 lb 9 8 oz)    Height:           Telemetry: NSR; Heart Rate: 62    Respiratory:   SpO2: SpO2: 97 %;  Room Air    Intake/Output: Intake/Output Summary (Last 24 hours) at 1/9/2020 0737  Last data filed at 1/9/2020 0503  Gross per 24 hour   Intake 900 ml   Output 2275 ml   Net -1375 ml         Weights:   Weight (last 2 days)     Date/Time   Weight    01/09/20 0600   59 7 (131 61)    01/08/20 0547   60 (132 28)            Admit weight: 60    Results:   Results from last 7 days   Lab Units 01/09/20  0458 01/08/20  0510 01/07/20  2308 01/07/20  1419  01/03/20  1221   WBC Thousand/uL 8 64 8 41  --   --   --  7 41   HEMOGLOBIN g/dL 12 5 13 0 13 1 12 7  --  14 0   I STAT HEMOGLOBIN   --   --   --   --    < >  --    HEMATOCRIT % 38 7 40 3 39 7 38 8  --  43 6   HEMATOCRIT, ISTAT   --   --   --   --    < >  --    PLATELETS Thousands/uL 66* 87*  --  94*  --  123*    < > = values in this interval not displayed  Results from last 7 days   Lab Units 01/08/20  0510 01/07/20  1419 01/07/20  1328  01/03/20  1221   POTASSIUM mmol/L 4 0 4 0  --   --  4 3   CHLORIDE mmol/L 109* 110*  --   --  105   CO2 mmol/L 28 26  --   --  27   CO2, I-STAT mmol/L  --   --  25   < >  --    BUN mg/dL 14 17  --   --  19   CREATININE mg/dL 0 94 0 88  --   --  0 89   GLUCOSE, ISTAT mg/dl  --   --  97   < >  --    CALCIUM mg/dL 9 6 9 0  --   --  10 3*    < > = values in this interval not displayed  Results from last 7 days   Lab Units 01/03/20  1221   INR  1 10       Studies:    TTE: done; results pending  Invasive Lines/Tubes:  Invasive Devices     Central Venous Catheter Line            CVC Central Lines 01/07/20 Triple 1 day          Peripheral Intravenous Line            Peripheral IV 01/07/20 Left;Ventral (anterior) Wrist 2 days    Peripheral IV 01/07/20 Right Antecubital 2 days                Physical Exam:    HEENT/NECK:  PERRLA  No jugular venous distention      Cardiac: Regular rate and rhythm and No murmurs/rubs/gallops  Pulmonary:  Breath sounds clear bilaterally and No rales/rhonchi/wheezes  Abdomen:  Non-tender, Non-distended and Normal bowel sounds  Incisions: Groin puncture sites clean, dry, and intact without hematoma  Extremities: Extremities warm/dry, Radial pulses 2+ bilaterally and DP pulses +1 bilaterally  Neuro: Alert and oriented X 3, Sensation is grossly intact and No focal deficits  Skin: Warm/Dry, without rashes or lesions  Assessment:  Patient Active Problem List   Diagnosis    BPH (benign prostatic hyperplasia)    Osteoporosis    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[address] )    Right inguinal hernia    Nonrheumatic aortic valve stenosis    Palpitations    Sinus bradycardia    Severe aortic stenosis    S/P TAVR (transcatheter aortic valve replacement)    Thrombocytopenia (HCC)    Hyperchloremia       Aortic stenosis, Non-Rheumatic  S/P transfemoral transcatheter aortic valve replacement; POD # 2    Plan:    1  Cardiac:   NSR; HR/BP well-controlled  Lopressor 12 5 mg PO BID  Continue ASA, Plavix for antiplatelet regimen  LVEF- 65  Maintain central IV access today for 24h  Continue DVT prophylaxis    2  Surgical:    Groins CDI, soft, Non-tender  No signs of hematoma  Peripheral extremities neurovascularly intact  3  Pulmonary:   Good Room air oxygen saturation; Continue incentive spirometry/Coughing/Deep breathing exercises      4  Renal:   Intake/Output net: -1300 mL/24 hours  Appears euvolemic, will hold diuretic  Post op Creatinine stable; Follow up labs prn    5  Neuro:  Neurologically intact; No active issues  Incisional pain well-controlled; Continue prn Percocet    6  GI:  Tolerating TLC 2 3 gm sodium diet  Maintain 1800 mL daily fluid restriction   Continue stool softeners and prn suppository  Continue GI prophylaxis    7  Endo:    No history of diabetes; Glucose well-controlled with sliding scale coverage    8  Hematology: Thrombocytopenia, Plts 66 from 120 preop     Hold ASA/Plavix  Arixtra for DVT prophylaxis started yesterday, heparin products held  Will send for LE       9  Disposition:  Anticipate discharge to home tomorrow if labwork remains stable/improved       VTE Pharmacologic Prophylaxis: Fondaparinux (Arixtra) and Heparin  VTE Mechanical Prophylaxis: sequential compression device    Collaborative rounds completed with NICOL Naik , and EDUARDO RN    SIGNATURE: Franklyn Dorado PA-C  DATE: January 9, 2020  TIME: 7:37 AM

## 2020-01-09 NOTE — PROGRESS NOTES
Pastoral Care Progress Note    2020  Patient: Lm Owen : 1940  Admission Date & Time: 2020 0741  MRN: [id number] [facility]: [id number]                     Chaplaincy Interventions Utilized:   Empowerment: Encouraged self-care    Exploration: Explored relational needs & resources, Explored spiritual needs & resources and Facilitated story telling    Collaboration:     Relationship Building: Cultivated a relationship of care and support and Listened empathically    Ritual:     Chaplaincy Outcomes Achieved:  Expressed peace, Identified meaningful connections and Improved communication    Spiritual Coping Strategies Utilized:   No spiritual coping       20 [address] Lise Oquendo Patient not active with Rastaf[facility]   Spiritual Beliefs/Perceptions   Concept of God Accepting   Relationship with God Close   Support Systems Spouse/significant other   Stress Factors   Patient Stress Factors Health changes   Coping Responses   Patient Coping Accepting;Open/discussion   Plan of Care   Assessment Completed by: Unit visit

## 2020-01-09 NOTE — PLAN OF CARE
Problem: PHYSICAL THERAPY ADULT  Goal: Performs mobility at highest level of function for planned discharge setting  See evaluation for individualized goals  Description  Treatment/Interventions: Functional transfer training, LE strengthening/ROM, Elevations, Therapeutic exercise, Endurance training, Patient/family training, Equipment eval/education, Bed mobility, Gait training, Spoke to nursing, OT(PT spoke to CM)  Equipment Recommended: Gabe Islas(At this time; will monitor progress to no AD )       See flowsheet documentation for full assessment, interventions and recommendations  Outcome: Completed  Note:   Prognosis: Excellent  Problem List: Decreased strength, Decreased endurance, Impaired balance, Decreased mobility  Assessment: The pt  is independently ambulating around the unit without difficulty  He has no further skilled therapy needs, and he may return home at discharge  Barriers to Discharge: None     Recommendation: Home with family support     PT - OK to Discharge: Yes    See flowsheet documentation for full assessment

## 2020-01-10 LAB
ERYTHROCYTE [DISTWIDTH] IN BLOOD BY AUTOMATED COUNT: 13.1 % (ref 11.6–15.1)
GLUCOSE SERPL-MCNC: 107 MG/DL (ref 65–140)
GLUCOSE SERPL-MCNC: 136 MG/DL (ref 65–140)
GLUCOSE SERPL-MCNC: 173 MG/DL (ref 65–140)
GLUCOSE SERPL-MCNC: 84 MG/DL (ref 65–140)
HCT VFR BLD AUTO: 39.2 % (ref 36.5–49.3)
HGB BLD-MCNC: 12.7 G/DL (ref 12–17)
MCH RBC QN AUTO: 30.2 PG (ref 26.8–34.3)
MCHC RBC AUTO-ENTMCNC: 32.4 G/DL (ref 31.4–37.4)
MCV RBC AUTO: 93 FL (ref 82–98)
PLATELET # BLD AUTO: 60 THOUSANDS/UL (ref 149–390)
PMV BLD AUTO: 12.6 FL (ref 8.9–12.7)
RBC # BLD AUTO: 4.21 MILLION/UL (ref 3.88–5.62)
WBC # BLD AUTO: 7.92 THOUSAND/UL (ref 4.31–10.16)

## 2020-01-10 PROCEDURE — 85027 COMPLETE CBC AUTOMATED: CPT | Performed by: PHYSICIAN ASSISTANT

## 2020-01-10 PROCEDURE — 99232 SBSQ HOSP IP/OBS MODERATE 35: CPT | Performed by: INTERNAL MEDICINE

## 2020-01-10 PROCEDURE — 99232 SBSQ HOSP IP/OBS MODERATE 35: CPT | Performed by: THORACIC SURGERY (CARDIOTHORACIC VASCULAR SURGERY)

## 2020-01-10 PROCEDURE — 82948 REAGENT STRIP/BLOOD GLUCOSE: CPT

## 2020-01-10 RX ADMIN — CHLORHEXIDINE GLUCONATE 0.12% ORAL RINSE 15 ML: 1.2 LIQUID ORAL at 21:27

## 2020-01-10 RX ADMIN — BISACODYL 10 MG: 5 TABLET, DELAYED RELEASE ORAL at 08:48

## 2020-01-10 RX ADMIN — POLYETHYLENE GLYCOL 3350 17 G: 17 POWDER, FOR SOLUTION ORAL at 08:48

## 2020-01-10 RX ADMIN — TAMSULOSIN HYDROCHLORIDE 0.4 MG: 0.4 CAPSULE ORAL at 16:18

## 2020-01-10 RX ADMIN — CHLORHEXIDINE GLUCONATE 0.12% ORAL RINSE 15 ML: 1.2 LIQUID ORAL at 09:04

## 2020-01-10 RX ADMIN — Medication 1 APPLICATION: at 08:48

## 2020-01-10 RX ADMIN — FONDAPARINUX SODIUM 2.5 MG: 2.5 INJECTION, SOLUTION SUBCUTANEOUS at 08:54

## 2020-01-10 RX ADMIN — Medication 1 APPLICATION: at 21:26

## 2020-01-10 RX ADMIN — LEVETIRACETAM 250 MG: 500 TABLET, FILM COATED ORAL at 08:48

## 2020-01-10 RX ADMIN — PANTOPRAZOLE SODIUM 40 MG: 40 TABLET, DELAYED RELEASE ORAL at 06:48

## 2020-01-10 RX ADMIN — LEVETIRACETAM 250 MG: 500 TABLET, FILM COATED ORAL at 21:27

## 2020-01-10 RX ADMIN — PRAVASTATIN SODIUM 20 MG: 20 TABLET ORAL at 16:18

## 2020-01-10 RX ADMIN — INSULIN LISPRO 1 UNITS: 100 INJECTION, SOLUTION INTRAVENOUS; SUBCUTANEOUS at 06:48

## 2020-01-10 NOTE — PROGRESS NOTES
Progress Note - Cardiothoracic Surgery   Garry Paulson [de-identified] y o  male MRN: [id number]  Unit/Bed#: [facility] 403-01 Encounter: [id number]  Aortic stenosis, Non-Rheumatic  S/P transfemoral transcatheter aortic valve replacement; POD # 3      24 Hour Events: BB held per cards recommendation, previously documented severe bradycardia  No acute issues  Medications:   Scheduled Meds:    Current Facility-Administered Medications:  acetaminophen 650 mg Rectal Q4H PRN Raciel El PA-C   acetaminophen 650 mg Oral Q4H PRN Bertha El PA-C   bisacodyl 10 mg Rectal Daily PRN Raciel El PA-C   chlorhexidine 15 mL Mouth/Throat Q12H [address] Bertha El PA-C   fondaparinux 2 5 mg Subcutaneous Q24H Karly CECE   insulin lispro 1-5 Units Subcutaneous TID AC Bertha El PA-C   insulin lispro 1-5 Units Subcutaneous HS Bertha El PA-C   levETIRAcetam 250 mg Oral Q12H [address] Bertha El PA-C   mupirocin 1 application Nasal E50O [address] Bertha El PA-C   ondansetron 4 mg Intravenous Q6H PRN Bertha El PA-C   pantoprazole 40 mg Oral Early Morning Bertha El PA-C   polyethylene glycol 17 g Oral Daily Bertha El PA-C   pravastatin 20 mg Oral Daily With KeySpanCECE   tamsulosin 0 4 mg Oral Daily With L-3 Ysabel El PA-C     Continuous Infusions:   PRN Meds:   acetaminophen    acetaminophen    bisacodyl    ondansetron    Vitals:   Vitals:    01/09/20 1500 01/09/20 1913 01/09/20 2305 01/10/20 0232   BP: 100/58 114/55 114/55 96/50   BP Location: Right arm Right arm Right arm Right arm   Pulse: 56 72 62 62   Resp: 18 18 18 18   Temp: 98 3 °F (36 8 °C) 97 9 °F (36 6 °C) 98 4 °F (36 9 °C) 98 6 °F (37 °C)   TempSrc: Oral Oral Oral Oral   SpO2: 97% 96% 93% 93%   Weight:       Height:           Telemetry: NSR; Heart Rate: 62    Respiratory:   SpO2: SpO2: 93 %;  Room Air    Intake/Output:     Intake/Output Summary (Last 24 hours) at 1/10/2020 [address] [place] [address] filed at 1/10/2020 0500  Gross per 24 hour   Intake 980 ml   Output 1830 ml   Net -850 ml         Weights:   Weight (last 2 days)     Date/Time   Weight    01/09/20 0600   59 7 (131 61)    01/08/20 0547   60 (132 28)            Admit weight: 60    Results:   Results from last 7 days   Lab Units 01/09/20  0458 01/08/20  0510 01/07/20  2308 01/07/20  1419  01/03/20  1221   WBC Thousand/uL 8 64 8 41  --   --   --  7 41   HEMOGLOBIN g/dL 12 5 13 0 13 1 12 7  --  14 0   I STAT HEMOGLOBIN   --   --   --   --    < >  --    HEMATOCRIT % 38 7 40 3 39 7 38 8  --  43 6   HEMATOCRIT, ISTAT   --   --   --   --    < >  --    PLATELETS Thousands/uL 66* 87*  --  94*  --  123*    < > = values in this interval not displayed  Results from last 7 days   Lab Units 01/08/20  0510 01/07/20  1419 01/07/20  1328  01/03/20  1221   POTASSIUM mmol/L 4 0 4 0  --   --  4 3   CHLORIDE mmol/L 109* 110*  --   --  105   CO2 mmol/L 28 26  --   --  27   CO2, I-STAT mmol/L  --   --  25   < >  --    BUN mg/dL 14 17  --   --  19   CREATININE mg/dL 0 94 0 88  --   --  0 89   GLUCOSE, ISTAT mg/dl  --   --  97   < >  --    CALCIUM mg/dL 9 6 9 0  --   --  10 3*    < > = values in this interval not displayed  Results from last 7 days   Lab Units 01/03/20  1221   INR  1 10       Studies:    TTE:  SUMMARY     LEFT VENTRICLE:  Systolic function was vigorous  Ejection fraction was estimated to be 70 %  There were no regional wall motion abnormalities  There was mild concentric hypertrophy  Doppler parameters were consistent with abnormal left ventricular relaxation (grade 1 diastolic dysfunction)      MITRAL VALVE:  There was mild annular calcification  There was mild systolic anterior motion of the anterior leaflet  There was trace regurgitation      AORTIC VALVE:  A bioprosthesis (#23 [address] 3 TAVR) was present   The prosthesis was well-seated without stenosis and with mild paravalvular (at 5 and 12 o'clock positions) regurgitation  Peak and mean velocities (gradients) were 2 9 (33) and 1 9  (17) m/sec (mmHg), respectively  The calculated aortic valve area was 1 4 cm2 using continuity equation      TRICUSPID VALVE:  There was trace regurgitation  Invasive Lines/Tubes:  Invasive Devices     Central Venous Catheter Line            CVC Central Lines 01/07/20 Triple 2 days          Peripheral Intravenous Line            Peripheral IV 01/07/20 Left;Ventral (anterior) Wrist 3 days    Peripheral IV 01/07/20 Right Antecubital 3 days                Physical Exam:    HEENT/NECK:  PERRLA  No jugular venous distention  Cardiac: Regular rate and rhythm  Pulmonary:  Breath sounds clear bilaterally and No rales/rhonchi/wheezes  Abdomen:  Non-tender, Non-distended and Normal bowel sounds  Incisions: Groin puncture sites clean, dry, and intact without hematoma  Extremities: Extremities warm/dry and Radial pulses 2+ bilaterally  Neuro: Alert and oriented X 3 and Sensation is grossly intact  Skin: Warm/Dry, without rashes or lesions  Assessment:  Patient Active Problem List   Diagnosis    BPH (benign prostatic hyperplasia)    Osteoporosis    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[address] )    Right inguinal hernia    Nonrheumatic aortic valve stenosis    Palpitations    Sinus bradycardia    Severe aortic stenosis    S/P TAVR (transcatheter aortic valve replacement)    Thrombocytopenia (HCC)    Hyperchloremia       Aortic stenosis, Non-Rheumatic  S/P transfemoral transcatheter aortic valve replacement; POD # 3    Plan:    1  Cardiac:   NSR; HR/BP well-controlled  Lopressor 12 5 mg PO BID  Continue ASA, Plavix for antiplatelet regimen  LVEF- 65  Maintain central IV access today for 24h  Continue DVT prophylaxis    2  Surgical:    Groins CDI, soft, Non-tender  No signs of hematoma  Peripheral extremities neurovascularly intact          3  Pulmonary:   Good Room air oxygen saturation; Continue incentive spirometry/Coughing/Deep breathing exercises      4  Renal:   Intake/Output net: -1300 mL/24 hours  Appears euvolemic, will hold diuretic  Post op Creatinine stable; Follow up labs prn    5  Neuro:  Neurologically intact; No active issues  Incisional pain well-controlled; Continue prn Percocet    6  GI:  Tolerating TLC 2 3 gm sodium diet  Maintain 1800 mL daily fluid restriction   Continue stool softeners and prn suppository  Continue GI prophylaxis    7  Endo:    No history of diabetes; Glucose well-controlled with sliding scale coverage    8  Hematology: Thrombocytopenia, Plts 66 from 120 preop  Hold ASA/Plavix  Arixtra for DVT prophylaxis started yesterday, heparin products held  Will send for LE US      9  Disposition:  Anticipate discharge to home tomorrow if labwork remains stable/improved       VTE Pharmacologic Prophylaxis: Fondaparinux (Arixtra) and Heparin  VTE Mechanical Prophylaxis: sequential compression device    Collaborative rounds completed with NICOL Swartz , and P4 RN    SIGNATURE: Santiago Ty PA-C  DATE: January 10, 2020  TIME: 7:27 AM

## 2020-01-10 NOTE — PROGRESS NOTES
Progress Note - Cardiology   Christelle Hankins [de-identified] y o  male MRN: [id number]  Unit/Bed#: [facility] 403-01 Encounter: [id number]        Principal Problem:    Severe aortic stenosis  Active Problems:    S/P TAVR (transcatheter aortic valve replacement)    Thrombocytopenia (HCC)    Hyperchloremia        Assessment/Plan     1  Severe symptomatic AS s/p TAVR  Postop day 3  Hemodynamically stable  Telemetry- NSR 50-70's   Pre op EKG- NSR  Post op EKG- NSR I, AVL inverted T waves      2  Thrombocytopenia-platelet count dropping postop  60,000 today, previously 123  Heparin products d/c'ed  No obvious bleeding  Asa held  Nesmith with CT surgery- HIT not 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  F/U in am per CT surgery     3  Bradycardia-history of bradycardia on no AV moy blockers thus BB d/c'ed  Prior holter- avg HR 57 off av moy blockers  Pt did have few doses of lopressor 12 5, no significant bradycardia  Pt will f/u with Dr Skip Lagunas   Chief Complaint/Subjective  Pt remains without lightheadedness  OOB ambulating  Frustrated he is still here for low platelet count  No signs of bleeding           Vitals: /59 (BP Location: Right arm)   Pulse 60   Temp 97 8 °F (36 6 °C) (Oral)   Resp 18   Ht 5' 2" (1 575 m)   Wt 59 7 kg (131 lb 9 8 oz)   SpO2 93%   BMI 24 07 kg/m²     Vitals:    01/08/20 0547 01/09/20 0600   Weight: 60 kg (132 lb 4 4 oz) 59 7 kg (131 lb 9 8 oz)     Orthostatic Blood Pressures      Most Recent Value   Blood Pressure  112/59 filed at 01/10/2020 0700   Patient Position - Orthostatic VS  Sitting filed at 01/10/2020 0700            Intake/Output Summary (Last 24 hours) at 1/10/2020 1119  Last data filed at 1/10/2020 0847  Gross per 24 hour   Intake 920 ml   Output 1530 ml   Net -610 ml       Invasive Devices     Central Venous Catheter Line            CVC Central Lines 01/07/20 Triple 2 days          Peripheral Intravenous Line            Peripheral IV 01/07/20 Left;Ventral (anterior) Wrist 3 days    Peripheral IV 01/07/20 Right Antecubital 3 days                Current Facility-Administered Medications   Medication Dose Route Frequency    acetaminophen (TYLENOL) rectal suppository 650 mg  650 mg Rectal Q4H PRN    acetaminophen (TYLENOL) tablet 650 mg  650 mg Oral Q4H PRN    bisacodyl (DULCOLAX) rectal suppository 10 mg  10 mg Rectal Daily PRN    chlorhexidine (PERIDEX) 0 12 % oral rinse 15 mL  15 mL Mouth/Throat Q12H LOBITO    fondaparinux (ARIXTRA) subcutaneous injection 2 5 mg  2 5 mg Subcutaneous Q24H    insulin lispro (HumaLOG) 100 units/mL subcutaneous injection 1-5 Units  1-5 Units Subcutaneous TID AC    insulin lispro (HumaLOG) 100 units/mL subcutaneous injection 1-5 Units  1-5 Units Subcutaneous HS    levETIRAcetam (KEPPRA) tablet 250 mg  250 mg Oral Q12H LOBITO    mupirocin (BACTROBAN) 2 % nasal ointment 1 application  1 application Nasal L47V [facility]    ondansetron (ZOFRAN) injection 4 mg  4 mg Intravenous Q6H PRN    pantoprazole (PROTONIX) EC tablet 40 mg  40 mg Oral Early Morning    polyethylene glycol (MIRALAX) packet 17 g  17 g Oral Daily    pravastatin (PRAVACHOL) tablet 20 mg  20 mg Oral Daily With Dinner    tamsulosin (FLOMAX) capsule 0 4 mg  0 4 mg Oral Daily With Dinner         Physical Exam: /59 (BP Location: Right arm)   Pulse 60   Temp 97 8 °F (36 6 °C) (Oral)   Resp 18   Ht 5' 2" (1 575 m)   Wt 59 7 kg (131 lb 9 8 oz)   SpO2 93%   BMI 24 07 kg/m²     General Appearance:    Alert, cooperative, no distress, appears stated age   Head:    Normocephalic, no scleral icterus   Eyes:    PERRL   Nose:   Nares normal, septum midline, no drainage    Throat:   Lips, mucosa, and tongue normal   Neck:   Supple, symmetrical, trachea midline,       no JVD       Lungs:     Clear to auscultation bilaterally, respirations unlabored   Chest Wall:    No tenderness or deformity    Heart:    Regular rate and rhythm, S1 and S2 normal, no murmur, rub   or gallop   Abdomen:     Soft, non-tender, bowel sounds active all four quadrants,     no masses, no organomegaly   Extremities:   Extremities normal, atraumatic, no cyanosis or edema   Pulses:   2+ and symmetric all extremities   Skin:   Skin color, texture, turgor normal, no rashes or lesions   Neurologic:   Alert and oriented to person place and time, no focal deficits                 Lab Results:   Recent Results (from the past 72 hour(s))   POCT activated clotting time    Collection Time: 01/07/20 11:49 AM   Result Value Ref Range    Activated Clotting Time, i-STAT 126 89 - 137 sec    Specimen Type VENOUS    POCT Blood Gas (CG8+)    Collection Time: 01/07/20 11:49 AM   Result Value Ref Range    ph, Nahum ISTAT 7 400 7 300 - 7 400    pCO2, Nahum i-STAT 37 3 (L) 42 0 - 50 0 mm HG    pO2, Nahum i-STAT 104 0 (H) 35 0 - 45 0 mm HG    BE, i-STAT -1 -2 - 3 mmol/L    HCO3, Nahum i-STAT 23 1 (L) 24 0 - 30 0 mmol/L    CO2, i-STAT 24 21 - 32 mmol/L    O2 Sat, i-STAT 98 (H) 60 - 85 %    SODIUM, I-STAT 140 136 - 145 mmol/l    Potassium, i-STAT 3 9 3 5 - 5 3 mmol/L    Calcium, Ionized i-STAT 1 32 1 12 - 1 32 mmol/L    Hct, i-STAT 35 (L) 36 5 - 49 3 %    Hgb, i-STAT 11 9 (L) 12 0 - 17 0 g/dl    Glucose, i-STAT 97 65 - 140 mg/dl    Specimen Type VENOUS    POCT activated clotting time    Collection Time: 01/07/20  1:27 PM   Result Value Ref Range    Activated Clotting Time, i-STAT 303 (H) 89 - 137 sec    Specimen Type ARTERIAL    POCT Blood Gas (CG8+)    Collection Time: 01/07/20  1:28 PM   Result Value Ref Range    pH, Art i-STAT 7 412 7 350 - 7 450    pCO2, Art i-STAT 38 1 36 0 - 44 0 mm HG    pO2, ART i-STAT 72 0 (L) 75 0 - 129 0 mm HG    BE, i-STAT 0 -2 - 3 mmol/L    HCO3, Art i-STAT 24 3 22 0 - 28 0 mmol/L    CO2, i-STAT 25 21 - 32 mmol/L    O2 Sat, i-STAT 94 (H) 60 - 85 %    SODIUM, I-STAT 139 136 - 145 mmol/l    Potassium, i-STAT 4 0 3 5 - 5 3 mmol/L    Calcium, Ionized i-STAT 1 25 1 12 - 1 32 mmol/L    Hct, i-STAT 34 (L) 36 5 - 49 3 %    Hgb, i-STAT 11 6 (L) 12 0 - 17 0 g/dl    Glucose, i-STAT 97 65 - 140 mg/dl    Specimen Type ARTERIAL    ECG 12 lead    Collection Time: 01/07/20  2:14 PM   Result Value Ref Range    Ventricular Rate 72 BPM    Atrial Rate 72 BPM    DC Interval 154 ms    QRSD Interval 92 ms    QT Interval 379 ms    QTC Interval 415 ms    P Rudolph 59 degrees    QRS Axis 41 degrees    T Wave Axis 99 degrees   Basic metabolic panel    Collection Time: 01/07/20  2:19 PM   Result Value Ref Range    Sodium 140 136 - 145 mmol/L    Potassium 4 0 3 5 - 5 3 mmol/L    Chloride 110 (H) 100 - 108 mmol/L    CO2 26 21 - 32 mmol/L    ANION GAP 4 4 - 13 mmol/L    BUN 17 5 - 25 mg/dL    Creatinine 0 88 0 60 - 1 30 mg/dL    Glucose 102 65 - 140 mg/dL    Calcium 9 0 8 3 - 10 1 mg/dL    eGFR 81 ml/min/1 73sq m   Hemoglobin and hematocrit, blood    Collection Time: 01/07/20  2:19 PM   Result Value Ref Range    Hemoglobin 12 7 12 0 - 17 0 g/dL    Hematocrit 38 8 36 5 - 49 3 %   Platelet count    Collection Time: 01/07/20  2:19 PM   Result Value Ref Range    Platelets 94 (L) 347 - 390 Thousands/uL    MPV 12 4 8 9 - 12 7 fL   Fingerstick Glucose (POCT)    Collection Time: 01/07/20  2:21 PM   Result Value Ref Range    POC Glucose 109 65 - 140 mg/dl   Fingerstick Glucose (POCT)    Collection Time: 01/07/20  4:26 PM   Result Value Ref Range    POC Glucose 96 65 - 140 mg/dl   Fingerstick Glucose (POCT)    Collection Time: 01/07/20  9:00 PM   Result Value Ref Range    POC Glucose 114 65 - 140 mg/dl   Hemoglobin and hematocrit, blood    Collection Time: 01/07/20 11:08 PM   Result Value Ref Range    Hemoglobin 13 1 12 0 - 17 0 g/dL    Hematocrit 39 7 36 5 - 49 3 %   Basic metabolic panel    Collection Time: 01/08/20  5:10 AM   Result Value Ref Range    Sodium 142 136 - 145 mmol/L    Potassium 4 0 3 5 - 5 3 mmol/L    Chloride 109 (H) 100 - 108 mmol/L    CO2 28 21 - 32 mmol/L    ANION GAP 5 4 - 13 mmol/L    BUN 14 5 - 25 mg/dL    Creatinine 0 94 0 60 - 1 30 mg/dL    Glucose 104 65 - 140 mg/dL    Calcium 9 6 8 3 - 10 1 mg/dL    eGFR 76 ml/min/1 73sq m   CBC-AM POD #1    Collection Time: 01/08/20  5:10 AM   Result Value Ref Range    WBC 8 41 4 31 - 10 16 Thousand/uL    RBC 4 33 3 88 - 5 62 Million/uL    Hemoglobin 13 0 12 0 - 17 0 g/dL    Hematocrit 40 3 36 5 - 49 3 %    MCV 93 82 - 98 fL    MCH 30 0 26 8 - 34 3 pg    MCHC 32 3 31 4 - 37 4 g/dL    RDW 13 1 11 6 - 15 1 %    Platelets 87 (L) 327 - 390 Thousands/uL    MPV 12 5 8 9 - 12 7 fL   Magnesium    Collection Time: 01/08/20  5:10 AM   Result Value Ref Range    Magnesium 2 2 1 6 - 2 6 mg/dL   ECG 12 lead    Collection Time: 01/08/20  5:49 AM   Result Value Ref Range    Ventricular Rate 50 BPM    Atrial Rate 50 BPM    NC Interval 154 ms    QRSD Interval 84 ms    QT Interval 436 ms    QTC Interval 397 ms    P Norris 54 degrees    QRS Axis 47 degrees    T Wave Axis 113 degrees   Fingerstick Glucose (POCT)    Collection Time: 01/08/20  6:20 AM   Result Value Ref Range    POC Glucose 109 65 - 140 mg/dl   Prepare Leukoreduced RBC:Has consent been obtained? Yes; Date of Surgery: 1/7/2020; Where is the Surgery Scheduled?  [address] [facility] [place], 3 Units    Collection Time: 01/08/20  7:22 AM   Result Value Ref Range    Unit Product Code I5207J82     Unit Number [id number]     Unit ABO B     Unit RH NEG     Crossmatch Compatible     Unit Dispense Status Return to [facility]     Unit Product Code U0273J66     Unit Number [id number]     Unit ABO B     Unit RH NEG     Crossmatch Compatible     Unit Dispense Status Return to [facility]     Unit Product Code B8219M94     Unit Number [id number]     Unit ABO B     Unit RH NEG     Crossmatch Compatible     Unit Dispense Status Return to [facility]     Unit Product Code O1578W48     Unit Number [id number]     Unit ABO B     Unit RH NEG     Crossmatch Compatible     Unit Dispense Status Return to [facility]    Fingerstick Glucose (POCT)    Collection Time: 01/08/20 10:57 AM   Result Value Ref Range    POC Glucose 75 65 - 140 mg/dl Fingerstick Glucose (POCT)    Collection Time: 01/08/20  4:08 PM   Result Value Ref Range    POC Glucose 131 65 - 140 mg/dl   Fingerstick Glucose (POCT)    Collection Time: 01/08/20  9:07 PM   Result Value Ref Range    POC Glucose 122 65 - 140 mg/dl   CBC    Collection Time: 01/09/20  4:58 AM   Result Value Ref Range    WBC 8 64 4 31 - 10 16 Thousand/uL    RBC 4 15 3 88 - 5 62 Million/uL    Hemoglobin 12 5 12 0 - 17 0 g/dL    Hematocrit 38 7 36 5 - 49 3 %    MCV 93 82 - 98 fL    MCH 30 1 26 8 - 34 3 pg    MCHC 32 3 31 4 - 37 4 g/dL    RDW 13 1 11 6 - 15 1 %    Platelets 66 (L) 640 - 390 Thousands/uL    MPV 12 7 8 9 - 12 7 fL   Fingerstick Glucose (POCT)    Collection Time: 01/09/20  5:43 AM   Result Value Ref Range    POC Glucose 111 65 - 140 mg/dl   Fingerstick Glucose (POCT)    Collection Time: 01/09/20 11:01 AM   Result Value Ref Range    POC Glucose 91 65 - 140 mg/dl   Fingerstick Glucose (POCT)    Collection Time: 01/09/20  3:21 PM   Result Value Ref Range    POC Glucose 148 (H) 65 - 140 mg/dl   Fingerstick Glucose (POCT)    Collection Time: 01/09/20  8:44 PM   Result Value Ref Range    POC Glucose 121 65 - 140 mg/dl   Fingerstick Glucose (POCT)    Collection Time: 01/10/20  6:14 AM   Result Value Ref Range    POC Glucose 173 (H) 65 - 140 mg/dl   CBC and Platelet    Collection Time: 01/10/20  6:22 AM   Result Value Ref Range    WBC 7 92 4 31 - 10 16 Thousand/uL    RBC 4 21 3 88 - 5 62 Million/uL    Hemoglobin 12 7 12 0 - 17 0 g/dL    Hematocrit 39 2 36 5 - 49 3 %    MCV 93 82 - 98 fL    MCH 30 2 26 8 - 34 3 pg    MCHC 32 4 31 4 - 37 4 g/dL    RDW 13 1 11 6 - 15 1 %    Platelets 60 (L) 914 - 390 Thousands/uL    MPV 12 6 8 9 - 12 7 fL     Imaging: I have personally reviewed pertinent reports  Tele- nsr 50-60's      Counseling / Coordination of Care  Total time spent today 20 minutes  Greater than 50% of total time was spent with the patient and / or family counseling and / or coordination of care

## 2020-01-11 VITALS
HEART RATE: 67 BPM | DIASTOLIC BLOOD PRESSURE: 60 MMHG | BODY MASS INDEX: 23.69 KG/M2 | RESPIRATION RATE: 17 BRPM | HEIGHT: 62 IN | SYSTOLIC BLOOD PRESSURE: 115 MMHG | OXYGEN SATURATION: 94 % | TEMPERATURE: 97.7 F | WEIGHT: 128.75 LBS

## 2020-01-11 LAB
ERYTHROCYTE [DISTWIDTH] IN BLOOD BY AUTOMATED COUNT: 12.8 % (ref 11.6–15.1)
GLUCOSE SERPL-MCNC: 110 MG/DL (ref 65–140)
GLUCOSE SERPL-MCNC: 112 MG/DL (ref 65–140)
HCT VFR BLD AUTO: 37.7 % (ref 36.5–49.3)
HGB BLD-MCNC: 12.4 G/DL (ref 12–17)
MCH RBC QN AUTO: 30.6 PG (ref 26.8–34.3)
MCHC RBC AUTO-ENTMCNC: 32.9 G/DL (ref 31.4–37.4)
MCV RBC AUTO: 93 FL (ref 82–98)
PLATELET # BLD AUTO: 67 THOUSANDS/UL (ref 149–390)
PMV BLD AUTO: 11.5 FL (ref 8.9–12.7)
RBC # BLD AUTO: 4.05 MILLION/UL (ref 3.88–5.62)
WBC # BLD AUTO: 8.24 THOUSAND/UL (ref 4.31–10.16)

## 2020-01-11 PROCEDURE — 85027 COMPLETE CBC AUTOMATED: CPT | Performed by: PHYSICIAN ASSISTANT

## 2020-01-11 PROCEDURE — 82948 REAGENT STRIP/BLOOD GLUCOSE: CPT

## 2020-01-11 PROCEDURE — NC001 PR NO CHARGE: Performed by: THORACIC SURGERY (CARDIOTHORACIC VASCULAR SURGERY)

## 2020-01-11 PROCEDURE — 99238 HOSP IP/OBS DSCHRG MGMT 30/<: CPT | Performed by: THORACIC SURGERY (CARDIOTHORACIC VASCULAR SURGERY)

## 2020-01-11 RX ORDER — ACETAMINOPHEN 325 MG/1
TABLET ORAL
Qty: 90 TABLET | Refills: 0 | Status: SHIPPED | OUTPATIENT
Start: 2020-01-11 | End: 2020-12-15

## 2020-01-11 RX ORDER — ASPIRIN 325 MG
325 TABLET, DELAYED RELEASE (ENTERIC COATED) ORAL DAILY
Qty: 30 TABLET | Refills: 0 | Status: SHIPPED | OUTPATIENT
Start: 2020-01-11 | End: 2020-01-20 | Stop reason: ALTCHOICE

## 2020-01-11 RX ORDER — PANTOPRAZOLE SODIUM 20 MG/1
20 TABLET, DELAYED RELEASE ORAL DAILY
Qty: 30 TABLET | Refills: 0 | Status: SHIPPED | OUTPATIENT
Start: 2020-01-11 | End: 2020-06-12

## 2020-01-11 RX ADMIN — LEVETIRACETAM 250 MG: 500 TABLET, FILM COATED ORAL at 08:05

## 2020-01-11 RX ADMIN — FONDAPARINUX SODIUM 2.5 MG: 2.5 INJECTION, SOLUTION SUBCUTANEOUS at 08:06

## 2020-01-11 RX ADMIN — PANTOPRAZOLE SODIUM 40 MG: 40 TABLET, DELAYED RELEASE ORAL at 05:46

## 2020-01-11 NOTE — SOCIAL WORK
Spoke with patient about [facility]  Referral made to [facility] as per patient choice for lab work post procedure  Script fax attached to [facility] confirmed that they will be able to see patient as requested

## 2020-01-11 NOTE — DISCHARGE SUMMARY
Discharge Summary - Cardiothoracic Surgery   iGna Sánchez [de-identified] y o  male MRN: [id number]  Unit/Bed#: [facility] 403-01 Encounter: [id number]    Admission Date: 1/7/2020     Discharge Date: 01/11/20    Admitting Diagnosis: Severe aortic stenosis [I35 0]    Primary Discharge Diagnosis:   Aortic stenosis, Non-Rheumatic  S/P transfemoral transcatheter aortic valve replacement;    Secondary Discharge Diagnosis:   Patient Active Problem List   Diagnosis    BPH (benign prostatic hyperplasia)    Osteoporosis    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[address] )    Right inguinal hernia    Nonrheumatic aortic valve stenosis    Palpitations    Sinus bradycardia    Severe aortic stenosis    S/P TAVR (transcatheter aortic valve replacement)    Thrombocytopenia (HCC)    Hyperchloremia   Hyperchloremia resolved at discharge  Attending: NICOL Mosley  Consulting Physician(s):   Cardiology  Medical/Critical Care  Geriatrics    Procedures Performed:   1/7/20: Transcatheter aortic valve replacement with a 23 mm Myers ALEAH 3 bioprosthetic valve via right transfemoral approach  Hospital Course: The patient was seen in consultation prior to this admission for evaluation of Aortic stenosis, Non-Rheumatic  Risks and benefits of transfemoral transcatheter aortic valve replacement were discussed in detail, and patient was agreeable  Routine preoperative evaluation was completed and informed consent was obtained prior to admission  1/7: Elective admission for TAVR (#23mm Myers Aleah S3) via left transfemoral approach  Trace PVL s/p valve deployment on intra-op POOL  Extuabted in OR to facemask  Palpable DP pulses to b/l LE  Transferred to PACU  On no cardiac infusions  Transferred to P4  Start Lopressor 12 5 mg BID   1/8: asymptomatic SB overnight, improved when awake  No issues  Lopressor started  Transferred to Tele  A-line and rivera out  Echo done final report pending  1/9: Tolerated BB yesterday   Platelets trending down, 66 today  ASA/Plavix held, arixtra started  PM: seen by cardiology, recommended to hold BB as has had previous symptomatic bradycardia per office documentation  1/10: Echo with small PVL, otherwise no significant findings  Plts 60k today  Continue to hold ASA/Plavix  Will repeat labs in AM    1/11: Plts improved to 67K  No other issues  Will d/c on  only, re-check labs on Monday and Thursday  Add Plavix when > 100k  Condition at Discharge:   good     Discharge Physical Exam:  HEENT/NECK:  PERRLA  No jugular venous distention  Cardiac: Regular rate and rhythm  No rubs/murmurs/gallops  Pulmonary:  Breath sounds slightly diminished at the bases bilaterally  Abdomen:  Non-tender, Non-distended  Positive bowel sounds  Incisions: Groin puncture sites clean, dry, and intact without hematoma  Lower extremities: Extremities warm/dry  Radial/PT/DP pulses 2+ bilaterally  No edema B/L  Neuro: Alert and oriented X 3  Sensation is grossly intact  No focal deficits  Skin: Warm/Dry, without rashes or lesions  Discharge Data:  Results from last 7 days   Lab Units 01/11/20  0547 01/10/20  0622 01/09/20  0458   WBC Thousand/uL 8 24 7 92 8 64   HEMOGLOBIN g/dL 12 4 12 7 12 5   HEMATOCRIT % 37 7 39 2 38 7   PLATELETS Thousands/uL 67* 60* 66*     Results from last 7 days   Lab Units 01/08/20  0510 01/07/20  1419 01/07/20  1328   POTASSIUM mmol/L 4 0 4 0  --    CHLORIDE mmol/L 109* 110*  --    CO2 mmol/L 28 26  --    CO2, I-STAT mmol/L  --   --  25   BUN mg/dL 14 17  --    CREATININE mg/dL 0 94 0 88  --    GLUCOSE, ISTAT mg/dl  --   --  97   CALCIUM mg/dL 9 6 9 0  --            Discharge instructions/Information to patient and family:   See after visit summary for information provided to patient and family  Nemesio Freed was educated on restrictions regarding driving and lifting, and techniques of proper incisional care    They were specifically counselled on signs and symptoms of a sternal wound infection, and advised to contact our service immediately should they develop fevers, sweats, chill, redness or drainage at the site of any incisions  Provisions for Follow-Up Care:  See after visit summary for information related to follow-up care and any pertinent home health orders  Disposition:  Home    Planned Readmission:   No    Discharge Medications:  See after visit summary for reconciled discharge medications provided to patient and family  Efren Krabbe was provided contact information and scheduled a follow up appointment with NICOL Merino  Additionally, they were advised to schedule follow up appointments to be seen by their primary care physician within 7-10 days, and their cardiologist within 2-3 weeks  Contact information was provided  Efren Krabbe was counseled on the importance of avoiding tobacco products  As with all patients whom have undergone open heart surgery, tobacco cessation medication was contraindicated at the time of discharge  ACE/ARB was Contraindicated secondary to EF > 40% and no history of heart failure      BB was not prescribed due to documented history of profound bradycardia as outpatient  Will consider resuming at cardiology post op visit  Narcotic pain medication was not prescribed  Pain was well controlled with Tylenol  The patient was informed that following their postoperative surgical evaluation, they will be referred to outpatient cardiac rehabilitation  They were counseled that this program is run by specialists who will help them safely strengthen their heart and prevent more heart disease  Cardiac rehabilitation will include exercise, relaxation, stress management, and heart-healthy nutrition  Caregivers will also check to make sure their medication regimen is working  I spent 30 minutes discharging the patient  This time was spent on the day of discharge   I had direct contact with the patient on the day of discharge  Additional documentation is required if more than 30 minutes were spent on discharge       SIGNATURE: Kendall Fermin PA-C  DATE: January 11, 2020  TIME: 10:54 AM

## 2020-01-11 NOTE — PROGRESS NOTES
Progress Note - Cardiothoracic Surgery   Prince Allan [de-identified] y o  male MRN: [id number]  Unit/Bed#: [facility] 403-01 Encounter: [id number]  Aortic stenosis, Non-Rheumatic  S/P transfemoral transcatheter aortic valve replacement; POD # 4      24 Hour Events: No issues overnight  Patient ambulating independently  Platelets improved to 67K today  Medications:   Scheduled Meds:    Current Facility-Administered Medications:  acetaminophen 650 mg Rectal Q4H PRN Bertha El PA-C   acetaminophen 650 mg Oral Q4H PRN Bertha El PA-C   bisacodyl 10 mg Rectal Daily PRN Selin Kalata CECE El   chlorhexidine 15 mL Mouth/Throat Q12H [address] Bertha El PA-C   fondaparinux 2 5 mg Subcutaneous Q24H Janes Oden PA-C   insulin lispro 1-5 Units Subcutaneous TID AC Bertha El PA-C   insulin lispro 1-5 Units Subcutaneous HS Bertha El PA-C   levETIRAcetam 250 mg Oral Q12H [address] Bertha El PA-C   mupirocin 1 application Nasal M21A [address] Bertha El PA-C   ondansetron 4 mg Intravenous Q6H PRN Bertha El PA-C   pantoprazole 40 mg Oral Early Morning Bertha El PA-C   polyethylene glycol 17 g Oral Daily Bertha El PA-C   pravastatin 20 mg Oral Daily With CECE Echavarria   tamsulosin 0 4 mg Oral Daily With CECE Echavarria     Continuous Infusions:   PRN Meds:   acetaminophen    acetaminophen    bisacodyl    ondansetron    Vitals:   Vitals:    01/10/20 1601 01/10/20 1914 01/11/20 0400 01/11/20 0600   BP: 131/63 136/60 130/71    BP Location: Right arm  Left arm    Pulse: 67 68 61    Resp: 20 18 16    Temp: (!) 97 3 °F (36 3 °C) 97 9 °F (36 6 °C) 98 3 °F (36 8 °C)    TempSrc: Oral  Oral    SpO2: 96% 96% 93%    Weight:    58 4 kg (128 lb 12 oz)   Height:           Telemetry: NSR; Heart Rate: 80    Respiratory:   SpO2: SpO2: 93 %;  Room Air    Intake/Output:     Intake/Output Summary (Last 24 hours) at 1/11/2020 0713  Last data filed at 1/11/2020 0600  Gross per 24 hour   Intake 420 ml   Output 1000 ml   Net -580 ml         Weights:   Weight (last 2 days)     Date/Time   Weight    01/11/20 0600   58 4 (128 75)    01/09/20 0600   59 7 (131 61)            Admit weight: 60    Results:   Results from last 7 days   Lab Units 01/10/20  0622 01/09/20  0458 01/08/20  0510   WBC Thousand/uL 7 92 8 64 8 41   HEMOGLOBIN g/dL 12 7 12 5 13 0   HEMATOCRIT % 39 2 38 7 40 3   PLATELETS Thousands/uL 60* 66* 87*     Results from last 7 days   Lab Units 01/08/20  0510 01/07/20  1419 01/07/20  1328   POTASSIUM mmol/L 4 0 4 0  --    CHLORIDE mmol/L 109* 110*  --    CO2 mmol/L 28 26  --    CO2, I-STAT mmol/L  --   --  25   BUN mg/dL 14 17  --    CREATININE mg/dL 0 94 0 88  --    GLUCOSE, ISTAT mg/dl  --   --  97   CALCIUM mg/dL 9 6 9 0  --            Studies:    TTE:  SUMMARY     LEFT VENTRICLE:  Systolic function was vigorous  Ejection fraction was estimated to be 70 %  There were no regional wall motion abnormalities  There was mild concentric hypertrophy  Doppler parameters were consistent with abnormal left ventricular relaxation (grade 1 diastolic dysfunction)      MITRAL VALVE:  There was mild annular calcification  There was mild systolic anterior motion of the anterior leaflet  There was trace regurgitation      AORTIC VALVE:  A bioprosthesis (#23 Myers Aleah 3 TAVR) was present  The prosthesis was well-seated without stenosis and with mild paravalvular (at 5 and 12 o'clock positions) regurgitation  Peak and mean velocities (gradients) were 2 9 (33) and 1 9  (17) m/sec (mmHg), respectively  The calculated aortic valve area was 1 4 cm2 using continuity equation      TRICUSPID VALVE:  There was trace regurgitation      Invasive Lines/Tubes:  Invasive Devices     Central Venous Catheter Line            CVC Central Lines 01/07/20 Triple 3 days          Peripheral Intravenous Line            Peripheral IV 01/07/20 Right Antecubital 4 days                Physical Exam:    HEENT/NECK:  PERRLA  No jugular venous distention  Cardiac: Regular rate and rhythm and No murmurs/rubs/gallops  Pulmonary:  Breath sounds clear bilaterally  Abdomen:  Non-tender, Non-distended and Normal bowel sounds  Incisions: Groin puncture sites clean, dry, and intact without hematoma  Extremities: Extremities warm/dry and Radial pulses 2+ bilaterally  Neuro: Alert and oriented X 3, Sensation is grossly intact and No focal deficits  Skin: Warm/Dry, without rashes or lesions  Assessment:  Patient Active Problem List   Diagnosis    BPH (benign prostatic hyperplasia)    Osteoporosis    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[address] )    Right inguinal hernia    Nonrheumatic aortic valve stenosis    Palpitations    Sinus bradycardia    Severe aortic stenosis    S/P TAVR (transcatheter aortic valve replacement)    Thrombocytopenia (HCC)    Hyperchloremia       Aortic stenosis, Non-Rheumatic  S/P transfemoral transcatheter aortic valve replacement; POD # 4    Plan:    1  Cardiac:   NSR; HR/BP well-controlled  Hold lopressor for history of SB    ASA/Plavix held for thrombocytopenia, will resume at discharge if platelets improved  LVEF- 65  Maintain central IV access today for 24h  Continue DVT prophylaxis    2  Surgical:    Groins CDI, soft, Non-tender  No signs of hematoma  Peripheral extremities neurovascularly intact  3  Pulmonary:   Good Room air oxygen saturation; Continue incentive spirometry/Coughing/Deep breathing exercises      4  Renal:   Intake/Output net: -580 mL/24 hours  Appears euvolemic, will hold diuretic  Post op Creatinine stable; Follow up labs prn    5  Neuro:  Neurologically intact; No active issues  Incisional pain well-controlled; Continue prn Percocet    6  GI:  Tolerating TLC 2 3 gm sodium diet  Maintain 1800 mL daily fluid restriction   Continue stool softeners and prn suppository  Continue GI prophylaxis    7   Endo:    No history of diabetes; Glucose well-controlled with sliding scale coverage    8  Hematology: Thrombocytopenia, Plts 66 from 120 preop  Improved to 67K  Resume ASA, hold plavix  Will f/u labs as outpatient  Arixtra for DVT prophylaxis started yesterday, heparin products held       9  Disposition:  Anticipate discharge to home today       VTE Pharmacologic Prophylaxis: Fondaparinux (Arixtra) and Heparin  VTE Mechanical Prophylaxis: sequential compression device    Collaborative rounds completed with Dr Salbador Najera, and P4 RN    SIGNATURE: Raul Deleon PA-C  DATE: January 11, 2020  TIME: 7:13 AM

## 2020-01-13 ENCOUNTER — LAB REQUISITION (OUTPATIENT)
Dept: LAB | Facility: HOSPITAL | Age: 80
End: 2020-01-13
Payer: MEDICARE

## 2020-01-13 DIAGNOSIS — I35.0 NONRHEUMATIC AORTIC (VALVE) STENOSIS: ICD-10-CM

## 2020-01-13 DIAGNOSIS — D69.6 THROMBOCYTOPENIA, UNSPECIFIED (HCC): ICD-10-CM

## 2020-01-13 DIAGNOSIS — Z95.2 PRESENCE OF PROSTHETIC HEART VALVE: ICD-10-CM

## 2020-01-13 LAB
BASOPHILS # BLD AUTO: 0.03 THOUSANDS/ΜL (ref 0–0.1)
BASOPHILS NFR BLD AUTO: 0 % (ref 0–1)
EOSINOPHIL # BLD AUTO: 1.13 THOUSAND/ΜL (ref 0–0.61)
EOSINOPHIL NFR BLD AUTO: 16 % (ref 0–6)
ERYTHROCYTE [DISTWIDTH] IN BLOOD BY AUTOMATED COUNT: 12.4 % (ref 11.6–15.1)
HCT VFR BLD AUTO: 41.5 % (ref 36.5–49.3)
HGB BLD-MCNC: 13.3 G/DL (ref 12–17)
IMM GRANULOCYTES # BLD AUTO: 0.01 THOUSAND/UL (ref 0–0.2)
IMM GRANULOCYTES NFR BLD AUTO: 0 % (ref 0–2)
LYMPHOCYTES # BLD AUTO: 1.64 THOUSANDS/ΜL (ref 0.6–4.47)
LYMPHOCYTES NFR BLD AUTO: 23 % (ref 14–44)
MCH RBC QN AUTO: 30.4 PG (ref 26.8–34.3)
MCHC RBC AUTO-ENTMCNC: 32 G/DL (ref 31.4–37.4)
MCV RBC AUTO: 95 FL (ref 82–98)
MONOCYTES # BLD AUTO: 0.89 THOUSAND/ΜL (ref 0.17–1.22)
MONOCYTES NFR BLD AUTO: 13 % (ref 4–12)
NEUTROPHILS # BLD AUTO: 3.38 THOUSANDS/ΜL (ref 1.85–7.62)
NEUTS SEG NFR BLD AUTO: 48 % (ref 43–75)
NRBC BLD AUTO-RTO: 0 /100 WBCS
PLATELET # BLD AUTO: 92 THOUSANDS/UL (ref 149–390)
PMV BLD AUTO: 12.6 FL (ref 8.9–12.7)
RBC # BLD AUTO: 4.38 MILLION/UL (ref 3.88–5.62)
WBC # BLD AUTO: 7.08 THOUSAND/UL (ref 4.31–10.16)

## 2020-01-13 PROCEDURE — 85025 COMPLETE CBC W/AUTO DIFF WBC: CPT | Performed by: THORACIC SURGERY (CARDIOTHORACIC VASCULAR SURGERY)

## 2020-01-16 ENCOUNTER — LAB REQUISITION (OUTPATIENT)
Dept: LAB | Facility: HOSPITAL | Age: 80
End: 2020-01-16
Payer: MEDICARE

## 2020-01-16 DIAGNOSIS — D69.6 THROMBOCYTOPENIA, UNSPECIFIED (HCC): ICD-10-CM

## 2020-01-16 DIAGNOSIS — I35.0 NONRHEUMATIC AORTIC (VALVE) STENOSIS: ICD-10-CM

## 2020-01-16 DIAGNOSIS — I11.0 HYPERTENSIVE HEART DISEASE WITH HEART FAILURE (HCC): ICD-10-CM

## 2020-01-16 DIAGNOSIS — I25.10 ATHEROSCLEROTIC HEART DISEASE OF NATIVE CORONARY ARTERY WITHOUT ANGINA PECTORIS: ICD-10-CM

## 2020-01-16 LAB
BASOPHILS # BLD AUTO: 0.04 THOUSANDS/ΜL (ref 0–0.1)
BASOPHILS NFR BLD AUTO: 1 % (ref 0–1)
EOSINOPHIL # BLD AUTO: 0.91 THOUSAND/ΜL (ref 0–0.61)
EOSINOPHIL NFR BLD AUTO: 12 % (ref 0–6)
ERYTHROCYTE [DISTWIDTH] IN BLOOD BY AUTOMATED COUNT: 12.7 % (ref 11.6–15.1)
HCT VFR BLD AUTO: 42.9 % (ref 36.5–49.3)
HGB BLD-MCNC: 13.8 G/DL (ref 12–17)
IMM GRANULOCYTES # BLD AUTO: 0.02 THOUSAND/UL (ref 0–0.2)
IMM GRANULOCYTES NFR BLD AUTO: 0 % (ref 0–2)
LYMPHOCYTES # BLD AUTO: 1.88 THOUSANDS/ΜL (ref 0.6–4.47)
LYMPHOCYTES NFR BLD AUTO: 25 % (ref 14–44)
MCH RBC QN AUTO: 29.9 PG (ref 26.8–34.3)
MCHC RBC AUTO-ENTMCNC: 32.2 G/DL (ref 31.4–37.4)
MCV RBC AUTO: 93 FL (ref 82–98)
MONOCYTES # BLD AUTO: 0.69 THOUSAND/ΜL (ref 0.17–1.22)
MONOCYTES NFR BLD AUTO: 9 % (ref 4–12)
NEUTROPHILS # BLD AUTO: 3.92 THOUSANDS/ΜL (ref 1.85–7.62)
NEUTS SEG NFR BLD AUTO: 53 % (ref 43–75)
NRBC BLD AUTO-RTO: 0 /100 WBCS
PLATELET # BLD AUTO: 127 THOUSANDS/UL (ref 149–390)
PMV BLD AUTO: 13.5 FL (ref 8.9–12.7)
RBC # BLD AUTO: 4.62 MILLION/UL (ref 3.88–5.62)
WBC # BLD AUTO: 7.46 THOUSAND/UL (ref 4.31–10.16)

## 2020-01-16 PROCEDURE — 85025 COMPLETE CBC W/AUTO DIFF WBC: CPT | Performed by: THORACIC SURGERY (CARDIOTHORACIC VASCULAR SURGERY)

## 2020-01-17 ENCOUNTER — TELEPHONE (OUTPATIENT)
Dept: CARDIAC SURGERY | Facility: CLINIC | Age: 80
End: 2020-01-17

## 2020-01-17 DIAGNOSIS — I35.0 SEVERE AORTIC STENOSIS: ICD-10-CM

## 2020-01-17 DIAGNOSIS — Z95.2 S/P TAVR (TRANSCATHETER AORTIC VALVE REPLACEMENT): Primary | ICD-10-CM

## 2020-01-17 RX ORDER — CLOPIDOGREL BISULFATE 75 MG/1
75 TABLET ORAL DAILY
Qty: 30 TABLET | Refills: 2 | Status: SHIPPED | OUTPATIENT
Start: 2020-01-17 | End: 2020-06-12 | Stop reason: ALTCHOICE

## 2020-01-17 NOTE — TELEPHONE ENCOUNTER
Post op call    1/7/20: Elective TAVR  1/11/20: Discharge home  1/17/20: Spoke to patient   He is doing well  He is active around the house and climbing stairs He feels good, denies angina, SOB, lightheadedness  Groin sites are ecchymotic but no pain or bleeding  Eating well, no GI issues  He had thrombocytopenia at discharge from the hospital therefore Plavix was held  F/u CBC on Monday and yesterday has demonstrated an upward trend in Plt ct (now 127,000)  I will ordered Plavix and instructed patient to start this medication for a 90 day course of therapy      Medications reviewed  Questions answered  Appts reviewed

## 2020-01-20 ENCOUNTER — OFFICE VISIT (OUTPATIENT)
Dept: CARDIOLOGY CLINIC | Facility: CLINIC | Age: 80
End: 2020-01-20
Payer: MEDICARE

## 2020-01-20 VITALS
SYSTOLIC BLOOD PRESSURE: 130 MMHG | BODY MASS INDEX: 23.19 KG/M2 | DIASTOLIC BLOOD PRESSURE: 70 MMHG | HEIGHT: 62 IN | WEIGHT: 126 LBS | HEART RATE: 72 BPM

## 2020-01-20 DIAGNOSIS — R00.1 SINUS BRADYCARDIA: ICD-10-CM

## 2020-01-20 DIAGNOSIS — E78.2 MIXED HYPERLIPIDEMIA: ICD-10-CM

## 2020-01-20 DIAGNOSIS — R00.2 PALPITATIONS: ICD-10-CM

## 2020-01-20 DIAGNOSIS — I35.0 NONRHEUMATIC AORTIC VALVE STENOSIS: Primary | ICD-10-CM

## 2020-01-20 PROCEDURE — 93000 ELECTROCARDIOGRAM COMPLETE: CPT | Performed by: INTERNAL MEDICINE

## 2020-01-20 PROCEDURE — 99213 OFFICE O/P EST LOW 20 MIN: CPT | Performed by: INTERNAL MEDICINE

## 2020-01-20 RX ORDER — OYSTER SHELL CALCIUM WITH VITAMIN D 500; 200 MG/1; [IU]/1
1 TABLET, FILM COATED ORAL DAILY
COMMUNITY

## 2020-01-20 RX ORDER — ASPIRIN 81 MG/1
81 TABLET ORAL DAILY
Start: 2020-01-20

## 2020-01-20 RX ORDER — KRILL/OM-3/DHA/EPA/PHOSPHO/AST 500MG-86MG
CAPSULE ORAL
COMMUNITY
End: 2022-08-02 | Stop reason: ALTCHOICE

## 2020-01-20 NOTE — PROGRESS NOTES
Cardiology Follow Up    Elina Sour  1940  [id number]  [address] [facility] [address]  [phone]    Reason for visit: Patient here post TAVR on 1/7  Jaylon Pipe Had low plts in hospital which improved to near normal prior to DC  Jaylon Pipe Also hx of sinus bradycardia and palpitations  Has hx of HLP      1  Nonrheumatic aortic valve stenosis  POCT ECG   2  Sinus bradycardia  POCT ECG   3  Palpitations         Interval History:   The patient had TAVR on 1/7  He was held in hospital for a few days due to low plts     They have returned to baseline which is mildly low  He denies CP/SOB/edema/palpitations or dizziness       Patient Active Problem List   Diagnosis    BPH (benign prostatic hyperplasia)    Osteoporosis    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[facility] )    Right inguinal hernia    Nonrheumatic aortic valve stenosis    Palpitations    Sinus bradycardia    S/P TAVR (transcatheter aortic valve replacement)    Thrombocytopenia (HCC)    Hyperchloremia     Past Medical History:   Diagnosis Date    BPH (benign prostatic hyperplasia)     CAD (coronary artery disease)     Diabetes mellitus (HCC)     type 2, diet controlled    Diastolic CHF ([facility] )     Epilepsy ([facility] )     History of mycosis fungoides     Hyperlipidemia     Hypertension     ILD (interstitial lung disease) ([facility] )     Osteoporosis     Seizures (HCC)     partial sensory    Severe aortic stenosis      Social History     Socioeconomic History    Marital status: /Civil Union     Spouse name: Not on file    Number of children: Not on file    Years of education: Not on file    Highest education level: Not on file   Occupational History    Not on file   Social Needs    Financial resource strain: Not on file    Food insecurity:     Worry: Not on file     Inability: Not on file    Transportation needs:     Medical: Not on file     Non-medical: Not on file   Tobacco Use    Smoking status: Never Smoker    Smokeless tobacco: Never Used   Substance and Sexual Activity    Alcohol use: Yes     Frequency: 4 or more times a week     Comment: 1 glass of wine with dinner     Drug use: No    Sexual activity: Not Currently   Lifestyle    Physical activity:     Days per week: Not on file     Minutes per session: Not on file    Stress: Not on file   Relationships    Social connections:     Talks on phone: Not on file     Gets together: Not on file     Attends Buddhist service: Not on file     Active member of club or organization: Not on file     Attends meetings of clubs or organizations: Not on file     Relationship status: Not on file    Intimate partner violence:     Fear of current or ex partner: Not on file     Emotionally abused: Not on file     Physically abused: Not on file     Forced sexual activity: Not on file   Other Topics Concern    Not on file   Social History Narrative    Not on file      Family History   Problem Relation Age of Onset    Coronary artery disease Family     Heart disease Family      Past Surgical History:   Procedure Laterality Date    CARDIAC CATHETERIZATION      COLONOSCOPY      DC ECHO TRANSESOPHAG R-T 2D W/PRB IMG ACQUISJ I&R N/A 1/7/2020    Procedure: TRANSESOPHAGEAL ECHOCARDIOGRAM (POOL); Surgeon: Antonino Soto DO;  Location:  MAIN OR;  Service: Cardiac Surgery    DC REPAIR [address] HERNIA,5+Y/O,REDUCIBL Right 11/16/2018    Procedure: REPAIR RIGHT INGUINAL HERNIA WITH MESH;  Surgeon: Ebony Delgadillo MD;  Location: [address] [place] [address] MAIN OR;  Service: General    DC REPLACE AORTIC VALVE OPENFEMORAL ARTERY APPROACH N/A 1/7/2020    Procedure: REPLACEMENT AORTIC VALVE TRANSCATHETER (TAVR) TRANSFEMORAL W/ 23 MM CARROLL KATHLEEN S3 VALVE (ACCESS ON LEFT);   Surgeon: Antonino Soto DO;  Location:  MAIN OR;  Service: Cardiac Surgery       Current Outpatient Medications:     acetaminophen (TYLENOL) 325 mg tablet, Take 1-2 tabs q6h PRN mild fever/pain, Disp: 90 tablet, Rfl: 0    amoxicillin (AMOXIL) 500 mg capsule, BEFORE DENTAL WORK, Disp: , Rfl: 3    ascorbic acid (VITAMIN C) 1000 MG tablet, Take 1,000 mg by mouth daily, Disp: , Rfl:     aspirin (ECOTRIN) 325 mg EC tablet, Take 1 tablet (325 mg total) by mouth daily (Patient taking differently: Take 81 mg by mouth daily ), Disp: 30 tablet, Rfl: 0    calcium-vitamin D (OSCAL 500/200 D-3) 500 mg-200 units per tablet, Take 1 tablet by mouth daily, Disp: , Rfl:     cholecalciferol (VITAMIN D3) 400 units tablet, Take 2,000 Units by mouth daily, Disp: , Rfl:     clopidogrel (PLAVIX) 75 mg tablet, Take 1 tablet (75 mg total) by mouth daily, Disp: 30 tablet, Rfl: 2    finasteride (PROSCAR) 5 mg tablet, Take 5 mg by mouth  , Disp: , Rfl:     Krill Oil 500 MG CAPS, Take by mouth, Disp: , Rfl:     levETIRAcetam (KEPPRA) 250 mg tablet, Take 250 mg by mouth 2 (two) times a day, Disp: , Rfl:     Multiple Vitamin (DAILY VALUE MULTIVITAMIN PO), Take 1 tablet by mouth daily, Disp: , Rfl:     pantoprazole (PROTONIX) 20 mg tablet, Take 1 tablet (20 mg total) by mouth daily, Disp: 30 tablet, Rfl: 0    simvastatin (ZOCOR) 10 mg tablet, Take 10 mg by mouth daily at bedtime, Disp: , Rfl:     tamsulosin (FLOMAX) 0 4 mg, Take 0 4 mg by mouth daily with dinner  , Disp: , Rfl:     bisacodyl (DULCOLAX) 5 mg EC tablet, Take 2 tablets (10 mg total) by mouth daily as needed for constipation (Patient not taking: Reported on 1/20/2020), Disp: 60 tablet, Rfl: 0    calcium citrate-vitamin D (CITRACAL+D) 315-200 MG-UNIT per tablet, Take 1 tablet by mouth daily  , Disp: , Rfl:     Omega-3 Fatty Acids ([employer] FISH OIL) 1000 MG CAPS, Take 1 capsule by mouth daily  , Disp: , Rfl:   No Known Allergies      Review of Systems:  Review of Systems   Constitutional: Negative for fatigue and unexpected weight change  HENT: Positive for sneezing  Respiratory: Positive for cough (URI)   Negative for shortness of breath and wheezing  Cardiovascular: Negative for chest pain, palpitations and leg swelling  Gastrointestinal: Negative for abdominal pain, blood in stool and constipation  Genitourinary: Positive for frequency  Negative for hematuria  Musculoskeletal: Negative for arthralgias and back pain  Neurological: Negative for dizziness and light-headedness  Physical Exam:  Vitals:    01/20/20 1111   BP: 130/70   BP Location: Right arm   Patient Position: Sitting   Cuff Size: Adult   Pulse: 72   Weight: 57 2 kg (126 lb)   Height: 5' 2" (1 575 m)       Physical Exam   Constitutional: He appears well-developed and well-nourished  No distress  HENT:   Head: Normocephalic and atraumatic  Mouth/Throat: Oropharynx is clear and moist  No oropharyngeal exudate  Eyes: Conjunctivae are normal  No scleral icterus  Neck: Neck supple  Normal carotid pulses and no JVD present  Carotid bruit is not present  No thyromegaly present  Cardiovascular: Normal rate, regular rhythm and intact distal pulses  Exam reveals no gallop and no friction rub  Murmur heard  Systolic (basal) murmur is present with a grade of 2/6  No diastolic murmur is present  Pulmonary/Chest: Breath sounds normal  He has no wheezes  He has no rhonchi  He has no rales  Abdominal: Soft  He exhibits no mass  There is no hepatosplenomegaly  There is no tenderness  Musculoskeletal: He exhibits no edema  Bilateral groin hematomas       Discussion/Summary:  1  AS s/p TAVR 2 weeks ago  Doing well    plts low postop but at baseline    Continue ASA 81 and clopidogrel (3 months total)  2  Sinus bradycardia    HR 72 today     No issues with bradycardia arrhythmias  3  Palpitations  Having none currently  4  Hyperlipidemia  Patient on longstanding simvastatin    LDL cholesterol is quite good on this dosage in patient has no evident vascular disease    Told patient he can liberalize diet in terms of fat intake and salt but to limit sweets and starches in light borderline hemoglobin A1c    He does not have hypertension or vascular disease and can liberalize his diet to gain back some of the weight he lost    FU 6 months      Jaci Bennett MD

## 2020-01-29 ENCOUNTER — APPOINTMENT (OUTPATIENT)
Dept: LAB | Facility: HOSPITAL | Age: 80
End: 2020-01-29
Payer: MEDICARE

## 2020-01-29 ENCOUNTER — HOSPITAL ENCOUNTER (OUTPATIENT)
Dept: NON INVASIVE DIAGNOSTICS | Facility: HOSPITAL | Age: 80
Discharge: HOME | End: 2020-01-29
Payer: MEDICARE

## 2020-01-29 DIAGNOSIS — I35.0 SEVERE AORTIC STENOSIS: ICD-10-CM

## 2020-01-29 LAB
ANION GAP SERPL CALCULATED.3IONS-SCNC: 3 MMOL/L (ref 4–13)
BUN SERPL-MCNC: 21 MG/DL (ref 5–25)
CALCIUM SERPL-MCNC: 10 MG/DL (ref 8.3–10.1)
CHLORIDE SERPL-SCNC: 105 MMOL/L (ref 100–108)
CO2 SERPL-SCNC: 26 MMOL/L (ref 21–32)
CREAT SERPL-MCNC: 1.02 MG/DL (ref 0.6–1.3)
ERYTHROCYTE [DISTWIDTH] IN BLOOD BY AUTOMATED COUNT: 12.4 % (ref 11.6–15.1)
GFR SERPL CREATININE-BSD FRML MDRD: 69 ML/MIN/1.73SQ M
GLUCOSE SERPL-MCNC: 99 MG/DL (ref 65–140)
HCT VFR BLD AUTO: 47.6 % (ref 36.5–49.3)
HGB BLD-MCNC: 15.1 G/DL (ref 12–17)
MCH RBC QN AUTO: 29.4 PG (ref 26.8–34.3)
MCHC RBC AUTO-ENTMCNC: 31.7 G/DL (ref 31.4–37.4)
MCV RBC AUTO: 93 FL (ref 82–98)
PLATELET # BLD AUTO: 144 THOUSANDS/UL (ref 149–390)
PMV BLD AUTO: 11.5 FL (ref 8.9–12.7)
POTASSIUM SERPL-SCNC: 4.2 MMOL/L (ref 3.5–5.3)
RBC # BLD AUTO: 5.14 MILLION/UL (ref 3.88–5.62)
SODIUM SERPL-SCNC: 134 MMOL/L (ref 136–145)
WBC # BLD AUTO: 7.27 THOUSAND/UL (ref 4.31–10.16)

## 2020-01-29 PROCEDURE — 36415 COLL VENOUS BLD VENIPUNCTURE: CPT

## 2020-01-29 PROCEDURE — 80048 BASIC METABOLIC PNL TOTAL CA: CPT

## 2020-01-29 PROCEDURE — 93306 TTE W/DOPPLER COMPLETE: CPT | Performed by: INTERNAL MEDICINE

## 2020-01-29 PROCEDURE — 93306 TTE W/DOPPLER COMPLETE: CPT

## 2020-01-29 PROCEDURE — 85027 COMPLETE CBC AUTOMATED: CPT

## 2020-01-29 PROCEDURE — 93005 ELECTROCARDIOGRAM TRACING: CPT

## 2020-02-03 ENCOUNTER — OFFICE VISIT (OUTPATIENT)
Dept: CARDIAC SURGERY | Facility: CLINIC | Age: 80
End: 2020-02-03
Payer: MEDICARE

## 2020-02-03 VITALS
DIASTOLIC BLOOD PRESSURE: 60 MMHG | RESPIRATION RATE: 14 BRPM | HEART RATE: 62 BPM | SYSTOLIC BLOOD PRESSURE: 138 MMHG | BODY MASS INDEX: 23.55 KG/M2 | OXYGEN SATURATION: 97 % | TEMPERATURE: 97.2 F | WEIGHT: 128 LBS | HEIGHT: 62 IN

## 2020-02-03 DIAGNOSIS — I35.0 NONRHEUMATIC AORTIC VALVE STENOSIS: ICD-10-CM

## 2020-02-03 DIAGNOSIS — Z95.2 S/P TAVR (TRANSCATHETER AORTIC VALVE REPLACEMENT): Primary | ICD-10-CM

## 2020-02-03 DIAGNOSIS — Z48.89 ENCOUNTER FOR POSTOPERATIVE CARE: ICD-10-CM

## 2020-02-03 PROCEDURE — 99213 OFFICE O/P EST LOW 20 MIN: CPT | Performed by: NURSE PRACTITIONER

## 2020-02-03 NOTE — PROGRESS NOTES
POST OP FOLLOW UP VISIT S/P TAVR    Procedure: S/P transfemoral transcatheter aortic valve replacement #23 mm Myers KATHLEEN 3, performed on 1/7/2020  History: Nazario Han is a [de-identified]y o  year old male who presents to our office today for routine follow up care from transfemoral transcatheter aortic valve replacement  He tolerated his procedure well  Trace PVL was detected after deployment of the valve on intra-op POOL  TTE on POD #1 demonstrated similar findings; trace PVL  Patient developed thrombocytopenia post op with plt ct drop to a low of 60 on POD #3  He had no bruising or bleeding issues  He was discharge home on POD #4 and plt ct was starting to recover  He was discharge on Aspirin 81 mg daily only  Follow up CBC on 1/13/20 demonstrated improvement in his plt ct to 92  Plavix 75 mg daily x 90 days was started at that time  Patient returns today for routine 30 day post op follow up  He states he is feeling good  He is tolerating all activities without SOB, lightheadedness or angina  Weight is stable, no edema  He denies any bruising or bleeding  Groin sites without p ain, swelling or issues with healing       Review of System:     History obtained from chart review and the patient  General ROS: negative  Psychological ROS: negative  Ophthalmic ROS: positive for - uses glasses  ENT ROS: negative  Hematological and Lymphatic ROS: negative for - bleeding problems, bruising or jaundice  Respiratory ROS: no cough, shortness of breath, or wheezing  Cardiovascular ROS: no chest pain or dyspnea on exertion  Gastrointestinal ROS: no abdominal pain, change in bowel habits, or black or bloody stools  Genito-Urinary ROS: no dysuria, trouble voiding, or hematuria  Musculoskeletal ROS: negative  Neurological ROS: no TIA or stroke symptoms  Dermatological ROS: negative    Vital Signs:     Vitals:    02/03/20 0900 02/03/20 0948   BP: 128/60 138/60   BP Location: Left arm Right arm   Cuff Size: Adult Adult   Pulse: 62    Resp: 14    Temp: (!) 97 2 °F (36 2 °C)    TempSrc: Oral    SpO2: 97%    Weight: 58 1 kg (128 lb)    Height: 5' 2" (1 575 m)        Home Medications:     Prior to Admission medications    Medication Sig Start Date End Date Taking?  Authorizing Provider   acetaminophen (TYLENOL) 325 mg tablet Take 1-2 tabs q6h PRN mild fever/pain 1/11/20  Yes Donna Franz PA-C   amoxicillin (AMOXIL) 500 mg capsule BEFORE DENTAL WORK 11/27/19  Yes Historical Provider, MD   ascorbic acid (VITAMIN C) 1000 MG tablet Take 1,000 mg by mouth daily   Yes Historical Provider, MD   aspirin (ECOTRIN LOW STRENGTH) 81 mg EC tablet Take 1 tablet (81 mg total) by mouth daily 1/20/20  Yes Shashank David MD   calcium-vitamin D (OSCAL 500/200 D-3) 500 mg-200 units per tablet Take 1 tablet by mouth daily   Yes Historical Provider, MD   cholecalciferol (VITAMIN D3) 400 units tablet Take 2,000 Units by mouth daily   Yes Historical Provider, MD   clopidogrel (PLAVIX) 75 mg tablet Take 1 tablet (75 mg total) by mouth daily 1/17/20  Yes JACOBO Cerna   finasteride (PROSCAR) 5 mg tablet Take 5 mg by mouth   4/4/18  Yes Historical Provider, MD Aracelis Schwarz 500 MG CAPS Take by mouth   Yes Historical Provider, MD   levETIRAcetam (KEPPRA) 250 mg tablet Take 250 mg by mouth 2 (two) times a day   Yes Historical Provider, MD   Multiple Vitamin (DAILY VALUE MULTIVITAMIN PO) Take 1 tablet by mouth daily   Yes Historical Provider, MD   pantoprazole (PROTONIX) 20 mg tablet Take 1 tablet (20 mg total) by mouth daily 1/11/20 2/10/20 Yes Donna Franz PA-C   simvastatin (ZOCOR) 10 mg tablet Take 10 mg by mouth daily at bedtime   Yes Historical Provider, MD   tamsulosin (FLOMAX) 0 4 mg Take 0 4 mg by mouth daily with dinner   4/4/18  Yes Historical Provider, MD   bisacodyl (DULCOLAX) 5 mg EC tablet Take 2 tablets (10 mg total) by mouth daily as needed for constipation  Patient not taking: Reported on 1/20/2020 1/11/20 2/3/20  Donna Franz PA-C   calcium citrate-vitamin D (CITRACAL+D) 315-200 MG-UNIT per tablet Take 1 tablet by mouth daily    2/3/20  Historical Provider, MD   Omega-3 Fatty Acids ([employer] FISH OIL) 1000 MG CAPS Take 1 capsule by mouth daily    2/3/20  Historical Provider, MD       Physical Exam:    General: Alert, oriented, well developed, no acute distress  HEENT/NECK:  PERRLA  No jugular venous distention  Cardiac:Regular rate and rhythm, brief PADMINI  Pulmonary:Breath sounds clear bilaterally  Abdomen:  Non-tender, Non-distended  Positive bowel sounds  Upper extremities: 2+ radial pulses; brisk capillary refill  Lower extremities: Extremities warm/dry  Bilateral femoral pulses 2+, no hematoma or bruit; PT/DP pulses 2+ bilaterally  No edema B/L  Incisions: Inguinal puncture site is clean, dry, and intact  Neuro: Alert and oriented X 3  Sensation is grossly intact  No focal deficits  Skin: Warm/Dry, without rashes or lesions  Lab Results:     Results from last 7 days   Lab Units 01/29/20  1012   WBC Thousand/uL 7 27   HEMOGLOBIN g/dL 15 1   HEMATOCRIT % 47 6   PLATELETS Thousands/uL 144*     Results from last 7 days   Lab Units 01/29/20  1012   POTASSIUM mmol/L 4 2   CHLORIDE mmol/L 105   CO2 mmol/L 26   BUN mg/dL 21   CREATININE mg/dL 1 02   CALCIUM mg/dL 10 0       Imaging Studies:     Transthoracic Echocardiogram: 1/29/20  LEFT VENTRICLE:  Systolic function was normal  Ejection fraction was estimated to be 65 %  There were no regional wall motion abnormalities  Doppler parameters were consistent with abnormal left ventricular relaxation (grade 1 diastolic dysfunction)      RIGHT VENTRICLE:  The size was normal   Systolic function was normal      MITRAL VALVE:  There was mild annular calcification  There was mild systolic anterior motion of the anterior leaflet  There was trace regurgitation      AORTIC VALVE:  A bioprosthesis (#23 Myers Aleah 3 TAVR) was present   The prosthesis was well-seated without stenosis and with mild paravalvular (at 5 and 12 o'clock positions) regurgitation  Peak and mean velocities (gradients) were 2 9 (33) and 1 9  (17) m/sec (mmHg), respectively  The calculated aortic valve area was 1 4 cm2 using continuity equation  There was trace perivalvular regurgitation      EKG:   SB    I have personally reviewed pertinent films in PACS    TAVR evaluation Assessment:     Stan Aguilar 122: I    Assessment: Aortic stenosis, Non-Rheumatic  S/P transfemoral transcatheter aortic valve replacement;    Plan:     Zeyad De Anda is making good progress in his recover following transfemoral transcatheter aortic valve replacement  He is at NYHA functional class I  Groin incisions are well healed  Weight and VS are stable  Recent echocardiogram demonstrates TAVR prosthesis well seated, normal function and trace PVL (no change in comparison to post op Echo)   ECG, BMP & CBC stable  Plt ct has recovered, no 144  I reviewed his medications and made no changes  Recommended Plavix therapy after TAVR is for 90 days then stop but Aspirin 81 mg daily is lifelong  I have discussed the benefits of participating in cardiac rehabilitation and have encouraged them to to do so  Zeyad De Anda may resume driving and all normal activities  He may go to [place]  Zeyad De Anda has already been evaluated by his primary care physician and  cardiologist for ongoing medical care  Arrangements will be made for one year follow-up in our office with repeat echocardiogram, ECG, CBC & BMP  I have advised him to notify his PCP with any new concerns that may arise in the interim and to maintain routine follow up with his cardiologist  Zeyad De Anda was comfortable with our recommendations and his questions were answered to his satisfaction      Routine referral to gastroenterology for colonoscopy screening was not indicated, as the patient is over 75years old    Ruth, [place]  2/3/20  10:13 AM

## 2020-02-08 LAB
ATRIAL RATE: 58 BPM
P AXIS: 54 DEGREES
PR INTERVAL: 160 MS
QRS AXIS: 7 DEGREES
QRSD INTERVAL: 92 MS
QT INTERVAL: 414 MS
QTC INTERVAL: 406 MS
T WAVE AXIS: 45 DEGREES
VENTRICULAR RATE: 58 BPM

## 2020-02-08 PROCEDURE — 93010 ELECTROCARDIOGRAM REPORT: CPT | Performed by: INTERNAL MEDICINE

## 2020-02-14 DIAGNOSIS — I35.0 AORTIC VALVE STENOSIS, ETIOLOGY OF CARDIAC VALVE DISEASE UNSPECIFIED: Primary | ICD-10-CM

## 2020-02-14 DIAGNOSIS — Z95.2 S/P TAVR (TRANSCATHETER AORTIC VALVE REPLACEMENT): ICD-10-CM

## 2020-02-14 DIAGNOSIS — D69.6 THROMBOCYTOPENIA (HCC): ICD-10-CM

## 2020-03-07 DIAGNOSIS — Z95.2 S/P TAVR (TRANSCATHETER AORTIC VALVE REPLACEMENT): ICD-10-CM

## 2020-03-07 DIAGNOSIS — I35.0 SEVERE AORTIC STENOSIS: ICD-10-CM

## 2020-03-07 DIAGNOSIS — D69.6 THROMBOCYTOPENIA (HCC): ICD-10-CM

## 2020-03-18 RX ORDER — PANTOPRAZOLE SODIUM 20 MG/1
TABLET, DELAYED RELEASE ORAL
Qty: 30 TABLET | Refills: 0 | OUTPATIENT
Start: 2020-03-18

## 2020-04-02 DIAGNOSIS — I35.0 SEVERE AORTIC STENOSIS: ICD-10-CM

## 2020-04-02 DIAGNOSIS — Z95.2 S/P TAVR (TRANSCATHETER AORTIC VALVE REPLACEMENT): ICD-10-CM

## 2020-04-02 RX ORDER — CLOPIDOGREL BISULFATE 75 MG/1
TABLET ORAL
Qty: 30 TABLET | Refills: 0 | OUTPATIENT
Start: 2020-04-02

## 2020-04-06 ENCOUNTER — TELEPHONE (OUTPATIENT)
Dept: CARDIOLOGY CLINIC | Facility: CLINIC | Age: 80
End: 2020-04-06

## 2020-05-27 ENCOUNTER — TRANSCRIBE ORDERS (OUTPATIENT)
Dept: ADMINISTRATIVE | Facility: HOSPITAL | Age: 80
End: 2020-05-27

## 2020-05-27 DIAGNOSIS — M85.80 OSTEOPENIA, UNSPECIFIED LOCATION: Primary | ICD-10-CM

## 2020-05-27 DIAGNOSIS — D37.6 NEOPLASM OF UNCERTAIN BEHAVIOR OF LIVER AND BILIARY PASSAGES: Primary | ICD-10-CM

## 2020-05-28 ENCOUNTER — HOSPITAL ENCOUNTER (OUTPATIENT)
Dept: RADIOLOGY | Facility: HOSPITAL | Age: 80
Discharge: HOME | End: 2020-05-28
Payer: MEDICARE

## 2020-05-28 DIAGNOSIS — D37.6 NEOPLASM OF UNCERTAIN BEHAVIOR OF LIVER AND BILIARY PASSAGES: ICD-10-CM

## 2020-05-28 PROCEDURE — 74183 MRI ABD W/O CNTR FLWD CNTR: CPT

## 2020-05-28 PROCEDURE — A9585 GADOBUTROL INJECTION: HCPCS | Performed by: RADIOLOGY

## 2020-05-28 RX ADMIN — GADOBUTROL 6 ML: 604.72 INJECTION INTRAVENOUS at 15:35

## 2020-06-01 ENCOUNTER — HOSPITAL ENCOUNTER (OUTPATIENT)
Dept: RADIOLOGY | Facility: IMAGING CENTER | Age: 80
Discharge: HOME | End: 2020-06-01
Payer: MEDICARE

## 2020-06-01 DIAGNOSIS — M85.80 OSTEOPENIA, UNSPECIFIED LOCATION: ICD-10-CM

## 2020-06-01 PROCEDURE — 77080 DXA BONE DENSITY AXIAL: CPT

## 2020-06-11 ENCOUNTER — TELEPHONE (OUTPATIENT)
Dept: CARDIOLOGY CLINIC | Facility: CLINIC | Age: 80
End: 2020-06-11

## 2020-06-12 ENCOUNTER — OFFICE VISIT (OUTPATIENT)
Dept: CARDIOLOGY CLINIC | Facility: CLINIC | Age: 80
End: 2020-06-12
Payer: MEDICARE

## 2020-06-12 VITALS
BODY MASS INDEX: 23.37 KG/M2 | TEMPERATURE: 99.1 F | DIASTOLIC BLOOD PRESSURE: 60 MMHG | OXYGEN SATURATION: 97 % | HEIGHT: 62 IN | WEIGHT: 127 LBS | HEART RATE: 62 BPM | SYSTOLIC BLOOD PRESSURE: 112 MMHG

## 2020-06-12 DIAGNOSIS — I35.0 NONRHEUMATIC AORTIC VALVE STENOSIS: Primary | ICD-10-CM

## 2020-06-12 DIAGNOSIS — I49.3 PVC'S (PREMATURE VENTRICULAR CONTRACTIONS): ICD-10-CM

## 2020-06-12 DIAGNOSIS — E78.2 MIXED HYPERLIPIDEMIA: ICD-10-CM

## 2020-06-12 DIAGNOSIS — R00.1 SINUS BRADYCARDIA: ICD-10-CM

## 2020-06-12 PROCEDURE — 99214 OFFICE O/P EST MOD 30 MIN: CPT | Performed by: INTERNAL MEDICINE

## 2020-12-01 ENCOUNTER — HOSPITAL ENCOUNTER (OUTPATIENT)
Dept: NON INVASIVE DIAGNOSTICS | Facility: HOSPITAL | Age: 80
Discharge: HOME | End: 2020-12-01
Payer: MEDICARE

## 2020-12-01 DIAGNOSIS — I35.0 NONRHEUMATIC AORTIC VALVE STENOSIS: ICD-10-CM

## 2020-12-01 DIAGNOSIS — Z95.2 S/P TAVR (TRANSCATHETER AORTIC VALVE REPLACEMENT): ICD-10-CM

## 2020-12-01 PROCEDURE — 93306 TTE W/DOPPLER COMPLETE: CPT | Performed by: INTERNAL MEDICINE

## 2020-12-01 PROCEDURE — 93306 TTE W/DOPPLER COMPLETE: CPT

## 2020-12-02 ENCOUNTER — HOSPITAL ENCOUNTER (OUTPATIENT)
Dept: NON INVASIVE DIAGNOSTICS | Facility: HOSPITAL | Age: 80
Discharge: HOME | End: 2020-12-02
Payer: MEDICARE

## 2020-12-02 ENCOUNTER — APPOINTMENT (OUTPATIENT)
Dept: LAB | Facility: HOSPITAL | Age: 80
End: 2020-12-02
Payer: MEDICARE

## 2020-12-02 DIAGNOSIS — I35.0 NONRHEUMATIC AORTIC VALVE STENOSIS: ICD-10-CM

## 2020-12-02 DIAGNOSIS — Z95.2 S/P TAVR (TRANSCATHETER AORTIC VALVE REPLACEMENT): ICD-10-CM

## 2020-12-02 DIAGNOSIS — I35.0 AORTIC VALVE STENOSIS, ETIOLOGY OF CARDIAC VALVE DISEASE UNSPECIFIED: ICD-10-CM

## 2020-12-02 DIAGNOSIS — I35.0 AORTIC VALVE STENOSIS, ETIOLOGY OF CARDIAC VALVE DISEASE UNSPECIFIED: Primary | ICD-10-CM

## 2020-12-02 LAB
ANION GAP SERPL CALCULATED.3IONS-SCNC: 7 MMOL/L (ref 4–13)
ATRIAL RATE: 63 BPM
BUN SERPL-MCNC: 18 MG/DL (ref 5–25)
CALCIUM SERPL-MCNC: 10 MG/DL (ref 8.3–10.1)
CHLORIDE SERPL-SCNC: 103 MMOL/L (ref 100–108)
CO2 SERPL-SCNC: 27 MMOL/L (ref 21–32)
CREAT SERPL-MCNC: 0.97 MG/DL (ref 0.6–1.3)
ERYTHROCYTE [DISTWIDTH] IN BLOOD BY AUTOMATED COUNT: 12.9 % (ref 11.6–15.1)
GFR SERPL CREATININE-BSD FRML MDRD: 73 ML/MIN/1.73SQ M
GLUCOSE SERPL-MCNC: 143 MG/DL (ref 65–140)
HCT VFR BLD AUTO: 46.6 % (ref 36.5–49.3)
HGB BLD-MCNC: 14.8 G/DL (ref 12–17)
MCH RBC QN AUTO: 29.6 PG (ref 26.8–34.3)
MCHC RBC AUTO-ENTMCNC: 31.8 G/DL (ref 31.4–37.4)
MCV RBC AUTO: 93 FL (ref 82–98)
P AXIS: 37 DEGREES
PLATELET # BLD AUTO: 122 THOUSANDS/UL (ref 149–390)
PMV BLD AUTO: 12.4 FL (ref 8.9–12.7)
POTASSIUM SERPL-SCNC: 4.4 MMOL/L (ref 3.5–5.3)
PR INTERVAL: 156 MS
QRS AXIS: 9 DEGREES
QRSD INTERVAL: 86 MS
QT INTERVAL: 398 MS
QTC INTERVAL: 407 MS
RBC # BLD AUTO: 5 MILLION/UL (ref 3.88–5.62)
SODIUM SERPL-SCNC: 137 MMOL/L (ref 136–145)
T WAVE AXIS: 54 DEGREES
VENTRICULAR RATE: 63 BPM
WBC # BLD AUTO: 5.96 THOUSAND/UL (ref 4.31–10.16)

## 2020-12-02 PROCEDURE — 80048 BASIC METABOLIC PNL TOTAL CA: CPT

## 2020-12-02 PROCEDURE — 93005 ELECTROCARDIOGRAM TRACING: CPT | Performed by: THORACIC SURGERY (CARDIOTHORACIC VASCULAR SURGERY)

## 2020-12-02 PROCEDURE — 36415 COLL VENOUS BLD VENIPUNCTURE: CPT

## 2020-12-02 PROCEDURE — 93010 ELECTROCARDIOGRAM REPORT: CPT | Performed by: INTERNAL MEDICINE

## 2020-12-02 PROCEDURE — 85027 COMPLETE CBC AUTOMATED: CPT

## 2020-12-07 ENCOUNTER — OFFICE VISIT (OUTPATIENT)
Dept: CARDIAC SURGERY | Facility: CLINIC | Age: 80
End: 2020-12-07
Payer: MEDICARE

## 2020-12-07 VITALS
DIASTOLIC BLOOD PRESSURE: 56 MMHG | HEART RATE: 66 BPM | OXYGEN SATURATION: 95 % | HEIGHT: 62 IN | TEMPERATURE: 97.2 F | BODY MASS INDEX: 24.62 KG/M2 | RESPIRATION RATE: 18 BRPM | SYSTOLIC BLOOD PRESSURE: 124 MMHG | WEIGHT: 133.8 LBS

## 2020-12-07 DIAGNOSIS — Z95.2 S/P TAVR (TRANSCATHETER AORTIC VALVE REPLACEMENT): Primary | ICD-10-CM

## 2020-12-07 PROCEDURE — 99213 OFFICE O/P EST LOW 20 MIN: CPT | Performed by: PHYSICIAN ASSISTANT

## 2020-12-15 ENCOUNTER — OFFICE VISIT (OUTPATIENT)
Dept: CARDIOLOGY CLINIC | Facility: CLINIC | Age: 80
End: 2020-12-15
Payer: MEDICARE

## 2020-12-15 VITALS
RESPIRATION RATE: 16 BRPM | TEMPERATURE: 97.6 F | DIASTOLIC BLOOD PRESSURE: 64 MMHG | HEIGHT: 62 IN | BODY MASS INDEX: 24.29 KG/M2 | HEART RATE: 68 BPM | SYSTOLIC BLOOD PRESSURE: 110 MMHG | WEIGHT: 132 LBS

## 2020-12-15 DIAGNOSIS — R00.1 SINUS BRADYCARDIA: ICD-10-CM

## 2020-12-15 DIAGNOSIS — I49.3 PVC'S (PREMATURE VENTRICULAR CONTRACTIONS): ICD-10-CM

## 2020-12-15 DIAGNOSIS — I35.0 NONRHEUMATIC AORTIC VALVE STENOSIS: Primary | ICD-10-CM

## 2020-12-15 DIAGNOSIS — R00.2 PALPITATIONS: ICD-10-CM

## 2020-12-15 DIAGNOSIS — E78.2 MIXED HYPERLIPIDEMIA: ICD-10-CM

## 2020-12-15 PROCEDURE — 99213 OFFICE O/P EST LOW 20 MIN: CPT | Performed by: INTERNAL MEDICINE

## 2021-04-22 ENCOUNTER — TRANSCRIBE ORDERS (OUTPATIENT)
Dept: ADMINISTRATIVE | Facility: HOSPITAL | Age: 81
End: 2021-04-22

## 2021-04-22 DIAGNOSIS — D37.6 NEOPLASM OF UNCERTAIN BEHAVIOR OF LIVER: Primary | ICD-10-CM

## 2021-05-07 ENCOUNTER — TRANSCRIBE ORDERS (OUTPATIENT)
Dept: ADMINISTRATIVE | Facility: HOSPITAL | Age: 81
End: 2021-05-07

## 2021-05-07 ENCOUNTER — HOSPITAL ENCOUNTER (OUTPATIENT)
Dept: RADIOLOGY | Facility: IMAGING CENTER | Age: 81
Discharge: HOME | End: 2021-05-07
Payer: MEDICARE

## 2021-05-07 DIAGNOSIS — T18.2XXS FOREIGN BODY IN STOMACH, SEQUELA: ICD-10-CM

## 2021-05-07 DIAGNOSIS — T18.2XXS FOREIGN BODY IN STOMACH, SEQUELA: Primary | ICD-10-CM

## 2021-05-07 PROCEDURE — 74018 RADEX ABDOMEN 1 VIEW: CPT

## 2021-05-15 ENCOUNTER — HOSPITAL ENCOUNTER (OUTPATIENT)
Dept: RADIOLOGY | Facility: HOSPITAL | Age: 81
Discharge: HOME | End: 2021-05-15
Payer: MEDICARE

## 2021-05-15 DIAGNOSIS — D37.6 NEOPLASM OF UNCERTAIN BEHAVIOR OF LIVER: ICD-10-CM

## 2021-05-15 PROCEDURE — 74183 MRI ABD W/O CNTR FLWD CNTR: CPT

## 2021-05-15 PROCEDURE — A9585 GADOBUTROL INJECTION: HCPCS | Performed by: RADIOLOGY

## 2021-05-15 RX ADMIN — GADOBUTROL 6 ML: 604.72 INJECTION INTRAVENOUS at 14:09

## 2021-06-29 ENCOUNTER — OFFICE VISIT (OUTPATIENT)
Dept: CARDIOLOGY CLINIC | Facility: CLINIC | Age: 81
End: 2021-06-29
Payer: MEDICARE

## 2021-06-29 VITALS
HEART RATE: 57 BPM | OXYGEN SATURATION: 95 % | WEIGHT: 129.2 LBS | DIASTOLIC BLOOD PRESSURE: 60 MMHG | BODY MASS INDEX: 23.77 KG/M2 | SYSTOLIC BLOOD PRESSURE: 122 MMHG | HEIGHT: 62 IN

## 2021-06-29 DIAGNOSIS — R00.2 PALPITATIONS: ICD-10-CM

## 2021-06-29 DIAGNOSIS — R00.1 SINUS BRADYCARDIA: ICD-10-CM

## 2021-06-29 DIAGNOSIS — E78.2 MIXED HYPERLIPIDEMIA: ICD-10-CM

## 2021-06-29 DIAGNOSIS — I35.0 NONRHEUMATIC AORTIC VALVE STENOSIS: Primary | ICD-10-CM

## 2021-06-29 DIAGNOSIS — I49.3 PVC'S (PREMATURE VENTRICULAR CONTRACTIONS): ICD-10-CM

## 2021-06-29 PROCEDURE — 99214 OFFICE O/P EST MOD 30 MIN: CPT | Performed by: INTERNAL MEDICINE

## 2021-06-29 NOTE — PROGRESS NOTES
Cardiology Follow Up    Maxim Snyder  1940  [id number]  [address] [facility] [address]  [phone]    Reason for visit:  Six-month follow-up for aortic stenosis status post TAVR Jan//2020  Also has history of sinus bradycardia and palpitations with known PVCs as well as history of hyperlipidemia         1  Nonrheumatic aortic valve stenosis     2  PVC's (premature ventricular contractions)     3  Palpitations     4  Sinus bradycardia     5  Mixed hyperlipidemia         Interval History:  Since the patient's last visit, he denies chest pain or shortness of breath  He continues to get intermittent palpitations which he described as a skipped beat  These tend to occur in sequence at times  He does get some brief dizziness at times  He has no peripheral edema      Patient Active Problem List   Diagnosis    BPH (benign prostatic hyperplasia)    Osteoporosis    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[facility] )    Right inguinal hernia    Nonrheumatic aortic valve stenosis    Palpitations    Sinus bradycardia    S/P TAVR (transcatheter aortic valve replacement)    Thrombocytopenia (HCC)    Hyperchloremia    Mixed hyperlipidemia    Encounter for postoperative care    PVC's (premature ventricular contractions)     Past Medical History:   Diagnosis Date    BPH (benign prostatic hyperplasia)     CAD (coronary artery disease)     Diabetes mellitus ([facility])     type 2, diet controlled    Diastolic CHF ([facility] )     [facility]     History of mycosis fungoides     Hyperlipidemia     Hypertension     ILD (interstitial lung disease) ([facility])     Osteoporosis     Seizures ([facility])     partial sensory    Severe aortic stenosis      Social History     Socioeconomic History    Marital status: /Civil Union     Spouse name: Not on file    Number of children: Not on file    Years of education: Not on file   Cook Highest education level: Not on file   Occupational History    Not on file   Tobacco Use    Smoking status: Never Smoker    Smokeless tobacco: Never Used   Vaping Use    Vaping Use: Never used   Substance and Sexual Activity    Alcohol use: Yes     Comment: 1 glass of wine with dinner     Drug use: No    Sexual activity: Not Currently   Other Topics Concern    Not on file   Social History Narrative    Not on file     Social Determinants of Health     Financial Resource Strain:     Difficulty of Paying Living Expenses:    Food Insecurity:     Worried About Running Out of Food in the Last Year:     Ran Out of Food in the Last Year:    Transportation Needs:     Lack of Transportation (Medical):  Lack of Transportation (Non-Medical):    Physical Activity:     Days of Exercise per Week:     Minutes of Exercise per Session:    Stress:     Feeling of Stress :    Social Connections:     Frequency of Communication with Friends and Family:     Frequency of Social Gatherings with Friends and Family:     Attends Orthodox Services:     Active Member of Clubs or Organizations:     Attends Club or Organization Meetings:     Marital Status:    Intimate Partner Violence:     Fear of Current or Ex-Partner:     Emotionally Abused:     Physically Abused:     Sexually Abused:       Family History   Problem Relation Age of Onset    Coronary artery disease Family     Heart disease Family      Past Surgical History:   Procedure Laterality Date    CARDIAC CATHETERIZATION      COLONOSCOPY      CT ECHO TRANSESOPHAG R-T 2D W/PRB IMG ACQUISJ I&R N/A 1/7/2020    Procedure: TRANSESOPHAGEAL ECHOCARDIOGRAM (POOL);   Surgeon: Jayna Witt DO;  Location:  MAIN OR;  Service: Cardiac Surgery    CT REPAIR [address] HERNIA,5+Y/O,REDUCIBL Right 11/16/2018    Procedure: REPAIR RIGHT INGUINAL HERNIA WITH MESH;  Surgeon: Radha Santiago MD;  Location: [facility] MAIN OR;  Service: General    CT REPLACE AORTIC VALVE OPENFEMORAL ARTERY APPROACH N/A 1/7/2020    Procedure: REPLACEMENT AORTIC VALVE TRANSCATHETER (TAVR) TRANSFEMORAL W/ 23 MM CARROLL KATHLEEN S3 VALVE (ACCESS ON LEFT); Surgeon: Kellen Solano DO;  Location: [facility] MAIN OR;  Service: Cardiac Surgery       Current Outpatient Medications:     amoxicillin (AMOXIL) 500 mg capsule, BEFORE DENTAL WORK, Disp: , Rfl: 3    ascorbic acid (VITAMIN C) 1000 MG tablet, Take 1,000 mg by mouth daily, Disp: , Rfl:     aspirin (ECOTRIN LOW STRENGTH) 81 mg EC tablet, Take 1 tablet (81 mg total) by mouth daily, Disp: , Rfl:     calcium-vitamin D (OSCAL 500/200 D-3) 500 mg-200 units per tablet, Take 1 tablet by mouth daily, Disp: , Rfl:     cholecalciferol (VITAMIN D3) 400 units tablet, Take 2,000 Units by mouth daily, Disp: , Rfl:     finasteride (PROSCAR) 5 mg tablet, Take 5 mg by mouth  , Disp: , Rfl:     Krill Oil 500 MG CAPS, Take by mouth, Disp: , Rfl:     levETIRAcetam (KEPPRA) 250 mg tablet, Take 250 mg by mouth 2 (two) times a day, Disp: , Rfl:     Multiple Vitamin (DAILY VALUE MULTIVITAMIN PO), Take 1 tablet by mouth daily, Disp: , Rfl:     simvastatin (ZOCOR) 10 mg tablet, Take 10 mg by mouth daily at bedtime, Disp: , Rfl:     tamsulosin (FLOMAX) 0 4 mg, Take 0 4 mg by mouth daily with dinner  , Disp: , Rfl:     pantoprazole (PROTONIX) 20 mg tablet, Take 1 tablet (20 mg total) by mouth daily, Disp: 30 tablet, Rfl: 0  Allergies   Allergen Reactions    Metformin Diarrhea     Review of Systems:  Review of Systems   Constitutional: Positive for fatigue  Negative for activity change, appetite change and unexpected weight change  Respiratory: Negative for cough, chest tightness, shortness of breath and wheezing  Cardiovascular: Positive for palpitations  Negative for chest pain and leg swelling  Gastrointestinal: Positive for constipation  Negative for abdominal pain, blood in stool and diarrhea  Genitourinary: Positive for frequency   Negative for dysuria, hematuria and urgency  Musculoskeletal: Positive for arthralgias  Negative for back pain, gait problem and joint swelling  Neurological: Positive for dizziness and light-headedness  Negative for syncope, speech difficulty and headaches  Psychiatric/Behavioral: Negative for agitation, behavioral problems, confusion and decreased concentration  Physical Exam:  Physical Exam  Constitutional:       Comments: Thin elderly male in NAD   HENT:      Head: Normocephalic and atraumatic  Mouth/Throat:      Mouth: Mucous membranes are moist       Pharynx: No oropharyngeal exudate  Eyes:      General: No scleral icterus  Conjunctiva/sclera: Conjunctivae normal    Neck:      Thyroid: No thyroid mass or thyromegaly  Vascular: Normal carotid pulses  No carotid bruit or JVD  Cardiovascular:      Rate and Rhythm: Normal rate  Occasional extrasystoles are present  Pulses: Normal pulses  Heart sounds: Murmur heard  Crescendo decrescendo systolic (basal) murmur is present with a grade of 2/6  No diastolic murmur is present  No friction rub  No gallop  Pulmonary:      Breath sounds: Normal breath sounds  No wheezing, rhonchi or rales  Abdominal:      Palpations: Abdomen is soft  There is no hepatomegaly, splenomegaly or mass  Tenderness: There is no abdominal tenderness  Musculoskeletal:         General: Deformity (kyphosis) present  No swelling  Cervical back: Neck supple  Skin:     General: Skin is warm  Coloration: Skin is not jaundiced or pale  Findings: No bruising, erythema, lesion or rash  Neurological:      Mental Status: He is oriented to person, place, and time  Cranial Nerves: No cranial nerve deficit  Sensory: No sensory deficit  Motor: No weakness  Psychiatric:         Mood and Affect: Mood normal          Behavior: Behavior normal          Thought Content: Thought content normal          Judgment: Judgment normal          Discussion/Summary:  1  Aortic stenosis status post TAVR in January of 2020  Does have low-grade murmur but no significant stenosis is seen on echo 6 months ago  Clearly having no symptoms referable to aortic stenosis anymore  2  PVCs and PACs  Relatively infrequent and mildly symptomatic  No specific treatment of these  Would not use beta-blocker specially considering tendency for sinus bradycardia  3  Palpitations  Likely related to PVCs and PACs  Patient reassured these are benign  4  Sinus bradycardia  Not evident today  No evidence to suggest spontaneous symptomatic bradycardia  5  Hyperlipidemia  Patient on simvastatin 10 mg daily  LDL cholesterol 57 in May      Follow-up 6 months      Debora Garcia MD

## 2021-11-14 PROBLEM — I49.1 PREMATURE ATRIAL CONTRACTIONS: Status: ACTIVE | Noted: 2021-11-14

## 2021-11-16 ENCOUNTER — OFFICE VISIT (OUTPATIENT)
Dept: CARDIOLOGY CLINIC | Facility: CLINIC | Age: 81
End: 2021-11-16
Payer: MEDICARE

## 2021-11-16 VITALS
DIASTOLIC BLOOD PRESSURE: 62 MMHG | SYSTOLIC BLOOD PRESSURE: 118 MMHG | HEIGHT: 62 IN | BODY MASS INDEX: 24.03 KG/M2 | HEART RATE: 64 BPM | RESPIRATION RATE: 16 BRPM | WEIGHT: 130.6 LBS

## 2021-11-16 DIAGNOSIS — R00.2 PALPITATIONS: ICD-10-CM

## 2021-11-16 DIAGNOSIS — I49.1 PREMATURE ATRIAL CONTRACTIONS: ICD-10-CM

## 2021-11-16 DIAGNOSIS — I35.0 NONRHEUMATIC AORTIC VALVE STENOSIS: Primary | ICD-10-CM

## 2021-11-16 DIAGNOSIS — E78.2 MIXED HYPERLIPIDEMIA: ICD-10-CM

## 2021-11-16 DIAGNOSIS — R00.1 SINUS BRADYCARDIA: ICD-10-CM

## 2021-11-16 DIAGNOSIS — I49.3 PVC'S (PREMATURE VENTRICULAR CONTRACTIONS): ICD-10-CM

## 2021-11-16 PROCEDURE — 99214 OFFICE O/P EST MOD 30 MIN: CPT | Performed by: INTERNAL MEDICINE

## 2021-11-18 ENCOUNTER — HOSPITAL ENCOUNTER (OUTPATIENT)
Dept: NON INVASIVE DIAGNOSTICS | Facility: HOSPITAL | Age: 81
Discharge: HOME | End: 2021-11-18
Attending: INTERNAL MEDICINE
Payer: MEDICARE

## 2021-11-18 DIAGNOSIS — R00.2 PALPITATIONS: ICD-10-CM

## 2021-11-18 PROCEDURE — 93226 XTRNL ECG REC<48 HR SCAN A/R: CPT

## 2021-11-18 PROCEDURE — 93225 XTRNL ECG REC<48 HRS REC: CPT

## 2021-11-19 ENCOUNTER — TELEPHONE (OUTPATIENT)
Dept: CARDIOLOGY CLINIC | Facility: CLINIC | Age: 81
End: 2021-11-19

## 2021-11-23 ENCOUNTER — HOSPITAL ENCOUNTER (OUTPATIENT)
Dept: NON INVASIVE DIAGNOSTICS | Facility: CLINIC | Age: 81
Discharge: HOME | End: 2021-11-23
Payer: MEDICARE

## 2021-11-23 VITALS
BODY MASS INDEX: 23.92 KG/M2 | HEART RATE: 64 BPM | HEIGHT: 62 IN | WEIGHT: 130 LBS | DIASTOLIC BLOOD PRESSURE: 62 MMHG | SYSTOLIC BLOOD PRESSURE: 118 MMHG

## 2021-11-23 DIAGNOSIS — I35.0 NONRHEUMATIC AORTIC (VALVE) STENOSIS: ICD-10-CM

## 2021-11-23 LAB
AORTIC ROOT: 2.8 CM
AORTIC VALVE MEAN VELOCITY: 20 M/S
APICAL FOUR CHAMBER EJECTION FRACTION: 66 %
AV LVOT MEAN GRADIENT: 3 MMHG
AV LVOT PEAK GRADIENT: 5 MMHG
AV MEAN GRADIENT: 19 MMHG
AV PEAK GRADIENT: 36 MMHG
AV REGURGITATION PRESSURE HALF TIME: 0.81 MS
DOP CALC AO VTI: 70.18 CM
DOP CALC LVOT PEAK VEL VTI: 26.55 CM
DOP CALC LVOT PEAK VEL: 1.15 M/S
E WAVE DECELERATION TIME: 216 MS
FRACTIONAL SHORTENING: 36 % (ref 28–44)
INTERVENTRICULAR SEPTUM IN DIASTOLE (PARASTERNAL SHORT AXIS VIEW): 0.8 CM
LEFT ATRIUM AREA SYSTOLE SINGLE PLANE A4C: 15.2 CM2
LEFT INTERNAL DIMENSION IN SYSTOLE: 2.8 CM (ref 2.1–4)
LEFT VENTRICULAR INTERNAL DIMENSION IN DIASTOLE: 4.4 CM (ref 3.76–5.59)
LEFT VENTRICULAR POSTERIOR WALL IN END DIASTOLE: 0.8 CM
LEFT VENTRICULAR STROKE VOLUME: 56 ML
MV E'TISSUE VEL-SEP: 7 CM/S
MV PEAK A VEL: 1.05 M/S
MV PEAK E VEL: 109 CM/S
MV STENOSIS PRESSURE HALF TIME: 0 MS
RIGHT ATRIUM AREA SYSTOLE A4C: 11.2 CM2
RIGHT VENTRICLE ID DIMENSION: 3.3 CM
SL CV AV DECELERATION TIME RETROGRADE: 2792 MS
SL CV AV PEAK GRADIENT RETROGRADE: 69 MMHG
SL CV PED ECHO LEFT VENTRICLE DIASTOLIC VOLUME (MOD BIPLANE) 2D: 86 ML
SL CV PED ECHO LEFT VENTRICLE SYSTOLIC VOLUME (MOD BIPLANE) 2D: 30 ML
TRICUSPID VALVE PEAK REGURGITATION VELOCITY: 2.61 M/S
TRICUSPID VALVE S': 1.2 CM/S
TV PEAK GRADIENT: 27 MMHG
Z-SCORE OF LEFT VENTRICULAR DIMENSION IN END SYSTOLE: -0.41

## 2021-11-23 PROCEDURE — 93306 TTE W/DOPPLER COMPLETE: CPT

## 2021-11-23 PROCEDURE — 93306 TTE W/DOPPLER COMPLETE: CPT | Performed by: INTERNAL MEDICINE

## 2021-11-24 ENCOUNTER — TELEPHONE (OUTPATIENT)
Dept: CARDIOLOGY CLINIC | Facility: CLINIC | Age: 81
End: 2021-11-24

## 2021-11-26 PROCEDURE — 93227 XTRNL ECG REC<48 HR R&I: CPT | Performed by: INTERNAL MEDICINE

## 2022-04-11 ENCOUNTER — OFFICE VISIT (OUTPATIENT)
Dept: CARDIOLOGY CLINIC | Facility: CLINIC | Age: 82
End: 2022-04-11
Payer: MEDICARE

## 2022-04-11 VITALS
WEIGHT: 130 LBS | HEART RATE: 55 BPM | HEIGHT: 62 IN | SYSTOLIC BLOOD PRESSURE: 120 MMHG | DIASTOLIC BLOOD PRESSURE: 68 MMHG | BODY MASS INDEX: 23.92 KG/M2

## 2022-04-11 DIAGNOSIS — I35.0 NONRHEUMATIC AORTIC VALVE STENOSIS: Primary | ICD-10-CM

## 2022-04-11 DIAGNOSIS — E78.2 MIXED HYPERLIPIDEMIA: ICD-10-CM

## 2022-04-11 DIAGNOSIS — I49.3 PVC'S (PREMATURE VENTRICULAR CONTRACTIONS): ICD-10-CM

## 2022-04-11 DIAGNOSIS — R00.2 PALPITATIONS: ICD-10-CM

## 2022-04-11 DIAGNOSIS — R00.1 SINUS BRADYCARDIA: ICD-10-CM

## 2022-04-11 PROCEDURE — 99214 OFFICE O/P EST MOD 30 MIN: CPT | Performed by: INTERNAL MEDICINE

## 2022-04-11 PROCEDURE — 93000 ELECTROCARDIOGRAM COMPLETE: CPT | Performed by: INTERNAL MEDICINE

## 2022-04-11 NOTE — PROGRESS NOTES
Cardiology Follow Up    Mauricio Johnson  1940  [id number]  [address] [facility] [address]  [phone]    Reason for visit:  Six-month follow-up for aortic stenosis status post TAVR in January of 2020 with some mild to moderate aortic regurgitation seen on echo last year, sinus bradycardia, PVCs with palpitations, and hyperlipidemia  1  Nonrheumatic aortic valve stenosis  POCT ECG   2  PVC's (premature ventricular contractions)     3  Palpitations     4  Sinus bradycardia     5  Mixed hyperlipidemia         Interval History:   Since the patients last visit he denies chest pain or SOB  He rides bike and walks about 2 miles per day without sx  [place] [place] He does get rare to occasional palpitations at rest  He feels them in his throat  [facility] He denies edema  He does get some vague dizziness in the AM when he starts to walk  [facility]  He does not drink much water in the AM      Patient Active Problem List   Diagnosis    BPH (benign prostatic hyperplasia)    Osteoporosis    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[facility] )    Right inguinal hernia    Nonrheumatic aortic valve stenosis    Palpitations    Sinus bradycardia    S/P TAVR (transcatheter aortic valve replacement)    Thrombocytopenia (HCC)    Hyperchloremia    Mixed hyperlipidemia    Encounter for postoperative care    PVC's (premature ventricular contractions)    Premature atrial contractions     Past Medical History:   Diagnosis Date    BPH (benign prostatic hyperplasia)     CAD (coronary artery disease)     Diabetes mellitus (HCC)     type 2, diet controlled    [facility]     Epilepsy ([facility] )     History of mycosis fungoides     Hyperlipidemia     Hypertension     ILD (interstitial lung disease) ([facility] )     Osteoporosis     Seizures (HCC)     partial sensory    Severe aortic stenosis      Social History     Socioeconomic History    Marital status: /Civil [employer]     Spouse name: Not on file    Number of children: Not on file    Years of education: Not on file    Highest education level: Not on file   Occupational History    Not on file   Tobacco Use    Smoking status: Never Smoker    Smokeless tobacco: Never Used   Vaping Use    Vaping Use: Never used   Substance and Sexual Activity    Alcohol use: Yes     Comment: 1 glass of wine with dinner     Drug use: No    Sexual activity: Not Currently   Other Topics Concern    Not on file   Social History Narrative    Not on file     Social Determinants of Health     Financial Resource Strain: Not on file   Food Insecurity: Not on file   Transportation Needs: Not on file   Physical Activity: Not on file   Stress: Not on file   Social Connections: Not on file   Intimate Partner Violence: Not on file   Housing Stability: Not on file      Family History   Problem Relation Age of Onset    Coronary artery disease Family     Heart disease Family      Past Surgical History:   Procedure Laterality Date    CARDIAC CATHETERIZATION      COLONOSCOPY      OK ECHO TRANSESOPHAG R-T 2D W/PRB IMG ACQUISJ I&R N/A 1/7/2020    Procedure: TRANSESOPHAGEAL ECHOCARDIOGRAM (POOL); Surgeon: Litzy Fernandez DO;  [facility];  Service: Cardiac Surgery    OK REPAIR [address] HERNIA,5+Y/O,REDUCIBL Right 11/16/2018    Procedure: REPAIR RIGHT INGUINAL HERNIA WITH MESH;  Surgeon: Shayy Alegria MD;  Location: [facility] MAIN OR;  Service: General    OK REPLACE AORTIC VALVE OPENFEMORAL ARTERY APPROACH N/A 1/7/2020    Procedure: REPLACEMENT AORTIC VALVE TRANSCATHETER (TAVR) TRANSFEMORAL W/ 23 MM CARROLL KATHLEEN S3 VALVE (ACCESS ON LEFT);   Surgeon: Litzy Fernandez DO;  [facility];  Service: Cardiac Surgery       Current Outpatient Medications:     amoxicillin (AMOXIL) 500 mg capsule, BEFORE DENTAL WORK, Disp: , Rfl: 3    ascorbic acid (VITAMIN C) 1000 MG tablet, Take 1,000 mg by mouth daily, Disp: , Rfl:     aspirin (ECOTRIN LOW STRENGTH) 81 mg EC tablet, Take 1 tablet (81 mg total) by mouth daily, Disp: , Rfl:     calcium-vitamin D (OSCAL 500/200 D-3) 500 mg-200 units per tablet, Take 1 tablet by mouth daily, Disp: , Rfl:     cholecalciferol (VITAMIN D3) 400 units tablet, Take 2,000 Units by mouth daily, Disp: , Rfl:     finasteride (PROSCAR) 5 mg tablet, Take 5 mg by mouth  , Disp: , Rfl:     Krill Oil 500 MG CAPS, Take by mouth, Disp: , Rfl:     levETIRAcetam (KEPPRA) 250 mg tablet, Take 250 mg by mouth 2 (two) times a day, Disp: , Rfl:     Multiple Vitamin (DAILY VALUE MULTIVITAMIN PO), Take 1 tablet by mouth daily, Disp: , Rfl:     simvastatin (ZOCOR) 10 mg tablet, Take 10 mg by mouth daily at bedtime, Disp: , Rfl:     tamsulosin (FLOMAX) 0 4 mg, Take 0 4 mg by mouth daily with dinner  , Disp: , Rfl:     pantoprazole (PROTONIX) 20 mg tablet, Take 1 tablet (20 mg total) by mouth daily (Patient not taking: Reported on 4/11/2022 ), Disp: 30 tablet, Rfl: 0  Allergies   Allergen Reactions    Escitalopram Dizziness    Metformin Diarrhea       Review of Systems:  Review of Systems   Constitutional: Positive for fatigue (Feels better after he starts to walk)  Negative for activity change, appetite change and unexpected weight change  Respiratory: Negative for cough, chest tightness, shortness of breath and wheezing  Cardiovascular: Positive for palpitations  Gastrointestinal: Positive for constipation  Negative for abdominal pain, blood in stool and diarrhea  Genitourinary: Positive for frequency  Negative for dysuria, hematuria and urgency  Musculoskeletal: Positive for arthralgias (shoulder)  Negative for back pain, gait problem and joint swelling  Neurological: Positive for dizziness and light-headedness  Negative for syncope, numbness and headaches  Psychiatric/Behavioral: Negative for agitation, behavioral problems, confusion and decreased concentration         Physical Exam:  Vitals:    04/11/22 1507 BP: 120/68   BP Location: Left arm   Patient Position: Sitting   Cuff Size: Adult   Pulse: 55   Weight: 59 kg (130 lb)   Height: 5' 2" (1 575 m)       Physical Exam  Constitutional:       Comments: Thin elderly male in NAD   HENT:      Head: Normocephalic and atraumatic  Mouth/Throat:      Mouth: Mucous membranes are moist       Pharynx: No oropharyngeal exudate or posterior oropharyngeal erythema  Eyes:      General: No scleral icterus  Conjunctiva/sclera: Conjunctivae normal    Neck:      Thyroid: No thyroid mass or thyromegaly  Vascular: Normal carotid pulses  No carotid bruit or JVD  Cardiovascular:      Rate and Rhythm: Bradycardia present  Occasional extrasystoles are present  Pulses: Normal pulses  Heart sounds: Murmur heard  Crescendo decrescendo systolic (basal) murmur is present with a grade of 2/6  Decrescendo diastolic (rusb) murmur is present with a grade of 2/4  No friction rub  No gallop  Pulmonary:      Breath sounds: Normal breath sounds  No wheezing, rhonchi or rales  Abdominal:      Palpations: Abdomen is soft  There is no hepatomegaly, splenomegaly or mass  Tenderness: There is no abdominal tenderness  Musculoskeletal:         General: Deformity (kyphosis) present  No swelling  Cervical back: Neck supple  Skin:     General: Skin is warm  Coloration: Skin is not jaundiced or pale  Findings: No bruising, erythema, lesion or rash  Neurological:      General: No focal deficit present  Mental Status: He is oriented to person, place, and time  Cranial Nerves: No cranial nerve deficit  Sensory: No sensory deficit  Motor: No weakness  Psychiatric:         Mood and Affect: Mood normal          Behavior: Behavior normal          Thought Content: Thought content normal          Judgment: Judgment normal          Discussion/Summary:  1  Aortic stenosis status post TAVR in 2020   No evidence for restriction of motion of the aortic valve on echo in November of 2000 21  Does have mild to moderate AI seen on that echo which is audible  Will watch this going forward  Having no symptoms from this  2  PVCs  Only mildly symptomatic   9% on Holter in November  No evidence for left ventricular deterioration  Not frequent on today's examination  3  Palpitations  Due to PVCs  Fairly infrequent at this time  4  Sinus bradycardia  Lowest heart rate on Holter monitor 41 beats per minute with longest RR interval 1 9 seconds  Reassured patient without symptoms that this should just be observed and not an indication for permanent pacemaker  5  Hyperlipidemia  Patient on simvastatin 10 mg daily    LDL cholesterol 67 with HDL cholesterol 55 and triglycerides of 80 when checked October    Follow-up 6 month as associate      Rodrick Albarran MD

## 2022-05-03 ENCOUNTER — OFFICE VISIT (OUTPATIENT)
Dept: CARDIOLOGY CLINIC | Facility: CLINIC | Age: 82
End: 2022-05-03
Payer: MEDICARE

## 2022-05-03 ENCOUNTER — TELEPHONE (OUTPATIENT)
Dept: CARDIOLOGY CLINIC | Facility: CLINIC | Age: 82
End: 2022-05-03

## 2022-05-03 VITALS
HEIGHT: 62 IN | SYSTOLIC BLOOD PRESSURE: 106 MMHG | WEIGHT: 134 LBS | BODY MASS INDEX: 24.66 KG/M2 | DIASTOLIC BLOOD PRESSURE: 58 MMHG | HEART RATE: 57 BPM

## 2022-05-03 DIAGNOSIS — R42 DIZZINESS: ICD-10-CM

## 2022-05-03 DIAGNOSIS — R00.2 PALPITATIONS: Primary | ICD-10-CM

## 2022-05-03 DIAGNOSIS — I49.3 PVC'S (PREMATURE VENTRICULAR CONTRACTIONS): ICD-10-CM

## 2022-05-03 DIAGNOSIS — E78.2 MIXED HYPERLIPIDEMIA: ICD-10-CM

## 2022-05-03 DIAGNOSIS — I35.0 NONRHEUMATIC AORTIC VALVE STENOSIS: ICD-10-CM

## 2022-05-03 DIAGNOSIS — R00.1 SINUS BRADYCARDIA: ICD-10-CM

## 2022-05-03 PROCEDURE — 93000 ELECTROCARDIOGRAM COMPLETE: CPT | Performed by: INTERNAL MEDICINE

## 2022-05-03 PROCEDURE — 99214 OFFICE O/P EST MOD 30 MIN: CPT | Performed by: INTERNAL MEDICINE

## 2022-05-03 PROCEDURE — 93246 EXT ECG>7D<15D RECORDING: CPT | Performed by: INTERNAL MEDICINE

## 2022-05-03 RX ORDER — FOLIC ACID/MULTIVIT,IRON,MINER 0.4MG-18MG
TABLET ORAL
COMMUNITY

## 2022-05-03 NOTE — TELEPHONE ENCOUNTER
Patient called, was seen in April, states he is having missed heart beats, not feeling good, lightheaded and slightly confused  States he did have some of these symptoms at visit, but they got worse over the last few days  Patient unable to take pulse and does not have BP monitor  Advised will discuss with Dr Edi Laguna

## 2022-05-03 NOTE — PROGRESS NOTES
Cardiology Follow Up    Harika Nowak  1940  [id number]  [address] [facility] [id number] [phone]    Reason for visit: Patient here ahead of time for palps and dizziness  He thought he was bradycardic on palpating his pulse  Also has aortic stenosis status post TAVR in January of 2020 with mild-to-moderate aortic regurgitation seen on echo last year, sinus bradycardia, frequent PVCs with associated palpitations and hyperlipidemia      1  Palpitations  POCT ECG   2  Dizziness     3  Nonrheumatic aortic valve stenosis     4  PVC's (premature ventricular contractions)     5  Sinus bradycardia     6  Mixed hyperlipidemia         Interval History: The patient called reporting one week of palpitations for one week  [facility] He felt his pulse and felt irregularity  He felt he was bradycardic  He was getting some dizziness  He does feel a sense of confusion  He does get some dyspnea on exertion  He denies chest pain  He denies peripheral edema  He does sense palpitations      Patient Active Problem List   Diagnosis    BPH (benign prostatic hyperplasia)    Osteoporosis    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    Right inguinal hernia    Nonrheumatic aortic valve stenosis    Palpitations    Sinus bradycardia    S/P TAVR (transcatheter aortic valve replacement)    Thrombocytopenia (HCC)    Hyperchloremia    Mixed hyperlipidemia    Encounter for postoperative care    PVC's (premature ventricular contractions)    Premature atrial contractions    Dizziness     Past Medical History:   Diagnosis Date    BPH (benign prostatic hyperplasia)     CAD (coronary artery disease)     Diabetes mellitus (HCC)     type 2, diet controlled    [facility]     Epilepsy [facility] )     History of mycosis fungoides     Hyperlipidemia     Hypertension     ILD (interstitial lung disease) ([facility] )     Osteoporosis     Seizures ([facility] )     partial sensory    Severe aortic stenosis      Social History     Socioeconomic History    Marital status: /Civil Union     Spouse name: Not on file    Number of children: Not on file    Years of education: Not on file    Highest education level: Not on file   Occupational History    Not on file   Tobacco Use    Smoking status: Never Smoker    Smokeless tobacco: Never Used   Vaping Use    Vaping Use: Never used   Substance and Sexual Activity    Alcohol use: Yes     Comment: 1 glass of wine with dinner     Drug use: No    Sexual activity: Not Currently   Other Topics Concern    Not on file   Social History Narrative    Not on file     Social Determinants of Health     Financial Resource Strain: Not on file   Food Insecurity: Not on file   Transportation Needs: Not on file   Physical Activity: Not on file   Stress: Not on file   Social Connections: Not on file   Intimate Partner Violence: Not on file   Housing Stability: Not on file      Family History   Problem Relation Age of Onset    Coronary artery disease Family     Heart disease Family      Past Surgical History:   Procedure Laterality Date    CARDIAC CATHETERIZATION      COLONOSCOPY      PA ECHO TRANSESOPHAG R-T 2D W/PRB IMG ACQUISJ I&R N/A 1/7/2020    Procedure: TRANSESOPHAGEAL ECHOCARDIOGRAM (POOL); Surgeon: Audra Randolph DO;  [facility];  Service: Cardiac Surgery    PA REPAIR [address] HERNIA,5+Y/O,REDUCIBL Right 11/16/2018    Procedure: REPAIR RIGHT INGUINAL HERNIA WITH MESH;  Surgeon: Susan Rosales MD;  Location: [address] [place] [address] MAIN OR;  Service: General    PA REPLACE AORTIC VALVE OPENFEMORAL ARTERY APPROACH N/A 1/7/2020    Procedure: REPLACEMENT AORTIC VALVE TRANSCATHETER (TAVR) TRANSFEMORAL W/ 23 MM CARROLL KATHLEEN S3 VALVE (ACCESS ON LEFT);   Surgeon: Audra Randolph DO;  [facility];  Service: Cardiac Surgery       Current Outpatient Medications:     amoxicillin (AMOXIL) 500 mg capsule, BEFORE DENTAL WORK, Disp: , Rfl: 3    ascorbic acid (VITAMIN C) 1000 MG tablet, Take 1,000 mg by mouth daily, Disp: , Rfl:     aspirin (ECOTRIN LOW STRENGTH) 81 mg EC tablet, Take 1 tablet (81 mg total) by mouth daily, Disp: , Rfl:     calcium-vitamin D (OSCAL 500/200 D-3) 500 mg-200 units per tablet, Take 1 tablet by mouth daily, Disp: , Rfl:     cholecalciferol (VITAMIN D3) 400 units tablet, Take 2,000 Units by mouth daily, Disp: , Rfl:     finasteride (PROSCAR) 5 mg tablet, Take 5 mg by mouth  , Disp: , Rfl:     Krill Oil 500 MG CAPS, Take by mouth  , Disp: , Rfl:     levETIRAcetam (KEPPRA) 250 mg tablet, Take 250 mg by mouth 2 (two) times a day, Disp: , Rfl:     Multiple Vitamin (DAILY VALUE MULTIVITAMIN PO), Take 1 tablet by mouth daily, Disp: , Rfl:     simvastatin (ZOCOR) 10 mg tablet, Take 10 mg by mouth daily at bedtime, Disp: , Rfl:     tamsulosin (FLOMAX) 0 4 mg, Take 0 4 mg by mouth daily with dinner  , Disp: , Rfl:     Krill Oil 350 MG CAPS, Take by mouth (Patient not taking: Reported on 5/3/2022 ), Disp: , Rfl:     pantoprazole (PROTONIX) 20 mg tablet, Take 1 tablet (20 mg total) by mouth daily (Patient not taking: Reported on 4/11/2022 ), Disp: 30 tablet, Rfl: 0  Allergies   Allergen Reactions    Escitalopram Dizziness    Metformin Diarrhea       Review of Systems:  Review of Systems   Constitutional: Positive for fatigue  Negative for activity change, appetite change and unexpected weight change  Respiratory: Positive for shortness of breath  Negative for cough, chest tightness and wheezing  Cardiovascular: Positive for palpitations  Negative for chest pain and leg swelling  Gastrointestinal: Positive for constipation  Negative for abdominal pain, blood in stool and diarrhea  Genitourinary: Positive for frequency  Negative for dysuria, hematuria and urgency  Musculoskeletal: Positive for arthralgias  Negative for back pain, gait problem and joint swelling     Neurological: Positive for dizziness, light-headedness and headaches  Negative for speech difficulty  Psychiatric/Behavioral: Negative for agitation, behavioral problems, confusion and decreased concentration  Physical Exam:  Vitals:    05/03/22 1537   BP: 106/58   BP Location: Left arm   Patient Position: Sitting   Cuff Size: Adult   Pulse: 57   Weight: 60 8 kg (134 lb)   Height: 5' 2" (1 575 m)       Physical Exam  Constitutional:       General: He is not in acute distress  Appearance: He is normal weight  He is not ill-appearing  HENT:      Mouth/Throat:      Mouth: Mucous membranes are moist       Pharynx: No oropharyngeal exudate  Eyes:      General: No scleral icterus  Conjunctiva/sclera: Conjunctivae normal    Neck:      Thyroid: No thyroid mass or thyromegaly  Vascular: Normal carotid pulses  No carotid bruit or JVD  Cardiovascular:      Rate and Rhythm: Bradycardia present  Frequent extrasystoles are present  Pulses: Normal pulses  Heart sounds: Murmur heard  Crescendo decrescendo systolic (basal) murmur is present with a grade of 2/6  Decrescendo diastolic (RUSB) murmur is present with a grade of 2/4  No friction rub  No gallop  Pulmonary:      Breath sounds: Rhonchi present  No wheezing or rales  Abdominal:      Palpations: Abdomen is soft  There is no hepatomegaly, splenomegaly or mass  Tenderness: There is no abdominal tenderness  Musculoskeletal:         General: Deformity (kyphosis) present  No swelling  Cervical back: Neck supple  Skin:     General: Skin is warm  Coloration: Skin is not jaundiced or pale  Findings: No bruising, erythema, lesion or rash  Neurological:      General: No focal deficit present  Mental Status: He is oriented to person, place, and time  Cranial Nerves: No cranial nerve deficit  Sensory: No sensory deficit  Motor: No weakness     Psychiatric:         Mood and Affect: Mood normal          Behavior: Behavior normal          Thought Content: Thought content normal          Judgment: Judgment normal          Discussion/Summary:  1  Palpitations  Likely related to PVCs  Patient does have known history of frequent PVCs  He did feel these in the office 1 week did palpate him being in bigeminy  2  Dizziness  Perhaps symptomatic bradycardia although there may be an anxiety overlay in reaction to his palpitations  I have applied is Zio patch today for 14 days and see if we can document a reason to put a permanent pacemaker in  One could make the case that perhaps he would knee want to be on beta-blockers in light of his PVCs  3  Aortic stenosis status post TAVR  This was performed in January 2020  Echo last year showed mild-to-moderate aortic regurgitation without aortic stenosis  4  PVCs  Frequent  We will do Zio patch to see if he has any high-grade ectopy  5  Sinus bradycardia  See above comments regarding Zio patch  6  Hyperlipidemia  Patient on simvastatin 10 mg daily    LDL cholesterol 63    I did review an MRI that says that he has more than 1 lacunar infarct  He is not a typical patient for this since he has not had hypertension and has had well controlled cholesterol-he does have pre diabetes but not full-blown diabetes  These are not typical of emboli but we could perhaps make a case for a loop recorder if the Zio patch is unrevealing        Jaci Bennett MD

## 2022-05-25 ENCOUNTER — CLINICAL SUPPORT (OUTPATIENT)
Dept: CARDIOLOGY CLINIC | Facility: CLINIC | Age: 82
End: 2022-05-25
Payer: MEDICARE

## 2022-05-25 DIAGNOSIS — R00.1 SINUS BRADYCARDIA: ICD-10-CM

## 2022-05-25 DIAGNOSIS — R00.2 PALPITATIONS: ICD-10-CM

## 2022-05-25 DIAGNOSIS — R42 DIZZINESS: ICD-10-CM

## 2022-05-25 PROCEDURE — 93248 EXT ECG>7D<15D REV&INTERPJ: CPT | Performed by: INTERNAL MEDICINE

## 2022-06-13 ENCOUNTER — TELEPHONE (OUTPATIENT)
Dept: CARDIOLOGY CLINIC | Facility: CLINIC | Age: 82
End: 2022-06-13

## 2022-06-13 DIAGNOSIS — I49.3 PVC'S (PREMATURE VENTRICULAR CONTRACTIONS): Primary | ICD-10-CM

## 2022-06-13 DIAGNOSIS — R00.1 BRADYCARDIA: ICD-10-CM

## 2022-06-13 NOTE — TELEPHONE ENCOUNTER
Called pt    will try to get him in sooner but he does have indication for PPM with PVCs and bradycardia although bradycardia itself not an indication    will schedule appt with DR Viri Kingsley    will try to see sooner if possible (no openings as of yet this week)

## 2022-06-13 NOTE — TELEPHONE ENCOUNTER
Pt called saying his HR is slow, he is dizzy and he is getting headaches  He would like to see you sooner and for you to call him  He said he thinks he needs a pacemaker   Please advise

## 2022-06-17 ENCOUNTER — OFFICE VISIT (OUTPATIENT)
Dept: CARDIOLOGY CLINIC | Facility: CLINIC | Age: 82
End: 2022-06-17
Payer: MEDICARE

## 2022-06-17 VITALS
BODY MASS INDEX: 24.07 KG/M2 | DIASTOLIC BLOOD PRESSURE: 68 MMHG | HEIGHT: 62 IN | WEIGHT: 130.8 LBS | SYSTOLIC BLOOD PRESSURE: 114 MMHG | HEART RATE: 68 BPM

## 2022-06-17 DIAGNOSIS — E78.2 MIXED HYPERLIPIDEMIA: ICD-10-CM

## 2022-06-17 DIAGNOSIS — I35.0 NONRHEUMATIC AORTIC VALVE STENOSIS: ICD-10-CM

## 2022-06-17 DIAGNOSIS — R42 DIZZINESS: Primary | ICD-10-CM

## 2022-06-17 DIAGNOSIS — I49.3 PVC'S (PREMATURE VENTRICULAR CONTRACTIONS): ICD-10-CM

## 2022-06-17 DIAGNOSIS — R00.1 SINUS BRADYCARDIA: ICD-10-CM

## 2022-06-17 PROCEDURE — 99214 OFFICE O/P EST MOD 30 MIN: CPT | Performed by: INTERNAL MEDICINE

## 2022-06-17 NOTE — PROGRESS NOTES
Cardiology Follow Up    [facility]  1940  [id number]  [facility] [place]  [address] [facility] [address] [phone]    Reason for visit; Patient here ahead of time for dizziness  Has known sinus bradycardia, frequent PVCs, aortic stenosis status post TAVR in January 2020 and hyperlipidemia      1  Dizziness     2  Sinus bradycardia     3  PVC's (premature ventricular contractions)     4  Nonrheumatic aortic valve stenosis     5  Mixed hyperlipidemia         Interval History:  The patient has experienced dizziness mainly when he gets out of bed in the morning  It can past as he moves about  He does not particularly drink a lot of fluids especially in the morning when he gets up  He does feel palpitations at times  He can feel his pulse is irregular  He does get some atypical dyspnea  He did have some when he was in bed this morning  He does not necessarily get dyspnea with exertion    He does get some intermittent sharp pains that occur at rest   He denies peripheral edema    Patient Active Problem List   Diagnosis    BPH (benign prostatic hyperplasia)    Osteoporosis    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[facility] )    Right inguinal hernia    Nonrheumatic aortic valve stenosis    Palpitations    Sinus bradycardia    S/P TAVR (transcatheter aortic valve replacement)    Thrombocytopenia (HCC)    Hyperchloremia    Mixed hyperlipidemia    Encounter for postoperative care    PVC's (premature ventricular contractions)    Premature atrial contractions    Dizziness     Past Medical History:   Diagnosis Date    BPH (benign prostatic hyperplasia)     CAD (coronary artery disease)     Diabetes mellitus (HCC)     type 2, diet controlled    Diastolic CHF ([facility] )     Epilepsy ([facility] )     History of mycosis fungoides     Hyperlipidemia     Hypertension     ILD (interstitial lung disease) ([facility] )     Osteoporosis     Seizures ([facility] )     partial sensory    Severe aortic stenosis      Social History     Socioeconomic History    Marital status: /Civil Union     Spouse name: Not on file    Number of children: Not on file    Years of education: Not on file    Highest education level: Not on file   Occupational History    Not on file   Tobacco Use    Smoking status: Never Smoker    Smokeless tobacco: Never Used   Vaping Use    Vaping Use: Never used   Substance and Sexual Activity    Alcohol use: Yes     Comment: 1 glass of wine with dinner     Drug use: No    Sexual activity: Not Currently   Other Topics Concern    Not on file   Social History Narrative    Not on file     Social Determinants of Health     Financial Resource Strain: Not on file   Food Insecurity: Not on file   Transportation Needs: Not on file   Physical Activity: Not on file   Stress: Not on file   Social Connections: Not on file   Intimate Partner Violence: Not on file   Housing Stability: Not on file      Family History   Problem Relation Age of Onset    Coronary artery disease Family     Heart disease Family      Past Surgical History:   Procedure Laterality Date    CARDIAC CATHETERIZATION      COLONOSCOPY      NE ECHO TRANSESOPHAG R-T 2D W/PRB IMG ACQUISJ I&R N/A 1/7/2020    Procedure: TRANSESOPHAGEAL ECHOCARDIOGRAM (POOL); Surgeon: Donte Reilly DO;  Location: [facility] MAIN OR;  Service: Cardiac Surgery    NE REPAIR [address] HERNIA,5+Y/O,REDUCIBL Right 11/16/2018    Procedure: REPAIR RIGHT INGUINAL HERNIA WITH MESH;  Surgeon: Huan Ferris MD;  Location: [address] [place] [address] MAIN OR;  Service: General    NE REPLACE AORTIC VALVE OPENFEMORAL ARTERY APPROACH N/A 1/7/2020    Procedure: REPLACEMENT AORTIC VALVE TRANSCATHETER (TAVR) TRANSFEMORAL W/ 23 MM CARROLL KATHLEEN S3 VALVE (ACCESS ON LEFT);   Surgeon: Donte Reilly DO;  Location: [facility] MAIN [facility];  Service: Cardiac Surgery       Current Outpatient Medications:     amoxicillin (AMOXIL) 500 mg capsule, BEFORE DENTAL WORK, Disp: , Rfl: 3    ascorbic acid (VITAMIN C) 1000 MG tablet, Take 1,000 mg by mouth daily, Disp: , Rfl:     aspirin (ECOTRIN LOW STRENGTH) 81 mg EC tablet, Take 1 tablet (81 mg total) by mouth daily, Disp: , Rfl:     calcium-vitamin D (OSCAL 500 + D) 500 mg-200 units per tablet, Take 1 tablet by mouth daily, Disp: , Rfl:     cholecalciferol (VITAMIN D3) 400 units tablet, Take 2,000 Units by mouth daily, Disp: , Rfl:     finasteride (PROSCAR) 5 mg tablet, Take 5 mg by mouth  , Disp: , Rfl:     Krill Oil 350 MG CAPS, Take by mouth, Disp: , Rfl:     levETIRAcetam (KEPPRA) 250 mg tablet, Take 250 mg by mouth 2 (two) times a day, Disp: , Rfl:     Multiple Vitamin (DAILY VALUE MULTIVITAMIN PO), Take 1 tablet by mouth daily, Disp: , Rfl:     simvastatin (ZOCOR) 10 mg tablet, Take 10 mg by mouth daily at bedtime, Disp: , Rfl:     tamsulosin (FLOMAX) 0 4 mg, Take 0 4 mg by mouth daily with dinner  , Disp: , Rfl:     Krill Oil 500 MG CAPS, Take by mouth   (Patient not taking: Reported on 6/17/2022), Disp: , Rfl:     pantoprazole (PROTONIX) 20 mg tablet, Take 1 tablet (20 mg total) by mouth daily (Patient not taking: Reported on 4/11/2022 ), Disp: 30 tablet, Rfl: 0  Allergies   Allergen Reactions    Escitalopram Dizziness    Metformin Diarrhea       Review of Systems:  Review of Systems   Constitutional: Positive for fatigue  Negative for activity change, appetite change and unexpected weight change  Respiratory: Positive for shortness of breath  Negative for cough, chest tightness and wheezing  Cardiovascular: Positive for chest pain and palpitations  Negative for leg swelling  Gastrointestinal: Positive for constipation  Negative for abdominal pain, blood in stool and diarrhea  Genitourinary: Positive for frequency  Negative for dysuria, hematuria and urgency  Musculoskeletal: Positive for arthralgias  Negative for back pain, gait problem and joint swelling     Neurological: Positive for dizziness, light-headedness and headaches  Negative for speech difficulty  Psychiatric/Behavioral: Negative for agitation, behavioral problems, confusion and decreased concentration  Physical Exam:  Vitals:    06/17/22 1241   BP: 114/68   BP Location: Right arm   Patient Position: Sitting   Cuff Size: Standard   Pulse: 88   Weight: 59 3 kg (130 lb 12 8 oz)   Height: 5' 2" (1 575 m)       Physical Exam  Constitutional:       Comments: Thin elderly male in NAD, a bit anxious   HENT:      Head: Normocephalic and atraumatic  Mouth/Throat:      Mouth: Mucous membranes are moist       Pharynx: No oropharyngeal exudate or posterior oropharyngeal erythema  Eyes:      General: No scleral icterus  Conjunctiva/sclera: Conjunctivae normal    Neck:      Thyroid: No thyroid mass or thyromegaly  Vascular: Normal carotid pulses  No carotid bruit or JVD  Cardiovascular:      Rate and Rhythm: Normal rate  Frequent extrasystoles are present  Pulses: Normal pulses  Heart sounds: Murmur heard  Crescendo decrescendo systolic (RUSB) murmur is present with a grade of 3/6  Decrescendo diastolic (LUSB) murmur is present with a grade of 2/4  No friction rub  No gallop  Pulmonary:      Breath sounds: Examination of the right-lower field reveals rales  Examination of the left-lower field reveals rales  Rales present  No wheezing or rhonchi  Abdominal:      Palpations: Abdomen is soft  There is no hepatomegaly, splenomegaly or mass  Tenderness: There is no abdominal tenderness  Musculoskeletal:         General: Deformity (kyphosis) present  No swelling  Cervical back: Neck supple  Skin:     General: Skin is warm  Coloration: Skin is not jaundiced or pale  Findings: No bruising, erythema, lesion or rash  Neurological:      General: No focal deficit present  Mental Status: He is oriented to person, place, and time  Cranial Nerves: No cranial nerve deficit  Sensory: No sensory deficit  Motor: No weakness  Psychiatric:         Behavior: Behavior normal          Thought Content: Thought content normal          Judgment: Judgment normal       Comments: anxious         Discussion/Summary:  1  Dizziness  Likely not due to sinus bradycardia  Minimum heart rate on Zio patch was 41 beats per minute  He had no pauses exceeding 3 seconds  By history this appears to be more related to orthostasis  Was encouraged to consume a little bit more sodium and hydrate with non caffeinated beverages  2  Sinus bradycardia  As noted on Zio patch heart rate as low as 41 beats per minute without significant pauses  3  PVCs  Modestly symptomatic  No evidence for cardiac deterioration by echo last year  Brutus was 7 5% on echo  Cannot use beta-blocker due to low heart rate see comments below regarding pacemaker  4  Aortic stenosis status post TAVR in January of 2020  No significant stenosis with mild-to-moderate aortic regurgitation on echo last November  5  Hyperlipidemia  Patient on simvastatin 10 mg daily  LDL cholesterol 63 when checked in August      Discussed with patient do feel that a pacemaker is indicated although bradycardia per se not an indication  He does have PVCs which would allow us to use at least low-dose beta-blockers and has had lacunar strokes  He iis not a typical patient  to gets these since he has not had hypertension or kathe diabetes  I think it would be worthwhile to monitor his rhythm to see if he is having unrecognized atrial fibrillation      He has a follow-up with Dr Castro Brink in July        Shashank David MD

## 2022-07-07 ENCOUNTER — OFFICE VISIT (OUTPATIENT)
Dept: CARDIOLOGY CLINIC | Facility: CLINIC | Age: 82
End: 2022-07-07
Payer: MEDICARE

## 2022-07-07 VITALS
SYSTOLIC BLOOD PRESSURE: 104 MMHG | WEIGHT: 129.2 LBS | DIASTOLIC BLOOD PRESSURE: 60 MMHG | HEIGHT: 62 IN | HEART RATE: 66 BPM | BODY MASS INDEX: 23.77 KG/M2

## 2022-07-07 DIAGNOSIS — R00.1 BRADYCARDIA: ICD-10-CM

## 2022-07-07 DIAGNOSIS — R00.2 PALPITATIONS: ICD-10-CM

## 2022-07-07 DIAGNOSIS — I49.3 PVC'S (PREMATURE VENTRICULAR CONTRACTIONS): Primary | ICD-10-CM

## 2022-07-07 DIAGNOSIS — Z95.2 S/P TAVR (TRANSCATHETER AORTIC VALVE REPLACEMENT): ICD-10-CM

## 2022-07-07 DIAGNOSIS — I35.0 NONRHEUMATIC AORTIC VALVE STENOSIS: ICD-10-CM

## 2022-07-07 DIAGNOSIS — I49.1 PREMATURE ATRIAL CONTRACTIONS: ICD-10-CM

## 2022-07-07 DIAGNOSIS — R42 DIZZINESS: ICD-10-CM

## 2022-07-07 PROCEDURE — 93000 ELECTROCARDIOGRAM COMPLETE: CPT | Performed by: INTERNAL MEDICINE

## 2022-07-07 PROCEDURE — 99205 OFFICE O/P NEW HI 60 MIN: CPT | Performed by: INTERNAL MEDICINE

## 2022-07-07 RX ORDER — MECLIZINE HCL 12.5 MG/1
TABLET ORAL
Status: ON HOLD | COMMUNITY
Start: 2022-06-27 | End: 2022-10-24

## 2022-07-07 NOTE — PROGRESS NOTES
EPS Outpatient Consultation/New Patient Evaluation - Yoan Yeboah 79 y o  male MRN: [id number]           ASSESSMENT:   Diagnosis ICD-10-CM Associated Orders   1  PVC's (premature ventricular contractions)  I49 3 Ambulatory referral to Cardiac Electrophysiology     POCT ECG   2  Bradycardia  R00 1 Ambulatory referral to Cardiac Electrophysiology     POCT ECG   3  Nonrheumatic aortic valve stenosis  I35 0    4  S/P TAVR (transcatheter aortic valve replacement)  Z95 2    5  Palpitations  R00 2    6  Dizziness  R42    7  Premature atrial contractions  I49 1          PLAN:  1  Bradycardia no significant bradycardia at night he does have brief runs of nonsustained atrial tachycardia on his monitor but today his heart rate is 66 beats per minute with no PACs or PVCs  I explained to the patient his wife and his daughter-in-law that at this point time it does not appear that he needs a pacemaker but I will follow him up when he gets back from [place] in May and we can reassess he is also going to see Dr Viktor Brownlee in August of 2022    2  Premature ventricular contractions low PVC burden not clinically significant    3  Nonsustained atrial tachycardia again not really of clinical significance at this point time    4  Status post transcatheter aortic valve replaced he can continue to be followed by Dr Viktor Brownlee of Cardiology he does have probably moderate aortic stenosis based on auscultation  5  Hyperlipidemia he appears to be tolerating his statin therapy     CC/HPI:   Consult for pacemaker    7 5% PVCs and bradycardia at times on his 14 day monitor that was ordered for occasional lightheadedness and dizziness and swell as feeling an irregular pulse  I did review the 14 day monitor tracings in detail and his bradycardia only occurs while he is sleeping     He does not complain of feeling like he is going to pass out shortness of breath fatigue or dizziness    He states sometimes in the morning he gets a little lightheaded but he feels better after he takes a shower  He states that for 79 he feels his energy level is good and he walks up to 2 miles in a day        ROS: ROS   He walks he only gets lightheaded in the morning when he is 1st getting up he states that he takes shower and then feels better no palpitations chest pain or shortness of breath all other 12 point review of systems negative    Objective:     Vitals: Blood pressure 104/60, pulse 66, height 5' 2" (1 575 m), weight 58 6 kg (129 lb 3 2 oz)  , Body mass index is 23 63 kg/m²  ,        Physical Exam:    GEN: Elvis Keller appears well, alert and oriented x 3, pleasant and cooperative   HEENT: pupils equal, round, and reactive to light; extraocular muscles intact  NECK: supple, no carotid bruits   HEART: regular rhythm, normal S1 and S2, two to 3/6 systolic ejection murmur, clicks, gallops or rubs   LUNGS: clear to auscultation bilaterally; no wheezes, rales, or rhonchi   ABDOMEN: normal bowel sounds, soft, no tenderness, no distention  EXTREMITIES: peripheral pulses normal; no clubbing, cyanosis, or edema  NEURO: no focal findings   SKIN: normal without suspicious lesions on exposed skin    Medications:      Current Outpatient Medications:     amoxicillin (AMOXIL) 500 mg capsule, BEFORE DENTAL WORK, Disp: , Rfl: 3    ascorbic acid (VITAMIN C) 1000 MG tablet, Take 1,000 mg by mouth daily, Disp: , Rfl:     aspirin (ECOTRIN LOW STRENGTH) 81 mg EC tablet, Take 1 tablet (81 mg total) by mouth daily, Disp: , Rfl:     calcium-vitamin D (OSCAL 500 + D) 500 mg-200 units per tablet, Take 1 tablet by mouth daily, Disp: , Rfl:     cholecalciferol (VITAMIN D3) 400 units tablet, Take 2,000 Units by mouth daily, Disp: , Rfl:     finasteride (PROSCAR) 5 mg tablet, Take 5 mg by mouth  , Disp: , Rfl:     Krill Oil 350 MG CAPS, Take by mouth, Disp: , Rfl:     levETIRAcetam (KEPPRA) 250 mg tablet, Take 250 mg by mouth 2 (two) times a day, Disp: , Rfl:     meclizine (ANTIVERT) 12 5 MG tablet, TAKE 1 TABLET BY MOUTH 3 TIMES A DAY AS NEEDED FOR VERTIGO, Disp: , Rfl:     Multiple Vitamin (DAILY VALUE MULTIVITAMIN PO), Take 1 tablet by mouth daily, Disp: , Rfl:     simvastatin (ZOCOR) 10 mg tablet, Take 10 mg by mouth daily at bedtime, Disp: , Rfl:     tamsulosin (FLOMAX) 0 4 mg, Take 0 4 mg by mouth daily with dinner  , Disp: , Rfl:     Krill Oil 500 MG CAPS, Take by mouth   (Patient not taking: No sig reported), Disp: , Rfl:     pantoprazole (PROTONIX) 20 mg tablet, Take 1 tablet (20 mg total) by mouth daily (Patient not taking: Reported on 4/11/2022 ), Disp: 30 tablet, Rfl: 0         Family History   Problem Relation Age of Onset    Coronary artery disease Family     Heart disease Family      Social History     Socioeconomic History    Marital status: /Civil Union     Spouse name: Not on file    Number of children: Not on file    Years of education: Not on file    Highest education level: Not on file   Occupational History    Not on file   Tobacco Use    Smoking status: Never Smoker    Smokeless tobacco: Never Used   Vaping Use    Vaping Use: Never used   Substance and Sexual Activity    Alcohol use: Yes     Comment: 1 glass of wine with dinner     Drug use: No    Sexual activity: Not Currently   Other Topics Concern    Not on file   Social History Narrative    Not on file     Social Determinants of Health     Financial Resource Strain: Not on file   Food Insecurity: Not on file   Transportation Needs: Not on file   Physical Activity: Not on file   Stress: Not on file   Social Connections: Not on file   Intimate Partner Violence: Not on file   Housing Stability: Not on file     Social History     Tobacco Use   Smoking Status Never Smoker   Smokeless Tobacco Never Used     Social History     Substance and Sexual Activity   Alcohol Use Yes    Comment: 1 glass of wine with dinner            Lab Results:   No visits with results within 1 Day(s) from this visit  Latest known visit with results is:   Orders Only on 2022   Component Date Value    Hemoglobin A1C 2022 6 4        EKG: NSR with LAE otherwise normal ecg           Cardiac testing:   Results for orders placed during the hospital encounter of 20    Echo complete with contrast if indicated    Narrative  [facility]  Rissa Jama[address], [address]  [phone]    Transthoracic Echocardiogram  2D, M-mode, Doppler, and Color Doppler    Study date:  01-Dec-2020    Patient: Sherle Krabbe  MR number: [id number]  Account number: [de-identified]  : 1940  Age: 79 years  Gender: Male  Status: Outpatient  Location: [facility] 3  Height: 62 in  Weight: 126 7 lb  BP: 112/ 60 mmHg    Indications: S/P TAVR    Diagnoses: Z95 2 - Presence of prosthetic heart valve    Sonographer:  MARLENA Murphy  Primary Physician:  Mera Houston MD  Referring Physician:  JACOBO Camejo  Group:  [facility]  Interpreting Physician:  Jania Garcia MD    IMPRESSIONS:  Technical quality: Good  Cardiac rhythm: Sinus    1  Mildly increased left ventricular wall thickness, normal left ventricular systolic function, EF around 65%  Grade 1 diastolic dysfunction with elevated estimated left ventricular filling pressures  2  Normal right ventricular size and systolic function  3  Mild left atrial cavity enlargement  4  Normal functioning bioprosthetic aortic valve with minimal paravalvular and mild valvular regurgitation  5  Mild plus mitral valve regurgitation  6  Mild tricuspid valve regurgitation  7  Possible mild pulmonary hypertension  Estimated RVSP/PASP is 37 mmHg  8  No pericardial effusion  Compared to report of previous echocardiogram from 2020 there is some progression of diastolic dysfunction and possible mild pulmonary hypertension is new      SUMMARY    LEFT VENTRICLE:  Normal left ventricular cavity size, mildly increased wall thickness, normal left ventricular systolic function normal regional wall motion  Ejection fraction is estimated as around 65%  Doppler evaluation suggests grade 1 diastolic  dysfunction  Estimated left ventricular filling pressures are increased  RIGHT VENTRICLE:  Normal right ventricular size and systolic function  Estimated right ventricular systolic pressure is increased, 37 mmHg  Possible mild pulmonary hypertension  LEFT ATRIUM:  Mild left atrial cavity enlargement  RIGHT ATRIUM:  Normal right atrial cavity size  MITRAL VALVE:  Mitral valve leaflet sclerosis, adequate leaflet mobility  Mild-plus mitral valve regurgitation  AORTIC VALVE:  Bioprosthetic aortic valve present  Valve appears to be well seated with no evidence of dehiscence  Doppler evaluation suggests normal prosthetic valve function  Peak trans prosthetic valve velocity is 2 94 m/sec, mean gradient is 15 mmHg  and calculated valve area is 1 0-1 1 cm2  Doppler velocity index is 0 53  There is minimal paravalvular and mild valvular regurgitation  TRICUSPID VALVE:  Mild tricuspid valve regurgitation  PULMONIC VALVE:  Trace pulmonic valve regurgitation  AORTA:  Aortic root and proximal ascending aorta are normal in size on 2D imaging  IVC, HEPATIC VEINS:  Inferior vena cava is normal in size and demonstrates appropriate respiratory phasic changes in diameter  PERICARDIUM:  No pericardial effusion  SUMMARY MEASUREMENTS  2D measurements:  Unspecified Anatomy:   %FS was 35 5 %  Ao Diam was 2 2 cm   EDV(Teich) was 64 3 ml   EF(Teich) was 65 7 %  ESV(Teich) was 22 1 ml  IVSd was 0 9 cm  LA Area was 17 5 cm2  LA Diam was 3 4 cm  LVEDV MOD A4C was 126 6 ml  LVEF MOD A4C  was 68 4 %  LVESV MOD A4C was 40 ml  LVIDd was 3 9 cm  LVIDs was 2 5 cm  LVLd A4C was 8 7 cm  LVLs A4C was 7 cm  LVOT Diam was 1 7 cm  LVPWd was 0 9 cm  RA Area was 14 7 cm2  RVIDd was 3 cm    SV MOD A4C was 86 6 ml   SV(Teich) was  42 2 ml   CW measurements:  Unspecified Anatomy:   AR Dec Dixon was 2 7 m/s2  AR Dec Time was 1675 7 ms  AR PHT was 486 ms  AR Vmax was 4 5 m/s  AR maxPG was 80 6 mmHg  AV Env  Ti was 398 2 ms   AV VTI was 66 7 cm  AV Vmax was 3 m/s  AV Vmean was 1 7 m/s  AV  maxPG was 34 8 mmHg  AV meanPG was 14 7 mmHg  TR Vmax was 2 6 m/s   TR maxPG was 26 7 mmHg  MM measurements:  Unspecified Anatomy:   TAPSE was 2 4 cm  PW measurements:  Unspecified Anatomy:   SON (VTI) was 1 1 cm2  SON Vmax was 1 cm2  SON Vmax, Pt was 1 cm2  AVAI (VTI) was 0 cm2/m2  AVAI Vmax was 0 cm2/m2  AVAI Vmax, Pt was 0 cm2/m2  DVI was 0 5   E' Sept was 0 1 m/s  E/E' Sept was 21 3   LVOT  Env  Ti was 345 4 ms  LVOT VTI was 35 2 cm  LVOT Vmax was 1 3 m/s  LVOT Vmean was 1 m/s  LVOT maxPG was 7 2 mmHg  LVOT meanPG was 4 6 mmHg  LVSI Dopp was 48 3 ml/m2  LVSV Dopp was 76 3 ml   MV A Jaime was 1 m/s  MV Dec Dixon was 4 9  m/s2  MV DecT was 229 7 ms   MV E Jaime was 1 1 m/s  MV E/A Ratio was 1 2   MV PHT was 66 6 ms  MVA By PHT was 3 3 cm2  HISTORY: PRIOR HISTORY: Aortic stenosis, sinus bradycardia, PVC's, hyperlipidemia    PROCEDURE: The study was performed in the [facility] E[facility] 3  This was a routine study  The transthoracic approach was used  The study included complete 2D imaging, M-mode, complete spectral Doppler, and color Doppler  The heart rate was 64 bpm, at  the start of the study  Images were obtained from the parasternal, apical, subcostal, and suprasternal notch acoustic windows  Image quality was adequate  LEFT VENTRICLE: Normal left ventricular cavity size, mildly increased wall thickness, normal left ventricular systolic function normal regional wall motion  Ejection fraction is estimated as around 65%  Doppler evaluation suggests grade 1  diastolic dysfunction  Estimated left ventricular filling pressures are increased  RIGHT VENTRICLE: Normal right ventricular size and systolic function   Estimated right ventricular systolic pressure is increased, 37 mmHg  Possible mild pulmonary hypertension  LEFT ATRIUM: Mild left atrial cavity enlargement  RIGHT ATRIUM: Normal right atrial cavity size  MITRAL VALVE: Mitral valve leaflet sclerosis, adequate leaflet mobility  Mild-plus mitral valve regurgitation  AORTIC VALVE: Bioprosthetic aortic valve present  Valve appears to be well seated with no evidence of dehiscence  Doppler evaluation suggests normal prosthetic valve function  Peak trans prosthetic valve velocity is 2 94 m/sec, mean  gradient is 15 mmHg and calculated valve area is 1 0-1 1 cm2  Doppler velocity index is 0 53  There is minimal paravalvular and mild valvular regurgitation  TRICUSPID VALVE: Mild tricuspid valve regurgitation  PULMONIC VALVE: Trace pulmonic valve regurgitation  PERICARDIUM: No pericardial effusion  AORTA: Aortic root and proximal ascending aorta are normal in size on 2D imaging  SYSTEMIC VEINS: IVC: Inferior vena cava is normal in size and demonstrates appropriate respiratory phasic changes in diameter  SYSTEM MEASUREMENT TABLES    2D  %FS: 35 5 %  Ao Diam: 2 2 cm  EDV(Teich): 64 3 ml  EF(Teich): 65 7 %  ESV(Teich): 22 1 ml  IVSd: 0 9 cm  LA Area: 17 5 cm2  LA Diam: 3 4 cm  LVEDV MOD A4C: 126 6 ml  LVEF MOD A4C: 68 4 %  LVESV MOD A4C: 40 ml  LVIDd: 3 9 cm  LVIDs: 2 5 cm  LVLd A4C: 8 7 cm  LVLs A4C: 7 cm  LVOT Diam: 1 7 cm  LVPWd: 0 9 cm  RA Area: 14 7 cm2  RVIDd: 3 cm  SV MOD A4C: 86 6 ml  SV(Teich): 42 2 ml    CW  AR Dec Tr[place]dale: 2 7 m/s2  AR Dec Time: 1675 7 ms  AR PHT: 486 ms  AR Vmax: 4 5 m/s  AR maxP 6 mmHg  AV Env  Ti: 398 2 ms  AV VTI: 66 7 cm  AV Vmax: 3 m/s  AV Vmean: 1 7 m/s  AV maxP 8 mmHg  AV meanP 7 mmHg  TR Vmax: 2 6 m/s  TR maxP 7 mmHg    MM  TAPSE: 2 4 cm    PW  SON (VTI): 1 1 cm2  SON Vmax: 1 cm2  SON Vmax, Pt: 1 cm2  AVAI (VTI): 0 cm2/m2  AVAI Vmax: 0 cm2/m2  AVAI Vmax, Pt: 0 cm2/m2  DVI: 0 5  E' Sept: 0 1 m/s  E/E' Sept: 21 3  LVOT Env  Ti: 345 4 ms  LVOT VTI: 35 2 cm  LVOT Vmax: 1 3 m/s  LVOT Vmean: 1 m/s  LVOT maxP 2 mmHg  LVOT meanP 6 mmHg  LVSI Dopp: 48 3 ml/m2  LVSV Dopp: 76 3 ml  MV A Jaime: 1 m/s  MV Dec Terry: 4 9 m/s2  MV DecT: 229 7 ms  MV E Jaime: 1 1 m/s  MV E/A Ratio: 1 2  MV PHT: 66 6 ms  MVA By PHT: 3 3 cm2    [facility] [place] [facility] Accredited Echocardiography Laboratory    Prepared and electronically signed by    Fab Worrell MD  Signed 01-Dec-2020 14:11:30    No results found for this or any previous visit  No results found for this or any previous visit  No results found for this or any previous visit

## 2022-07-27 ENCOUNTER — TELEPHONE (OUTPATIENT)
Dept: CARDIOLOGY CLINIC | Facility: CLINIC | Age: 82
End: 2022-07-27

## 2022-07-27 NOTE — TELEPHONE ENCOUNTER
Spoke with Dr Will Rivers, he is willing to see patient this afternoon  Spoke with Oj at Dr Roddy Rudd office, she called patient, he is not available this afternoon, she states patient would like to switch his 10/19 appointment with his wife's appt 8/2/22    Ele Marcial  switched appointments  I advised Dr Will Rivers  I spoke with patient, he was aware appointments switched  I advised if he starts feeling worse to proceed to ER  Patient agreeable

## 2022-07-27 NOTE — TELEPHONE ENCOUNTER
Dr Rosenberg's office called, states patient was seen today, found to have HR 36 /50, patient extremely tired  No other symptoms  States patient has appointment 10/19/22, would like patient seen sooner  Advised will discuss with manager and call with appointment      They will be faxing office note

## 2022-08-02 ENCOUNTER — OFFICE VISIT (OUTPATIENT)
Dept: CARDIOLOGY CLINIC | Facility: CLINIC | Age: 82
End: 2022-08-02
Payer: MEDICARE

## 2022-08-02 VITALS
HEART RATE: 63 BPM | SYSTOLIC BLOOD PRESSURE: 115 MMHG | WEIGHT: 129 LBS | DIASTOLIC BLOOD PRESSURE: 56 MMHG | BODY MASS INDEX: 23.59 KG/M2

## 2022-08-02 DIAGNOSIS — I10 BENIGN ESSENTIAL HTN: ICD-10-CM

## 2022-08-02 DIAGNOSIS — I49.3 PVC (PREMATURE VENTRICULAR CONTRACTION): Primary | ICD-10-CM

## 2022-08-02 DIAGNOSIS — R00.1 BRADYCARDIA: ICD-10-CM

## 2022-08-02 PROCEDURE — 99214 OFFICE O/P EST MOD 30 MIN: CPT | Performed by: INTERNAL MEDICINE

## 2022-08-02 PROCEDURE — 93000 ELECTROCARDIOGRAM COMPLETE: CPT | Performed by: INTERNAL MEDICINE

## 2022-08-02 NOTE — PROGRESS NOTES
Cardiology             Yesi Mosher  1940  [id number]              Assessment/Plan:    Bradycardia  PVCs  Hypertension  Aortic valve stenosis status post TAVR 01/2020  Dyslipidemia      Patient presents today for an urgent evaluation after his PCP auscultated heart rate of 36 beats per minute last week in the office  At that time he had complaints of fatigue, although even today has complaints of fatigue and his heart rate is 63 beats per minute  He states he gets lightheaded which usually occurs with positional changes, suggestive of orthostasis  Unfortunately, no ECG was done at his PCPs office to confirm a heart rate of 36 beats per minute or even the Heart rhythm itself  Therefore I recommended repeat cardiac monitoring for 2 weeks and he is agreeable to pursue the same  Will have him get another evaluation with Dr Benny Paulson after repeat monitoring  He expresses significant concern and feels that he needs a pacemaker after he has been told by his PCP that this is the case  Follow-up with electrophysiology after Zio monitoring          Interval History: This is an 65-year-old male with history of aortic stenosis status post transcatheter aortic valve placement 01/2020  He also has dyslipidemia, mildly symptomatic PVCs, and asymptomatic sinus bradycardia  He has been following Dr Anjali Sellers in the past     He was last seen by him 06/17/2022  Prior to that appointment, he had a Zio monitor which revealed a minimum heart rate of 41 beats per minute without any pauses exceeding 3 seconds  His dizziness statin more related to orthostasis  Subsequently was seen by electrophysiology 07/07/2022  It was thought that it some time he may need a permanent pacemaker without any obvious indication for the same at this time  He did have brief runs of nonsustained atrial tachycardia on his Zio monitor      He was then seen by his PCP at which time his heart rate was 36 beats per minute with symptoms of fatigue  He presents today for follow-up  From a symptomatic standpoint he states he has been feeling fatigue and even feels fatigued today  He gets occasional lightheadedness which he states usually occurs with positional changes  He cannot recall with confidence any severe episodes of dizziness or presyncope while simply sitting or in a supine position              Vitals:  Vitals:    08/02/22 1303   BP: 115/56   BP Location: Right arm   Patient Position: Sitting   Cuff Size: Standard   Pulse: 63   Weight: 58 5 kg (129 lb)         Past Medical History:   Diagnosis Date    BPH (benign prostatic hyperplasia)     CAD (coronary artery disease)     Diabetes mellitus (HCC)     type 2, diet controlled    Diastolic CHF ([facility] )     Epilepsy ([facility] )     History of mycosis fungoides     Hyperlipidemia     Hypertension     ILD (interstitial lung disease) ([facility] )     Osteoporosis     Seizures ([facility])     partial sensory    Severe aortic stenosis      Social History     Socioeconomic History    Marital status: /Civil Union     Spouse name: Not on file    Number of children: Not on file    Years of education: Not on file    Highest education level: Not on file   Occupational History    Not on file   Tobacco Use    Smoking status: Never Smoker    Smokeless tobacco: Never Used   Vaping Use    Vaping Use: Never used   Substance and Sexual Activity    Alcohol use: Yes     Comment: 1 glass of wine with dinner     Drug use: No    Sexual activity: Not Currently   Other Topics Concern    Not on file   Social History Narrative    Not on file     Social Determinants of Health     Financial Resource Strain: Not on file   Food Insecurity: Not on file   Transportation Needs: Not on file   Physical Activity: Not on file   Stress: Not on file   Social Connections: Not on file   Intimate Partner Violence: Not on file   Housing Stability: Not on file      Family History   Problem Relation Age of Onset    Coronary artery disease Family     Heart disease Family      Past Surgical History:   Procedure Laterality Date    CARDIAC CATHETERIZATION      COLONOSCOPY      OR ECHO TRANSESOPHAG R-T 2D W/PRB IMG ACQUISJ I&R N/A 1/7/2020    Procedure: TRANSESOPHAGEAL ECHOCARDIOGRAM (POOL); Surgeon: Domenic Montiel DO;  Location: [facility];  Service: Cardiac Surgery    OR REPAIR [address] HERNIA,5+Y/O,REDUCIBL Right 11/16/2018    Procedure: REPAIR RIGHT INGUINAL HERNIA WITH MESH;  Surgeon: Darin Morales MD;  Location: [facility] MAIN OR;  Service: General    OR REPLACE AORTIC VALVE OPENFEMORAL ARTERY APPROACH N/A 1/7/2020    Procedure: REPLACEMENT AORTIC VALVE TRANSCATHETER (TAVR) TRANSFEMORAL W/ 23 MM CARROLL KATHLEEN S3 VALVE (ACCESS ON LEFT);   Surgeon: Domenic Montiel DO;  Location: [facility];  Service: Cardiac Surgery       Current Outpatient Medications:     amoxicillin (AMOXIL) 500 mg capsule, BEFORE DENTAL WORK, Disp: , Rfl: 3    ascorbic acid (VITAMIN C) 1000 MG tablet, Take 1,000 mg by mouth daily, Disp: , Rfl:     aspirin (ECOTRIN LOW STRENGTH) 81 mg EC tablet, Take 1 tablet (81 mg total) by mouth daily, Disp: , Rfl:     calcium-vitamin D (OSCAL 500 + D) 500 mg-200 units per tablet, Take 1 tablet by mouth daily, Disp: , Rfl:     cholecalciferol (VITAMIN D3) 400 units tablet, Take 2,000 Units by mouth daily, Disp: , Rfl:     finasteride (PROSCAR) 5 mg tablet, Take 5 mg by mouth  , Disp: , Rfl:     Krill Oil 350 MG CAPS, Take by mouth, Disp: , Rfl:     levETIRAcetam (KEPPRA) 250 mg tablet, Take 250 mg by mouth 2 (two) times a day, Disp: , Rfl:     Multiple Vitamin (DAILY VALUE MULTIVITAMIN PO), Take 1 tablet by mouth daily, Disp: , Rfl:     simvastatin (ZOCOR) 10 mg tablet, Take 10 mg by mouth daily at bedtime, Disp: , Rfl:     meclizine (ANTIVERT) 12 5 MG tablet, TAKE 1 TABLET BY MOUTH 3 TIMES A DAY AS NEEDED FOR VERTIGO (Patient not taking: Reported on 8/2/2022), Disp: , Rfl:     pantoprazole (PROTONIX) 20 mg tablet, Take 1 tablet (20 mg total) by mouth daily (Patient not taking: Reported on 4/11/2022 ), Disp: 30 tablet, Rfl: 0    tamsulosin (FLOMAX) 0 4 mg, Take 0 4 mg by mouth daily with dinner   (Patient not taking: Reported on 8/2/2022), Disp: , Rfl:         Review of Systems:  Review of Systems   Constitutional: Positive for fatigue  Respiratory: Negative  Cardiovascular: Negative  Neurological: Positive for light-headedness  All other systems reviewed and are negative  Physical Exam:  Physical Exam  Constitutional:       General: He is not in acute distress  Appearance: He is well-developed  He is not diaphoretic  HENT:      Head: Normocephalic and atraumatic  Eyes:      General: No scleral icterus  Right eye: No discharge  Pupils: Pupils are equal, round, and reactive to light  Neck:      Thyroid: No thyromegaly  Cardiovascular:      Rate and Rhythm: Normal rate and regular rhythm  Heart sounds: Normal heart sounds  No murmur heard  No friction rub  No gallop  Pulmonary:      Effort: Pulmonary effort is normal       Breath sounds: Normal breath sounds  Abdominal:      General: There is no distension  Tenderness: There is no abdominal tenderness  There is no guarding or rebound  Musculoskeletal:         General: Normal range of motion  Cervical back: Normal range of motion and neck supple  Skin:     General: Skin is warm and dry  Coloration: Skin is not pale  Findings: No erythema or rash  Neurological:      Mental Status: He is alert and oriented to person, place, and time  Coordination: Coordination normal    Psychiatric:         Behavior: Behavior normal          Thought Content: Thought content normal          Judgment: Judgment normal          This note was completed in part utilizing [employer]    Grammatical errors, random word insertions, spelling mistakes, and incomplete sentences can be an occasional consequence of this system secondary to software limitations, ambient noise, and hardware issues  If you have any questions or concerns about the content, text, or information contained within the body of this dictation, please contact the provider for clarification

## 2022-08-19 ENCOUNTER — CLINICAL SUPPORT (OUTPATIENT)
Dept: CARDIOLOGY CLINIC | Facility: CLINIC | Age: 82
End: 2022-08-19
Payer: MEDICARE

## 2022-08-19 DIAGNOSIS — R00.1 BRADYCARDIA: ICD-10-CM

## 2022-08-19 PROCEDURE — 93248 EXT ECG>7D<15D REV&INTERPJ: CPT | Performed by: INTERNAL MEDICINE

## 2022-09-09 NOTE — PROGRESS NOTES
EPS Outpatient Consultation/New Patient Evaluation - Kerrie Hernandez 79 y o  male MRN: [id number]           ASSESSMENT:   Diagnosis ICD-10-CM Associated Orders   1  Bradycardia  R00 1 POCT ECG   2  PVC (premature ventricular contraction)  I49 3    3  Hyperlipidemia, unspecified hyperlipidemia type  E78 5    4  S/P TAVR (transcatheter aortic valve replacement)  Z95 2    5  Premature atrial contractions  I49 1    6  Nonrheumatic aortic valve stenosis  I35 0    7  PVC's (premature ventricular contractions)  I49 3    8  Sinus bradycardia  R00 1    9  Palpitations  R00 2          PLAN:  1  Bradycardia- showed on Zio report which was personally reviewed by myself average heart rate 53 beats per minute    2  Premature ventricular contractions-  showed on Zio report which was personally reviewed by myself 15 5% PVC burden increased from 7%    3  Nonsustained atrial tachycardia     4  Status post transcatheter aortic valve replaced he can continue to be followed by Dr Chance Ortega of Cardiology he does have probably moderate aortic stenosis based on auscultation  Also has regurgitation mild per echocardiographic report    5  Hyperlipidemia he appears to be tolerating his statin therapy     He has a low pulse and this is effectively for lower because of his frequent PVCs  I recommended a dual-chamber pacemaker hopefully with increase in sinus rate this will suppress his PVCs if this does not occur we could consider PVC ablation  He feels very poorly very lightheaded and dizzy and wants to get his pacemaker performed as soon as possible  If he continues to feel poorly after pacemaker is placed and his PVC burden is reduced would repeat echocardiogram to assess valve function  Discussed pacemaker and ablation with risk of procedure  Answered all questions and concerns of pacemaker  Went over in detail of how the pacemaker will be placed and how it will work   I personally reviewed his Zio patch and it showed 15% PVC with sinus bradycardia  This patient qualifies for a pacemaker  I explained to the patient the indications risks benefits and alternatives to pacemaker placement  I explained to the patient how the procedure is performed and that risks include but are not limited to bleeding bruising damage to blood vessels heart valves  Serious complications such as heart perforation or lung perforation are rare but can occur  Infection can occur with any foreign body implanted  Finally I explained to the patient that pacemaker leads may move particularly in the 1st one month and reoperation may be required  I explained this is not a complication  All questions were answered  CC/HPI:     Patient presents to the office today for a 6 week follow up from [facility]  Patient states he is feeling more dizzy and feels like he is going to pass out more often  He is willing to have a procedure done to make him feel better  He would like to have this procedure done before going away  He will be going back to [place] in December  Patient states he is just not feeling good anymore and he would like something done sooner than later  Patient seemed to be pretty nerves about wanting to get this done and asked several times when someone would vy to set this up and where this procedure would be done  AS MENTIONED IN MY PREVIOUS NOTE ON 7-7-22  Consult for pacemaker  7 5% PVCs and bradycardia at times on his 14 day monitor that was ordered for occasional lightheadedness and dizziness and swell as feeling an irregular pulse  I did review the 14 day monitor tracings in detail and his bradycardia only occurs while he is sleeping   He does not complain of feeling like he is going to pass out shortness of breath fatigue or dizziness  He states sometimes in the morning he gets a little lightheaded but he feels better after he takes a shower    He states that for 79 he feels his energy level is good and he walks up to 2 miles in a day        ROS: ROS   As mentioned above he is very lightheaded very fatigued gets dizzy at times and short of breath    He does not have palpitations or chest discomfort all other 12 point review of systems is negative    Objective:     Vitals: ? /50   Pulse 65   Ht 5' 2" (1 575 m)   Wt 57 6 kg (127 lb)   SpO2 97%   BMI 23 23 kg/m²          Physical Exam:    GEN: Yesi Mosher appears well, alert and oriented x 3, pleasant and cooperative   HEENT: pupils equal, round, and reactive to light; extraocular muscles intact  NECK: supple, no carotid bruits   HEART: regular rhythm, normal S1 and S2, two to 3/6 systolic ejection murmur, clicks, gallops or rubs   LUNGS: clear to auscultation bilaterally; no wheezes, rales, or rhonchi   ABDOMEN: normal bowel sounds, soft, no tenderness, no distention  EXTREMITIES: peripheral pulses normal; no clubbing, cyanosis, or edema  NEURO: no focal findings   SKIN: normal without suspicious lesions on exposed skin    Medications:      Current Outpatient Medications:     amoxicillin (AMOXIL) 500 mg capsule, BEFORE DENTAL WORK, Disp: , Rfl: 3    ascorbic acid (VITAMIN C) 1000 MG tablet, Take 1,000 mg by mouth daily, Disp: , Rfl:     aspirin (ECOTRIN LOW STRENGTH) 81 mg EC tablet, Take 1 tablet (81 mg total) by mouth daily, Disp: , Rfl:     calcium-vitamin D (OSCAL 500 + D) 500 mg-200 units per tablet, Take 1 tablet by mouth daily, Disp: , Rfl:     cholecalciferol (VITAMIN D3) 400 units tablet, Take 2,000 Units by mouth daily, Disp: , Rfl:     finasteride (PROSCAR) 5 mg tablet, Take 5 mg by mouth  , Disp: , Rfl:     Krill Oil 350 MG CAPS, Take by mouth, Disp: , Rfl:     levETIRAcetam (KEPPRA) 250 mg tablet, Take 250 mg by mouth 2 (two) times a day, Disp: , Rfl:     Multiple Vitamin (DAILY VALUE MULTIVITAMIN PO), Take 1 tablet by mouth daily, Disp: , Rfl:     simvastatin (ZOCOR) 10 mg tablet, Take 10 mg by mouth daily at bedtime, Disp: , Rfl:     meclizine (ANTIVERT) 12 5 MG tablet, TAKE 1 TABLET BY MOUTH 3 TIMES A DAY AS NEEDED FOR VERTIGO (Patient not taking: No sig reported), Disp: , Rfl:     pantoprazole (PROTONIX) 20 mg tablet, Take 1 tablet (20 mg total) by mouth daily (Patient not taking: Reported on 4/11/2022 ), Disp: 30 tablet, Rfl: 0    tamsulosin (FLOMAX) 0 4 mg, Take 0 4 mg by mouth daily with dinner   (Patient not taking: No sig reported), Disp: , Rfl:          Family History   Problem Relation Age of Onset    Coronary artery disease Family     Heart disease Family      Social History     Socioeconomic History    Marital status: /Civil Union     Spouse name: Not on file    Number of children: Not on file    Years of education: Not on file    Highest education level: Not on file   Occupational History    Not on file   Tobacco Use    Smoking status: Never Smoker    Smokeless tobacco: Never Used   Vaping Use    Vaping Use: Never used   Substance and Sexual Activity    Alcohol use: Yes     Comment: 1 glass of wine with dinner     Drug use: No    Sexual activity: Not Currently   Other Topics Concern    Not on file   Social History Narrative    Not on file     Social Determinants of Health     Financial Resource Strain: Not on file   Food Insecurity: Not on file   Transportation Needs: Not on file   Physical Activity: Not on file   Stress: Not on file   Social Connections: Not on file   Intimate Partner Violence: Not on file   Housing Stability: Not on file     Social History     Tobacco Use   Smoking Status Never Smoker   Smokeless Tobacco Never Used     Social History     Substance and Sexual Activity   Alcohol Use Yes    Comment: 1 glass of wine with dinner            Lab Results:   No visits with results within 1 Day(s) from this visit     Latest known visit with results is:   Orders Only on 04/14/2022   Component Date Value    Hemoglobin A1C 04/14/2022 6 4        EKG: NSR with LAE otherwise normal ecg           Cardiac testing: Results for orders placed during the hospital encounter of 20    Echo complete with contrast if indicated    Narrative  [facility]  [facility]  [place], [address]  [phone]    Transthoracic Echocardiogram  2D, M-mode, Doppler, and Color Doppler    Study date:  01-Dec-2020    Patient: Peng Farrar  MR number: [id number]  Account number: [de-identified]  : 1940  Age: 79 years  Gender: Male  Status: Outpatient  Location: [facility] ECHO 3  Height: 62 in  Weight: 126 7 lb  BP: 112/ 60 mmHg    Indications: S/P TAVR    Diagnoses: Z95 2 - Presence of prosthetic heart valve    Sonographer:  MARLENA Bal  Primary Physician:  Meghann Zepeda MD  Referring Physician:  JACOBO Hunt  Group:  [facility]  Interpreting Physician:  Casie Corley MD    IMPRESSIONS:  Technical quality: Good  Cardiac rhythm: Sinus    1  Mildly increased left ventricular wall thickness, normal left ventricular systolic function, EF around 65%  Grade 1 diastolic dysfunction with elevated estimated left ventricular filling pressures  2  Normal right ventricular size and systolic function  3  Mild left atrial cavity enlargement  4  Normal functioning bioprosthetic aortic valve with minimal paravalvular and mild valvular regurgitation  5  Mild plus mitral valve regurgitation  6  Mild tricuspid valve regurgitation  7  Possible mild pulmonary hypertension  Estimated RVSP/PASP is 37 mmHg  8  No pericardial effusion  Compared to report of previous echocardiogram from 2020 there is some progression of diastolic dysfunction and possible mild pulmonary hypertension is new  SUMMARY    LEFT VENTRICLE:  Normal left ventricular cavity size, mildly increased wall thickness, normal left ventricular systolic function normal regional wall motion  Ejection fraction is estimated as around 65%  Doppler evaluation suggests grade 1 diastolic  dysfunction  Estimated left ventricular filling pressures are increased  RIGHT VENTRICLE:  Normal right ventricular size and systolic function  Estimated right ventricular systolic pressure is increased, 37 mmHg  Possible mild pulmonary hypertension  LEFT ATRIUM:  Mild left atrial cavity enlargement  RIGHT ATRIUM:  Normal right atrial cavity size  MITRAL VALVE:  Mitral valve leaflet sclerosis, adequate leaflet mobility  Mild-plus mitral valve regurgitation  AORTIC VALVE:  Bioprosthetic aortic valve present  Valve appears to be well seated with no evidence of dehiscence  Doppler evaluation suggests normal prosthetic valve function  Peak trans prosthetic valve velocity is 2 94 m/sec, mean gradient is 15 mmHg  and calculated valve area is 1 0-1 1 cm2  Doppler velocity index is 0 53  There is minimal paravalvular and mild valvular regurgitation  TRICUSPID VALVE:  Mild tricuspid valve regurgitation  PULMONIC VALVE:  Trace pulmonic valve regurgitation  AORTA:  Aortic root and proximal ascending aorta are normal in size on 2D imaging  IVC, HEPATIC VEINS:  Inferior vena cava is normal in size and demonstrates appropriate respiratory phasic changes in diameter  PERICARDIUM:  No pericardial effusion  SUMMARY MEASUREMENTS  2D measurements:  Unspecified Anatomy:   %FS was 35 5 %  Ao Diam was 2 2 cm   EDV(Teich) was 64 3 ml   EF(Teich) was 65 7 %  ESV(Teich) was 22 1 ml  IVSd was 0 9 cm  LA Area was 17 5 cm2  LA Diam was 3 4 cm  LVEDV MOD A4C was 126 6 ml  LVEF MOD A4C  was 68 4 %  LVESV MOD A4C was 40 ml  LVIDd was 3 9 cm  LVIDs was 2 5 cm  LVLd A4C was 8 7 cm  LVLs A4C was 7 cm  LVOT Diam was 1 7 cm  LVPWd was 0 9 cm  RA Area was 14 7 cm2  RVIDd was 3 cm  SV MOD A4C was 86 6 ml   SV(Teich) was  42 2 ml   CW measurements:  Unspecified Anatomy:   AR Dec [place] was 2 7 m/s2  AR Dec Time was 1675 7 ms  AR PHT was 486 ms  AR Vmax was 4 5 m/s  AR maxPG was 80 6 mmHg  AV Env  Ti was 398 2 ms  AV VTI was 66 7 cm  AV Vmax was 3 m/s  AV Vmean was 1 7 m/s  AV  maxPG was 34 8 mmHg  AV meanPG was 14 7 mmHg  TR Vmax was 2 6 m/s   TR maxPG was 26 7 mmHg  MM measurements:  Unspecified Anatomy:   TAPSE was 2 4 cm  PW measurements:  Unspecified Anatomy:   SON (VTI) was 1 1 cm2  SON Vmax was 1 cm2  SON Vmax, Pt was 1 cm2  AVAI (VTI) was 0 cm2/m2  AVAI Vmax was 0 cm2/m2  AVAI Vmax, Pt was 0 cm2/m2  DVI was 0 5   E' Sept was 0 1 m/s  E/E' Sept was 21 3   LVOT  Env  Ti was 345 4 ms  LVOT VTI was 35 2 cm  LVOT Vmax was 1 3 m/s  LVOT Vmean was 1 m/s  LVOT maxPG was 7 2 mmHg  LVOT meanPG was 4 6 mmHg  LVSI Dopp was 48 3 ml/m2  LVSV Dopp was 76 3 ml   MV A Jaime was 1 m/s  MV Dec [place] was 4 9  m/s2  MV DecT was 229 7 ms   MV E Jaime was 1 1 m/s  MV E/A Ratio was 1 2   MV PHT was 66 6 ms  MVA By PHT was 3 3 cm2  HISTORY: PRIOR HISTORY: Aortic stenosis, sinus bradycardia, PVC's, hyperlipidemia    PROCEDURE: The study was performed in the [facility] ECHO 3  This was a routine study  The transthoracic approach was used  The study included complete 2D imaging, M-mode, complete spectral Doppler, and color Doppler  The heart rate was 64 bpm, at  the start of the study  Images were obtained from the parasternal, apical, subcostal, and suprasternal notch acoustic windows  Image quality was adequate  LEFT VENTRICLE: Normal left ventricular cavity size, mildly increased wall thickness, normal left ventricular systolic function normal regional wall motion  Ejection fraction is estimated as around 65%  Doppler evaluation suggests grade 1  diastolic dysfunction  Estimated left ventricular filling pressures are increased  RIGHT VENTRICLE: Normal right ventricular size and systolic function  Estimated right ventricular systolic pressure is increased, 37 mmHg  Possible mild pulmonary hypertension  LEFT ATRIUM: Mild left atrial cavity enlargement  RIGHT ATRIUM: Normal right atrial cavity size      MITRAL VALVE: Mitral valve leaflet sclerosis, adequate leaflet mobility  Mild-plus mitral valve regurgitation  AORTIC VALVE: Bioprosthetic aortic valve present  Valve appears to be well seated with no evidence of dehiscence  Doppler evaluation suggests normal prosthetic valve function  Peak trans prosthetic valve velocity is 2 94 m/sec, mean  gradient is 15 mmHg and calculated valve area is 1 0-1 1 cm2  Doppler velocity index is 0 53  There is minimal paravalvular and mild valvular regurgitation  TRICUSPID VALVE: Mild tricuspid valve regurgitation  PULMONIC VALVE: Trace pulmonic valve regurgitation  PERICARDIUM: No pericardial effusion  AORTA: Aortic root and proximal ascending aorta are normal in size on 2D imaging  SYSTEMIC VEINS: IVC: Inferior vena cava is normal in size and demonstrates appropriate respiratory phasic changes in diameter  SYSTEM MEASUREMENT TABLES    2D  %FS: 35 5 %  Ao Diam: 2 2 cm  EDV(Teich): 64 3 ml  EF(Teich): 65 7 %  ESV(Teich): 22 1 ml  IVSd: 0 9 cm  LA Area: 17 5 cm2  LA Diam: 3 4 cm  LVEDV MOD A4C: 126 6 ml  LVEF MOD A4C: 68 4 %  LVESV MOD A4C: 40 ml  LVIDd: 3 9 cm  LVIDs: 2 5 cm  LVLd A4C: 8 7 cm  LVLs A4C: 7 cm  LVOT Diam: 1 7 cm  LVPWd: 0 9 cm  RA Area: 14 7 cm2  RVIDd: 3 cm  SV MOD A4C: 86 6 ml  SV(Teich): 42 2 ml    CW  AR Dec [place]: 2 7 m/s2  AR Dec Time: 1675 7 ms  AR PHT: 486 ms  AR Vmax: 4 5 m/s  AR maxP 6 mmHg  AV Env  Ti: 398 2 ms  AV VTI: 66 7 cm  AV Vmax: 3 m/s  AV Vmean: 1 7 m/s  AV maxP 8 mmHg  AV meanP 7 mmHg  TR Vmax: 2 6 m/s  TR maxP 7 mmHg    MM  TAPSE: 2 4 cm    PW  SON (VTI): 1 1 cm2  SON Vmax: 1 cm2  SON Vmax, Pt: 1 cm2  AVAI (VTI): 0 cm2/m2  AVAI Vmax: 0 cm2/m2  AVAI Vmax, Pt: 0 cm2/m2  DVI: 0 5  E' Sept: 0 1 m/s  E/E' Sept: 21 3  LVOT Env  Ti: 345 4 ms  LVOT VTI: 35 2 cm  LVOT Vmax: 1 3 m/s  LVOT Vmean: 1 m/s  LVOT maxP 2 mmHg  LVOT meanP 6 mmHg  LVSI Dopp: 48 3 ml/m2  LVSV Dopp: 76 3 ml  MV A Jaime: 1 m/s  MV Dec [place]: 4 9 m/s2  MV DecT: 229 7 ms  MV E Jaime: 1 1 m/s  MV E/A Ratio: 1 2  MV PHT: 66 6 ms  MVA By PHT: 3 3 cm2    [employer] Accredited Echocardiography Laboratory    Prepared and electronically signed by    Mónica Connell MD  Signed 01-Dec-2020 14:11:30    No results found for this or any previous visit  No results found for this or any previous visit  No results found for this or any previous visit

## 2022-09-12 ENCOUNTER — OFFICE VISIT (OUTPATIENT)
Dept: CARDIOLOGY CLINIC | Facility: CLINIC | Age: 82
End: 2022-09-12
Payer: MEDICARE

## 2022-09-12 VITALS
BODY MASS INDEX: 23.37 KG/M2 | HEIGHT: 62 IN | OXYGEN SATURATION: 97 % | SYSTOLIC BLOOD PRESSURE: 130 MMHG | WEIGHT: 127 LBS | HEART RATE: 65 BPM | DIASTOLIC BLOOD PRESSURE: 50 MMHG

## 2022-09-12 DIAGNOSIS — I49.3 PVC'S (PREMATURE VENTRICULAR CONTRACTIONS): ICD-10-CM

## 2022-09-12 DIAGNOSIS — R00.1 BRADYCARDIA: Primary | ICD-10-CM

## 2022-09-12 DIAGNOSIS — I49.1 PREMATURE ATRIAL CONTRACTIONS: ICD-10-CM

## 2022-09-12 DIAGNOSIS — I35.0 NONRHEUMATIC AORTIC VALVE STENOSIS: ICD-10-CM

## 2022-09-12 DIAGNOSIS — Z95.2 S/P TAVR (TRANSCATHETER AORTIC VALVE REPLACEMENT): ICD-10-CM

## 2022-09-12 DIAGNOSIS — E78.5 HYPERLIPIDEMIA, UNSPECIFIED HYPERLIPIDEMIA TYPE: ICD-10-CM

## 2022-09-12 DIAGNOSIS — I49.3 PVC (PREMATURE VENTRICULAR CONTRACTION): ICD-10-CM

## 2022-09-12 DIAGNOSIS — R00.1 SINUS BRADYCARDIA: ICD-10-CM

## 2022-09-12 DIAGNOSIS — R00.2 PALPITATIONS: ICD-10-CM

## 2022-09-12 PROCEDURE — 93000 ELECTROCARDIOGRAM COMPLETE: CPT | Performed by: INTERNAL MEDICINE

## 2022-09-12 PROCEDURE — 99215 OFFICE O/P EST HI 40 MIN: CPT | Performed by: INTERNAL MEDICINE

## 2022-09-16 ENCOUNTER — TELEPHONE (OUTPATIENT)
Dept: CARDIOLOGY CLINIC | Facility: CLINIC | Age: 82
End: 2022-09-16

## 2022-09-16 NOTE — TELEPHONE ENCOUNTER
PC from patient who is waiting for a call from our surgery scheduling for a pacemaker  Talked with Ramone Sandoval and they have him on schedule to call him about this, however, he is very impatient and keeps calling  They will get him on the schedule soon

## 2022-09-19 ENCOUNTER — TELEPHONE (OUTPATIENT)
Dept: CARDIOLOGY CLINIC | Facility: CLINIC | Age: 82
End: 2022-09-19

## 2022-09-19 DIAGNOSIS — R00.1 BRADYCARDIA: Primary | ICD-10-CM

## 2022-09-19 NOTE — TELEPHONE ENCOUNTER
----- Message from Donaldo Chapman DO sent at 9/12/2022 10:25 AM EDT -----  Please arrange dual chamber if possible st  [employer] [place]  [employer]  Diagnosis sick sinus syndrome    Thanks        Louisa Zapata

## 2022-09-20 NOTE — TELEPHONE ENCOUNTER
Patient scheduled for Dual chamber Pacer at [facility] on 10/24/22 with Dr Mark Anthony Lunsford  Patient aware of general information and labs required  Patient cover under medicare  Can we please have the case

## 2022-10-11 ENCOUNTER — APPOINTMENT (OUTPATIENT)
Dept: LAB | Facility: IMAGING CENTER | Age: 82
End: 2022-10-11
Payer: MEDICARE

## 2022-10-11 LAB
ALBUMIN SERPL BCP-MCNC: 3.7 G/DL (ref 3.5–5)
ALP SERPL-CCNC: 50 U/L (ref 46–116)
ALT SERPL W P-5'-P-CCNC: 35 U/L (ref 12–78)
ANION GAP SERPL CALCULATED.3IONS-SCNC: 4 MMOL/L (ref 4–13)
AST SERPL W P-5'-P-CCNC: 22 U/L (ref 5–45)
BASOPHILS # BLD AUTO: 0.05 THOUSANDS/ΜL (ref 0–0.1)
BASOPHILS NFR BLD AUTO: 1 % (ref 0–1)
BILIRUB SERPL-MCNC: 0.63 MG/DL (ref 0.2–1)
BUN SERPL-MCNC: 17 MG/DL (ref 5–25)
CALCIUM SERPL-MCNC: 10.4 MG/DL (ref 8.3–10.1)
CHLORIDE SERPL-SCNC: 106 MMOL/L (ref 96–108)
CO2 SERPL-SCNC: 27 MMOL/L (ref 21–32)
CREAT SERPL-MCNC: 1.05 MG/DL (ref 0.6–1.3)
EOSINOPHIL # BLD AUTO: 0.94 THOUSAND/ΜL (ref 0–0.61)
EOSINOPHIL NFR BLD AUTO: 13 % (ref 0–6)
ERYTHROCYTE [DISTWIDTH] IN BLOOD BY AUTOMATED COUNT: 13.3 % (ref 11.6–15.1)
GFR SERPL CREATININE-BSD FRML MDRD: 65 ML/MIN/1.73SQ M
GLUCOSE P FAST SERPL-MCNC: 106 MG/DL (ref 65–99)
HCT VFR BLD AUTO: 44.1 % (ref 36.5–49.3)
HGB BLD-MCNC: 14.4 G/DL (ref 12–17)
IMM GRANULOCYTES # BLD AUTO: 0 THOUSAND/UL (ref 0–0.2)
IMM GRANULOCYTES NFR BLD AUTO: 0 % (ref 0–2)
LYMPHOCYTES # BLD AUTO: 2.41 THOUSANDS/ΜL (ref 0.6–4.47)
LYMPHOCYTES NFR BLD AUTO: 33 % (ref 14–44)
MCH RBC QN AUTO: 30.1 PG (ref 26.8–34.3)
MCHC RBC AUTO-ENTMCNC: 32.7 G/DL (ref 31.4–37.4)
MCV RBC AUTO: 92 FL (ref 82–98)
MONOCYTES # BLD AUTO: 0.77 THOUSAND/ΜL (ref 0.17–1.22)
MONOCYTES NFR BLD AUTO: 11 % (ref 4–12)
NEUTROPHILS # BLD AUTO: 3.14 THOUSANDS/ΜL (ref 1.85–7.62)
NEUTS SEG NFR BLD AUTO: 42 % (ref 43–75)
NRBC BLD AUTO-RTO: 0 /100 WBCS
PLATELET # BLD AUTO: 109 THOUSANDS/UL (ref 149–390)
PMV BLD AUTO: 13.6 FL (ref 8.9–12.7)
POTASSIUM SERPL-SCNC: 4.6 MMOL/L (ref 3.5–5.3)
PROT SERPL-MCNC: 8.3 G/DL (ref 6.4–8.4)
RBC # BLD AUTO: 4.78 MILLION/UL (ref 3.88–5.62)
SODIUM SERPL-SCNC: 137 MMOL/L (ref 135–147)
WBC # BLD AUTO: 7.31 THOUSAND/UL (ref 4.31–10.16)

## 2022-10-11 PROCEDURE — 36415 COLL VENOUS BLD VENIPUNCTURE: CPT

## 2022-10-11 PROCEDURE — 80053 COMPREHEN METABOLIC PANEL: CPT

## 2022-10-11 PROCEDURE — 85025 COMPLETE CBC W/AUTO DIFF WBC: CPT

## 2022-10-23 ENCOUNTER — ANESTHESIA EVENT (OUTPATIENT)
Dept: NON INVASIVE DIAGNOSTICS | Facility: HOSPITAL | Age: 82
End: 2022-10-23
Payer: MEDICARE

## 2022-10-23 NOTE — ANESTHESIA PREPROCEDURE EVALUATION
Procedure:  Cardiac pacer implant/ DUAL CHAMBER (N/A Chest)    Relevant Problems   ANESTHESIA (within normal limits)   (-) History of anesthesia complications      CARDIO   (+) CAD (coronary artery disease)   (+) Hyperlipidemia   (+) Mixed hyperlipidemia   (+) Nonrheumatic aortic valve stenosis   (+) PVC (premature ventricular contraction)   (+) Premature atrial contractions   (+) S/P TAVR (transcatheter aortic valve replacement)   (+) Sinus bradycardia      ENDO   (+) Diabetes mellitus, type 2 (HCC)      GI/HEPATIC (within normal limits)      /RENAL   (+) BPH (benign prostatic hyperplasia)      HEMATOLOGY   (+) Thrombocytopenia (HCC)      MUSCULOSKELETAL (within normal limits)      NEURO/PSYCH   (+) 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HCC)      PULMONARY (within normal limits)        Physical Exam    Airway    Mallampati score: II  TM Distance: >3 FB  Neck ROM: full     Dental   No notable dental hx     Cardiovascular  Rhythm: regular, Rate: normal, Cardiovascular exam normal    Pulmonary  Pulmonary exam normal Breath sounds clear to auscultation,     Other Findings        Anesthesia Plan  ASA Score- 3     Anesthesia Type- IV sedation with anesthesia with ASA Monitors  Additional Monitors:   Airway Plan:           Plan Factors-    Chart reviewed  EKG reviewed  Imaging results reviewed  Existing labs reviewed  Patient summary reviewed  Patient is not a current smoker  Patient did not smoke on day of surgery  Induction- intravenous  Postoperative Plan-     Informed Consent- Anesthetic plan and risks discussed with patient and son  I personally reviewed this patient with the CRNA  Discussed and agreed on the Anesthesia Plan with the CRNA  Jackr  Echo (11/23/21): •  Left Ventricle: Left ventricular cavity size is normal  Systolic function is normal  Wall motion is normal  Diastolic function is moderately abnormal, consistent with grade II (pseudonormal) relaxation    •  Right Ventricle: Right ventricular cavity size is normal  Systolic function is normal   •  Aortic Valve: There is a bioprosthetic valve  There is mild to moderate regurgitation  There is no evidence of stenosis  Peak gradient is 35, meand gradient is 18 mm Hg, Index of 0 34  NPO and allergies verified  Pre-procedure glucose was 111 this morning, 10/24/22  Patient took aspirin and Keppra this morning  Plan:  IV sedation/MAC, GA as backup    Benefits and risks of sedation were discussed with the patient including possibility of recall under sedation and the potential for conversion to general anesthesia if necessary  All questions were answered  Anesthesia consent was obtained from the patient

## 2022-10-24 ENCOUNTER — ANESTHESIA (OUTPATIENT)
Dept: NON INVASIVE DIAGNOSTICS | Facility: HOSPITAL | Age: 82
End: 2022-10-24
Payer: MEDICARE

## 2022-10-24 ENCOUNTER — HOSPITAL ENCOUNTER (OUTPATIENT)
Facility: HOSPITAL | Age: 82
Setting detail: OUTPATIENT SURGERY
Discharge: HOME | End: 2022-10-24
Attending: INTERNAL MEDICINE | Admitting: INTERNAL MEDICINE
Payer: MEDICARE

## 2022-10-24 ENCOUNTER — APPOINTMENT (OUTPATIENT)
Dept: RADIOLOGY | Facility: HOSPITAL | Age: 82
End: 2022-10-24
Payer: MEDICARE

## 2022-10-24 VITALS
SYSTOLIC BLOOD PRESSURE: 144 MMHG | RESPIRATION RATE: 18 BRPM | TEMPERATURE: 97.2 F | OXYGEN SATURATION: 90 % | DIASTOLIC BLOOD PRESSURE: 66 MMHG | WEIGHT: 128 LBS | HEIGHT: 63 IN | HEART RATE: 69 BPM | BODY MASS INDEX: 22.68 KG/M2

## 2022-10-24 DIAGNOSIS — J20.9 ACUTE BRONCHITIS: Primary | ICD-10-CM

## 2022-10-24 DIAGNOSIS — R00.1 BRADYCARDIA: ICD-10-CM

## 2022-10-24 LAB
ANION GAP SERPL CALCULATED.3IONS-SCNC: 6 MMOL/L (ref 4–13)
ATRIAL RATE: 59 BPM
BASOPHILS # BLD AUTO: 0.03 THOUSANDS/ÂΜL (ref 0–0.1)
BASOPHILS NFR BLD AUTO: 1 % (ref 0–1)
BUN SERPL-MCNC: 25 MG/DL (ref 5–25)
CALCIUM SERPL-MCNC: 10 MG/DL (ref 8.3–10.1)
CHLORIDE SERPL-SCNC: 107 MMOL/L (ref 96–108)
CO2 SERPL-SCNC: 24 MMOL/L (ref 21–32)
CREAT SERPL-MCNC: 1 MG/DL (ref 0.6–1.3)
EOSINOPHIL # BLD AUTO: 0.7 THOUSAND/ÂΜL (ref 0–0.61)
EOSINOPHIL NFR BLD AUTO: 12 % (ref 0–6)
ERYTHROCYTE [DISTWIDTH] IN BLOOD BY AUTOMATED COUNT: 12.9 % (ref 11.6–15.1)
GFR SERPL CREATININE-BSD FRML MDRD: 69 ML/MIN/1.73SQ M
GLUCOSE P FAST SERPL-MCNC: 111 MG/DL (ref 65–99)
GLUCOSE SERPL-MCNC: 111 MG/DL (ref 65–140)
HCT VFR BLD AUTO: 44.7 % (ref 36.5–49.3)
HGB BLD-MCNC: 14.5 G/DL (ref 12–17)
IMM GRANULOCYTES # BLD AUTO: 0.02 THOUSAND/UL (ref 0–0.2)
IMM GRANULOCYTES NFR BLD AUTO: 0 % (ref 0–2)
INR PPP: 1.02 (ref 0.84–1.19)
LYMPHOCYTES # BLD AUTO: 1.77 THOUSANDS/ÂΜL (ref 0.6–4.47)
LYMPHOCYTES NFR BLD AUTO: 30 % (ref 14–44)
MCH RBC QN AUTO: 29.2 PG (ref 26.8–34.3)
MCHC RBC AUTO-ENTMCNC: 32.4 G/DL (ref 31.4–37.4)
MCV RBC AUTO: 90 FL (ref 82–98)
MONOCYTES # BLD AUTO: 0.63 THOUSAND/ÂΜL (ref 0.17–1.22)
MONOCYTES NFR BLD AUTO: 11 % (ref 4–12)
NEUTROPHILS # BLD AUTO: 2.71 THOUSANDS/ÂΜL (ref 1.85–7.62)
NEUTS SEG NFR BLD AUTO: 46 % (ref 43–75)
NRBC BLD AUTO-RTO: 0 /100 WBCS
P AXIS: 46 DEGREES
PLATELET # BLD AUTO: 120 THOUSANDS/UL (ref 149–390)
PMV BLD AUTO: 11 FL (ref 8.9–12.7)
POTASSIUM SERPL-SCNC: 4.2 MMOL/L (ref 3.5–5.3)
PR INTERVAL: 158 MS
PROTHROMBIN TIME: 13.6 SECONDS (ref 11.6–14.5)
QRS AXIS: -2 DEGREES
QRSD INTERVAL: 82 MS
QT INTERVAL: 426 MS
QTC INTERVAL: 421 MS
RBC # BLD AUTO: 4.96 MILLION/UL (ref 3.88–5.62)
SODIUM SERPL-SCNC: 137 MMOL/L (ref 135–147)
T WAVE AXIS: 42 DEGREES
VENTRICULAR RATE: 59 BPM
WBC # BLD AUTO: 5.86 THOUSAND/UL (ref 4.31–10.16)

## 2022-10-24 PROCEDURE — C1898 LEAD, PMKR, OTHER THAN TRANS: HCPCS | Performed by: INTERNAL MEDICINE

## 2022-10-24 PROCEDURE — C1892 INTRO/SHEATH,FIXED,PEEL-AWAY: HCPCS | Performed by: INTERNAL MEDICINE

## 2022-10-24 PROCEDURE — 93010 ELECTROCARDIOGRAM REPORT: CPT | Performed by: INTERNAL MEDICINE

## 2022-10-24 PROCEDURE — C1769 GUIDE WIRE: HCPCS | Performed by: INTERNAL MEDICINE

## 2022-10-24 PROCEDURE — 33208 INSRT HEART PM ATRIAL & VENT: CPT | Performed by: INTERNAL MEDICINE

## 2022-10-24 PROCEDURE — 80048 BASIC METABOLIC PNL TOTAL CA: CPT | Performed by: PHYSICIAN ASSISTANT

## 2022-10-24 PROCEDURE — C1785 PMKR, DUAL, RATE-RESP: HCPCS | Performed by: INTERNAL MEDICINE

## 2022-10-24 PROCEDURE — 85025 COMPLETE CBC W/AUTO DIFF WBC: CPT | Performed by: PHYSICIAN ASSISTANT

## 2022-10-24 PROCEDURE — 93005 ELECTROCARDIOGRAM TRACING: CPT

## 2022-10-24 PROCEDURE — 71045 X-RAY EXAM CHEST 1 VIEW: CPT

## 2022-10-24 PROCEDURE — 85610 PROTHROMBIN TIME: CPT | Performed by: PHYSICIAN ASSISTANT

## 2022-10-24 PROCEDURE — NC001 PR NO CHARGE: Performed by: PHYSICIAN ASSISTANT

## 2022-10-24 DEVICE — ENVELOPE CMRM6122 ABSORB MED MR
Type: IMPLANTABLE DEVICE | Site: CHEST | Status: FUNCTIONAL
Brand: TYRX™

## 2022-10-24 DEVICE — LEAD 383069 MRI US
Type: IMPLANTABLE DEVICE | Site: HEART | Status: FUNCTIONAL
Brand: SELECTSECURE™ MRI SURESCAN™

## 2022-10-24 DEVICE — LEAD 457453 MRI US BI RCMCRD MVC
Type: IMPLANTABLE DEVICE | Site: HEART | Status: FUNCTIONAL
Brand: CAPSURE SENSE MRI™ SURESCAN™

## 2022-10-24 DEVICE — IPG W1DR01 AZURE XT DR MRI USA
Type: IMPLANTABLE DEVICE | Site: CHEST | Status: FUNCTIONAL
Brand: AZURE™ XT DR MRI SURESCAN™

## 2022-10-24 RX ORDER — PROPOFOL 10 MG/ML
INJECTION, EMULSION INTRAVENOUS AS NEEDED
Status: DISCONTINUED | OUTPATIENT
Start: 2022-10-24 | End: 2022-10-24

## 2022-10-24 RX ORDER — BENZONATATE 200 MG/1
200 CAPSULE ORAL 3 TIMES DAILY PRN
Qty: 20 CAPSULE | Refills: 0 | Status: SHIPPED | OUTPATIENT
Start: 2022-10-24

## 2022-10-24 RX ORDER — ACETAMINOPHEN 325 MG/1
650 TABLET ORAL EVERY 4 HOURS PRN
Status: DISCONTINUED | OUTPATIENT
Start: 2022-10-24 | End: 2022-10-24 | Stop reason: HOSPADM

## 2022-10-24 RX ORDER — GENTAMICIN SULFATE 40 MG/ML
INJECTION, SOLUTION INTRAMUSCULAR; INTRAVENOUS AS NEEDED
Status: DISCONTINUED | OUTPATIENT
Start: 2022-10-24 | End: 2022-10-24 | Stop reason: HOSPADM

## 2022-10-24 RX ORDER — PROPOFOL 10 MG/ML
INJECTION, EMULSION INTRAVENOUS CONTINUOUS PRN
Status: DISCONTINUED | OUTPATIENT
Start: 2022-10-24 | End: 2022-10-24

## 2022-10-24 RX ORDER — SODIUM CHLORIDE 9 MG/ML
INJECTION, SOLUTION INTRAVENOUS CONTINUOUS PRN
Status: DISCONTINUED | OUTPATIENT
Start: 2022-10-24 | End: 2022-10-24

## 2022-10-24 RX ORDER — CEFAZOLIN SODIUM 2 G/50ML
2000 SOLUTION INTRAVENOUS ONCE
Status: COMPLETED | OUTPATIENT
Start: 2022-10-24 | End: 2022-10-24

## 2022-10-24 RX ORDER — AZITHROMYCIN 250 MG/1
TABLET, FILM COATED ORAL
Qty: 6 TABLET | Refills: 0 | Status: SHIPPED | OUTPATIENT
Start: 2022-10-24 | End: 2022-10-28

## 2022-10-24 RX ORDER — LIDOCAINE HYDROCHLORIDE 10 MG/ML
INJECTION, SOLUTION EPIDURAL; INFILTRATION; INTRACAUDAL; PERINEURAL AS NEEDED
Status: DISCONTINUED | OUTPATIENT
Start: 2022-10-24 | End: 2022-10-24 | Stop reason: HOSPADM

## 2022-10-24 RX ADMIN — PROPOFOL 30 MG: 10 INJECTION, EMULSION INTRAVENOUS at 10:54

## 2022-10-24 RX ADMIN — PROPOFOL 20 MG: 10 INJECTION, EMULSION INTRAVENOUS at 11:26

## 2022-10-24 RX ADMIN — ACETAMINOPHEN 650 MG: 325 TABLET ORAL at 13:12

## 2022-10-24 RX ADMIN — SODIUM CHLORIDE: 9 INJECTION, SOLUTION INTRAVENOUS at 10:30

## 2022-10-24 RX ADMIN — CEFAZOLIN SODIUM 1000 MG: 2 SOLUTION INTRAVENOUS at 10:57

## 2022-10-24 RX ADMIN — PROPOFOL 50 MCG/KG/MIN: 10 INJECTION, EMULSION INTRAVENOUS at 10:54

## 2022-10-24 NOTE — Clinical Note
The Harriet Kaufmann Dr Hank Koyanagi device was inserted  The leads were placed into the connector and visually verified to be in correct position  Injury current obtained

## 2022-10-24 NOTE — DISCHARGE INSTRUCTIONS
Please refer to post pacemaker implantation discharge instructions and restrictions and your pacemaker booklet/temporary card  Keep incision dry for one week  Do not use lotions/powders/creams on incision  Leave outer bandage in place for 1 week - it is water proof, and as long as it is fully adhered to your skin you may shower with it  If it appears as though the bandage is coming off and/or there is any communication to the area of device incision, please then keep the whole area dry for the remaining week  After 1 week, please remove by pulling all edges away from the center of the bandage  No overhead reaching/pushing/pulling/lifting greater than 5-10lbs with left arm for six weeks  Please call the office if you notice redness, swelling, bleeding, or drainage from incision or if you develop fevers  AFTER PACEMAKER CARE:    If you have any questions, please call [phone] to speak with a nurse (8:30am-4pm, or [phone] after hours)  For appointments, please call [phone]  WHAT YOU SHOULD KNOW:   A pacemaker is a small, battery-powered device that is placed under your skin in your upper chest area with wires placed through a vein that lead directly into the heart  It helps regulate your heart rate and prevent your heart from beating too slowly  AFTER YOU LEAVE:     Medicines:     Pain medicine: You may need medicine to take away or decrease pain  Learn how to take your medicine  Ask what medicine and how much you should take  Be sure you know how, when, and how often to take it  Usually Over the counter pain medicine is sufficient to control pain (Acetominophen or Ibuprofen) Ask your doctor if you may take these  If this does not control your pain, narcotic pain killers may be prescribed, please call if you need prescription  Do not wait until the pain is severe before you take your medicine  Tell caregivers if your pain does not decrease      Pain medicine can make you dizzy or sleepy  Prevent falls by calling someone when you get out of bed or if you need help  Take your medicine as directed  Call your healthcare provider if you think your medicine is not helping or if you have side effects  Tell him if you are allergic to any medicine  Follow up with your cardiologist after your procedure: You will need a follow-up visit approximately 2 weeks after you leave the hospital  Your cardiologist will check your wound and make sure that your pacemaker is working correctly  Follow the instructions to check your pacemaker: Your cardiologist or primary healthcare provider will check your pacemaker and the battery regularly  He will use a computer to check your pacemaker over the telephone or wireless device which will be given to you  Pacemaker batteries usually last 8 to 10 years  The pacemaker unit will be replaced when the battery gets low  This is a simpler procedure than the original one to implant your pacemaker  Wound care:  Keep your incision dry for one week  Do not use lotions/powders/creams on incision  Leave outer bandage in place for 1 week - it is water proof, and as long as it is fully adhered to your skin you may shower with it  If it appears as though the bandage is coming off and/or there is any communication to the area of device incision, please then keep the whole area dry for the remaining week  After 1 week, please remove by pulling all edges away from the center of the bandage  Please call the office if you notice redness, swelling, bleeding, or drainage from incision or if you develop fevers  Activity:   Arm movement and lifting:  Be careful using the arm on the side of your pacemaker  Do not move your arm for the first 24 hours after your procedure  Do not  lift your arm above your shoulder or lift more than 10 pounds for one month after your procedure   Avoid pushing, pulling, or repetitive arm movements for one month  This helps the leads stay in place and helps your wound heal  Ask your caregiver when you can drive after your procedure  You may move your arm side to side without lifting above your shoulder, and do not need to wear a sling at home  Driving: you are ok to drive 48 hours after pacemaker is implanted   Sports:  Ask your caregiver when it is okay to play tennis, golf, basketball, or any sport that requires you to lift your arms  Do not play full contact sports, such as football, that could damage your pacemaker  Ask your cardiologist or primary healthcare provider how much and what kinds of physical activity are safe for you  Living with a pacemaker:   Tell all caregivers you have a pacemaker: This includes surgeons, radiologists, and medical technicians  You may want to wear a medical alert ID bracelet or necklace that states that you have a pacemaker  Carry your pacemaker ID card: Make sure you receive a pacemaker ID card  Carry it with you at all times  It lists important information about your pacemaker  Show it to airport security if you travel  Avoid electrical interference:  Avoid welding equipment and other equipment with large magnets or electric fields  These things could interfere with how your pacemaker works  Use your cell phone on the ear opposite from your pacemaker  Do not carry your cell phone in your shirt pocket over your chest      Some Pacemakers are MRI safe  Ask you doctor if it is safe to proceed with MRI and let the radiologist and staff know you have a pacemaker  Do not touch the skin around your pacemaker: This can cause damage to the lead wires or move the pacemaker unit from where it should be  Contact your cardiologist or primary healthcare provider if:   The area around your pacemaker has increasing amount of pain after surgery  The pain should improve over first few days after implantation       The skin around your stitches has increasing redness, swelling, or has drainage  This may mean that you have an infection  You have a fever  You have chills, a cough, and feel weak or achy  These are also signs of infection  Your feet or ankles are more swollen than your baseline  Your Heart rate is less than 50 beats per minute     Seek care immediately if:   Your bandage becomes soaked with blood  Your pacemaker is swelling rapidly    Your stitches open up  You feel your heart suddenly beating very slowly or quickly  You become too weak or dizzy to stand, or you pass out  Your arm or leg feels warm, tender, and painful  It may look swollen and red  You have chest pain that does not go away with rest or medicine  You feel lightheaded, short of breath, and have chest pain  You cough up blood  © 2014 [address] is for End User's use only and may not be sold, redistributed or otherwise used for commercial purposes  All illustrations and images included in CareNotes® are the copyrighted property of A D A M [employer] Inc  or [employer]  The above information is an  only  It is not intended as medical advice for individual conditions or treatments  Talk to your doctor, nurse or pharmacist before following any medical regimen to see if it is safe and effective for you

## 2022-10-24 NOTE — H&P
H&P Exam - Cardiology   Elen Connolly 79 y o  male MRN: [id number]  Unit/Bed#: BE CATH LAB ROOM Encounter: [id number]    Assessment/Plan   1  Symptomatic bradycardia and frequent PVC's   * patient presents to [facility] today for DC PPM due to symptomatic bradycardia and increasing PVC's   * for further details please see Dr Jocelyn Parada office note from 22  Basically this is a patient with increasing PVC burden with baseline bradycardia with recommendation of higher pacing rate being first line to treat his PVC's  Thus DC PPM was recommended    * today in sinus bradycardia with PVC's    * labs reviewed showing no major abnormalities    * plan for pacer and discharge home after bedrest         Imaging: I have personally reviewed pertinent reports  Results for orders placed during the hospital encounter of 20    Echo complete with contrast if indicated    Narrative  [address] [place] [address], [address]  [phone]    Transthoracic Echocardiogram  2D, M-mode, Doppler, and Color Doppler    Study date:  01-Dec-2020    Patient: Cornelius Chand  MR number: [id number]  Account number: [de-identified]  : 1940  Age: 79 years  Gender: Male  Status: Outpatient  Location: [facility] E[facility] 3  Height: 62 in  Weight: 126 7 lb  BP: 112/ 60 mmHg    Indications: S/P TAVR    Diagnoses: Z95 2 - Presence of prosthetic heart valve    Sonographer:  MARLENA Espitia  Primary Physician:  Andrew Morataya MD  Referring Physician:  JACOBO Kelsey  Group:  [facility]  Interpreting Physician:  Dave Vaca MD    IMPRESSIONS:  Technical quality: Good  Cardiac rhythm: Sinus    1  Mildly increased left ventricular wall thickness, normal left ventricular systolic function, EF around 65%  Grade 1 diastolic dysfunction with elevated estimated left ventricular filling pressures  2  Normal right ventricular size and systolic function    3  Mild left atrial cavity enlargement  4  Normal functioning bioprosthetic aortic valve with minimal paravalvular and mild valvular regurgitation  5  Mild plus mitral valve regurgitation  6  Mild tricuspid valve regurgitation  7  Possible mild pulmonary hypertension  Estimated RVSP/PASP is 37 mmHg  8  No pericardial effusion  Compared to report of previous echocardiogram from January 29, 2020 there is some progression of diastolic dysfunction and possible mild pulmonary hypertension is new  SUMMARY    LEFT VENTRICLE:  Normal left ventricular cavity size, mildly increased wall thickness, normal left ventricular systolic function normal regional wall motion  Ejection fraction is estimated as around 65%  Doppler evaluation suggests grade 1 diastolic  dysfunction  Estimated left ventricular filling pressures are increased  RIGHT VENTRICLE:  Normal right ventricular size and systolic function  Estimated right ventricular systolic pressure is increased, 37 mmHg  Possible mild pulmonary hypertension  LEFT ATRIUM:  Mild left atrial cavity enlargement  RIGHT ATRIUM:  Normal right atrial cavity size  MITRAL VALVE:  Mitral valve leaflet sclerosis, adequate leaflet mobility  Mild-plus mitral valve regurgitation  AORTIC VALVE:  Bioprosthetic aortic valve present  Valve appears to be well seated with no evidence of dehiscence  Doppler evaluation suggests normal prosthetic valve function  Peak trans prosthetic valve velocity is 2 94 m/sec, mean gradient is 15 mmHg  and calculated valve area is 1 0-1 1 cm2  Doppler velocity index is 0 53  There is minimal paravalvular and mild valvular regurgitation  TRICUSPID VALVE:  Mild tricuspid valve regurgitation  PULMONIC VALVE:  Trace pulmonic valve regurgitation  AORTA:  Aortic root and proximal ascending aorta are normal in size on 2D imaging      IVC, HEPATIC VEINS:  Inferior vena cava is normal in size and demonstrates appropriate respiratory phasic changes in diameter  PERICARDIUM:  No pericardial effusion  SUMMARY MEASUREMENTS  2D measurements:  Unspecified Anatomy:   %FS was 35 5 %  Ao Diam was 2 2 cm   EDV(Teich) was 64 3 ml   EF(Teich) was 65 7 %  ESV(Teich) was 22 1 ml  IVSd was 0 9 cm  LA Area was 17 5 cm2  LA Diam was 3 4 cm  LVEDV MOD A4C was 126 6 ml  LVEF MOD A4C  was 68 4 %  LVESV MOD A4C was 40 ml  LVIDd was 3 9 cm  LVIDs was 2 5 cm  LVLd A4C was 8 7 cm  LVLs A4C was 7 cm  LVOT Diam was 1 7 cm  LVPWd was 0 9 cm  RA Area was 14 7 cm2  RVIDd was 3 cm  SV MOD A4C was 86 6 ml   SV(Teich) was  42 2 ml   CW measurements:  Unspecified Anatomy:   AR Dec Kidder was 2 7 m/s2  AR Dec Time was 1675 7 ms  AR PHT was 486 ms  AR Vmax was 4 5 m/s  AR maxPG was 80 6 mmHg  AV Env  Ti was 398 2 ms   AV VTI was 66 7 cm  AV Vmax was 3 m/s  AV Vmean was 1 7 m/s  AV  maxPG was 34 8 mmHg  AV meanPG was 14 7 mmHg  TR Vmax was 2 6 m/s   TR maxPG was 26 7 mmHg  MM measurements:  Unspecified Anatomy:   TAPSE was 2 4 cm  PW measurements:  Unspecified Anatomy:   SON (VTI) was 1 1 cm2  SON Vmax was 1 cm2  SON Vmax, Pt was 1 cm2  AVAI (VTI) was 0 cm2/m2  AVAI Vmax was 0 cm2/m2  AVAI Vmax, Pt was 0 cm2/m2  DVI was 0 5   E' Sept was 0 1 m/s  E/E' Sept was 21 3   LVOT  Env  Ti was 345 4 ms  LVOT VTI was 35 2 cm  LVOT Vmax was 1 3 m/s  LVOT Vmean was 1 m/s  LVOT maxPG was 7 2 mmHg  LVOT meanPG was 4 6 mmHg  LVSI Dopp was 48 3 ml/m2  LVSV Dopp was 76 3 ml   MV A Jaime was 1 m/s  MV Dec Kidder was 4 9  m/s2  MV DecT was 229 7 ms   MV E Jaime was 1 1 m/s  MV E/A Ratio was 1 2   MV PHT was 66 6 ms  MVA By PHT was 3 3 cm2  HISTORY: PRIOR HISTORY: Aortic stenosis, sinus bradycardia, PVC's, hyperlipidemia    PROCEDURE: The study was performed in the [facility] ECHO 3  This was a routine study  The transthoracic approach was used  The study included complete 2D imaging, M-mode, complete spectral Doppler, and color Doppler   The heart rate was 64 bpm, at  the start of the study  Images were obtained from the parasternal, apical, subcostal, and suprasternal notch acoustic windows  Image quality was adequate  LEFT VENTRICLE: Normal left ventricular cavity size, mildly increased wall thickness, normal left ventricular systolic function normal regional wall motion  Ejection fraction is estimated as around 65%  Doppler evaluation suggests grade 1  diastolic dysfunction  Estimated left ventricular filling pressures are increased  RIGHT VENTRICLE: Normal right ventricular size and systolic function  Estimated right ventricular systolic pressure is increased, 37 mmHg  Possible mild pulmonary hypertension  LEFT ATRIUM: Mild left atrial cavity enlargement  RIGHT ATRIUM: Normal right atrial cavity size  MITRAL VALVE: Mitral valve leaflet sclerosis, adequate leaflet mobility  Mild-plus mitral valve regurgitation  AORTIC VALVE: Bioprosthetic aortic valve present  Valve appears to be well seated with no evidence of dehiscence  Doppler evaluation suggests normal prosthetic valve function  Peak trans prosthetic valve velocity is 2 94 m/sec, mean  gradient is 15 mmHg and calculated valve area is 1 0-1 1 cm2  Doppler velocity index is 0 53  There is minimal paravalvular and mild valvular regurgitation  TRICUSPID VALVE: Mild tricuspid valve regurgitation  PULMONIC VALVE: Trace pulmonic valve regurgitation  PERICARDIUM: No pericardial effusion  AORTA: Aortic root and proximal ascending aorta are normal in size on 2D imaging  SYSTEMIC VEINS: IVC: Inferior vena cava is normal in size and demonstrates appropriate respiratory phasic changes in diameter      SYSTEM MEASUREMENT TABLES    2D  %FS: 35 5 %  Ao Diam: 2 2 cm  EDV(Teich): 64 3 ml  EF(Teich): 65 7 %  ESV(Teich): 22 1 ml  IVSd: 0 9 cm  LA Area: 17 5 cm2  LA Diam: 3 4 cm  LVEDV MOD A4C: 126 6 ml  LVEF MOD A4C: 68 4 %  LVESV MOD A4C: 40 ml  LVIDd: 3 9 cm  LVIDs: 2 5 cm  LVLd A4C: 8 7 cm  LVLs A4C: 7 cm  LVOT Diam: 1 7 cm  LVPWd: 0 9 cm  RA Area: 14 7 cm2  RVIDd: 3 cm  SV MOD A4C: 86 6 ml  SV(Teich): 42 2 ml    CW  AR Dec Gosper: 2 7 m/s2  AR Dec Time: 1675 7 ms  AR PHT: 486 ms  AR Vmax: 4 5 m/s  AR maxP 6 mmHg  AV Env  Ti: 398 2 ms  AV VTI: 66 7 cm  AV Vmax: 3 m/s  AV Vmean: 1 7 m/s  AV maxP 8 mmHg  AV meanP 7 mmHg  TR Vmax: 2 6 m/s  TR maxP 7 mmHg    MM  TAPSE: 2 4 cm    PW  SON (VTI): 1 1 cm2  SON Vmax: 1 cm2  SON Vmax, Pt: 1 cm2  AVAI (VTI): 0 cm2/m2  AVAI Vmax: 0 cm2/m2  AVAI Vmax, Pt: 0 cm2/m2  DVI: 0 5  E' Sept: 0 1 m/s  E/E' Sept: 21 3  LVOT Env  Ti: 345 4 ms  LVOT VTI: 35 2 cm  LVOT Vmax: 1 3 m/s  LVOT Vmean: 1 m/s  LVOT maxP 2 mmHg  LVOT meanP 6 mmHg  LVSI Dopp: 48 3 ml/m2  LVSV Dopp: 76 3 ml  MV A Jaime: 1 m/s  MV Dec Gosper: 4 9 m/s2  MV DecT: 229 7 ms  MV E Jaime: 1 1 m/s  MV E/A Ratio: 1 2  MV PHT: 66 6 ms  MVA By PHT: 3 3 cm2    [facility] Accredited [facility]chocardi[facility]    Prepared and electronically signed by    Monet Garcia MD  Signed 01-Dec-2020 14:11:30      History of Present Illness   HPI:  Gaby Rubio is a 79y o  year old male with a history as above who presents to B for DC PPM implant  Patient had 2 week zio performed by Dr Nadyne Mohs due to resting HR in the mid 30's at office visit  Zio showed sinus bradycardia with high burden of PVC's  Patient was referred to Dr Phoenix Soni for [facility] evaluation with patient concerned of frequent fatigue and dizziness  Symptoms were thought to have been from his PVC's and bradycardia, thus DC PPM was recommended  Today patient does have an intermittent dry cough but no fevers or chills  No other concerns or complaints  Review of Systems  ROS as noted above, otherwise 12 point review of systems was performed and is negative         Historical Information   Past Medical History:   Diagnosis Date   • BPH (benign prostatic hyperplasia)    • CAD (coronary artery disease)    • Diabetes mellitus ([facility] )     type 2, diet controlled   • Diastolic CHF ([facility] )    • Epilepsy ([place] [address] )    • History of mycosis fungoides    • Hyperlipidemia    • Hypertension    • ILD (interstitial lung disease) ([facility] )    • Osteoporosis    • Seizures ([place] [address] )     partial sensory   • Severe aortic stenosis      Past Surgical History:   Procedure Laterality Date   • CARDIAC CATHETERIZATION     • COLONOSCOPY     • NC ECHO TRANSESOPHAG R-T 2D W/PRB IMG ACQUISJ I&R N/A 1/7/2020    Procedure: TRANSESOPHAGEAL ECHOCARDIOGRAM (POOL); Surgeon: Americo Veliz DO;  Location:  MAIN OR;  Service: Cardiac Surgery   • NC REPAIR [address] HERNIA,5+Y/O,REDUCIBL Right 11/16/2018    Procedure: REPAIR RIGHT INGUINAL HERNIA WITH MESH;  Surgeon: Josefina Viera MD;  Location: [address] [place] [address] MAIN OR;  Service: General   • NC REPLACE AORTIC VALVE OPENFEMORAL ARTERY APPROACH N/A 1/7/2020    Procedure: REPLACEMENT AORTIC VALVE TRANSCATHETER (TAVR) TRANSFEMORAL W/ 23 MM CARROLL KATHLEEN S3 VALVE (ACCESS ON LEFT);   Surgeon: Americo Veliz DO;  Location:  MAIN OR;  Service: Cardiac Surgery     Family History:   Family History   Problem Relation Age of Onset   • Coronary artery disease Family    • Heart disease Family        Social History   Social History     Substance and Sexual Activity   Alcohol Use Yes    Comment: 1 glass of wine with dinner      Social History     Substance and Sexual Activity   Drug Use No     Social History     Tobacco Use   Smoking Status Never Smoker   Smokeless Tobacco Never Used         Meds/Allergies   all medications and allergies reviewed  Home Medications:   Medications Prior to Admission   Medication   • amoxicillin (AMOXIL) 500 mg capsule   • ascorbic acid (VITAMIN C) 1000 MG tablet   • aspirin (ECOTRIN LOW STRENGTH) 81 mg EC tablet   • calcium-vitamin D (OSCAL 500 + D) 500 mg-200 units per tablet   • cholecalciferol (VITAMIN D3) 400 units tablet   • levETIRAcetam (KEPPRA) 250 mg tablet   • Multiple Vitamin (DAILY VALUE MULTIVITAMIN PO)   • simvastatin (ZOCOR) 10 mg tablet • pantoprazole (PROTONIX) 20 mg tablet       Allergies   Allergen Reactions   • Escitalopram Dizziness   • Metformin Diarrhea       Objective   Vitals: Blood pressure 143/65, pulse (!) 32, temperature 97 7 °F (36 5 °C), temperature source Temporal, resp  rate 16, height 5' 3" (1 6 m), weight 58 1 kg (128 lb), SpO2 96 %  Orthostatic Blood Pressures    Flowsheet Row Most Recent Value   Blood Pressure 143/65 filed at 10/24/2022 1004   Patient Position - Orthostatic VS Lying filed at 10/24/2022 1004          No intake or output data in the 24 hours ending 10/24/22 1141    Invasive Devices  Report    Peripheral Intravenous Line  Duration           Peripheral IV 10/24/22 Distal;Left;Ventral (anterior) Forearm <1 day                Physical Exam  Constitutional:       Appearance: He is well-developed  HENT:      Head: Normocephalic and atraumatic  Eyes:      Pupils: Pupils are equal, round, and reactive to light  Cardiovascular:      Rate and Rhythm: Normal rate and regular rhythm  Pulmonary:      Effort: Pulmonary effort is normal       Breath sounds: Normal breath sounds  Abdominal:      General: Bowel sounds are normal       Palpations: Abdomen is soft  Musculoskeletal:         General: Normal range of motion  Cervical back: Normal range of motion and neck supple  Skin:     General: Skin is warm and dry  Neurological:      Mental Status: He is alert and oriented to person, place, and time  Lab Results: I have personally reviewed pertinent lab results      Results from last 7 days   Lab Units 10/24/22  0952   WBC Thousand/uL 5 86   HEMOGLOBIN g/dL 14 5   HEMATOCRIT % 44 7   PLATELETS Thousands/uL 120*     Results from last 7 days   Lab Units 10/24/22  0952   POTASSIUM mmol/L 4 2   CHLORIDE mmol/L 107   CO2 mmol/L 24   BUN mg/dL 25   CREATININE mg/dL 1 00   CALCIUM mg/dL 10 0     Results from last 7 days   Lab Units 10/24/22  0952   INR  1 02               Code Status: Level 1 - Full Code

## 2022-10-24 NOTE — ANESTHESIA POSTPROCEDURE EVALUATION
Post-Op Assessment Note    CV Status:  Stable  Pain Score: 0    Pain management: adequate     Mental Status:  Awake   Hydration Status:  Stable   PONV Controlled:  None   Airway Patency:  Patent      Post Op Vitals Reviewed: Yes      Staff: CRNA         No complications documented      BP   110/64   Temp      Pulse 77   Resp   14   SpO2   99

## 2022-10-25 LAB
ATRIAL RATE: 70 BPM
P AXIS: 4 DEGREES
PR INTERVAL: 218 MS
QRS AXIS: -13 DEGREES
QRSD INTERVAL: 84 MS
QT INTERVAL: 402 MS
QTC INTERVAL: 434 MS
T WAVE AXIS: 42 DEGREES
VENTRICULAR RATE: 70 BPM

## 2022-10-25 PROCEDURE — 93010 ELECTROCARDIOGRAM REPORT: CPT | Performed by: INTERNAL MEDICINE

## 2022-10-28 ENCOUNTER — HOSPITAL ENCOUNTER (EMERGENCY)
Facility: HOSPITAL | Age: 82
Discharge: HOME | End: 2022-10-28
Attending: EMERGENCY MEDICINE
Payer: MEDICARE

## 2022-10-28 ENCOUNTER — APPOINTMENT (EMERGENCY)
Dept: RADIOLOGY | Facility: HOSPITAL | Age: 82
End: 2022-10-28
Payer: MEDICARE

## 2022-10-28 VITALS
SYSTOLIC BLOOD PRESSURE: 143 MMHG | RESPIRATION RATE: 28 BRPM | TEMPERATURE: 97.7 F | OXYGEN SATURATION: 95 % | DIASTOLIC BLOOD PRESSURE: 64 MMHG | WEIGHT: 128 LBS | BODY MASS INDEX: 22.67 KG/M2 | HEART RATE: 60 BPM

## 2022-10-28 DIAGNOSIS — Z95.0 PACEMAKER: ICD-10-CM

## 2022-10-28 DIAGNOSIS — I49.3 PVC'S (PREMATURE VENTRICULAR CONTRACTIONS): Primary | ICD-10-CM

## 2022-10-28 LAB
2HR DELTA HS TROPONIN: -11 NG/L
4HR DELTA HS TROPONIN: -36 NG/L
ALBUMIN SERPL BCP-MCNC: 3.4 G/DL (ref 3.5–5)
ALP SERPL-CCNC: 49 U/L (ref 46–116)
ALT SERPL W P-5'-P-CCNC: 35 U/L (ref 12–78)
ANION GAP SERPL CALCULATED.3IONS-SCNC: 2 MMOL/L (ref 4–13)
AST SERPL W P-5'-P-CCNC: 72 U/L (ref 5–45)
ATRIAL RATE: 159 BPM
ATRIAL RATE: 576 BPM
BASOPHILS # BLD AUTO: 0.05 THOUSANDS/ÂΜL (ref 0–0.1)
BASOPHILS NFR BLD AUTO: 1 % (ref 0–1)
BILIRUB SERPL-MCNC: 0.46 MG/DL (ref 0.2–1)
BUN SERPL-MCNC: 21 MG/DL (ref 5–25)
CALCIUM ALBUM COR SERPL-MCNC: 10.9 MG/DL (ref 8.3–10.1)
CALCIUM SERPL-MCNC: 10.4 MG/DL (ref 8.3–10.1)
CARDIAC TROPONIN I PNL SERPL HS: 118 NG/L
CARDIAC TROPONIN I PNL SERPL HS: 143 NG/L
CARDIAC TROPONIN I PNL SERPL HS: 154 NG/L
CHLORIDE SERPL-SCNC: 106 MMOL/L (ref 96–108)
CO2 SERPL-SCNC: 26 MMOL/L (ref 21–32)
CREAT SERPL-MCNC: 1 MG/DL (ref 0.6–1.3)
EOSINOPHIL # BLD AUTO: 0.81 THOUSAND/ÂΜL (ref 0–0.61)
EOSINOPHIL NFR BLD AUTO: 10 % (ref 0–6)
ERYTHROCYTE [DISTWIDTH] IN BLOOD BY AUTOMATED COUNT: 13.2 % (ref 11.6–15.1)
GFR SERPL CREATININE-BSD FRML MDRD: 69 ML/MIN/1.73SQ M
GLUCOSE SERPL-MCNC: 104 MG/DL (ref 65–140)
HCT VFR BLD AUTO: 45.5 % (ref 36.5–49.3)
HGB BLD-MCNC: 15 G/DL (ref 12–17)
IMM GRANULOCYTES # BLD AUTO: 0.01 THOUSAND/UL (ref 0–0.2)
IMM GRANULOCYTES NFR BLD AUTO: 0 % (ref 0–2)
LYMPHOCYTES # BLD AUTO: 1.81 THOUSANDS/ÂΜL (ref 0.6–4.47)
LYMPHOCYTES NFR BLD AUTO: 23 % (ref 14–44)
MCH RBC QN AUTO: 29.9 PG (ref 26.8–34.3)
MCHC RBC AUTO-ENTMCNC: 33 G/DL (ref 31.4–37.4)
MCV RBC AUTO: 91 FL (ref 82–98)
MONOCYTES # BLD AUTO: 0.91 THOUSAND/ÂΜL (ref 0.17–1.22)
MONOCYTES NFR BLD AUTO: 12 % (ref 4–12)
NEUTROPHILS # BLD AUTO: 4.22 THOUSANDS/ÂΜL (ref 1.85–7.62)
NEUTS SEG NFR BLD AUTO: 54 % (ref 43–75)
NRBC BLD AUTO-RTO: 0 /100 WBCS
P AXIS: 16 DEGREES
POTASSIUM SERPL-SCNC: 5.9 MMOL/L (ref 3.5–5.3)
PROT SERPL-MCNC: 8.5 G/DL (ref 6.4–8.4)
QRS AXIS: -1 DEGREES
QRS AXIS: -11 DEGREES
QRSD INTERVAL: 82 MS
QRSD INTERVAL: 82 MS
QT INTERVAL: 368 MS
QT INTERVAL: 418 MS
QTC INTERVAL: 405 MS
QTC INTERVAL: 473 MS
RBC # BLD AUTO: 5.02 MILLION/UL (ref 3.88–5.62)
SODIUM SERPL-SCNC: 134 MMOL/L (ref 135–147)
T WAVE AXIS: 37 DEGREES
T WAVE AXIS: 57 DEGREES
VENTRICULAR RATE: 73 BPM
VENTRICULAR RATE: 77 BPM
WBC # BLD AUTO: 7.81 THOUSAND/UL (ref 4.31–10.16)

## 2022-10-28 PROCEDURE — 93005 ELECTROCARDIOGRAM TRACING: CPT

## 2022-10-28 PROCEDURE — 71045 X-RAY EXAM CHEST 1 VIEW: CPT

## 2022-10-28 PROCEDURE — 36415 COLL VENOUS BLD VENIPUNCTURE: CPT

## 2022-10-28 PROCEDURE — 85025 COMPLETE CBC W/AUTO DIFF WBC: CPT

## 2022-10-28 PROCEDURE — 84484 ASSAY OF TROPONIN QUANT: CPT

## 2022-10-28 PROCEDURE — 80053 COMPREHEN METABOLIC PANEL: CPT

## 2022-10-28 RX ORDER — METOPROLOL SUCCINATE 25 MG/1
25 TABLET, EXTENDED RELEASE ORAL DAILY
Qty: 14 TABLET | Refills: 0 | Status: SHIPPED | OUTPATIENT
Start: 2022-10-28 | End: 2022-11-09 | Stop reason: SDUPTHER

## 2022-10-28 RX ORDER — METOPROLOL SUCCINATE 25 MG/1
25 TABLET, EXTENDED RELEASE ORAL DAILY
Status: DISCONTINUED | OUTPATIENT
Start: 2022-10-29 | End: 2022-10-28

## 2022-10-28 RX ORDER — METOPROLOL SUCCINATE 25 MG/1
25 TABLET, EXTENDED RELEASE ORAL DAILY
Status: DISCONTINUED | OUTPATIENT
Start: 2022-10-28 | End: 2022-10-28 | Stop reason: HOSPADM

## 2022-10-28 RX ADMIN — METOPROLOL SUCCINATE 25 MG: 25 TABLET, FILM COATED, EXTENDED RELEASE ORAL at 16:31

## 2022-10-28 NOTE — ED PROVIDER NOTES
History  Chief Complaint   Patient presents with   • Slow Heart Rate     Pt reports pacer placement on 10/24/2022  Reports he began with "slow HR" this morning, pt reports feeling dizzy, mild SOB     79year-old male patient with recent pacemaker on 10/24 presenting with slow heart rate this morning  Patient states that since procedure he has had some pain to the area of his procedure  however this morning started feeling lightheadedness, mild shortness of breath  States he checked his heart rate this morning and was slow  Denies any chest pain, fever, abd pain, n/v/d, urinary symptoms  States since his procedure, he has some soreness to the site but otherwise doing well  Prior to Admission Medications   Prescriptions Last Dose Informant Patient Reported? Taking?    Multiple Vitamin (DAILY VALUE MULTIVITAMIN PO)  Self Yes No   Sig: Take 1 tablet by mouth daily   amoxicillin (AMOXIL) 500 mg capsule  Self Yes No   Sig: BEFORE DENTAL WORK   ascorbic acid (VITAMIN C) 1000 MG tablet  Self Yes No   Sig: Take 1,000 mg by mouth daily   aspirin (ECOTRIN LOW STRENGTH) 81 mg EC tablet  Self No No   Sig: Take 1 tablet (81 mg total) by mouth daily   azithromycin (ZITHROMAX) 250 mg tablet   No No   Sig: Take 2 tablets today then 1 tablet daily x 4 days   benzonatate (TESSALON) 200 MG capsule   No No   Sig: Take 1 capsule (200 mg total) by mouth 3 (three) times a day as needed for cough   calcium-vitamin D (OSCAL 500 + D) 500 mg-200 units per tablet  Self Yes No   Sig: Take 1 tablet by mouth daily   cholecalciferol (VITAMIN D3) 400 units tablet  Self Yes No   Sig: Take 2,000 Units by mouth daily   levETIRAcetam (KEPPRA) 250 mg tablet  Self Yes No   Sig: Take 250 mg by mouth 2 (two) times a day   simvastatin (ZOCOR) 10 mg tablet  Self Yes No   Sig: Take 10 mg by mouth daily at bedtime      Facility-Administered Medications: None       Past Medical History:   Diagnosis Date   • BPH (benign prostatic hyperplasia)    • CAD (coronary artery disease)    • Diabetes mellitus ([place] [address] )     type 2, diet controlled   • Diastolic CHF ([place] [address] )    • Epilepsy ([place] [address] )    • History of mycosis fungoides    • Hyperlipidemia    • Hypertension    • ILD (interstitial lung disease) ([place] [address] )    • Osteoporosis    • Seizures ([place] [address] )     partial sensory   • Severe aortic stenosis        Past Surgical History:   Procedure Laterality Date   • CARDIAC CATHETERIZATION     • CARDIAC ELECTROPHYSIOLOGY PROCEDURE N/A 10/24/2022    Procedure: Cardiac pacer implant/ DUAL CHAMBER;  Surgeon: Kierra Plummer DO;  Location: [facility]; Service: Cardiology   • COLONOSCOPY     • HI ECHO TRANSESOPHAG R-T 2D W/PRB IMG ACQUISJ I&R N/A 1/7/2020    Procedure: TRANSESOPHAGEAL ECHOCARDIOGRAM (POOL); Surgeon: Vic Lockwood DO;  Location:  MAIN OR;  Service: Cardiac Surgery   • HI REPAIR [address] HERNIA,5+Y/O,REDUCIBL Right 11/16/2018    Procedure: REPAIR RIGHT INGUINAL HERNIA WITH MESH;  Surgeon: Julianna Napier MD;  Location: [address] [place] [address] MAIN OR;  Service: General   • HI REPLACE AORTIC VALVE OPENFEMORAL ARTERY APPROACH N/A 1/7/2020    Procedure: REPLACEMENT AORTIC VALVE TRANSCATHETER (TAVR) TRANSFEMORAL W/ 23 MM CARROLL KATHLEEN S3 VALVE (ACCESS ON LEFT); Surgeon: Vic Lockwood DO;  Location:  MAIN OR;  Service: Cardiac Surgery       Family History   Problem Relation Age of Onset   • Coronary artery disease Family    • Heart disease Family      I have reviewed and agree with the history as documented  E-Cigarette/Vaping   • E-Cigarette Use Never User      E-Cigarette/Vaping Substances   • Nicotine No    • THC No    • CBD No    • Flavoring No    • Other No    • Unknown No      Social History     Tobacco Use   • Smoking status: Never Smoker   • Smokeless tobacco: Never Used   Vaping Use   • Vaping Use: Never used   Substance Use Topics   • Alcohol use: Yes     Comment: 1 glass of wine with dinner    • Drug use: No        Review of Systems   Constitutional: Negative for chills and fever  HENT: Negative for congestion, rhinorrhea and sore throat  Eyes: Negative for pain and visual disturbance  Respiratory: Positive for shortness of breath  Negative for cough and chest tightness  Cardiovascular: Negative for chest pain and leg swelling  Gastrointestinal: Negative for abdominal pain, diarrhea, nausea and vomiting  Genitourinary: Negative for difficulty urinating and dysuria  Musculoskeletal: Negative for arthralgias, back pain and myalgias  Skin: Negative for rash and wound  Neurological: Positive for light-headedness  Negative for dizziness and headaches  All other systems reviewed and are negative  Physical Exam  ED Triage Vitals   Temperature Pulse Respirations Blood Pressure SpO2   10/28/22 1138 10/28/22 1136 10/28/22 1136 10/28/22 1140 10/28/22 1136   97 7 °F (36 5 °C) (!) 39 18 (!) 175/75 97 %      Temp Source Heart Rate Source Patient Position - Orthostatic VS BP Location FiO2 (%)   10/28/22 1138 10/28/22 1136 10/28/22 1145 10/28/22 1136 --   Oral Radial Lying Right arm       Pain Score       10/28/22 1144       4             Orthostatic Vital Signs  Vitals:    10/28/22 1500 10/28/22 1552 10/28/22 1600 10/28/22 1631   BP: 127/59  143/64    Pulse: 68 55 68 60   Patient Position - Orthostatic VS: Lying  Lying        Physical Exam  Vitals reviewed  Constitutional:       Appearance: Normal appearance  HENT:      Head: Normocephalic and atraumatic  Nose: Nose normal       Mouth/Throat:      Mouth: Mucous membranes are moist       Pharynx: Oropharynx is clear  Eyes:      Extraocular Movements: Extraocular movements intact  Conjunctiva/sclera: Conjunctivae normal    Cardiovascular:      Rate and Rhythm: Normal rate and regular rhythm  Pulses: Normal pulses  Heart sounds: Normal heart sounds        Comments: Left sided pacemaker in place with dressing surrounding  Pulmonary:      Effort: Pulmonary effort is normal       Breath sounds: Normal breath sounds  Abdominal:      General: Bowel sounds are normal       Palpations: Abdomen is soft  Tenderness: There is no abdominal tenderness  Musculoskeletal:         General: Normal range of motion  Cervical back: Normal range of motion  Skin:     General: Skin is warm and dry  Findings: Bruising (bruising to LUE) present  Neurological:      General: No focal deficit present  Mental Status: He is alert and oriented to person, place, and time  Mental status is at baseline           ED Medications  Medications - No data to display    Diagnostic Studies  Results Reviewed     Procedure Component Value Units Date/Time    HS Troponin I 4hr [[id number]]  (Abnormal) Collected: 10/28/22 1527    Lab Status: Final result Specimen: Blood from Line, Venous Updated: 10/28/22 1616     hs TnI 4hr 118 ng/L      Delta 4hr hsTnI -36 ng/L     CBC and differential [[id number]]  (Abnormal) Collected: 10/28/22 1147    Lab Status: Final result Specimen: Blood from Line, Venous Updated: 10/28/22 1552     WBC 7 81 Thousand/uL      RBC 5 02 Million/uL      Hemoglobin 15 0 g/dL      Hematocrit 45 5 %      MCV 91 fL      MCH 29 9 pg      MCHC 33 0 g/dL      RDW 13 2 %      Platelets --     nRBC 0 /100 WBCs      Neutrophils Relative 54 %      Immat GRANS % 0 %      Lymphocytes Relative 23 %      Monocytes Relative 12 %      Eosinophils Relative 10 %      Basophils Relative 1 %      Neutrophils Absolute 4 22 Thousands/µL      Immature Grans Absolute 0 01 Thousand/uL      Lymphocytes Absolute 1 81 Thousands/µL      Monocytes Absolute 0 91 Thousand/µL      Eosinophils Absolute 0 81 Thousand/µL      Basophils Absolute 0 05 Thousands/µL     HS Troponin I 2hr [[id number]]  (Abnormal) Collected: 10/28/22 1312    Lab Status: Final result Specimen: Blood from Line, Venous Updated: 10/28/22 1346     hs TnI 2hr 143 ng/L      Delta 2hr hsTnI -11 ng/L     HS Troponin 0hr (reflex protocol) [[id number]]  (Abnormal) Collected: 10/28/22 1147 Lab Status: Final result Specimen: Blood from Line, Venous Updated: 10/28/22 1220     hs TnI 0hr 154 ng/L     Comprehensive metabolic panel [[id number]]  (Abnormal) Collected: 10/28/22 1147    Lab Status: Final result Specimen: Blood from Line, Venous Updated: 10/28/22 1212     Sodium 134 mmol/L      Potassium 5 9 mmol/L      Chloride 106 mmol/L      CO2 26 mmol/L      ANION GAP 2 mmol/L      BUN 21 mg/dL      Creatinine 1 00 mg/dL      Glucose 104 mg/dL      Calcium 10 4 mg/dL      Corrected Calcium 10 9 mg/dL      AST 72 U/L      ALT 35 U/L      Alkaline Phosphatase 49 U/L      Total Protein 8 5 g/dL      Albumin 3 4 g/dL      Total Bilirubin 0 46 mg/dL      eGFR 69 ml/min/1 73sq m     Narrative:      [place] guidelines for Chronic Kidney Disease (CKD):   •  Stage 1 with normal or high GFR (GFR > 90 mL/min/1 73 square meters)  •  Stage 2 Mild CKD (GFR = 60-89 mL/min/1 73 square meters)  •  Stage 3A Moderate CKD (GFR = 45-59 mL/min/1 73 square meters)  •  Stage 3B Moderate CKD (GFR = 30-44 mL/min/1 73 square meters)  •  Stage 4 Severe CKD (GFR = 15-29 mL/min/1 73 square meters)  •  Stage 5 End Stage CKD (GFR <15 mL/min/1 73 square meters)  Note: GFR calculation is accurate only with a steady state creatinine                 XR chest 1 view portable   Final Result by Sandra Atkinson MD (10/28 6828)      No acute cardiopulmonary disease  Findings are stable            Workstation performed: [id number]               Procedures  Procedures      ED Course  ED Course as of 10/29/22 1734   Fri Oct 28, 2022   1512 EKG: atrial paced  Frequent PVCs                               SBIRT 22yo+    Flowsheet Row Most Recent Value   SBIRT (23 yo +)    In order to provide better care to our patients, we are screening all of our patients for alcohol and drug use  Would it be okay to ask you these screening questions? Yes Filed at: 10/28/2022 114   Initial Alcohol Screen: US AUDIT-C     1   How often do you have a drink containing alcohol? 0 Filed at: 10/28/2022 1148   3b  FEMALE Any Age, or MALE 65+: How often do you have 4 or more drinks on one occassion? 0 Filed at: 10/28/2022 1148   Audit-C Score 0 Filed at: 10/28/2022 1148   J CARLOS: How many times in the past year have you    Used an illegal drug or used a prescription medication for non-medical reasons? Never Filed at: 10/28/2022 1148                MDM  Number of Diagnoses or Management Options  Pacemaker  PVC's (premature ventricular contractions)  Diagnosis management comments: 67-year-old male patient with recent pacemaker placement on 10/24 presenting with lightheadedness, dizziness and feeling like heart rate slowed today  Denies any chest pain, shortness of breath  Exam within normal limits  EKG shows frequent PVCs however within normal limits  Pacemaker was interrogated, did not show any observations  Labs show decreasing troponin  Chest x-ray within normal limits  Spoke with EP, states likely due to PVCs that patient is having the symptoms  Patient was seen by EP in the ED  Recommended starting patient on Toprol and follow-up with Cardiology  Discussed results with patient  Stable for discharge return precautions given  Follow up with Cardiology         Amount and/or Complexity of Data Reviewed  Clinical lab tests: ordered and reviewed  Tests in the radiology section of CPT®: ordered and reviewed        Disposition  Final diagnoses:   PVC's (premature ventricular contractions)   Pacemaker     Time reflects when diagnosis was documented in both MDM as applicable and the Disposition within this note     Time User Action Codes Description Comment    10/28/2022  3:37 PM Ria FIGUEROA Add [I49 3] PVC's (premature ventricular contractions)     10/28/2022  3:37 PM Sarahi Montez Add [Z95 0] Pacemaker       ED Disposition     ED Disposition   Discharge    Condition   Stable    Date/Time   Fri Oct 28, 2022  3:37 PM    Comment   Leigh Negrete Vidhi discharge to home/self care  Follow-up Information     Follow up With Specialties Details Why Contact Info    Isabel Santoro MD Internal Medicine Schedule an appointment as soon as possible for a visit   Arleen Gallegos [address] [place] [address], DO Cardiology Schedule an appointment as soon as possible for a visit   [address] [place] [address]  [phone]            Discharge Medication List as of 10/28/2022  4:21 PM      START taking these medications    Details   metoprolol succinate (TOPROL-XL) 25 mg 24 hr tablet Take 1 tablet (25 mg total) by mouth daily for 14 days, Starting Fri 10/28/2022, Until Fri 11/11/2022, Normal         CONTINUE these medications which have NOT CHANGED    Details   amoxicillin (AMOXIL) 500 mg capsule BEFORE DENTAL WORK, Starting Wed 11/27/2019, Historical Med      ascorbic acid (VITAMIN C) 1000 MG tablet Take 1,000 mg by mouth daily, Historical Med      aspirin (ECOTRIN LOW STRENGTH) 81 mg EC tablet Take 1 tablet (81 mg total) by mouth daily, Starting Mon 1/20/2020, No Print      azithromycin (ZITHROMAX) 250 mg tablet Take 2 tablets today then 1 tablet daily x 4 days, Normal      benzonatate (TESSALON) 200 MG capsule Take 1 capsule (200 mg total) by mouth 3 (three) times a day as needed for cough, Starting Mon 10/24/2022, Normal      calcium-vitamin D (OSCAL 500 + D) 500 mg-200 units per tablet Take 1 tablet by mouth daily, Historical Med      cholecalciferol (VITAMIN D3) 400 units tablet Take 2,000 Units by mouth daily, Historical Med      levETIRAcetam (KEPPRA) 250 mg tablet Take 250 mg by mouth 2 (two) times a day, Historical Med      Multiple Vitamin (DAILY VALUE MULTIVITAMIN PO) Take 1 tablet by mouth daily, Historical Med      simvastatin (ZOCOR) 10 mg tablet Take 10 mg by mouth daily at bedtime, Historical Med           No discharge procedures on file      PDMP Review     None           ED Provider  Attending physically available and evaluated Gaby Antoniomarlee SILVEIRA managed the patient along with the ED Attending      Electronically Signed by         Geoffrey Leger MD  10/29/22 6568

## 2022-10-28 NOTE — CONSULTS
Consultation - Electrophysiology Cardiology (EP)   Surendra Cage 79 y o  male MRN: [id number]  Unit/Bed#: ED 09 Encounter: [id number]      Consults  PCP: Ashley Lyons MD  Outpatient Cardiologist: Jennifer Perry    Assessment/Plan   Surendra Cage is a 79y o  year old male with a history of sick sinus syndrome status post pacemaker placement earlier this week presenting with some generalized weakness this morning and would the patient thinks bradycardia  Bradycardia:  Patient most likely palpated lower heart rate and lower heart rate is reported on his monitor due to high burden of PVCs  His pacemaker is appropriately working with nice impedance and thresholds  He is currently a paced and V sense with bigeminy  He does have a high burden of PVCs on his recent ZIO patch   -no evidence of infection or device site hematoma  -device is working appropriately  -can start metoprolol 25 XL for PVCs and follow-up with his cardiologist on November 9th outpatient        Case discussed and reviewed with Dr Maribell Holm pending attending addendum  Thank you for involving us in the care of your patient  Ladonna Calixto MD  Cardiology Fellow   PGY-5    ==========================================================================================    History of Present Illness   Physician Requesting Consult: Marcelo Sales MD  Reason for Consult / Principal Problem: Pacemaker function     HPI: Surendra Cage is a 79y o  year old male with a history of sick sinus syndrome status post pacemaker placement earlier this week presenting with some generalized weakness this morning and would the patient thinks bradycardia  Patient came to the hospital because he thought his heart rate was very low and his pacemaker night might not be working  On arrival he is a paced with V sensing and frequent PVCs    During device interrogation he had a high PVC burden but all leads are working appropriately with nice impedance and thresholds  In the emergency room he is hemodynamically stable with no other symptoms Denies fevers/chills, nausea/vomiting, abdominal pain, diarrhea, cough, chest pain, shortness of breath, PND, orthopnea, presyncopal symptoms, syncopal episodes  TELEMETRY: personally reviewed by myself Lacho Mcrae     ECG:        DEVICE INTERROGATION  A paces and V sense with PVCs  HOLTER  Results for orders placed during the hospital encounter of 05/30/19    Holter monitor - 24 hour    Narrative  24 hour Holter monitor  Predominant rhythm sinus rhythm with an average heart rate of 57 beats per minute, minimum heart rate of 43 beats per minute and maximum heart rate of 102 beats per minute  There were rare ventricular ectopic beats noted totaling 13 in 24 hours  These were all isolated  There were rare supraventricular ectopic beats noted totaling 20 in 24 hours  These were all isolated except for 1 couplet  No heart block or pauses exceeding 1 8 seconds  No symptoms recorded by the patient  Minimum heart rate occurred at 4:30 a m  Presumably during sleep    Trsih Chadwick MD        Review of Systems  ROS as noted above in HPI  Historical Information   Past Medical History:   Diagnosis Date   • BPH (benign prostatic hyperplasia)    • CAD (coronary artery disease)    • Diabetes mellitus ([facility] )     type 2, diet controlled   • Diastolic CHF ([facility] )    • Epilepsy ([facility] )    • History of mycosis fungoides    • Hyperlipidemia    • Hypertension    • ILD (interstitial lung disease) ([facility] )    • Osteoporosis    • Seizures ([facility] )     partial sensory   • Severe aortic stenosis      Past Surgical History:   Procedure Laterality Date   • CARDIAC CATHETERIZATION     • CARDIAC ELECTROPHYSIOLOGY PROCEDURE N/A 10/24/2022    Procedure: Cardiac pacer implant/ DUAL CHAMBER;  Surgeon: Raul Bliss DO;  Location: [facility] CARDIAC CATH LAB;   Service: Cardiology   • COLONOSCOPY     • NE ECHO TRANSESOPHAG R-T 2D W/PRB IMG ACQUISJ I&R N/A 1/7/2020    Procedure: TRANSESOPHAGEAL ECHOCARDIOGRAM (POOL); Surgeon: Americo Veliz DO;  Location:  MAIN OR;  Service: Cardiac Surgery   • IL REPAIR [address] HERNIA,5+Y/O,REDUCIBL Right 11/16/2018    Procedure: REPAIR RIGHT INGUINAL HERNIA WITH MESH;  Surgeon: Josefina Viera MD;  Location: [address] [place] [address] MAIN OR;  Service: General   • IL REPLACE AORTIC VALVE OPENFEMORAL ARTERY APPROACH N/A 1/7/2020    Procedure: REPLACEMENT AORTIC VALVE TRANSCATHETER (TAVR) TRANSFEMORAL W/ 23 MM CARROLL KATHLEEN S3 VALVE (ACCESS ON LEFT); Surgeon: Americo Veliz DO;  Location:  MAIN OR;  Service: Cardiac Surgery     Social History     Substance and Sexual Activity   Alcohol Use Yes    Comment: 1 glass of wine with dinner      Social History     Substance and Sexual Activity   Drug Use No     Social History     Tobacco Use   Smoking Status Never Smoker   Smokeless Tobacco Never Used     Family History: non-contributory    Meds/Allergies   Hospital Medications:   No current facility-administered medications for this encounter  Home Medications: (Not in a hospital admission)      Allergies   Allergen Reactions   • Escitalopram Dizziness   • Metformin Diarrhea       Objective   Vitals: Blood pressure 153/69, pulse 70, temperature 97 7 °F (36 5 °C), temperature source Oral, resp  rate 14, weight 58 1 kg (128 lb), SpO2 94 %    Orthostatic Blood Pressures    Flowsheet Row Most Recent Value   Blood Pressure 153/69 filed at 10/28/2022 1300   Patient Position - Orthostatic VS Lying filed at 10/28/2022 1245          No intake or output data in the 24 hours ending 10/28/22 1354    Invasive Devices  Report    Peripheral Intravenous Line  Duration           Peripheral IV 10/28/22 Left Forearm <1 day                Physical Exam    GEN: Deepti Means appears well, alert and oriented x 3, pleasant and cooperative   HEENT:  Normocephalic, atraumatic, anicteric, moist mucous membranes  NECK: No JVD or carotid bruits   HEART: regular rhythm, regular rate, normal S1 and S2, no murmurs, clicks, gallops or rubs   LUNGS: Clear to auscultation bilaterally; no wheezes, rales, or rhonchi; respiration nonlabored   ABDOMEN:  Normoactive bowel sounds, soft, no tenderness, no distention  EXTREMITIES: peripheral pulses palpable; no edema  NEURO: no gross focal findings; cranial nerves grossly intact   SKIN:  Dry, intact, warm to touch    Lab Results: I have personally reviewed pertinent lab results  Results from last 7 days   Lab Units 10/28/22  1312 10/28/22  1147   HS TNI 0HR ng/L  --  154*   HS TNI 2HR ng/L 143*  --          Results from last 7 days   Lab Units 10/28/22  1147 10/24/22  0952   POTASSIUM mmol/L 5 9* 4 2   CO2 mmol/L 26 24   CHLORIDE mmol/L 106 107   BUN mg/dL 21 25   CREATININE mg/dL 1 00 1 00     Results from last 7 days   Lab Units 10/24/22  0952   HEMOGLOBIN g/dL 14 5   HEMATOCRIT % 44 7   PLATELETS Thousands/uL 120*         Results from last 7 days   Lab Units 10/24/22  0952   INR  1 02       Imaging: I have personally reviewed pertinent reports  Previous STRESS TEST:  No results found for this or any previous visit  No results found for this or any previous visit  No results found for this or any previous visit  Previous Cath/PCI:  Results for orders placed during the hospital encounter of 19    Cardiac catheterization    Narrative  [address] [place] [address], [address] [facility]  [phone]    [facility]    Invasive Cardiovascular Lab Complete Report    Patient: Oneil Griggs  MR number: [id number]  Account number: [de-identified]  Study date: 2019  Gender: Male  : 1940  Height: 61 8 in  Weight: 130 5 lb  BSA: 1 59 mï¾²    Allergies: NO KNOWN ALLERGIES    Diagnostic Cardiologist:  Asim Blanco MD  Primary Physician:  Mine Oliver MD    SUMMARY    CORONARY CIRCULATION:  Left main: Normal   LAD: Normal  The diagonal branches were also normal   Circumflex: Normal  The OM branches were also normal l  RCA: Dominant; normal     Summary:  Normal coronary angiography  Plan: TAVR workup  INDICATIONS:  --  Cardiac: aortic valve disease  PROCEDURES PERFORMED    --  Left coronary angiography  --  Right coronary angiography  --  Mod Sedation Same Physician Initial 15min  --  Coronary Catheterization (w/o [facility])  PROCEDURE: The risks and alternatives of the procedures and conscious sedation were explained to the patient and informed consent was obtained  The patient was brought to the cath lab and placed on the table  The planned puncture sites  were prepped and draped in the usual sterile fashion  --  Right radial artery access  After performing an John's test to verify adequate ulnar artery supply to the hand, the radial site was prepped  The puncture site was infiltrated with local anesthetic  The vessel was accessed using the  modified Seldinger technique, a wire was advanced into the vessel, and a sheath was advanced over the wire into the vessel  --  Left coronary artery angiography  A catheter was advanced over a guidewire into the aorta and positioned in the left coronary artery ostium under fluoroscopic guidance  Angiography was performed  --  Right coronary artery angiography  A catheter was advanced over a guidewire into the aorta and positioned in the right coronary artery ostium under fluoroscopic guidance  Angiography was performed  --  Mod Sedation Same Physician Initial 15min  --  Coronary Catheterization (w/o [facility])  PROCEDURE COMPLETION: The patient tolerated the procedure well and was discharged from the cath lab  TIMING: Test concluded at 10:52  HEMOSTASIS: The sheath was removed  The site was compressed with a Hemoband device  Hemostasis was  obtained  MEDICATIONS GIVEN: Verapamil (Isoptin, Calan, Covera), 1 25 mg, IA, at 10:38  Fentanyl (1OOmcg/2 ml), 50 mcg, IV, at 10:27   Versed (2mg/2ml), 2 mg, IV, at 10:27  1% Lidocaine, 1 ml, subcutaneously, at 10:35  Heparin 1000  units/ml, 4,000 units, IV, at 10:38  Nitroglycerin (200mcg/ml), 200 mcg, at 10:38  CONTRAST GIVEN: 50 ml Omnipaque (350mg I /ml)  RADIATION EXPOSURE: Fluoroscopy time: 7 4 min  CORONARY VESSELS:   --  Left main: Normal   --  LAD: Normal  The diagonal branches were also normal   --  Circumflex: Normal  The OM branches were also normal l  --  RCA: Dominant; normal     NOTE: Right radial access  Prepared and signed by    Yu Parks MD  Signed 2019 11:09:33    CC: Dr Maryuri Mckinney    Study diagram    Hemodynamic tables    Outputs:  Baseline  Outputs:  -- CALCULATIONS: [age]  Outputs:  -- CALCULATIONS: Body Surface Area: 1 59  Outputs:  -- CALCULATIONS: Height in cm: 157 00  Outputs:  -- CALCULATIONS: Sex: Male  Outputs:  -- CALCULATIONS: Weight in k 30    No results found for this or any previous visit  No results found for this or any previous visit  ECHO:  Results for orders placed during the hospital encounter of 20    Echo complete with contrast if indicated    Narrative  [address] [place] [address] 35  [place], [address]  [phone]    Transthoracic Echocardiogram  2D, M-mode, Doppler, and Color Doppler    Study date:  01-Dec-2020    Patient: Orla Harper  MR number: [id number]  Account number: [de-identified]  : 1940  Age: 79 years  Gender: Male  Status: Outpatient  Location: [place] ECHO 3  Height: 62 in  Weight: 126 7 lb  BP: 112/ 60 mmHg    Indications: S/P TAVR    Diagnoses: Z95 2 - Presence of prosthetic heart valve    Sonographer:  MARLENA Whitehead  Primary Physician:  Chance Abdul MD  Referring Physician:  JACOBO Crane  Group:  [facility]  Interpreting Physician:  Alan Day MD    IMPRESSIONS:  Technical quality: Good  Cardiac rhythm: Sinus    1   Mildly increased left ventricular wall thickness, normal left ventricular systolic function, EF around 65%  Grade 1 diastolic dysfunction with elevated estimated left ventricular filling pressures  2  Normal right ventricular size and systolic function  3  Mild left atrial cavity enlargement  4  Normal functioning bioprosthetic aortic valve with minimal paravalvular and mild valvular regurgitation  5  Mild plus mitral valve regurgitation  6  Mild tricuspid valve regurgitation  7  Possible mild pulmonary hypertension  Estimated RVSP/PASP is 37 mmHg  8  No pericardial effusion  Compared to report of previous echocardiogram from January 29, 2020 there is some progression of diastolic dysfunction and possible mild pulmonary hypertension is new  SUMMARY    LEFT VENTRICLE:  Normal left ventricular cavity size, mildly increased wall thickness, normal left ventricular systolic function normal regional wall motion  Ejection fraction is estimated as around 65%  Doppler evaluation suggests grade 1 diastolic  dysfunction  Estimated left ventricular filling pressures are increased  RIGHT VENTRICLE:  Normal right ventricular size and systolic function  Estimated right ventricular systolic pressure is increased, 37 mmHg  Possible mild pulmonary hypertension  LEFT ATRIUM:  Mild left atrial cavity enlargement  RIGHT ATRIUM:  Normal right atrial cavity size  MITRAL VALVE:  Mitral valve leaflet sclerosis, adequate leaflet mobility  Mild-plus mitral valve regurgitation  AORTIC VALVE:  Bioprosthetic aortic valve present  Valve appears to be well seated with no evidence of dehiscence  Doppler evaluation suggests normal prosthetic valve function  Peak trans prosthetic valve velocity is 2 94 m/sec, mean gradient is 15 mmHg  and calculated valve area is 1 0-1 1 cm2  Doppler velocity index is 0 53  There is minimal paravalvular and mild valvular regurgitation  TRICUSPID VALVE:  Mild tricuspid valve regurgitation  PULMONIC VALVE:  Trace pulmonic valve regurgitation      AORTA:  Aortic root and proximal ascending aorta are normal in size on 2D imaging  IVC, HEPATIC VEINS:  Inferior vena cava is normal in size and demonstrates appropriate respiratory phasic changes in diameter  PERICARDIUM:  No pericardial effusion  SUMMARY MEASUREMENTS  2D measurements:  Unspecified Anatomy:   %FS was 35 5 %  Ao Diam was 2 2 cm   EDV(Teich) was 64 3 ml   EF(Teich) was 65 7 %  ESV(Teich) was 22 1 ml  IVSd was 0 9 cm  LA Area was 17 5 cm2  LA Diam was 3 4 cm  LVEDV MOD A4C was 126 6 ml  LVEF MOD A4C  was 68 4 %  LVESV MOD A4C was 40 ml  LVIDd was 3 9 cm  LVIDs was 2 5 cm  LVLd A4C was 8 7 cm  LVLs A4C was 7 cm  LVOT Diam was 1 7 cm  LVPWd was 0 9 cm  RA Area was 14 7 cm2  RVIDd was 3 cm  SV MOD A4C was 86 6 ml   SV(Teich) was  42 2 ml   CW measurements:  Unspecified Anatomy:   AR Dec Sawyer was 2 7 m/s2  AR Dec Time was 1675 7 ms  AR PHT was 486 ms  AR Vmax was 4 5 m/s  AR maxPG was 80 6 mmHg  AV Env  Ti was 398 2 ms   AV VTI was 66 7 cm  AV Vmax was 3 m/s  AV Vmean was 1 7 m/s  AV  maxPG was 34 8 mmHg  AV meanPG was 14 7 mmHg  TR Vmax was 2 6 m/s   TR maxPG was 26 7 mmHg  MM measurements:  Unspecified Anatomy:   TAPSE was 2 4 cm  PW measurements:  Unspecified Anatomy:   SON (VTI) was 1 1 cm2  SON Vmax was 1 cm2  SON Vmax, Pt was 1 cm2  AVAI (VTI) was 0 cm2/m2  AVAI Vmax was 0 cm2/m2  AVAI Vmax, Pt was 0 cm2/m2  DVI was 0 5   E' Sept was 0 1 m/s  E/E' Sept was 21 3   LVOT  Env  Ti was 345 4 ms  LVOT VTI was 35 2 cm  LVOT Vmax was 1 3 m/s  LVOT Vmean was 1 m/s  LVOT maxPG was 7 2 mmHg  LVOT meanPG was 4 6 mmHg  LVSI Dopp was 48 3 ml/m2  LVSV Dopp was 76 3 ml   MV A Jaime was 1 m/s  MV Dec Sawyer was 4 9  m/s2  MV DecT was 229 7 ms   MV E Jaime was 1 1 m/s  MV E/A Ratio was 1 2   MV PHT was 66 6 ms  MVA By PHT was 3 3 cm2  HISTORY: PRIOR HISTORY: Aortic stenosis, sinus bradycardia, PVC's, hyperlipidemia    PROCEDURE: The study was performed in the [place] ECHO 3   This was a routine study  The transthoracic approach was used  The study included complete 2D imaging, M-mode, complete spectral Doppler, and color Doppler  The heart rate was 64 bpm, at  the start of the study  Images were obtained from the parasternal, apical, subcostal, and suprasternal notch acoustic windows  Image quality was adequate  LEFT VENTRICLE: Normal left ventricular cavity size, mildly increased wall thickness, normal left ventricular systolic function normal regional wall motion  Ejection fraction is estimated as around 65%  Doppler evaluation suggests grade 1  diastolic dysfunction  Estimated left ventricular filling pressures are increased  RIGHT VENTRICLE: Normal right ventricular size and systolic function  Estimated right ventricular systolic pressure is increased, 37 mmHg  Possible mild pulmonary hypertension  LEFT ATRIUM: Mild left atrial cavity enlargement  RIGHT ATRIUM: Normal right atrial cavity size  MITRAL VALVE: Mitral valve leaflet sclerosis, adequate leaflet mobility  Mild-plus mitral valve regurgitation  AORTIC VALVE: Bioprosthetic aortic valve present  Valve appears to be well seated with no evidence of dehiscence  Doppler evaluation suggests normal prosthetic valve function  Peak trans prosthetic valve velocity is 2 94 m/sec, mean  gradient is 15 mmHg and calculated valve area is 1 0-1 1 cm2  Doppler velocity index is 0 53  There is minimal paravalvular and mild valvular regurgitation  TRICUSPID VALVE: Mild tricuspid valve regurgitation  PULMONIC VALVE: Trace pulmonic valve regurgitation  PERICARDIUM: No pericardial effusion  AORTA: Aortic root and proximal ascending aorta are normal in size on 2D imaging  SYSTEMIC VEINS: IVC: Inferior vena cava is normal in size and demonstrates appropriate respiratory phasic changes in diameter      SYSTEM MEASUREMENT TABLES    2D  %FS: 35 5 %  Ao Diam: 2 2 cm  EDV(Teich): 64 3 ml  EF(Teich): 65 7 %  ESV(Teich): 22 1 ml  IVSd: 0 9 cm  LA Area: 17 5 cm2  LA Diam: 3 4 cm  LVEDV MOD A4C: 126 6 ml  LVEF MOD A4C: 68 4 %  LVESV MOD A4C: 40 ml  LVIDd: 3 9 cm  LVIDs: 2 5 cm  LVLd A4C: 8 7 cm  LVLs A4C: 7 cm  LVOT Diam: 1 7 cm  LVPWd: 0 9 cm  RA Area: 14 7 cm2  RVIDd: 3 cm  SV MOD A4C: 86 6 ml  SV(Teich): 42 2 ml    CW  AR Dec Boyd: 2 7 m/s2  AR Dec Time: 1675 7 ms  AR PHT: 486 ms  AR Vmax: 4 5 m/s  AR maxP 6 mmHg  AV Env  Ti: 398 2 ms  AV VTI: 66 7 cm  AV Vmax: 3 m/s  AV Vmean: 1 7 m/s  AV maxP 8 mmHg  AV meanP 7 mmHg  TR Vmax: 2 6 m/s  TR maxP 7 mmHg    MM  TAPSE: 2 4 cm    PW  SON (VTI): 1 1 cm2  SON Vmax: 1 cm2  SON Vmax, Pt: 1 cm2  AVAI (VTI): 0 cm2/m2  AVAI Vmax: 0 cm2/m2  AVAI Vmax, Pt: 0 cm2/m2  DVI: 0 5  E' Sept: 0 1 m/s  E/E' Sept: 21 3  LVOT Env  Ti: 345 4 ms  LVOT VTI: 35 2 cm  LVOT Vmax: 1 3 m/s  LVOT Vmean: 1 m/s  LVOT maxP 2 mmHg  LVOT meanP 6 mmHg  LVSI Dopp: 48 3 ml/m2  LVSV Dopp: 76 3 ml  MV A Jaime: 1 m/s  MV Dec Boyd: 4 9 m/s2  MV DecT: 229 7 ms  MV E Jaime: 1 1 m/s  MV E/A Ratio: 1 2  MV PHT: 66 6 ms  MVA By PHT: 3 3 cm2    [facility] Accredited Echocardi[facility] Laboratory    Prepared and electronically signed by    Lizbet Mc MD  Signed 01-Dec-2020 14:11:30    Results for orders placed during the hospital encounter of 21    Echo complete w/ contrast if indicated    Interpretation Summary  •  Left Ventricle: Left ventricular cavity size is normal  Systolic function is normal  Wall motion is normal  Diastolic function is moderately abnormal, consistent with grade II (pseudonormal) relaxation  •  Right Ventricle: Right ventricular cavity size is normal  Systolic function is normal   •  Aortic Valve: There is a bioprosthetic valve  There is mild to moderate regurgitation  There is no evidence of stenosis  Peak gradient is 35, meand gradient is 18 mm Hg, Index of 0 34  POOL:  No results found for this or any previous visit      No results found for this or any previous visit       CMR:  No results found for this or any previous visit  No results found for this or any previous visit  No results found for this or any previous visit  HOLTER  Results for orders placed during the hospital encounter of 05/30/19    Holter monitor - 24 hour    Narrative  24 hour Holter monitor  Predominant rhythm sinus rhythm with an average heart rate of 57 beats per minute, minimum heart rate of 43 beats per minute and maximum heart rate of 102 beats per minute  There were rare ventricular ectopic beats noted totaling 13 in 24 hours  These were all isolated  There were rare supraventricular ectopic beats noted totaling 20 in 24 hours  These were all isolated except for 1 couplet  No heart block or pauses exceeding 1 8 seconds  No symptoms recorded by the patient  Minimum heart rate occurred at 4:30 a m  Presumably during sleep    Cisco Monae MD    Results for orders placed during the hospital encounter of 11/18/21    Holter monitor    Interpretation Summary  24 hour Holter monitor  Predominant rhythm is normal sinus rhythm with an average heart rate of 59 beats per minute, minimum heart rate of 41 beats per minute and maximum heart rate of 88 beats per minute  There were frequent ventricular ectopic beats noted totaling 7583 in 24 hours  These comprised 9% of all ventricular complexes  These were predominantly isolated  Rare to occasional couplets of ventricular ectopy occurred  No ventricular tachycardia occurred  There were rare supraventricular ectopic beats noted totaling 17 in 24 hours  Two pairs of supraventricular ectopy occurred  No supraventricular tachycardia occurred  No heart block or pauses exceeding 1 9 seconds noted  No apparent symptoms recorded by the patient      Cisco Monae MD        Anticoagulation VTE Prophylaxis: Sequential compression device (Venodyne)        Counseling / Coordination of Care  Total floor / unit time spent today 30 minutes minutes  Greater than 50% of total time was spent with the patient and / or family counseling and / or coordination of care  A description of the counseling / coordination of care:          Epic/ Allscripts/Care Everywhere records reviewed:     ** Please Note: Fluency DirectDictation voice to text software may have been used in the creation of this document   **

## 2022-11-05 NOTE — ED ATTENDING ATTESTATION
10/28/2022  IJordan MD, saw and evaluated the patient  I have discussed the patient with the resident/non-physician practitioner and agree with the resident's/non-physician practitioner's findings, Plan of Care, and MDM as documented in the resident's/non-physician practitioner's note, except where noted  All available labs and Radiology studies were reviewed  I was present for key portions of any procedure(s) performed by the resident/non-physician practitioner and I was immediately available to provide assistance  At this point I agree with the current assessment done in the Emergency Department    I have conducted an independent evaluation of this patient a history and physical is as follows:  Pt had recent placement of pacemaker pt noted himself to have slow heart rate earlier Pt felt light headed at time no chest pain PE:  Alert L pacer pocketr no swelling or heamtoma no redness MDM: will do cardiac rocha cxr interrogate pacer discuss with ep    EKg reveals currently paced   ED Course         Critical Care Time  Procedures

## 2022-11-09 ENCOUNTER — IN-CLINIC DEVICE VISIT (OUTPATIENT)
Dept: CARDIOLOGY CLINIC | Facility: CLINIC | Age: 82
End: 2022-11-09

## 2022-11-09 DIAGNOSIS — I49.3 PVC'S (PREMATURE VENTRICULAR CONTRACTIONS): ICD-10-CM

## 2022-11-09 DIAGNOSIS — Z95.0 CARDIAC PACEMAKER IN SITU: Primary | ICD-10-CM

## 2022-11-09 RX ORDER — METOPROLOL SUCCINATE 25 MG/1
25 TABLET, EXTENDED RELEASE ORAL DAILY
Qty: 30 TABLET | Refills: 4 | Status: SHIPPED | OUTPATIENT
Start: 2022-11-09 | End: 2022-11-16 | Stop reason: SDUPTHER

## 2022-11-09 NOTE — PROGRESS NOTES
Results for orders placed or performed in visit on 11/09/22   Cardiac EP device report    Narrative    MDT DUAL CHAMBER PPM  (MVP ON) - ACTIVE SYSTEM IS MRI CONDITIONAL  WOUND CHECK: INCISION CLEAN AND DRY WITH EDGES APPROXIMATED; WOUND CARE AND RESTRICTIONS REVIEWED WITH PATIENT  DEVICE INTERROGATED IN THE [place] OFFICE  BATTERY VOLTAGE ADEQUATE-12 YRS  AP 91%  0%  ALL AVAILABLE LEAD PARAMETERS WITHIN NORMAL LIMITS  PVC COUNT INCLUDED  NO SIGNIFICANT HIGH RATE EPISODES  NO PROGRAMMING CHANGES MADE TO DEVICE PARAMETERS  NORMAL DEVICE FUNCTION   NC

## 2022-11-16 DIAGNOSIS — I49.3 PVC'S (PREMATURE VENTRICULAR CONTRACTIONS): ICD-10-CM

## 2022-11-16 RX ORDER — METOPROLOL SUCCINATE 25 MG/1
25 TABLET, EXTENDED RELEASE ORAL DAILY
Qty: 90 TABLET | Refills: 3 | Status: SHIPPED | OUTPATIENT
Start: 2022-11-16

## 2022-12-01 ENCOUNTER — OFFICE VISIT (OUTPATIENT)
Dept: CARDIOLOGY CLINIC | Facility: CLINIC | Age: 82
End: 2022-12-01

## 2022-12-01 VITALS
WEIGHT: 130.4 LBS | DIASTOLIC BLOOD PRESSURE: 52 MMHG | BODY MASS INDEX: 23.11 KG/M2 | OXYGEN SATURATION: 98 % | HEIGHT: 63 IN | SYSTOLIC BLOOD PRESSURE: 120 MMHG | HEART RATE: 82 BPM

## 2022-12-01 DIAGNOSIS — I49.3 PVC'S (PREMATURE VENTRICULAR CONTRACTIONS): ICD-10-CM

## 2022-12-01 DIAGNOSIS — I35.9 AORTIC VALVE DISEASE: Primary | ICD-10-CM

## 2022-12-01 RX ORDER — METOPROLOL SUCCINATE 25 MG/1
25 TABLET, EXTENDED RELEASE ORAL DAILY
Qty: 90 TABLET | Refills: 3 | Status: SHIPPED | OUTPATIENT
Start: 2022-12-01

## 2022-12-01 RX ORDER — FOLIC ACID/MULTIVIT,IRON,MINER 0.4MG-18MG
350 TABLET ORAL DAILY
COMMUNITY

## 2022-12-01 RX ORDER — METOPROLOL SUCCINATE 25 MG/1
25 TABLET, EXTENDED RELEASE ORAL DAILY
Qty: 90 TABLET | Refills: 3 | Status: SHIPPED | OUTPATIENT
Start: 2022-12-01 | End: 2022-12-01 | Stop reason: SDUPTHER

## 2022-12-01 RX ORDER — TAMSULOSIN HYDROCHLORIDE 0.4 MG/1
0.4 CAPSULE ORAL
COMMUNITY
Start: 2022-11-04

## 2022-12-01 NOTE — PROGRESS NOTES
Cardiology             Yesi Mosher  1940  [id number]              Assessment/Plan:     Sick sinus syndrome status post Medtronic dual-chamber permanent pacemaker placed 10/24/2022  PVCs  Hypertension  Aortic valve stenosis status post TAVR 01/2020--mild-to-moderate prosthetic valve regurgitation on prior echocardiogram  Dyslipidemia        Normally functioning device  Will continue metoprolol for PVCs  Blood pressure controlled  Will continue metoprolol  Continue simvastatin for dyslipidemia  Will repeat echocardiogram before next visit in 6 months to re-evaluate prosthetic valve regurgitation which was mild-to-moderate prior echocardiogram without evidence of stenosis  Patient will find a cardiologist while he is residing [address] [place] [address] in 6 months after echocardiogram              Interval History:      This is an 70-year-old male with history of aortic stenosis status post transcatheter aortic valve placement 01/2020  He also has dyslipidemia, mildly symptomatic PVCs, and asymptomatic sinus bradycardia  He has been following Dr Elissa Morales in the past      He was last seen by him 06/17/2022  Prior to that appointment, he had a Zio monitor which revealed a minimum heart rate of 41 beats per minute without any pauses exceeding 3 seconds  His dizziness statin more related to orthostasis  Subsequently was seen by electrophysiology 07/07/2022  It was thought that it some time he may need a permanent pacemaker without any obvious indication for the same at this time  He did have brief runs of nonsustained atrial tachycardia on his Zio monitor      He was then seen by his PCP at which time his heart rate was 36 beats per minute with symptoms of fatigue  Subsequent Zio monitor 08/19/2022 demonstrated minimal heart rate of 38 beats per minute with average heart rate 53 beats per minute  There was a 15 beat atrial run with frequent PVCs up to 15 5% of total beats    Patient triggers correlated with ventricular bigeminy with 1 trigger correlating with sinus rhythm  Thereafter he saw electrophysiology recommended dual-chamber pacemaker placement  On 10/24/22 underwent placement of a dual-chamber Medtronic permanent pacemaker      He went to the ER 10/28/2022 with mild lightheadedness and dyspnea  He states his heart rate was slow when he checked it at home and presented to the ER  Device interrogation demonstrated a normally functioning device and it was thought that his PVCs were reporting a falsely low heart rate on his monitor  Metoprolol 25 mg daily was initiated by electrophysiology for PVCs  He presents today for follow-up with no complaints  He feels well without chest pain, shortness of breath, dizziness, palpitations, lower extremity edema  He states he is going to [place] for the next 4-5 months          Vitals:  Vitals:    12/01/22 0803   BP: 120/52   Pulse: 82   SpO2: 98%   Weight: 59 1 kg (130 lb 6 4 oz)   Height: 5' 3" (1 6 m)         Past Medical History:   Diagnosis Date   • BPH (benign prostatic hyperplasia)    • CAD (coronary artery disease)    • Diabetes mellitus (HCC)     type 2, diet controlled   • Diastolic CHF ([facility] [address] )    • Epilepsy ([facility] )    • History of mycosis fungoides    • Hyperlipidemia    • Hypertension    • ILD (interstitial lung disease) ([facility] )    • Osteoporosis    • Seizures ([facility])     partial sensory   • Severe aortic stenosis      Social History     Socioeconomic History   • Marital status: /Civil Union     Spouse name: Not on file   • Number of children: Not on file   • Years of education: Not on file   • Highest education level: Not on file   Occupational History   • Not on file   Tobacco Use   • Smoking status: Never   • Smokeless tobacco: Never   Vaping Use   • Vaping Use: Never used   Substance and Sexual Activity   • Alcohol use: Yes     Comment: 1 glass of wine with dinner    • Drug use: No   • Sexual activity: Not Currently   Other Topics Concern   • Not on file   Social History Narrative   • Not on file     Social Determinants of Health     Financial Resource Strain: Not on file   Food Insecurity: Not on file   Transportation Needs: Not on file   Physical Activity: Not on file   Stress: Not on file   Social Connections: Not on file   Intimate Partner Violence: Not on file   Housing Stability: Not on file      Family History   Problem Relation Age of Onset   • Coronary artery disease Family    • Heart disease Family      Past Surgical History:   Procedure Laterality Date   • CARDIAC CATHETERIZATION     • CARDIAC ELECTROPHYSIOLOGY PROCEDURE N/A 10/24/2022    Procedure: Cardiac pacer implant/ DUAL CHAMBER;  Surgeon: Charles Mesa DO;  [facility]: [facility] CARD[facility]AC [facility]; Service: Cardiology   • COLONOSCOPY     • [place] ECHO TRANSESOPHAG R-T 2D W/PRB IMG ACQUISJ I&R N/A 1/7/2020    Procedure: TRANSESOPHAGEAL ECHOCARDIOGRAM (POOL); Surgeon: Juanis Rashid DO;  Location: [facility] MAIN OR;  Service: Cardiac Surgery   • [place] REPAIR [address] HERNIA,5+Y/O,REDUCIBL Right 11/16/2018    Procedure: REPAIR RIGHT INGUINAL HERNIA WITH MESH;  Surgeon: Althea Mullins MD;  Location: [facility] MAIN OR;  Service: General   • [place] REPLACE AORTIC VALVE OPENFEMORAL ARTERY APPROACH N/A 1/7/2020    Procedure: REPLACEMENT AORTIC VALVE TRANSCATHETER (TAVR) TRANSFEMORAL W/ 23 MM CARROLL KATHLEEN S3 VALVE (ACCESS ON LEFT);   Surgeon: Juanis Rashid DO;  Location: [facility] MAIN OR;  Service: Cardiac Surgery       Current Outpatient Medications:   •  amoxicillin (AMOXIL) 500 mg capsule, BEFORE DENTAL WORK, Disp: , Rfl: 3  •  ascorbic acid (VITAMIN C) 1000 MG tablet, Take 1,000 mg by mouth daily, Disp: , Rfl:   •  aspirin (ECOTRIN LOW STRENGTH) 81 mg EC tablet, Take 1 tablet (81 mg total) by mouth daily, Disp: , Rfl:   •  calcium-vitamin D (OSCAL 500 + D) 500 mg-200 units per tablet, Take 1 tablet by mouth daily, Disp: , Rfl:   •  cholecalciferol (VITAMIN D3) 400 units tablet, Take 2,000 Units by mouth daily, Disp: , Rfl:   •  Krill Oil 350 MG CAPS, Take 350 mg by mouth in the morning, Disp: , Rfl:   •  levETIRAcetam (KEPPRA) 250 mg tablet, Take 250 mg by mouth 2 (two) times a day, Disp: , Rfl:   •  metoprolol succinate (TOPROL-XL) 25 mg 24 hr tablet, Take 1 tablet (25 mg total) by mouth daily, Disp: 90 tablet, Rfl: 3  •  Multiple Vitamin (DAILY VALUE MULTIVITAMIN PO), Take 1 tablet by mouth daily, Disp: , Rfl:   •  simvastatin (ZOCOR) 10 mg tablet, Take 10 mg by mouth daily at bedtime, Disp: , Rfl:   •  tamsulosin (FLOMAX) 0 4 mg, Take 0 4 mg by mouth daily with dinner, Disp: , Rfl:   •  benzonatate (TESSALON) 200 MG capsule, Take 1 capsule (200 mg total) by mouth 3 (three) times a day as needed for cough (Patient not taking: Reported on 12/1/2022), Disp: 20 capsule, Rfl: 0        Review of Systems:  Review of Systems   Respiratory: Negative  Cardiovascular: Negative  All other systems reviewed and are negative  Physical Exam:  Physical Exam  Constitutional:       General: He is not in acute distress  Appearance: He is well-developed and well-nourished  He is not diaphoretic  HENT:      Head: Normocephalic and atraumatic  Eyes:      General: No scleral icterus  Right eye: No discharge  Extraocular Movements: EOM normal       Pupils: Pupils are equal, round, and reactive to light  Neck:      Thyroid: No thyromegaly  Cardiovascular:      Rate and Rhythm: Normal rate and regular rhythm  Heart sounds: Murmur heard  No friction rub  No gallop  Pulmonary:      Effort: Pulmonary effort is normal       Breath sounds: Normal breath sounds  Abdominal:      General: There is no distension  Tenderness: There is no abdominal tenderness  There is no guarding or rebound  Musculoskeletal:         General: No edema  Normal range of motion  Cervical back: Normal range of motion and neck supple  Skin:     General: Skin is warm and dry  Coloration: Skin is not pale  Findings: No erythema or rash  Neurological:      Mental Status: He is alert and oriented to person, place, and time  Coordination: Coordination normal    Psychiatric:         Mood and Affect: Mood and affect normal          Behavior: Behavior normal          Thought Content: Thought content normal          Judgment: Judgment normal          This note was completed in part utilizing [employer] Flu[employer] Direct Software  Grammatical errors, random word insertions, spelling mistakes, and incomplete sentences can be an occasional consequence of this system secondary to software limitations, ambient noise, and hardware issues  If you have any questions or concerns about the content, text, or information contained within the body of this dictation, please contact the provider for clarification

## 2023-02-13 ENCOUNTER — REMOTE DEVICE CLINIC VISIT (OUTPATIENT)
Dept: CARDIOLOGY CLINIC | Facility: CLINIC | Age: 83
End: 2023-02-13

## 2023-02-13 DIAGNOSIS — Z95.0 CARDIAC PACEMAKER IN SITU: Primary | ICD-10-CM

## 2023-02-13 NOTE — PROGRESS NOTES
Results for orders placed or performed in visit on 02/13/23   Cardiac EP device report    Narrative    MDT DUAL CHAMBER PPM  (MVP ON) - ACTIVE SYSTEM IS MRI CONDITIONAL  CARELINK TRANSMISSION: BATTERY VOLTAGE ADEQUATE (12 6 YRS)  AP 98 5%  <0 1% (AAIR -DDDR 60); ALL AVAILABLE LEAD PARAMETERS WITHIN NORMAL LIMITS  NO SIGNIFICANT HIGH RATE EPISODES  PACEMAKER FUNCTIONING APPROPRIATELY    ES

## 2023-02-17 DIAGNOSIS — I49.3 PVC'S (PREMATURE VENTRICULAR CONTRACTIONS): ICD-10-CM

## 2023-02-17 RX ORDER — METOPROLOL SUCCINATE 25 MG/1
25 TABLET, EXTENDED RELEASE ORAL DAILY
Qty: 90 TABLET | Refills: 2 | Status: SHIPPED | OUTPATIENT
Start: 2023-02-17

## 2023-05-23 ENCOUNTER — IN-CLINIC DEVICE VISIT (OUTPATIENT)
Dept: CARDIOLOGY CLINIC | Facility: CLINIC | Age: 83
End: 2023-05-23

## 2023-05-23 DIAGNOSIS — Z95.0 CARDIAC PACEMAKER IN SITU: Primary | ICD-10-CM

## 2023-05-23 NOTE — PROGRESS NOTES
Results for orders placed or performed in visit on 05/23/23   Cardiac EP device report    Narrative    MDT DUAL CHAMBER PPM  (MVP ON) - [address] [place] [address]  INTERROGATED IN THE [place] OFFICE  BATTERY VOLTAGE ADEQUATE (12 9 YRS)  AP 98 2%  <0 1% (AAIR-DDDR 70PPM); ALL LEAD PARAMETERS WITHIN NORMAL LIMITS  NO UNREPORTED HIGH RATE EPISODES  PATIENT TAKES ASA 81, METOPROLOL SUCC; NO PROGRAMMING CHANGES MADE TO DEVICE PARAMETERS  PACEMAKER FUNCTIONING APPROPRIATELY    ES

## 2023-06-01 ENCOUNTER — HOSPITAL ENCOUNTER (OUTPATIENT)
Dept: NON INVASIVE DIAGNOSTICS | Facility: HOSPITAL | Age: 83
Discharge: HOME | End: 2023-06-01
Attending: INTERNAL MEDICINE

## 2023-06-01 VITALS
HEART RATE: 70 BPM | DIASTOLIC BLOOD PRESSURE: 52 MMHG | SYSTOLIC BLOOD PRESSURE: 120 MMHG | WEIGHT: 130 LBS | BODY MASS INDEX: 23.04 KG/M2 | HEIGHT: 63 IN

## 2023-06-01 DIAGNOSIS — I35.9 AORTIC VALVE DISEASE: ICD-10-CM

## 2023-06-01 DIAGNOSIS — I49.3 PVC'S (PREMATURE VENTRICULAR CONTRACTIONS): ICD-10-CM

## 2023-06-01 LAB
AORTIC ROOT: 1.7 CM
AORTIC VALVE MEAN VELOCITY: 15.9 M/S
APICAL FOUR CHAMBER EJECTION FRACTION: 58 %
AV AREA BY CONTINUOUS VTI: 1.3 CM2
AV AREA PEAK VELOCITY: 1.2 CM2
AV LVOT MEAN GRADIENT: 4 MMHG
AV LVOT PEAK GRADIENT: 7 MMHG
AV MEAN GRADIENT: 11 MMHG
AV PEAK GRADIENT: 22 MMHG
AV REGURGITATION PRESSURE HALF TIME: 489 MS
AV VALVE AREA: 1.35 CM2
AV VELOCITY RATIO: 0.58
DOP CALC AO PEAK VEL: 2.33 M/S
DOP CALC AO VTI: 48.31 CM
DOP CALC LVOT AREA: 2.01 CM2
DOP CALC LVOT DIAMETER: 1.6 CM
DOP CALC LVOT PEAK VEL VTI: 32.35 CM
DOP CALC LVOT PEAK VEL: 1.34 M/S
DOP CALC LVOT STROKE INDEX: 41 ML/M2
DOP CALC LVOT STROKE VOLUME: 65.01 CM3
E WAVE DECELERATION TIME: 238 MS
FRACTIONAL SHORTENING: 25 % (ref 28–44)
INTERVENTRICULAR SEPTUM IN DIASTOLE (PARASTERNAL SHORT AXIS VIEW): 1.2 CM
INTERVENTRICULAR SEPTUM: 1.2 CM (ref 0.6–1.1)
LAAS-AP2: 8.8 CM2
LAAS-AP4: 13.7 CM2
LEFT ATRIUM AREA SYSTOLE SINGLE PLANE A4C: 12.9 CM2
LEFT ATRIUM SIZE: 2.9 CM
LEFT INTERNAL DIMENSION IN SYSTOLE: 2.1 CM (ref 2.1–4)
LEFT VENTRICULAR INTERNAL DIMENSION IN DIASTOLE: 2.8 CM (ref 3.5–6)
LEFT VENTRICULAR POSTERIOR WALL IN END DIASTOLE: 1.2 CM
LEFT VENTRICULAR STROKE VOLUME: 17 ML
LVSV (TEICH): 17 ML
MV E'TISSUE VEL-SEP: 5 CM/S
MV PEAK A VEL: 1 M/S
MV PEAK E VEL: 67 CM/S
MV STENOSIS PRESSURE HALF TIME: 69 MS
MV VALVE AREA P 1/2 METHOD: 3.19 CM2
RA PRESSURE ESTIMATED: 5 MMHG
RIGHT ATRIUM AREA SYSTOLE A4C: 10.4 CM2
RIGHT VENTRICLE ID DIMENSION: 3 CM
RV PSP: 24 MMHG
SL CV AV DECELERATION TIME RETROGRADE: 1686 MS
SL CV AV PEAK GRADIENT RETROGRADE: 61 MMHG
SL CV LEFT ATRIUM LENGTH A2C: 4 CM
SL CV LV EF: 63
SL CV PED ECHO LEFT VENTRICLE DIASTOLIC VOLUME (MOD BIPLANE) 2D: 30 ML
SL CV PED ECHO LEFT VENTRICLE SYSTOLIC VOLUME (MOD BIPLANE) 2D: 14 ML
TR MAX PG: 19 MMHG
TR PEAK VELOCITY: 2.2 M/S
TRICUSPID ANNULAR PLANE SYSTOLIC EXCURSION: 2.1 CM
TRICUSPID VALVE PEAK REGURGITATION VELOCITY: 2.16 M/S

## 2023-06-07 ENCOUNTER — OFFICE VISIT (OUTPATIENT)
Dept: CARDIOLOGY CLINIC | Facility: CLINIC | Age: 83
End: 2023-06-07
Payer: MEDICARE

## 2023-06-07 ENCOUNTER — TELEPHONE (OUTPATIENT)
Dept: HEMATOLOGY ONCOLOGY | Facility: CLINIC | Age: 83
End: 2023-06-07

## 2023-06-07 VITALS
HEIGHT: 63 IN | OXYGEN SATURATION: 97 % | DIASTOLIC BLOOD PRESSURE: 60 MMHG | WEIGHT: 126 LBS | BODY MASS INDEX: 22.32 KG/M2 | SYSTOLIC BLOOD PRESSURE: 110 MMHG | HEART RATE: 75 BPM

## 2023-06-07 DIAGNOSIS — D69.6 THROMBOCYTOPENIA (HCC): ICD-10-CM

## 2023-06-07 DIAGNOSIS — R06.09 DOE (DYSPNEA ON EXERTION): ICD-10-CM

## 2023-06-07 DIAGNOSIS — R53.83 OTHER FATIGUE: Primary | ICD-10-CM

## 2023-06-07 PROCEDURE — 99214 OFFICE O/P EST MOD 30 MIN: CPT | Performed by: INTERNAL MEDICINE

## 2023-06-07 NOTE — TELEPHONE ENCOUNTER
Appointment Change  Cancel, Reschedule, Change to Virtual      Who are you speaking with? Patient   If it is not the patient, are they listed on an active communication consent form? N/A   Which provider is the appointment scheduled with? JACOBO Gonzalez   When is the appointment scheduled? Please list date and time  6/9/23 11am   At which location is the appointment scheduled to take place? [place]   Was the appointment rescheduled or changed from an in person visit to a virtual visit? If so, please list the details of the change  n/a   What is the reason for the appointment change? Conflicting appt   Was STAR transport scheduled for this visit? N/A   Does STAR transport need to be scheduled for the new visit (if applicable) N/A   Does the patient need an infusion appointment rescheduled? N/A   Does the patient have an infusion appointment scheduled? If so, when? No   Is the patient undergoing chemotherapy? N/A   Was the no-show policy reviewed for appointments being changed with less then 24 hours of notice?  N/A

## 2023-06-15 ENCOUNTER — TELEPHONE (OUTPATIENT)
Dept: HEMATOLOGY ONCOLOGY | Facility: CLINIC | Age: 83
End: 2023-06-15

## 2023-06-15 NOTE — TELEPHONE ENCOUNTER
Appointment Change  Cancel, Reschedule, Change to Virtual      Who are you speaking with? Patient   If it is not the patient, are they listed on an active communication consent form? N/A   Which provider is the appointment scheduled with? JACOBO Renteria   When is the appointment scheduled? Please list date and time  6/9/23 @ 11am   At which location is the appointment scheduled to take place? [place]   Was the appointment rescheduled or changed from an in person visit to a virtual visit? If so, please list the details of the change  Yes 7/6/23 @ 3pm   What is the reason for the appointment change? Patient had conflicting appointments   Was STAR transport scheduled for this visit? no   Does STAR transport need to be scheduled for the new visit (if applicable) no   Does the patient need an infusion appointment rescheduled? no   Does the patient have an infusion appointment scheduled? If so, when? no   Is the patient undergoing chemotherapy? no   Was the no-show policy reviewed for appointments being changed with less then 24 hours of notice?  yes

## 2023-07-05 ENCOUNTER — TELEPHONE (OUTPATIENT)
Dept: HEMATOLOGY ONCOLOGY | Facility: CLINIC | Age: 83
End: 2023-07-05

## 2023-07-05 NOTE — TELEPHONE ENCOUNTER
Appointment Confirmation   Who are you speaking with? Patient   If it is not the patient, are they listed on an active communication consent form? N/A   Which provider is the appointment scheduled with? JACOBO Lindsay   When is the appointment scheduled? Please list date and time 08/18/2023 @10:20AM    At which location is the appointment scheduled to take place? [place]   Did caller verbalize understanding of appointment details?  Yes

## 2023-07-05 NOTE — TELEPHONE ENCOUNTER
Appointment Confirmation   Who are you speaking with? Patient   If it is not the patient, are they listed on an active communication consent form? N/A   Which provider is the appointment scheduled with? JACOBO Jones   When is the appointment scheduled? Please list date and time 7/6/23 3:00PM   At which location is the appointment scheduled to take place? [place]   Did caller verbalize understanding of appointment details?  Yes

## 2023-07-06 ENCOUNTER — CONSULT (OUTPATIENT)
Dept: HEMATOLOGY ONCOLOGY | Facility: CLINIC | Age: 83
End: 2023-07-06
Payer: MEDICARE

## 2023-07-06 VITALS
BODY MASS INDEX: 22.86 KG/M2 | SYSTOLIC BLOOD PRESSURE: 115 MMHG | DIASTOLIC BLOOD PRESSURE: 60 MMHG | TEMPERATURE: 97.9 F | HEART RATE: 74 BPM | OXYGEN SATURATION: 95 % | WEIGHT: 129 LBS | HEIGHT: 63 IN

## 2023-07-06 DIAGNOSIS — D69.6 THROMBOCYTOPENIA (HCC): ICD-10-CM

## 2023-07-06 PROCEDURE — 99204 OFFICE O/P NEW MOD 45 MIN: CPT

## 2023-07-06 NOTE — PATIENT INSTRUCTIONS
Melatonin for sleep    Lab location:  [address] [place] [address]  Phone  [phone]  Fax  [phone]    Hours  Monday: 7:00 AM - 1:00 PM  Tuesday: 7:00 AM - 1:00 PM  Wednesday: 7:00 AM - 1:00 PM  Thursday: 7:00 AM - 1:00 PM  Friday: 7:00 AM - 1:00 PM  Saturday: 7:00 AM - 12:00 PM

## 2023-07-06 NOTE — PROGRESS NOTES
[address] [facility] [address]  Hematology Ambulatory Consult  Jose Miguel, 1940, [id number]  7/6/2023    Assessment/Plan:  1. Thrombocytopenia [facility])  Mr Jesus Hedrick is a 79-year-old male seen in consultation for thrombocytopenia. This is a chronic problem that has been persistent since 2019. His platelet count has never gone below 100,000. Most recently his platelet count remains at his baseline at 113,000. He has no signs or symptoms of excess bleeding or bruising. He has no abnormal liver function on most recent labs. He has no constitutional symptoms at this time. I explained in detail the complete work-up listed below to the patient and his wife including autoimmune/inflammatory work-up as well as peripheral smear. Prior work-up demonstrated adequate B12 and folate levels. We also discussed ultrasound of his abdomen to assess for hepatosplenomegaly. He will have these labs drawn at his earliest convenience and the ultrasound completed prior to his follow-up with me in 3 weeks to discuss the results and if any further work-up or hematology follow-up is necessary at that point. I discussed that differential diagnosis includes statistical artifact/variant, chronic ITP, or liver dysfunction.    - Ambulatory Referral to Hematology / Oncology  - US abdomen complete; Future  - C-reactive protein; Future  - RF Screen w/ Reflex to Titer; Future  - Sedimentation rate, automated; Future  - MERARI w/Reflex; Future  - Comprehensive metabolic panel  - CBC and differential  - Peripheral Smear; Future      The patient is scheduled for follow-up in approximately 3 weeks. Patient voiced agreement and understanding to the above. Patient knows to call the Hematology/Oncology office with any questions and concerns regarding the above. Barrier(s) to care: None.   The patient is able to self care.    -------------------------------------------------------------------------------------------------------    Chief Complaint   Patient presents with   • New Patient Visit       Referring provider:  Mendoza Martell DO  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 He is seen in consultation for thrombocytopenia. This is a chronic problem since 2016. Most consistently since 2019 with a platelet count ranging in the low 100,000 range. He has never gone below 100,000. Most recent platelet count was stable at 113,000. He denies any excess bleeding or bruising. He denies blood in his stool/urine, epistaxis, or bleeding gums. He does not bruise easily. He is taking 81 mg of aspirin daily. He is also on Keppra for his seizure disorder for the last 10+ years. He has decreased over the past but no increase in dosage recently. He has no family history of any blood disorders or blood conditions. All other cell lines are within normal limits. He has no constitutional symptoms at this time. He does have trouble with his sleep habits which has been causing him some fatigue. He states that he has not been able to "shut his mind off" at night and also increased nocturia has made sleeping difficult. Review of Systems   Constitutional: Positive for fatigue (due to poor sleep ). Negative for activity change, appetite change, diaphoresis, fever and unexpected weight change. HENT: Negative. Eyes: Negative for photophobia and visual disturbance. Respiratory: Negative. Cardiovascular: Negative. Gastrointestinal: Negative. Endocrine: Negative for cold intolerance and heat intolerance.    Genitourinary: Positive for frequency (nocturia). Musculoskeletal: Negative for arthralgias, back pain and gait problem. Skin: Negative for pallor and rash. Neurological: Negative for dizziness, weakness, light-headedness and headaches. Hematological: Negative for adenopathy. Does not bruise/bleed easily. Psychiatric/Behavioral: Positive for sleep disturbance (cant shut mind off/ nocturia ). Negative for confusion. The patient is nervous/anxious. 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[facility])   • Right inguinal hernia   • Nonrheumatic aortic valve stenosis   • Palpitations   • Sinus bradycardia   • S/P TAVR (transcatheter aortic valve replacement)   • Thrombocytopenia (HCC)   • Hyperchloremia   • Mixed hyperlipidemia   • Encounter for postoperative care   • PVC's (premature ventricular contractions)   • Premature atrial contractions   • Dizziness   • Hyperlipidemia   • Bradycardia   • PVC (premature ventricular contraction)   • CAD (coronary artery disease)   • Diabetes mellitus, type 2 (HCC)       Past Medical History:   Diagnosis Date   • BPH (benign prostatic hyperplasia)    • CAD (coronary artery disease)    • Diabetes mellitus (HCC)     type 2, diet controlled   • Diastolic CHF ([address])    • Epilepsy ([address])    • History of mycosis fungoides    • Hyperlipidemia    • Hypertension    • ILD (interstitial lung disease) ([address])    • Osteoporosis    • Seizures ([address])     partial sensory   • Severe aortic stenosis        Past Surgical History:   Procedure Laterality Date   • CARDIAC CATHETERIZATION     • CARDIAC ELECTROPHYSIOLOGY PROCEDURE N/A 10/24/2022    Procedure: Cardiac pacer implant/ DUAL CHAMBER;  Surgeon: Tessa Bee DO;  [facility] CARD[facility]; Service: Cardiology   • COLONOSCOPY     • [place] ECHO TRANSESOPHAG R-T 2D W/PRB IMG ACQUISJ I&R N/A 1/7/2020    Procedure: TRANSESOPHAGEAL ECHOCARDIOGRAM (POOL);   Surgeon: Renata Schilling DO;  Location: [facility] MAIN OR;  Service: Cardiac Surgery   • [place] REPLACE AORTIC VALVE OPENFEMORAL ARTERY APPROACH N/A 1/7/2020    Procedure: REPLACEMENT AORTIC VALVE TRANSCATHETER (TAVR) TRANSFEMORAL W/ 23 MM CARROLL KATHLEEN S3 VALVE (ACCESS ON LEFT);   Surgeon: Anna Levin DO;  Location: [facility] MAIN OR;  Service: Cardiac Surgery   • CA RPR 1ST INGUN HRNA AGE 5 YRS/> REDUCIBLE Right 11/16/2018    Procedure: REPAIR RIGHT INGUINAL HERNIA WITH MESH;  Surgeon: Kervin Parekh MD;  Location: [facility] MAIN OR;  Service: General       Family History   Problem Relation Age of Onset   • Coronary artery disease Family    • Heart disease Family        Social History     Socioeconomic History   • Marital status: /Civil Union     Spouse name: None   • Number of children: None   • Years of education: None   • Highest education level: None   Occupational History   • None   Tobacco Use   • Smoking status: Never   • Smokeless tobacco: Never   Vaping Use   • Vaping Use: Never used   Substance and Sexual Activity   • Alcohol use: Yes     Comment: 1 glass of wine with dinner    • Drug use: No   • Sexual activity: Not Currently   Other Topics Concern   • None   Social History Narrative   • None     Social Determinants of Health     Financial Resource Strain: Not on file   Food Insecurity: Not on file   Transportation Needs: Not on file   Physical Activity: Not on file   Stress: Not on file   Social Connections: Not on file   Intimate Partner Violence: Not on file   Housing Stability: Not on file         Current Outpatient Medications:   •  amoxicillin (AMOXIL) 500 mg capsule, BEFORE DENTAL WORK, Disp: , Rfl: 3  •  ascorbic acid (VITAMIN C) 1000 MG tablet, Take 1,000 mg by mouth daily, Disp: , Rfl:   •  aspirin (ECOTRIN LOW STRENGTH) 81 mg EC tablet, Take 1 tablet (81 mg total) by mouth daily, Disp: , Rfl:   •  calcium-vitamin D (OSCAL 500 + D) 500 mg-200 units per tablet, Take 1 tablet by mouth daily, Disp: , Rfl:   •  cholecalciferol (VITAMIN D3) 400 units tablet, Take 2,000 Units by mouth daily, Disp: , Rfl:   •  Krill Oil 350 MG CAPS, Take 350 mg by mouth in the morning, Disp: , Rfl:   •  levETIRAcetam (KEPPRA) 250 mg tablet, Take 250 mg by mouth 2 (two) times a day, Disp: , Rfl:   •  metoprolol succinate (TOPROL-XL) 25 mg 24 hr tablet, Take 1 tablet (25 mg total) by mouth daily, Disp: 90 tablet, Rfl: 2  •  Multiple Vitamin (DAILY VALUE MULTIVITAMIN PO), Take 1 tablet by mouth daily, Disp: , Rfl:   •  simvastatin (ZOCOR) 10 mg tablet, Take 10 mg by mouth daily at bedtime, Disp: , Rfl:   •  tamsulosin (FLOMAX) 0.4 mg, Take 0.4 mg by mouth daily with dinner, Disp: , Rfl:   •  benzonatate (TESSALON) 200 MG capsule, Take 1 capsule (200 mg total) by mouth 3 (three) times a day as needed for cough (Patient not taking: Reported on 12/1/2022), Disp: 20 capsule, Rfl: 0    Allergies   Allergen Reactions   • Escitalopram Dizziness   • Metformin Diarrhea       Objective:  /60 (BP Location: Right arm, Patient Position: Sitting, Cuff Size: Standard)   Pulse 74   Temp 97.9 °F (36.6 °C) (Temporal)   Ht 5' 3" (1.6 m)   Wt 58.5 kg (129 lb)   SpO2 95%   BMI 22.85 kg/m²   Physical Exam  Constitutional:       General: He is not in acute distress. Appearance: Normal appearance. He is not ill-appearing. HENT:      Head: Normocephalic and atraumatic. Eyes:      Extraocular Movements: Extraocular movements intact. Conjunctiva/sclera: Conjunctivae normal.   Cardiovascular:      Rate and Rhythm: Normal rate. Pulses: Normal pulses. Heart sounds: No murmur heard. Pulmonary:      Effort: Pulmonary effort is normal. No respiratory distress. Abdominal:      General: Abdomen is flat. Bowel sounds are normal. There is no distension. Palpations: Abdomen is soft. Tenderness: There is no abdominal tenderness. Musculoskeletal:         General: Normal range of motion. Cervical back: Normal range of motion. No tenderness. Right lower leg: No edema. Left lower leg: No edema. Lymphadenopathy:      Cervical: No cervical adenopathy. Skin:     General: Skin is warm and dry. Capillary Refill: Capillary refill takes less than 2 seconds. Coloration: Skin is not jaundiced or pale. Neurological:      General: No focal deficit present. Mental Status: He is alert and oriented to person, place, and time. Mental status is at baseline. Motor: No weakness. Gait: Gait normal.   Psychiatric:         Mood and Affect: Mood normal.         Behavior: Behavior normal.         Thought Content: Thought content normal.         Judgment: Judgment normal.         Result Review  Labs: labs reviewed in [employer]     Lab Results   Component Value Date    WBC 7.81 10/28/2022    HGB 15.0 10/28/2022    HCT 45.5 10/28/2022    MCV 91 10/28/2022    PLT  10/28/2022      Comment:      Unable to perform due to clumped platelets       Imaging:  No relevant imaging to review      Please note: This report has been generated by a voice recognition software system. Therefore there may be syntax, spelling, and/or grammatical errors. Please call if you have any questions.

## 2023-07-08 ENCOUNTER — APPOINTMENT (OUTPATIENT)
Dept: LAB | Facility: IMAGING CENTER | Age: 83
End: 2023-07-08
Payer: MEDICARE

## 2023-07-08 DIAGNOSIS — D69.6 THROMBOCYTOPENIA (HCC): ICD-10-CM

## 2023-07-08 LAB
ALBUMIN SERPL BCP-MCNC: 3.5 G/DL (ref 3.5–5)
ALP SERPL-CCNC: 53 U/L (ref 46–116)
ALT SERPL W P-5'-P-CCNC: 32 U/L (ref 12–78)
ANION GAP SERPL CALCULATED.3IONS-SCNC: 2 MMOL/L
AST SERPL W P-5'-P-CCNC: 26 U/L (ref 5–45)
BASOPHILS NFR BLD MANUAL: 2 % (ref 0–1)
BILIRUB SERPL-MCNC: 0.54 MG/DL (ref 0.2–1)
BUN SERPL-MCNC: 23 MG/DL (ref 5–25)
CALCIUM SERPL-MCNC: 10.1 MG/DL (ref 8.3–10.1)
CHLORIDE SERPL-SCNC: 108 MMOL/L (ref 96–108)
CO2 SERPL-SCNC: 26 MMOL/L (ref 21–32)
CREAT SERPL-MCNC: 1.06 MG/DL (ref 0.6–1.3)
CRP SERPL QL: <3 MG/L
ERYTHROCYTE [DISTWIDTH] IN BLOOD BY AUTOMATED COUNT: 13.3 % (ref 11.6–15.1)
ERYTHROCYTE [SEDIMENTATION RATE] IN BLOOD: 37 MM/HOUR (ref 0–19)
GFR SERPL CREATININE-BSD FRML MDRD: 64 ML/MIN/1.73SQ M
GLUCOSE SERPL-MCNC: 143 MG/DL (ref 65–140)
HCT VFR BLD AUTO: 43.6 % (ref 36.5–49.3)
HGB BLD-MCNC: 13.7 G/DL (ref 12–17)
IMM EOSINOPHIL NFR BLD MANUAL: 10 % (ref 0–6)
LYMPHOCYTES NFR BLD: 20 % (ref 14–44)
MCH RBC QN AUTO: 29.1 PG (ref 26.8–34.3)
MCHC RBC AUTO-ENTMCNC: 31.4 G/DL (ref 31.4–37.4)
MCV RBC AUTO: 93 FL (ref 82–98)
MONOCYTES NFR BLD AUTO: 10 % (ref 4–12)
NEUTS SEG NFR BLD AUTO: 52 % (ref 45–77)
NRBC BLD AUTO-RTO: 0 /100 WBCS
PLATELET # BLD AUTO: 150 THOUSANDS/UL (ref 149–390)
PLATELET BLD QL SMEAR: ADEQUATE
PMV BLD AUTO: 13.9 FL (ref 8.9–12.7)
POTASSIUM SERPL-SCNC: 4.4 MMOL/L (ref 3.5–5.3)
PROT SERPL-MCNC: 7.9 G/DL (ref 6.4–8.4)
RBC # BLD AUTO: 4.7 MILLION/UL (ref 3.88–5.62)
RBC MORPH BLD: NORMAL
SODIUM SERPL-SCNC: 136 MMOL/L (ref 135–147)
TOTAL CELLS COUNTED SPEC: 100
VARIANT LYMPHS BLD QL SMEAR: 6 % (ref 0–0)
WBC # BLD AUTO: 6.46 THOUSAND/UL (ref 4.31–10.16)

## 2023-07-08 PROCEDURE — 86140 C-REACTIVE PROTEIN: CPT

## 2023-07-08 PROCEDURE — 86431 RHEUMATOID FACTOR QUANT: CPT

## 2023-07-08 PROCEDURE — 85652 RBC SED RATE AUTOMATED: CPT

## 2023-07-08 PROCEDURE — 86430 RHEUMATOID FACTOR TEST QUAL: CPT

## 2023-07-08 PROCEDURE — 85007 BL SMEAR W/DIFF WBC COUNT: CPT

## 2023-07-08 PROCEDURE — 86038 ANTINUCLEAR ANTIBODIES: CPT

## 2023-07-08 PROCEDURE — 36415 COLL VENOUS BLD VENIPUNCTURE: CPT

## 2023-07-09 LAB — ANA SER QL IA: NEGATIVE

## 2023-07-10 LAB
CRYOGLOB RF SER-ACNC: ABNORMAL [IU]/ML
RHEUMATOID FACT SER QL LA: POSITIVE

## 2023-07-14 ENCOUNTER — HOSPITAL ENCOUNTER (OUTPATIENT)
Dept: RADIOLOGY | Facility: IMAGING CENTER | Age: 83
End: 2023-07-14
Payer: MEDICARE

## 2023-07-14 DIAGNOSIS — J84.9 INTERSTITIAL LUNG DISEASE (HCC): ICD-10-CM

## 2023-07-14 PROCEDURE — 71250 CT THORAX DX C-: CPT

## 2023-07-17 ENCOUNTER — HOSPITAL ENCOUNTER (OUTPATIENT)
Dept: RADIOLOGY | Facility: IMAGING CENTER | Age: 83
Discharge: HOME | End: 2023-07-17
Payer: MEDICARE

## 2023-07-17 DIAGNOSIS — D69.6 THROMBOCYTOPENIA (HCC): ICD-10-CM

## 2023-07-17 PROCEDURE — 76700 US EXAM ABDOM COMPLETE: CPT

## 2023-07-18 ENCOUNTER — TELEPHONE (OUTPATIENT)
Dept: HEMATOLOGY ONCOLOGY | Facility: CLINIC | Age: 83
End: 2023-07-18

## 2023-07-18 NOTE — TELEPHONE ENCOUNTER
Appointment Confirmation   Who are you speaking with? Patient   If it is not the patient, are they listed on an active communication consent form? N/A   Which provider is the appointment scheduled with? JACOBO Jacobo   When is the appointment scheduled? Please list date and time 7/27/23 11:30AM   At which location is the appointment scheduled to take place? [place]   Did caller verbalize understanding of appointment details?  Yes

## 2023-07-20 DIAGNOSIS — I49.3 PVC'S (PREMATURE VENTRICULAR CONTRACTIONS): ICD-10-CM

## 2023-07-20 RX ORDER — METOPROLOL SUCCINATE 25 MG/1
25 TABLET, EXTENDED RELEASE ORAL DAILY
Qty: 90 TABLET | Refills: 2 | Status: SHIPPED | OUTPATIENT
Start: 2023-07-20

## 2023-07-26 ENCOUNTER — TELEPHONE (OUTPATIENT)
Dept: HEMATOLOGY ONCOLOGY | Facility: CLINIC | Age: 83
End: 2023-07-26

## 2023-07-26 NOTE — TELEPHONE ENCOUNTER
Appointment Confirmation   Who are you speaking with? Patient   If it is not the patient, are they listed on an active communication consent form? N/A   Which provider is the appointment scheduled with? JACOBO Cazares   When is the appointment scheduled? Please list date and time 07/27/2023 @11:30AM    At which location is the appointment scheduled to take place? [place]   Did caller verbalize understanding of appointment details?  Yes

## 2023-07-27 ENCOUNTER — OFFICE VISIT (OUTPATIENT)
Dept: HEMATOLOGY ONCOLOGY | Facility: CLINIC | Age: 83
End: 2023-07-27
Payer: MEDICARE

## 2023-07-27 VITALS
RESPIRATION RATE: 18 BRPM | WEIGHT: 130 LBS | DIASTOLIC BLOOD PRESSURE: 62 MMHG | HEIGHT: 63 IN | SYSTOLIC BLOOD PRESSURE: 118 MMHG | BODY MASS INDEX: 23.04 KG/M2 | TEMPERATURE: 97.1 F | OXYGEN SATURATION: 96 % | HEART RATE: 78 BPM

## 2023-07-27 DIAGNOSIS — K76.89 HEPATIC FOCAL NODULAR HYPERPLASIA: ICD-10-CM

## 2023-07-27 DIAGNOSIS — D69.6 THROMBOCYTOPENIA (HCC): Primary | ICD-10-CM

## 2023-07-27 DIAGNOSIS — R76.8 RHEUMATOID FACTOR POSITIVE: ICD-10-CM

## 2023-07-27 PROCEDURE — 99215 OFFICE O/P EST HI 40 MIN: CPT

## 2023-07-27 RX ORDER — TRAZODONE HYDROCHLORIDE 50 MG/1
25 TABLET ORAL
COMMUNITY

## 2023-07-27 NOTE — PROGRESS NOTES
[address] [facility] [address]  Hematology Ambulatory Follow-Up  Krissy Combs, 1940, [id number]  7/27/2023    Assessment/Plan:  1. Thrombocytopenia [facility])  Mr. Hilton Hernandez is an 79-year-old male seen in consultation for thrombocytopenia. He has chronic thrombocytopenia dating back to at least 2019 and has never gone below 100,000. Most recent platelet count was within normal limits at 150,000 and he has no signs and symptoms of excess bleeding or bruising. See below for abnormal findings on initial work-up. We discussed that since his platelet count has remained stable and now back to within normal limits there is no ongoing need for hematology follow-up at this time. He will continue to follow with his PCP with routine labs. If his platelet count drops below 100,000 I would see him back in consultation for further evaluation otherwise this is likely statistical artifact/variant or chronic ITP with no need for intervention or treatment at this time. 2. Rheumatoid factor positive  Work-up found a positive rheumatoid factor with elevated sed rate. He does not have symptoms of rheumatoid arthritis or any joint aches of concern. He does have fatigue. I discussed with the patient and his wife that chronic inflammation can cause a decrease in platelet count and could be a contributing factor to the abnormalities found on his CBC. I suggested evaluation with rheumatology. They have declined referral at this time. They would like to discuss this with her primary care physician. 3. Hepatic focal nodular hyperplasia  Abdominal ultrasound found echogenic lesion measuring 1.9 x 1.9 x 2 cm likely correlating to prior findings on MRI of his abdomen. He was previously undergoing yearly MRIs following a benign focal nodular hyperplasia previously measuring 1.2 cm. He is scheduled to undergo repeat MRI in a few weeks.   MRIs need to be collaborated with cardiology as he has a pacemaker placed. Discussed that he will review findings of MRI with his PCP if further work-up is necessary I suggest evaluation with gastroenterology/hepatology for possible biopsy. This is likely a benign condition and relatively slow below growing and LFTs remain within normal limit. Patient voiced agreement and understanding to the above. Patient knows to call the Hematology/Oncology office with any questions and concerns regarding the above. Barrier(s) to care: None  The patient is able to self care.  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 He is seen in follow-up for thrombocytopenia. This is a chronic problem since 2016. Most consistently since 2019 with a platelet count ranging in the low 100,000 range. He has never gone below 100,000. Most recent platelet count was stable at 113,000. He denies any excess bleeding or bruising. He denies blood in his stool/urine, epistaxis, or bleeding gums. He does not bruise easily. He is taking 81 mg of aspirin daily. He is also on Keppra for his seizure disorder for the last 10+ years. He has decreased over the past but no increase in dosage recently. He has no family history of any blood disorders or blood conditions. All other cell lines are within normal limits. He has no constitutional symptoms at this time. He does have trouble with his sleep habits which has been causing him some fatigue.   He states that he has not been able to "shut his mind off" at night and also increased nocturia has made sleeping difficult. Initial work-up included inflammatory markers and autoimmune panel which was positive for rheumatoid factor as well as elevated sed rate. Most recent CBC demonstrated platelet count within normal limits at 150,000. He also had an ultrasound of his abdomen which found an echogenic lesion measuring 1.9 x 1.9 x 2 cm. He has been followed with yearly MRIs of his abdomen for benign focal nodule hyperplasia. Last MRI in 2021 demonstrated stable findings. LFTs remained within normal limits. He states that he has never seen a hepatologist or undergone biopsy of this lesion. He is scheduled for repeat MRI in a few weeks. MRIs are complicated by pacemaker placement. Component Ref Range & Units 7/8/23 10:25 AM   Rheumatoid Factor Negative Positive Abnormal     Comment: See RF Quantitation                   RF Quantitation (none) 128 IU/mL Abnormal       Sed Rate 0 - 19 mm/hour 37 High               Review of Systems   Constitutional: Positive for fatigue (due to poor sleep ). Negative for activity change, appetite change, diaphoresis, fever and unexpected weight change. HENT: Negative. Eyes: Negative for photophobia and visual disturbance. Respiratory: Negative. Cardiovascular: Negative. Gastrointestinal: Negative. Endocrine: Negative for cold intolerance and heat intolerance. Genitourinary: Positive for frequency (nocturia). Musculoskeletal: Negative for arthralgias, back pain and gait problem. Skin: Negative for pallor and rash. Neurological: Negative for dizziness, weakness, light-headedness and headaches. Hematological: Negative for adenopathy. Does not bruise/bleed easily. Psychiatric/Behavioral: Positive for sleep disturbance (cant shut mind off/ nocturia ). Negative for confusion. The patient is nervous/anxious.         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[facility])   • Right inguinal hernia   • Nonrheumatic aortic valve stenosis   • Palpitations   • Sinus bradycardia   • S/P TAVR (transcatheter aortic valve replacement)   • Thrombocytopenia (HCC)   • Hyperchloremia   • Mixed hyperlipidemia   • Encounter for postoperative care   • PVC's (premature ventricular contractions)   • Premature atrial contractions   • Dizziness   • Hyperlipidemia   • Bradycardia   • PVC (premature ventricular contraction)   • CAD (coronary artery disease)   • Diabetes mellitus, type 2 (HCC)       Past Medical History:   Diagnosis Date   • BPH (benign prostatic hyperplasia)    • CAD (coronary artery disease)    • Diabetes mellitus (HCC)     type 2, diet controlled   • Diastolic CHF ([address])    • Epilepsy ([address])    • History of mycosis fungoides    • Hyperlipidemia    • Hypertension    • ILD (interstitial lung disease) ([address])    • Osteoporosis    • Seizures ([address])     partial sensory   • Severe aortic stenosis        Past Surgical History:   Procedure Laterality Date   • CARDIAC CATHETERIZATION     • CARDIAC ELECTROPHYSIOLOGY PROCEDURE N/A 10/24/2022    Procedure: Cardiac pacer implant/ DUAL CHAMBER;  Surgeon: Pancho Aponte DO;  Location: [facility] CARD[facility]AC [facility]; Service: Cardiology   • COLONOSCOPY     • ID ECHO TRANSESOPHAG R-T 2D W/PRB IMG ACQUISJ I&R N/A 1/7/2020    Procedure: TRANSESOPHAGEAL ECHOCARDIOGRAM (POOL); Surgeon: Obey Humphrey DO;  Location: [facility] MAIN OR;  Service: Cardiac Surgery   • ID REPLACE AORTIC VALVE OPENFEMORAL ARTERY APPROACH N/A 1/7/2020    Procedure: REPLACEMENT AORTIC VALVE TRANSCATHETER (TAVR) TRANSFEMORAL W/ 23 MM CARROLL KATHLEEN S3 VALVE (ACCESS ON LEFT);   Surgeon: Obey Humphrey DO;  Location: [facility] MAIN OR;  Service: Cardiac Surgery   • ID RPR 1ST INGUN HRNA AGE 5 YRS/> REDUCIBLE Right 11/16/2018    Procedure: REPAIR RIGHT INGUINAL HERNIA WITH MESH;  Surgeon: Robin Cage MD;  Location: [address] [place] [address] MAIN OR;  Service: General       Family History   Problem Relation Age of Onset   • Coronary artery disease Family    • Heart disease Family        Social History     Socioeconomic History   • Marital status: /Civil Union     Spouse name: Not on file   • Number of children: Not on file   • Years of education: Not on file   • Highest education level: Not on file   Occupational History   • Not on file   Tobacco Use   • Smoking status: Never   • Smokeless tobacco: Never   Vaping Use   • Vaping Use: Never used   Substance and Sexual Activity   • Alcohol use: Yes     Comment: 1 glass of wine with dinner    • Drug use: No   • Sexual activity: Not Currently   Other Topics Concern   • Not on file   Social History Narrative   • Not on file     Social Determinants of Health     Financial Resource Strain: Not on file   Food Insecurity: Not on file   Transportation Needs: Not on file   Physical Activity: Not on file   Stress: Not on file   Social Connections: Not on file   Intimate Partner Violence: Not on file   Housing Stability: Not on file         Current Outpatient Medications:   •  amoxicillin (AMOXIL) 500 mg capsule, BEFORE DENTAL WORK, Disp: , Rfl: 3  •  ascorbic acid (VITAMIN C) 1000 MG tablet, Take 1,000 mg by mouth daily, Disp: , Rfl:   •  aspirin (ECOTRIN LOW STRENGTH) 81 mg EC tablet, Take 1 tablet (81 mg total) by mouth daily, Disp: , Rfl:   •  calcium-vitamin D (OSCAL 500 + D) 500 mg-200 units per tablet, Take 1 tablet by mouth daily, Disp: , Rfl:   •  cholecalciferol (VITAMIN D3) 400 units tablet, Take 2,000 Units by mouth daily, Disp: , Rfl:   •  Krill Oil 350 MG CAPS, Take 350 mg by mouth in the morning, Disp: , Rfl:   •  levETIRAcetam (KEPPRA) 250 mg tablet, Take 250 mg by mouth 2 (two) times a day, Disp: , Rfl:   •  metoprolol succinate (TOPROL-XL) 25 mg 24 hr tablet, Take 1 tablet (25 mg total) by mouth daily, Disp: 90 tablet, Rfl: 2  •  Multiple Vitamin (DAILY VALUE MULTIVITAMIN PO), Take 1 tablet by mouth daily, Disp: , Rfl:   •  tamsulosin (FLOMAX) 0.4 mg, Take 0.4 mg by mouth daily with dinner, Disp: , Rfl:   •  traZODone (DESYREL) 50 mg tablet, Take 25 mg by mouth daily at bedtime, Disp: , Rfl:   •  benzonatate (TESSALON) 200 MG capsule, Take 1 capsule (200 mg total) by mouth 3 (three) times a day as needed for cough (Patient not taking: Reported on 12/1/2022), Disp: 20 capsule, Rfl: 0  •  simvastatin (ZOCOR) 10 mg tablet, Take 10 mg by mouth daily at bedtime, Disp: , Rfl:     Allergies   Allergen Reactions   • Escitalopram Dizziness   • Metformin Diarrhea       Objective:  /62 (BP Location: Left arm)   Pulse 78   Temp (!) 97.1 °F (36.2 °C) (Tympanic)   Resp 18   Ht 5' 3" (1.6 m)   Wt 59 kg (130 lb)   SpO2 96%   BMI 23.03 kg/m²    Physical Exam  Constitutional:       General: He is not in acute distress. Appearance: Normal appearance. He is not ill-appearing. HENT:      Head: Normocephalic and atraumatic. Eyes:      Extraocular Movements: Extraocular movements intact. Conjunctiva/sclera: Conjunctivae normal.   Cardiovascular:      Rate and Rhythm: Normal rate. Pulses: Normal pulses. Heart sounds: No murmur heard. Pulmonary:      Effort: Pulmonary effort is normal. No respiratory distress. Abdominal:      General: Abdomen is flat. Bowel sounds are normal. There is no distension. Palpations: Abdomen is soft. Tenderness: There is no abdominal tenderness. Musculoskeletal:         General: Normal range of motion. Cervical back: Normal range of motion. No tenderness. Right lower leg: No edema. Left lower leg: No edema. Lymphadenopathy:      Cervical: No cervical adenopathy. Skin:     General: Skin is warm and dry. Capillary Refill: Capillary refill takes less than 2 seconds. Coloration: Skin is not jaundiced or pale. Neurological:      General: No focal deficit present. Mental Status: He is alert and oriented to person, place, and time. Mental status is at baseline. Motor: No weakness.       Gait: Gait normal.   Psychiatric:         Mood and Affect: Mood normal.         Behavior: Behavior normal.         Thought Content: Thought content normal.         Judgment: Judgment normal.         Result Review  Labs:  Appointment on 2023   Component Date Value Ref Range Status   • CRP 2023 <3.0  <3.0 mg/L Final   • Rheumatoid Factor 2023 Positive (A)  Negative Final    See RF Quantitation   • Sed Rate 2023 37 (H)  0 - 19 mm/hour Final   • Segmented Neutrophils Manual 2023 52  45 - 77 % Final   • Lymphocytes Manual 2023 20  14 - 44 % Final   • Monocytes Manual 2023 10  4 - 12 % Final   • Eosinophils Manual 2023 10 (H)  0 - 6 % Final   • Basos 2023 2 (H)  0 - 1 % Final   • Atypical Lymphocytes Relative 2023 6 (H)  0 - 0 % Final   • Total Counted 2023 100   Final   • RBC Morphology 2023 Normal   Final   • Platelet Estimate  Adequate  Adequate Final   • MERARI 2023 Negative  Negative Final   • RF Quantitation 2023 128 IU/mL (A)  (none) Final   Consult on 2023   Component Date Value Ref Range Status   • WBC 2023 6.46  4.31 - 10.16 Thousand/uL Final   • RBC 2023 4.70  3.88 - 5.62 Million/uL Final   • Hemoglobin 2023 13.7  12.0 - 17.0 g/dL Final   • Hematocrit 2023 43.6  36.5 - 49.3 % Final   • MCV 2023 93  82 - 98 fL Final   • MCH 2023 29.1  26.8 - 34.3 pg Final   • MCHC 2023 31.4  31.4 - 37.4 g/dL Final   • RDW 2023 13.3  11.6 - 15.1 % Final   • MPV 2023 13.9 (H)  8.9 - 12.7 fL Final   • Platelets 7667 150  149 - 390 Thousands/uL Final   • nRBC 2023 0  /100 WBCs Final       Imagin2023: US abdomen   Spleen and liver size is within limits.     Echogenic lesion measuring 1.9 x 1.9 x 2 cm and may correspond to the previously described lesion on prior MR abdomens. Discussion follows under separate cover of prior reports. Please note:   This report has been generated by a voice recognition software system. Therefore there may be syntax, spelling, and/or grammatical errors. Please call if you have any questions.

## 2023-08-22 ENCOUNTER — HOSPITAL ENCOUNTER (OUTPATIENT)
Dept: RADIOLOGY | Facility: HOSPITAL | Age: 83
Discharge: HOME | End: 2023-08-22
Payer: MEDICARE

## 2023-08-22 DIAGNOSIS — D37.6 NEOPLASM OF UNCERTAIN BEHAVIOR OF LIVER: ICD-10-CM

## 2023-08-22 PROCEDURE — A9581 GADOXETATE DISODIUM INJ: HCPCS | Performed by: RADIOLOGY

## 2023-08-22 PROCEDURE — 74183 MRI ABD W/O CNTR FLWD CNTR: CPT

## 2023-08-22 RX ADMIN — GADOXETATE DISODIUM 10 ML: 181.43 INJECTION, SOLUTION INTRAVENOUS at 11:06

## 2023-08-22 NOTE — NURSING NOTE
Device interrogation for MRI. Normal device function prior to MRI. Leads and device meet all requirements per policy for MRI. Device programmed AOO 85bpm per Cardiology for MRI. Patient has no complaints. Vital signs monitored throughout by LUIS Swenson RN. Normal device function post MRI. Device reprogrammed to prior settings per Cardiology.

## 2023-08-22 NOTE — NURSING NOTE
[employer] device interrogation for MRI done by MICHELLE Molina clinical specialist. Device set to MRI safe mode @85 bpm.    MRI abdomen w/without contrast complete. Pt tolerated well. VSS throughout scan. Pt alert and oriented on room air. No complaints or visible signs of distress. Device reprogrammed to prior settings per Cardiology by Saumya Lang RN.

## 2023-08-25 ENCOUNTER — REMOTE DEVICE CLINIC VISIT (OUTPATIENT)
Dept: CARDIOLOGY CLINIC | Facility: CLINIC | Age: 83
End: 2023-08-25
Payer: MEDICARE

## 2023-08-25 DIAGNOSIS — Z95.0 CARDIAC PACEMAKER IN SITU: Primary | ICD-10-CM

## 2023-08-25 PROCEDURE — 93296 REM INTERROG EVL PM/IDS: CPT | Performed by: INTERNAL MEDICINE

## 2023-08-25 PROCEDURE — 93294 REM INTERROG EVL PM/LDLS PM: CPT | Performed by: INTERNAL MEDICINE

## 2023-08-25 NOTE — PROGRESS NOTES
Results for orders placed or performed in visit on 08/25/23   Cardiac EP device report    Narrative    MDT DUAL CHAMBER PPM  (MVP ON) - ACTIVE SYSTEM IS MRI CONDITIONAL  CARELINK TRANSMISSION: BATTERY VOLTAGE ADEQUATE (12.7 YRS). AP99.6%  <0.1% ALL AVAILABLE LEAD PARAMETERS WITHIN NORMAL LIMITS. NO NEW SIGNIFICANT HIGH RATE EPISODES. NORMAL DEVICE FUNCTION.  AM/COLVIN

## 2023-09-06 ENCOUNTER — TRANSCRIBE ORDERS (OUTPATIENT)
Dept: RADIOLOGY | Facility: HOSPITAL | Age: 83
End: 2023-09-06

## 2023-09-06 DIAGNOSIS — D37.6 NEOPLASM OF UNCERTAIN BEHAVIOR OF LIVER AND BILIARY PASSAGES: Primary | ICD-10-CM

## 2023-09-08 ENCOUNTER — DOCUMENTATION (OUTPATIENT)
Dept: HEMATOLOGY ONCOLOGY | Facility: CLINIC | Age: 83
End: 2023-09-08

## 2023-09-08 ENCOUNTER — TELEPHONE (OUTPATIENT)
Dept: HEMATOLOGY ONCOLOGY | Facility: CLINIC | Age: 83
End: 2023-09-08

## 2023-09-08 NOTE — TELEPHONE ENCOUNTER
Discussed with Dr. Ozzie Gilbert the new findings on MRI of his abdomen with increasing size of the hepatic lesion suspicious for [address] We also discussed the AFP is mildly elevated at 9.8. Question was whether or not to refer to surgical oncology for surgical resection versus interventional radiology for IR biopsy. He would like me to discuss further with my attending. I will reach back out to him if surgical oncology referral is not the recommendation moving forward.

## 2023-09-08 NOTE — TELEPHONE ENCOUNTER
Spoke with Dr. Carolina Raymond to make him aware that Dr. Ifeoma Hernandez is agreeable to surg onc referral. This is in process

## 2023-09-08 NOTE — TELEPHONE ENCOUNTER
Dr. Matt Scruggs would like to speak you JACOBO Berry regarding mutual patient.  Please call his cell phone number at [phone]

## 2023-09-08 NOTE — PROGRESS NOTES
Intake received/ Chart reviewed for services completed outside of [facility]    Pathology completed: none    Imaging completed: MRI 8/22/2023, 5/15/2023  Patient previously seen by medical oncology. Per note in chart Hung Duffy feels referral to Surgical oncology vs IR more appropriate for surg onc  All records needed are in patients chart. No records retrieval needed at this time.

## 2023-09-11 ENCOUNTER — DOCUMENTATION (OUTPATIENT)
Dept: SURGICAL ONCOLOGY | Facility: CLINIC | Age: 83
End: 2023-09-11

## 2023-09-11 ENCOUNTER — TELEPHONE (OUTPATIENT)
Dept: HEMATOLOGY ONCOLOGY | Facility: CLINIC | Age: 83
End: 2023-09-11

## 2023-09-11 NOTE — TELEPHONE ENCOUNTER
Appointment Confirmation   Who are you speaking with? Patient   If it is not the patient, are they listed on an active communication consent form? Yes   Which provider is the appointment scheduled with? Dr. Jerome Hernandez   When is the appointment scheduled? Please list date and time 9/15/23   At which location is the appointment scheduled to take place? [place]   Did caller verbalize understanding of appointment details?  Yes

## 2023-09-11 NOTE — PROGRESS NOTES
Intake received and chart reviewed for pathway workup evaluation. Patient referred to Surgical Oncology from PCP for liver lesion found on MRI. Referring provider-  Dr. Camille Alberts abnormal data AFP  Order: [id number]   Ref Range & Units 9/7/23  9:07 AM   Alpha Fetoprotein Tumor Marker <9.0 ng/mL 9.8 High      COMPREHENSIVE METABOLIC PANEL  Order: [id number]   Ref Range & Units 6/22/23  8:27 AM   Glucose 65 - 99 mg/dL 110 High     BUN 7 - 28 mg/dL 20    Creatinine 0.53 - 1.30 mg/dL 1.09    Sodium 135 - 145 mmol/L 137    Potassium 3.5 - 5.2 mmol/L 4.5    Chloride 100 - 109 mmol/L 103    Carbon Dioxide 21 - 31 mmol/L 26    Calcium 8.5 - 10.1 mg/dL 10.1    Alkaline Phosphatase 35 - 120 U/L 43    Albumin 3.5 - 5.7 g/dL 4.0    Bilirubin, Total 0.2 - 1.0 mg/dL 0.7    Comment: Eltrombopag and its metabolites may interfere with this assay causing erroneously high patient results. Protein, Total 6.3 - 8.3 g/dL 7.7    AST <41 U/L 26    ALT <56 U/L 20    Anion Gap 3 - 11 8    eGFRcr >59 67    eGFRcr Comment  Interpretive information: calculated GFR    Comment:                                      Imaging-    8/22/23  -  MRI - ABDOMEN - WITH AND WITHOUT CONTRAST     INDICATION: 79 years / Male  D37.6: Neoplasm of uncertain behavior of liver, gallbladder and bile ducts.     COMPARISON: MR abdomen 5/15/2021     TECHNIQUE:  Multiplanar/multisequence MRI of the abdomen was performed before and after administration of contrast.     IV Contrast:  10 mL of gadoxetate disodium SOLN     FINDINGS:     LOWER CHEST:   Unremarkable.     LIVER:  Normal in size and configuration. Increased size of a now 3.1 x 2.8 cm left hepatic lesion which previously measured 1.2 cm on the 5/15/2021 MRI.  Lesion is T1 hypointense (series 7 image 79), and mildly T2 hyperintense (series 14 image 10) with nonperipheral arterial phase   hyperenhancement (series 8 image 84) and now demonstrates washout and possible pseudocapsule (series 8 image 189). Lesion is not well assessed on diffusion-weighted imaging due to artifact from adjacent gas-containing colon though there does appear to be   hyperintensity in the general region of the lesion on DWI.     The hepatic veins and portal veins are patent.     BILE DUCTS: No intrahepatic or extrahepatic bile duct dilation.     GALLBLADDER: Normal.     PANCREAS:  Unremarkable.     ADRENAL GLANDS:  Unremarkable.     SPLEEN:  Normal.     KIDNEYS/PROXIMAL URETERS: No hydroureteronephrosis. No suspicious renal mass.     BOWEL:   No dilated loops of bowel.     PERITONEUM/RETROPERITONEUM: No mass. No ascites.     LYMPH NODES: No abdominal lymphadenopathy.     VASCULAR STRUCTURES:  No aneurysm.     ABDOMINAL WALL:  Unremarkable.     OSSEOUS STRUCTURES:  No suspicious osseous lesion.        IMPRESSION:     Increased size of now 3.1 cm arterial phase enhancing lesion in the left hepatic lobe with new features of washout and pseudocapsule enhancement, without contrast retention on the delayed hepatobiliary phase. Appearance raises suspicion for   hepatocellular carcinoma, with adenoma and atypical hemangioma considered less likely. Recommend tissue sampling. 7/14/23  - CT CHEST WITH HIGH RESOLUTION SLICES - WITHOUT CONTRAST     INDICATION:   J84.9: Interstitial pulmonary disease, unspecified. I can find no additional information regarding this in the medical record.     COMPARISON: Chest radiograph 10/28/2022 and chest CT 12/12/2019. Abdomen MRI 5/15/2021.     TECHNIQUE: CT of the chest without intravenous contrast.  Contiguous 1.25 mm axial images and 1 x 10 mm transverse images in supine position. Thin section images at 10 mm intervals, supine in inspiration and expiration. Patient unable to lie prone. Sagittal and coronal reformats. Axial and coronal MIP. Coronal minIP.     Radiation dose length product (DLP):  182 mGy-cm .   Radiation dose exposure minimized using iterative reconstruction and automated exposure control.     FINDINGS:     LUNGS:  Nothing to suggest interstitial lung disease with no reticulation, traction bronchiectasis/bronchiolectasis, groundglass, or honeycombing. No significant air trapping on expiration.     5 mm and smaller bilateral lung nodules, marked on series 4, stable since November 2019 and benign. Benign calcified granulomas.     AIRWAYS: No significant filling defects.     PLEURA:  Unremarkable.     HEART/GREAT VESSELS: Normal heart size. Mild coronary artery calcification indicating atherosclerotic heart disease. TAVR.  Left subclavian pacemaker leads in the right atrial appendage and right ventricular septum.     MEDIASTINUM AND LUIS:  Unremarkable.     CHEST WALL AND LOWER NECK: Unremarkable.     UPPER ABDOMEN: Enlarging lesion in the left hepatic lobe from 1.2 cm in May 2021 to 3.1 cm, which has been previously characterized on MRI as benign focal nodular hyperplasia.     OSSEOUS STRUCTURES: Mild degenerative disease in the spine.     IMPRESSION:     Nothing to indicate interstitial lung disease.     Enlarging lesion in the left hepatic lobe since 2019, previously characterized on MRI as benign focal nodular hyperplasia but recommend confirmation with MRI with Eovist intravenous contrast as previously recommended, particularly given enlargement      Scheduled appointments-  9/15  Dr. Olga Prajapati Cell Phone/PDA (specify)/Clothing

## 2023-09-12 PROBLEM — K76.9 LIVER LESION: Status: ACTIVE | Noted: 2023-09-12

## 2023-09-13 ENCOUNTER — TELEPHONE (OUTPATIENT)
Dept: HEMATOLOGY ONCOLOGY | Facility: CLINIC | Age: 83
End: 2023-09-13

## 2023-09-13 NOTE — TELEPHONE ENCOUNTER
Appointment Confirmation   Who are you speaking with? Patient   If it is not the patient, are they listed on an active communication consent form? N/A   Which provider is the appointment scheduled with? Dr. Olga Prajapati   When is the appointment scheduled? Please list date and time 9/15/23 @ 11:30am   At which location is the appointment scheduled to take place? [place]   Did caller verbalize understanding of appointment details?  yes

## 2023-09-15 ENCOUNTER — CONSULT (OUTPATIENT)
Dept: SURGICAL ONCOLOGY | Facility: CLINIC | Age: 83
End: 2023-09-15
Payer: MEDICARE

## 2023-09-15 VITALS
BODY MASS INDEX: 23.39 KG/M2 | SYSTOLIC BLOOD PRESSURE: 132 MMHG | OXYGEN SATURATION: 95 % | HEIGHT: 63 IN | HEART RATE: 81 BPM | DIASTOLIC BLOOD PRESSURE: 78 MMHG | WEIGHT: 132 LBS | TEMPERATURE: 97.7 F

## 2023-09-15 DIAGNOSIS — K76.9 LIVER LESION: Primary | ICD-10-CM

## 2023-09-15 DIAGNOSIS — R77.2 ELEVATED ALPHA FETOPROTEIN: ICD-10-CM

## 2023-09-15 DIAGNOSIS — D37.6 NEOPLASM OF UNCERTAIN BEHAVIOR OF LIVER: ICD-10-CM

## 2023-09-15 PROCEDURE — 99203 OFFICE O/P NEW LOW 30 MIN: CPT | Performed by: SURGERY

## 2023-09-15 NOTE — LETTER
September 15, 2023     Maritza Xiong, [address] [place] [id number]    Patient: Rama Childress   YOB: 1940   Date of Visit: 9/15/2023       Dear Dr. Ozzie Gilbert: Thank you for referring Rama Childress to me for evaluation. Below are my notes for this consultation. If you have questions, please do not hesitate to call me. I look forward to following your patient along with you. Sincerely,        Anneliese Allen MD        CC: No Recipients    Anneliese Allen MD  9/15/2023  2:16 PM  Signed               Surgical Oncology Consult       [facility] SURGICAL ONCOLOGY [facility] [address]    Rama Childress  1940  [id number]  [facility] SURGICAL ONCOLOGY [place] [id number]    Chief Complaint   Patient presents with   • Consult       Assessment/Plan:    No problem-specific Assessment & Plan notes found for this encounter. Diagnoses and all orders for this visit:    Liver lesion  -     Ambulatory Referral to Interventional Radiology; Future    Neoplasm of uncertain behavior of liver  -     Ambulatory Referral to Surgical Oncology  -     Ambulatory Referral to Interventional Radiology; Future    Elevated alpha fetoprotein  -     Ambulatory Referral to Surgical Oncology  -     Ambulatory Referral to Interventional Radiology; Future       Advance Care Planning/Advance Directives:  Discussed disease status, cancer treatment plans and/or cancer treatment goals with the patient. Oncology History    No history exists. History of Present Illness: 79-year-old man found to have a liver lesion an arterial etiology. This is noted 2 years ago and was 1.2 cm at that time with no worrisome features. On surveillance scan is noted to have enlarged to fold, prompting referral for further work-up and treatment. He has no prior hepatitis history. No liver history. No cirrhosis. Not an excessive drinker. Review of Systems   Constitutional: Negative. HENT: Negative. Eyes: Negative. Respiratory: Negative. Cardiovascular: Negative. Gastrointestinal: Negative. Endocrine: Negative. Genitourinary: Negative. Musculoskeletal: Negative. Skin: Negative. Allergic/Immunologic: Negative. Neurological: Negative. Hematological: Negative. Psychiatric/Behavioral: Negative. All other systems reviewed and are negative. 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[facility])   • Right inguinal hernia   • Nonrheumatic aortic valve stenosis   • Palpitations   • Sinus bradycardia   • S/P TAVR (transcatheter aortic valve replacement)   • Thrombocytopenia (HCC)   • Hyperchloremia   • Mixed hyperlipidemia   • Encounter for postoperative care   • PVC's (premature ventricular contractions)   • Premature atrial contractions   • Dizziness   • Hyperlipidemia   • Bradycardia   • PVC (premature ventricular contraction)   • CAD (coronary artery disease)   • Diabetes mellitus, type 2 (HCC)   • Liver lesion     Past Medical History:   Diagnosis Date   • BPH (benign prostatic hyperplasia)    • CAD (coronary artery disease)    • Diabetes mellitus (HCC)     type 2, diet controlled   • Diastolic CHF ([address])    • Epilepsy ([address])    • History of mycosis fungoides    • Hyperlipidemia    • Hypertension    • ILD (interstitial lung disease) ([address])    • Osteoporosis    • Seizures ([address])     partial sensory   • Severe aortic stenosis      Past Surgical History:   Procedure Laterality Date   • CARDIAC CATHETERIZATION     • CARDIAC ELECTROPHYSIOLOGY PROCEDURE N/A 10/24/2022    Procedure: Cardiac pacer implant/ DUAL CHAMBER;  Surgeon: Jacey Costello DO;  Location:  CARDIAC CATH LAB;   Service: Cardiology   • COLONOSCOPY     • NM ECHO TRANSESOPHAG R-T 2D W/PRB IMG ACQUISJ I&R N/A 1/7/2020    Procedure: TRANSESOPHAGEAL ECHOCARDIOGRAM (POOL); Surgeon: Suman Fallon DO;  Location: [facility] MAIN OR;  Service: Cardiac Surgery   • GA REPLACE AORTIC VALVE OPENFEMORAL ARTERY APPROACH N/A 1/7/2020    Procedure: REPLACEMENT AORTIC VALVE TRANSCATHETER (TAVR) TRANSFEMORAL W/ 23 MM CARROLL KATHLEEN S3 VALVE (ACCESS ON LEFT);   Surgeon: Suman Fallon DO;  Location: [facility] MAIN OR;  Service: Cardiac Surgery   • GA RPR 1ST INGUN HRNA AGE 5 YRS/> REDUCIBLE Right 11/16/2018    Procedure: REPAIR RIGHT INGUINAL HERNIA WITH MESH;  Surgeon: Sven Cobian MD;  Location: [address] [place] [address] MAIN OR;  Service: General     Family History   Problem Relation Age of Onset   • Coronary artery disease Family    • Heart disease Family      Social History     Socioeconomic History   • Marital status: /Civil Union     Spouse name: Not on file   • Number of children: Not on file   • Years of education: Not on file   • Highest education level: Not on file   Occupational History   • Not on file   Tobacco Use   • Smoking status: Never   • Smokeless tobacco: Never   Vaping Use   • Vaping Use: Never used   Substance and Sexual Activity   • Alcohol use: Yes     Comment: 1 glass of wine with dinner    • Drug use: No   • Sexual activity: Not Currently   Other Topics Concern   • Not on file   Social History Narrative   • Not on file     Social Determinants of Health     Financial Resource Strain: Not on file   Food Insecurity: Not on file   Transportation Needs: Not on file   Physical Activity: Not on file   Stress: Not on file   Social Connections: Not on file   Intimate Partner Violence: Not on file   Housing Stability: Not on file       Current Outpatient Medications:   •  amoxicillin (AMOXIL) 500 mg capsule, BEFORE DENTAL WORK, Disp: , Rfl: 3  •  ascorbic acid (VITAMIN C) 1000 MG tablet, Take 1,000 mg by mouth daily, Disp: , Rfl:   •  aspirin (ECOTRIN LOW STRENGTH) 81 mg EC tablet, Take 1 tablet (81 mg total) by mouth daily, Disp: , Rfl:   • calcium-vitamin D (OSCAL 500 + D) 500 mg-200 units per tablet, Take 1 tablet by mouth daily, Disp: , Rfl:   •  cholecalciferol (VITAMIN D3) 400 units tablet, Take 2,000 Units by mouth daily, Disp: , Rfl:   •  Krill Oil 350 MG CAPS, Take 350 mg by mouth in the morning, Disp: , Rfl:   •  levETIRAcetam (KEPPRA) 250 mg tablet, Take 250 mg by mouth 2 (two) times a day, Disp: , Rfl:   •  metoprolol succinate (TOPROL-XL) 25 mg 24 hr tablet, Take 1 tablet (25 mg total) by mouth daily, Disp: 90 tablet, Rfl: 2  •  Multiple Vitamin (DAILY VALUE MULTIVITAMIN PO), Take 1 tablet by mouth daily, Disp: , Rfl:   •  simvastatin (ZOCOR) 10 mg tablet, Take 10 mg by mouth daily at bedtime, Disp: , Rfl:   •  tamsulosin (FLOMAX) 0.4 mg, Take 0.4 mg by mouth daily with dinner, Disp: , Rfl:   •  traZODone (DESYREL) 50 mg tablet, Take 25 mg by mouth daily at bedtime, Disp: , Rfl:   •  benzonatate (TESSALON) 200 MG capsule, Take 1 capsule (200 mg total) by mouth 3 (three) times a day as needed for cough (Patient not taking: Reported on 12/1/2022), Disp: 20 capsule, Rfl: 0  Allergies   Allergen Reactions   • Escitalopram Dizziness   • Metformin Diarrhea     Vitals:    09/15/23 1325   BP: 132/78   Pulse: 81   Temp: 97.7 °F (36.5 °C)   SpO2: 95%       Physical Exam  Vitals reviewed. Constitutional:       Appearance: Normal appearance. HENT:      Head: Normocephalic and atraumatic. Right Ear: External ear normal.      Left Ear: External ear normal.      Nose: Nose normal.   Eyes:      Extraocular Movements: Extraocular movements intact. Pupils: Pupils are equal, round, and reactive to light. Cardiovascular:      Rate and Rhythm: Normal rate and regular rhythm. Pulses: Normal pulses. Heart sounds: Normal heart sounds. Pulmonary:      Breath sounds: Normal breath sounds. Abdominal:      General: Abdomen is flat. There is no distension. Palpations: Abdomen is soft. There is no mass. Tenderness:  There is no abdominal tenderness. There is no guarding or rebound. Hernia: No hernia is present. Musculoskeletal:         General: Normal range of motion. Cervical back: Normal range of motion and neck supple. Skin:     General: Skin is warm and dry. Neurological:      General: No focal deficit present. Mental Status: He is alert and oriented to person, place, and time. Psychiatric:         Mood and Affect: Mood normal.         Behavior: Behavior normal.         Thought Content: Thought content normal.         Judgment: Judgment normal.         Pathology:  None    Labs:  No results found for: "AFP"   Ref Range & Units 9/7/23  9:07 AM   Alpha Fetoprotein Tumor Marker <9.0 ng/mL 9.8 High          Imaging  MRI abdomen w wo contrast    Result Date: 8/30/2023  Narrative: MRI - ABDOMEN - WITH AND WITHOUT CONTRAST INDICATION: 79 years / Male  D37.6: Neoplasm of uncertain behavior of liver, gallbladder and bile ducts. COMPARISON: MR abdomen 5/15/2021 TECHNIQUE:  Multiplanar/multisequence MRI of the abdomen was performed before and after administration of contrast. IV Contrast:  10 mL of gadoxetate disodium SOLN FINDINGS: LOWER CHEST:   Unremarkable. LIVER: Normal in size and configuration. Increased size of a now 3.1 x 2.8 cm left hepatic lesion which previously measured 1.2 cm on the 5/15/2021 MRI. Lesion is T1 hypointense (series 7 image 79), and mildly T2 hyperintense (series 14 image 10) with nonperipheral arterial phase hyperenhancement (series 8 image 84) and now demonstrates washout and possible pseudocapsule (series 8 image 189). Lesion is not well assessed on diffusion-weighted imaging due to artifact from adjacent gas-containing colon though there does appear to be  hyperintensity in the general region of the lesion on DWI. The hepatic veins and portal veins are patent. BILE DUCTS: No intrahepatic or extrahepatic bile duct dilation. GALLBLADDER: Normal. PANCREAS:  Unremarkable.  ADRENAL GLANDS: Unremarkable. SPLEEN:  Normal. KIDNEYS/PROXIMAL URETERS: No hydroureteronephrosis. No suspicious renal mass. BOWEL:   No dilated loops of bowel. PERITONEUM/RETROPERITONEUM: No mass. No ascites. LYMPH NODES: No abdominal lymphadenopathy. VASCULAR STRUCTURES:  No aneurysm. ABDOMINAL WALL:  Unremarkable. OSSEOUS STRUCTURES:  No suspicious osseous lesion. Impression: Increased size of now 3.1 cm arterial phase enhancing lesion in the left hepatic lobe with new features of washout and pseudocapsule enhancement, without contrast retention on the delayed hepatobiliary phase. Appearance raises suspicion for hepatocellular carcinoma, with adenoma and atypical hemangioma considered less likely. Recommend tissue sampling. I personally discussed this study with Andre Gutiérrez on 8/30/2023 11:58 AM. Workstation performed: [id number]     Cardiac EP device report    Result Date: 8/25/2023  Narrative: MDT DUAL CHAMBER PPM  (MVP ON) - ACTIVE SYSTEM IS MRI CONDITIONAL CARELINK TRANSMISSION: BATTERY VOLTAGE ADEQUATE (12.7 YRS). AP99.6%  <0.1% ALL AVAILABLE LEAD PARAMETERS WITHIN NORMAL LIMITS. NO NEW SIGNIFICANT HIGH RATE EPISODES. NORMAL DEVICE FUNCTION. AM/COLVIN     I reviewed the above laboratory and imaging data. Discussion/Summary: 79-year-old man, liver lesion of undetermined etiology which is increased in size. AFP minimally elevated. We will set him up for IR guided biopsy of lesion to further characterize. He will follow-up here once results will for review. All questions answered and copies of reports given him for his records.

## 2023-09-15 NOTE — PROGRESS NOTES
Surgical Oncology Consult       [facility] SURGICAL [address]  [place] [address]    Yesi Mosher  1940  [id number]  [facility] SURGICAL ONCOLOGY [place] [phone]    Chief Complaint   Patient presents with   • Consult       Assessment/Plan:    No problem-specific Assessment & Plan notes found for this encounter. Diagnoses and all orders for this visit:    Liver lesion  -     Ambulatory Referral to Interventional Radiology; Future    Neoplasm of uncertain behavior of liver  -     Ambulatory Referral to Surgical Oncology  -     Ambulatory Referral to Interventional Radiology; Future    Elevated alpha fetoprotein  -     Ambulatory Referral to Surgical Oncology  -     Ambulatory Referral to Interventional Radiology; Future        Advance Care Planning/Advance Directives:  Discussed disease status, cancer treatment plans and/or cancer treatment goals with the patient. Oncology History    No history exists. History of Present Illness: 26-year-old man found to have a liver lesion an arterial etiology. This is noted 2 years ago and was 1.2 cm at that time with no worrisome features. On surveillance scan is noted to have enlarged to fold, prompting referral for further work-up and treatment. He has no prior hepatitis history. No liver history. No cirrhosis. Not an excessive drinker. Review of Systems   Constitutional: Negative. HENT: Negative. Eyes: Negative. Respiratory: Negative. Cardiovascular: Negative. Gastrointestinal: Negative. Endocrine: Negative. Genitourinary: Negative. Musculoskeletal: Negative. Skin: Negative. Allergic/Immunologic: Negative. Neurological: Negative. Hematological: Negative. Psychiatric/Behavioral: Negative. All other systems reviewed and are negative.         Patient Active Problem List Diagnosis   • BPH (benign prostatic hyperplasia)   • Osteoporosis   • Partial sensory seizure disorder [facility])   • Right inguinal hernia   • Nonrheumatic aortic valve stenosis   • Palpitations   • Sinus bradycardia   • S/P TAVR (transcatheter aortic valve replacement)   • Thrombocytopenia (HCC)   • Hyperchloremia   • Mixed hyperlipidemia   • Encounter for postoperative care   • PVC's (premature ventricular contractions)   • Premature atrial contractions   • Dizziness   • Hyperlipidemia   • Bradycardia   • PVC (premature ventricular contraction)   • CAD (coronary artery disease)   • Diabetes mellitus, type 2 (HCC)   • Liver lesion     Past Medical History:   Diagnosis Date   • BPH (benign prostatic hyperplasia)    • CAD (coronary artery disease)    • Diabetes mellitus (HCC)     type 2, diet controlled   • Diastolic CHF ([address])    • Epilepsy ([address])    • History of mycosis fungoides    • Hyperlipidemia    • Hypertension    • ILD (interstitial lung disease) ([address])    • Osteoporosis    • Seizures ([address])     partial sensory   • Severe aortic stenosis      Past Surgical History:   Procedure Laterality Date   • CARDIAC CATHETERIZATION     • CARDIAC ELECTROPHYSIOLOGY PROCEDURE N/A 10/24/2022    Procedure: Cardiac pacer implant/ DUAL CHAMBER;  Surgeon: Matthias Pope DO;  Location: [facility] CARDIAC CATH LAB; Service: Cardiology   • COLONOSCOPY     • ID ECHO TRANSESOPHAG R-T 2D W/PRB IMG ACQUISJ I&R N/A 1/7/2020    Procedure: TRANSESOPHAGEAL ECHOCARDIOGRAM (POOL); Surgeon: Lisa Massey DO;  Location: [facility] MAIN OR;  Service: Cardiac Surgery   • ID REPLACE AORTIC VALVE OPENFEMORAL ARTERY APPROACH N/A 1/7/2020    Procedure: REPLACEMENT AORTIC VALVE TRANSCATHETER (TAVR) TRANSFEMORAL W/ 23 MM CARROLL KATHLEEN S3 VALVE (ACCESS ON LEFT);   Surgeon: Lisa Massey DO;  Location: [facility] MAIN OR;  Service: Cardiac Surgery   • ID RPR 1ST INGUN HRNA AGE 5 YRS/> REDUCIBLE Right 11/16/2018    Procedure: REPAIR RIGHT INGUINAL HERNIA WITH MESH; Surgeon: Azeem Monzon MD;  Location: [address] [place] [address] MAIN OR;  Service: General     Family History   Problem Relation Age of Onset   • Coronary artery disease Family    • Heart disease Family      Social History     Socioeconomic History   • Marital status: /Civil Union     Spouse name: Not on file   • Number of children: Not on file   • Years of education: Not on file   • Highest education level: Not on file   Occupational History   • Not on file   Tobacco Use   • Smoking status: Never   • Smokeless tobacco: Never   Vaping Use   • Vaping Use: Never used   Substance and Sexual Activity   • Alcohol use: Yes     Comment: 1 glass of wine with dinner    • Drug use: No   • Sexual activity: Not Currently   Other Topics Concern   • Not on file   Social History Narrative   • Not on file     Social Determinants of Health     Financial Resource Strain: Not on file   Food Insecurity: Not on file   Transportation Needs: Not on file   Physical Activity: Not on file   Stress: Not on file   Social Connections: Not on file   Intimate Partner Violence: Not on file   Housing Stability: Not on file       Current Outpatient Medications:   •  amoxicillin (AMOXIL) 500 mg capsule, BEFORE DENTAL WORK, Disp: , Rfl: 3  •  ascorbic acid (VITAMIN C) 1000 MG tablet, Take 1,000 mg by mouth daily, Disp: , Rfl:   •  aspirin (ECOTRIN LOW STRENGTH) 81 mg EC tablet, Take 1 tablet (81 mg total) by mouth daily, Disp: , Rfl:   •  calcium-vitamin D (OSCAL 500 + D) 500 mg-200 units per tablet, Take 1 tablet by mouth daily, Disp: , Rfl:   •  cholecalciferol (VITAMIN D3) 400 units tablet, Take 2,000 Units by mouth daily, Disp: , Rfl:   •  Krill Oil 350 MG CAPS, Take 350 mg by mouth in the morning, Disp: , Rfl:   •  levETIRAcetam (KEPPRA) 250 mg tablet, Take 250 mg by mouth 2 (two) times a day, Disp: , Rfl:   •  metoprolol succinate (TOPROL-XL) 25 mg 24 hr tablet, Take 1 tablet (25 mg total) by mouth daily, Disp: 90 tablet, Rfl: 2  •  Multiple Vitamin (DAILY VALUE MULTIVITAMIN PO), Take 1 tablet by mouth daily, Disp: , Rfl:   •  simvastatin (ZOCOR) 10 mg tablet, Take 10 mg by mouth daily at bedtime, Disp: , Rfl:   •  tamsulosin (FLOMAX) 0.4 mg, Take 0.4 mg by mouth daily with dinner, Disp: , Rfl:   •  traZODone (DESYREL) 50 mg tablet, Take 25 mg by mouth daily at bedtime, Disp: , Rfl:   •  benzonatate (TESSALON) 200 MG capsule, Take 1 capsule (200 mg total) by mouth 3 (three) times a day as needed for cough (Patient not taking: Reported on 12/1/2022), Disp: 20 capsule, Rfl: 0  Allergies   Allergen Reactions   • Escitalopram Dizziness   • Metformin Diarrhea     Vitals:    09/15/23 1325   BP: 132/78   Pulse: 81   Temp: 97.7 °F (36.5 °C)   SpO2: 95%       Physical Exam  Vitals reviewed. Constitutional:       Appearance: Normal appearance. HENT:      Head: Normocephalic and atraumatic. Right Ear: External ear normal.      Left Ear: External ear normal.      Nose: Nose normal.   Eyes:      Extraocular Movements: Extraocular movements intact. Pupils: Pupils are equal, round, and reactive to light. Cardiovascular:      Rate and Rhythm: Normal rate and regular rhythm. Pulses: Normal pulses. Heart sounds: Normal heart sounds. Pulmonary:      Breath sounds: Normal breath sounds. Abdominal:      General: Abdomen is flat. There is no distension. Palpations: Abdomen is soft. There is no mass. Tenderness: There is no abdominal tenderness. There is no guarding or rebound. Hernia: No hernia is present. Musculoskeletal:         General: Normal range of motion. Cervical back: Normal range of motion and neck supple. Skin:     General: Skin is warm and dry. Neurological:      General: No focal deficit present. Mental Status: He is alert and oriented to person, place, and time. Psychiatric:         Mood and Affect: Mood normal.         Behavior: Behavior normal.         Thought Content:  Thought content normal.         Judgment: Judgment normal.         Pathology:  None    Labs:  No results found for: "AFP"   Ref Range & Units 9/7/23  9:07 AM   Alpha Fetoprotein Tumor Marker <9.0 ng/mL 9.8 High          Imaging  MRI abdomen w wo contrast    Result Date: 8/30/2023  Narrative: MRI - ABDOMEN - WITH AND WITHOUT CONTRAST INDICATION: 79 years / Male  D37.6: Neoplasm of uncertain behavior of liver, gallbladder and bile ducts. COMPARISON: MR abdomen 5/15/2021 TECHNIQUE:  Multiplanar/multisequence MRI of the abdomen was performed before and after administration of contrast. IV Contrast:  10 mL of gadoxetate disodium SOLN FINDINGS: LOWER CHEST:   Unremarkable. LIVER: Normal in size and configuration. Increased size of a now 3.1 x 2.8 cm left hepatic lesion which previously measured 1.2 cm on the 5/15/2021 MRI. Lesion is T1 hypointense (series 7 image 79), and mildly T2 hyperintense (series 14 image 10) with nonperipheral arterial phase hyperenhancement (series 8 image 84) and now demonstrates washout and possible pseudocapsule (series 8 image 189). Lesion is not well assessed on diffusion-weighted imaging due to artifact from adjacent gas-containing colon though there does appear to be  hyperintensity in the general region of the lesion on DWI. The hepatic veins and portal veins are patent. BILE DUCTS: No intrahepatic or extrahepatic bile duct dilation. GALLBLADDER: Normal. PANCREAS:  Unremarkable. ADRENAL GLANDS:  Unremarkable. SPLEEN:  Normal. KIDNEYS/PROXIMAL URETERS: No hydroureteronephrosis. No suspicious renal mass. BOWEL:   No dilated loops of bowel. PERITONEUM/RETROPERITONEUM: No mass. No ascites. LYMPH NODES: No abdominal lymphadenopathy. VASCULAR STRUCTURES:  No aneurysm. ABDOMINAL WALL:  Unremarkable. OSSEOUS STRUCTURES:  No suspicious osseous lesion.      Impression: Increased size of now 3.1 cm arterial phase enhancing lesion in the left hepatic lobe with new features of washout and pseudocapsule enhancement, without contrast retention on the delayed hepatobiliary phase. Appearance raises suspicion for hepatocellular carcinoma, with adenoma and atypical hemangioma considered less likely. Recommend tissue sampling. I personally discussed this study with Keyannafrank Neff on 8/30/2023 11:58 AM. Workstation performed: [id number]     Cardiac EP device report    Result Date: 8/25/2023  Narrative: MDT DUAL CHAMBER PPM  (MVP ON) - ACTIVE SYSTEM IS MRI CONDITIONAL CARELINK TRANSMISSION: BATTERY VOLTAGE ADEQUATE (12.7 YRS). AP99.6%  <0.1% ALL AVAILABLE LEAD PARAMETERS WITHIN NORMAL LIMITS. NO NEW SIGNIFICANT HIGH RATE EPISODES. NORMAL DEVICE FUNCTION. AM/COLVIN     I reviewed the above laboratory and imaging data. Discussion/Summary: 26-year-old man, liver lesion of undetermined etiology which is increased in size. AFP minimally elevated. We will set him up for IR guided biopsy of lesion to further characterize. He will follow-up here once results will for review. All questions answered and copies of reports given him for his records.

## 2023-09-18 ENCOUNTER — TELEPHONE (OUTPATIENT)
Dept: HEMATOLOGY ONCOLOGY | Facility: CLINIC | Age: 83
End: 2023-09-18

## 2023-09-18 ENCOUNTER — TELEPHONE (OUTPATIENT)
Dept: CARDIOLOGY CLINIC | Facility: CLINIC | Age: 83
End: 2023-09-18

## 2023-09-18 ENCOUNTER — PREP FOR PROCEDURE (OUTPATIENT)
Dept: INTERVENTIONAL RADIOLOGY/VASCULAR | Facility: HOSPITAL | Age: 83
End: 2023-09-18

## 2023-09-18 DIAGNOSIS — R16.0 LIVER MASS, LEFT LOBE: Primary | ICD-10-CM

## 2023-09-18 NOTE — TELEPHONE ENCOUNTER
Patient Call    Who are you speaking with? Patient    If it is not the patient, are they listed on an active communication consent form? N/A   What is the reason for this call? Patient calling in to confirm both his biopsy appointment and his follow up with Dr Steph Taylor. I assured the patient they are actually scheduled. Patient seems to be very nervous about his appointments. Does this require a call back? No   If a call back is required, please list best call back number N/A   If a call back is required, advise that a message will be forwarded to their care team and someone will return their call as soon as possible. Did you relay this information to the patient?  No

## 2023-09-18 NOTE — TELEPHONE ENCOUNTER
Meri Nearing from IR scheduling calling in to let Tish Peraza know that she will be calling the patient to schedule his biopsy. Teams message was sent to inform Tish Peraza.

## 2023-09-18 NOTE — TELEPHONE ENCOUNTER
Patient Call    Who are you speaking with? [facility]ology    If it is not the patient, are they listed on an active communication consent form? N/A   What is the reason for this call? She asks if the pt needs a clearance for the aspirin. Fax number [phone]   Does this require a call back? N/A   If a call back is required, please list best call back number n/a   If a call back is required, advise that a message will be forwarded to their care team and someone will return their call as soon as possible. Did you relay this information to the patient?  N/A

## 2023-09-18 NOTE — TELEPHONE ENCOUNTER
Spoke to patient and let him know that IR will be calling him by tomorrow afternoon to schedule his bioipsy. Patient is very anxious.

## 2023-09-18 NOTE — TELEPHONE ENCOUNTER
Call Transfer   Who are you speaking with? Patient   If it is not the patient, are they listed on an active communication consent form? N/A   Who is the patients HemOnc/SurgOnc provider? Dr. Olga Prajapati   What is the reason for this call? Patient calling in stating that he was told at his appointment with Dr Olga Prajapati that someone would be calling him to schedule a liver biopsy. The patient states no one has called him. Patient would like to be reached back at [phone]. Person/Department that the call was transferred to? Time that call was transferred? Kenny Shin @ 9:20AM   Your call will be transferred now. If you receive a voicemail, please leave a detailed message and a member of the team will return your call as soon as possible. Did you relay this information to the caller?   Yes

## 2023-09-18 NOTE — TELEPHONE ENCOUNTER
PC from Hemo/Onc asking for 5 day hold of aspirin for liver biopsy. Gave our phone and fax number to have paper with information faxed to Dr Issa Granger.  This is a Dr Dunham Older patient who has not seen Dr Issa Granger yet and is scheduled for Nov.

## 2023-09-19 ENCOUNTER — TELEPHONE (OUTPATIENT)
Dept: HEMATOLOGY ONCOLOGY | Facility: CLINIC | Age: 83
End: 2023-09-19

## 2023-09-19 NOTE — TELEPHONE ENCOUNTER
Call Transfer   Who are you speaking with? Doc office   If it is not the patient, are they listed on an active communication consent form? yes   Who is the patients HemOnc/SurgOnc provider? Jackie Fenton   What is the reason for this call? Need documentation   Person/Department that the call was transferred to? Time that call was transferred? Jose Vidal 10:05am 9/19   Your call will be transferred now. If you receive a voicemail, please leave a detailed message and a member of the team will return your call as soon as possible. Did you relay this information to the caller?  yes

## 2023-09-19 NOTE — TELEPHONE ENCOUNTER
Spoke to Dr. Huber Leblanc office and re-faxed the form to them for recommendations on holding the aspirin.  Fax confirmation was received

## 2023-09-20 ENCOUNTER — DOCUMENTATION (OUTPATIENT)
Dept: HEMATOLOGY ONCOLOGY | Facility: CLINIC | Age: 83
End: 2023-09-20

## 2023-09-20 ENCOUNTER — TELEPHONE (OUTPATIENT)
Dept: CARDIOLOGY CLINIC | Facility: CLINIC | Age: 83
End: 2023-09-20

## 2023-09-20 NOTE — TELEPHONE ENCOUNTER
Called patient and told him okay to hold is ASA as requested for his liver biopsy.   Told him note sent to requesting MD.

## 2023-09-20 NOTE — PROGRESS NOTES
In-basket message received from Dr. Joe Orellana to add patient to [facility] on 10/12/2023. Chart reviewed and prep started. Pending final pathology results from IR bx on 10/5.

## 2023-09-22 ENCOUNTER — TELEPHONE (OUTPATIENT)
Dept: HEMATOLOGY ONCOLOGY | Facility: CLINIC | Age: 83
End: 2023-09-22

## 2023-09-22 ENCOUNTER — TELEPHONE (OUTPATIENT)
Dept: SURGICAL ONCOLOGY | Facility: CLINIC | Age: 83
End: 2023-09-22

## 2023-09-22 DIAGNOSIS — K76.9 LIVER LESION: Primary | ICD-10-CM

## 2023-09-22 NOTE — TELEPHONE ENCOUNTER
Patient Call    Who are you speaking with? Patient    If it is not the patient, are they listed on an active communication consent form? N/A   What is the reason for this call? Biopsy was cancelled and pt wanted to know why   Does this require a call back? Yes   If a call back is required, please list best call back number [phone]   If a call back is required, advise that a message will be forwarded to their care team and someone will return their call as soon as possible. Did you relay this information to the patient?  Yes

## 2023-09-22 NOTE — TELEPHONE ENCOUNTER
Patient Call    Who are you speaking with? Patient    If it is not the patient, are they listed on an active communication consent form? N/A   What is the reason for this call? Pt called about his biopsy   Does this require a call back? Yes   If a call back is required, please list best call back number [phone]   If a call back is required, advise that a message will be forwarded to their care team and someone will return their call as soon as possible. Did you relay this information to the patient?  Yes

## 2023-09-22 NOTE — TELEPHONE ENCOUNTER
Patient called in with questions about his biopsy. IR biopsy and ablation was ordered. Patient was given IR phone number. PCP was updated on the plan. Patient verbalized understanding.

## 2023-09-27 ENCOUNTER — TELEMEDICINE (OUTPATIENT)
Dept: INTERVENTIONAL RADIOLOGY/VASCULAR | Facility: CLINIC | Age: 83
End: 2023-09-27
Payer: MEDICARE

## 2023-09-27 ENCOUNTER — OFFICE VISIT (OUTPATIENT)
Dept: CARDIOLOGY CLINIC | Facility: CLINIC | Age: 83
End: 2023-09-27
Payer: MEDICARE

## 2023-09-27 VITALS
HEART RATE: 72 BPM | DIASTOLIC BLOOD PRESSURE: 50 MMHG | BODY MASS INDEX: 23.03 KG/M2 | SYSTOLIC BLOOD PRESSURE: 100 MMHG | WEIGHT: 130 LBS

## 2023-09-27 DIAGNOSIS — K76.9 LIVER LESION: ICD-10-CM

## 2023-09-27 DIAGNOSIS — I49.3 PVC'S (PREMATURE VENTRICULAR CONTRACTIONS): Primary | ICD-10-CM

## 2023-09-27 PROCEDURE — 93000 ELECTROCARDIOGRAM COMPLETE: CPT | Performed by: INTERNAL MEDICINE

## 2023-09-27 PROCEDURE — 99214 OFFICE O/P EST MOD 30 MIN: CPT | Performed by: INTERNAL MEDICINE

## 2023-09-27 PROCEDURE — 99441 PR PHYS/QHP TELEPHONE EVALUATION 5-10 MIN: CPT | Performed by: RADIOLOGY

## 2023-09-27 NOTE — PROGRESS NOTES
Cardiology             Yesi Mosher  1940  [id number]              Assessment/Plan:     Fatigue/atypical chest pain  Sick sinus syndrome status post Medtronic dual-chamber permanent pacemaker placed 10/24/2022  PVCs  Hypertension  Aortic valve stenosis status post TAVR 01/2020--mild-to-moderate prosthetic valve regurgitation on prior echocardiogram  Dyslipidemia           Patient with no cardiac complaints today. Prior blood work ordered including BNP was unremarkable. No evidence of volume overload. No symptoms of angina. Prior coronary angiography before TAVR was unremarkable. Continue to follow-up with surgery for liver biopsy as scheduled. Follow-up with hematology/oncology. Okay to hold aspirin 1 week prior to biopsy. Blood pressure controlled, continue metoprolol  Continue simvastatin for dyslipidemia  No significant murmur on examination today, prior echocardiogram with normally functioning bioprosthetic aortic valve  Atrial paced rhythm on ECG today.   No high rate episodes on prior device interrogation, 99.6% atrial pacing present.              Follow-up in 6 months               Interval History:      This is an 74-year-old male with history of aortic stenosis status post transcatheter aortic valve placement 01/2020. Makayla Montgomery also has dyslipidemia, mildly symptomatic PVCs, and asymptomatic sinus bradycardia.  He has been following Dr. Mitchel Gambino the past.     He was last seen by him 06/17/2022. Amadou Andersen to that appointment, he had a Zio monitor which revealed a minimum heart rate of 41 beats per minute without any pauses exceeding 3 seconds.  His dizziness statin more related to orthostasis.  Subsequently was seen by electrophysiology 07/07/2022. [place] [facility] was thought that it some time he may need a permanent pacemaker without any obvious indication for the same at this time. Makayla Montgomery did have brief runs of nonsustained atrial tachycardia on his Zio monitor.     He was then seen by his PCP at which time his heart rate was 36 beats per minute with symptoms of fatigue. Subsequent Zio monitor 08/19/2022 demonstrated minimal heart rate of 38 beats per minute with average heart rate 53 beats per minute. There was a 15 beat atrial run with frequent PVCs up to 15.5% of total beats. Patient triggers correlated with ventricular bigeminy with 1 trigger correlating with sinus rhythm. Thereafter he saw electrophysiology recommended dual-chamber pacemaker placement. On 10/24/22 underwent placement of a dual-chamber Medtronic permanent pacemaker.     He went to the ER 10/28/2022 with mild lightheadedness and dyspnea. He states his heart rate was slow when he checked it at home and presented to the ER. Device interrogation demonstrated a normally functioning device and it was thought that his PVCs were reporting a falsely low heart rate on his monitor. Metoprolol 25 mg daily was initiated by electrophysiology for PVCs.     Echocardiogram 6/1/2023 demonstrates a normally functioning bioprosthetic aortic valve with normal left and ejection fraction and mild mitral vegetation. He was last seen 6/2023 at which time he had complaints of fatigue and atypical chest pain. Subsequent CMP, TSH, BNP, and CBC were unremarkable. His platelets were low normal with a history of thrombocytopenia in 2022. He presents today for follow-up. Since his last visit he was cleared for liver biopsy. He states they have postpone this and seems anxious and irritated from it. He denies exertional chest pain, shortness of breath, dizziness, palpitations, lower extremity edema.         Vitals:  Vitals:    09/27/23 1437   BP: 100/50   BP Location: Left arm   Patient Position: Sitting   Cuff Size: Adult   Pulse: 72   Weight: 59 kg (130 lb)         Past Medical History:   Diagnosis Date   • BPH (benign prostatic hyperplasia)    • CAD (coronary artery disease)    • Diabetes mellitus (HCC)     type 2, diet controlled   • Diastolic CHF ([address]) • Epilepsy ([address] [facility])    • History of mycosis fungoides    • Hyperlipidemia    • Hypertension    • ILD (interstitial lung disease) ([address])    • Osteoporosis    • Seizures (HCC)     partial sensory   • Severe aortic stenosis      Social History     Socioeconomic History   • Marital status: /Civil Union     Spouse name: Not on file   • Number of children: Not on file   • Years of education: Not on file   • Highest education level: Not on file   Occupational History   • Not on file   Tobacco Use   • Smoking status: Never   • Smokeless tobacco: Never   Vaping Use   • Vaping Use: Never used   Substance and Sexual Activity   • Alcohol use: Yes     Comment: 1 glass of wine with dinner    • Drug use: No   • Sexual activity: Not Currently   Other Topics Concern   • Not on file   Social History Narrative   • Not on file     Social Determinants of Health     Financial Resource Strain: Not on file   Food Insecurity: Not on file   Transportation Needs: Not on file   Physical Activity: Not on file   Stress: Not on file   Social Connections: Not on file   Intimate Partner Violence: Not on file   Housing Stability: Not on file      Family History   Problem Relation Age of Onset   • Coronary artery disease Family    • Heart disease Family      Past Surgical History:   Procedure Laterality Date   • CARDIAC CATHETERIZATION     • CARDIAC ELECTROPHYSIOLOGY PROCEDURE N/A 10/24/2022    Procedure: Cardiac pacer implant/ DUAL CHAMBER;  Surgeon: Kelly Mcneil DO;  Location: [facility]; Service: Cardiology   • COLONOSCOPY     • MD ECHO TRANSESOPHAG R-T 2D W/PRB IMG ACQUISJ I&R N/A 1/7/2020    Procedure: TRANSESOPHAGEAL ECHOCARDIOGRAM (POOL); Surgeon: Artie Johnson DO;  Location: [facility] MAIN OR;  Service: Cardiac Surgery   • MD REPLACE AORTIC VALVE OPENFEMORAL ARTERY APPROACH N/A 1/7/2020    Procedure: REPLACEMENT AORTIC VALVE TRANSCATHETER (TAVR) TRANSFEMORAL W/ 23 MM CARROLL KATHLEEN S3 VALVE (ACCESS ON LEFT);   Surgeon: Betina Noel DO Gianni;  Location:  MAIN OR;  Service: Cardiac Surgery   • AL RPR 1ST INGUN HRNA AGE 5 YRS/> REDUCIBLE Right 11/16/2018    Procedure: REPAIR RIGHT INGUINAL HERNIA WITH MESH;  Surgeon: Svitlana Salmon MD;  Location:  MAIN OR;  Service: General       Current Outpatient Medications:   •  amoxicillin (AMOXIL) 500 mg capsule, BEFORE DENTAL WORK, Disp: , Rfl: 3  •  ascorbic acid (VITAMIN C) 1000 MG tablet, Take 1,000 mg by mouth daily, Disp: , Rfl:   •  aspirin (ECOTRIN LOW STRENGTH) 81 mg EC tablet, Take 1 tablet (81 mg total) by mouth daily, Disp: , Rfl:   •  calcium-vitamin D (OSCAL 500 + D) 500 mg-200 units per tablet, Take 1 tablet by mouth daily, Disp: , Rfl:   •  cholecalciferol (VITAMIN D3) 400 units tablet, Take 2,000 Units by mouth daily, Disp: , Rfl:   •  Krill Oil 350 MG CAPS, Take 350 mg by mouth in the morning, Disp: , Rfl:   •  levETIRAcetam (KEPPRA) 250 mg tablet, Take 250 mg by mouth 2 (two) times a day, Disp: , Rfl:   •  metoprolol succinate (TOPROL-XL) 25 mg 24 hr tablet, Take 1 tablet (25 mg total) by mouth daily, Disp: 90 tablet, Rfl: 2  •  Multiple Vitamin (DAILY VALUE MULTIVITAMIN PO), Take 1 tablet by mouth daily, Disp: , Rfl:   •  simvastatin (ZOCOR) 10 mg tablet, Take 10 mg by mouth daily at bedtime, Disp: , Rfl:   •  tamsulosin (FLOMAX) 0.4 mg, Take 0.4 mg by mouth daily with dinner, Disp: , Rfl:   •  traZODone (DESYREL) 50 mg tablet, Take 50 mg by mouth daily at bedtime, Disp: , Rfl:   •  benzonatate (TESSALON) 200 MG capsule, Take 1 capsule (200 mg total) by mouth 3 (three) times a day as needed for cough (Patient not taking: Reported on 12/1/2022), Disp: 20 capsule, Rfl: 0        Review of Systems:  Review of Systems   Respiratory: Negative. Cardiovascular: Negative. All other systems reviewed and are negative. Physical Exam:  Physical Exam  Constitutional:       General: He is not in acute distress. Appearance: He is well-developed. He is not diaphoretic.    HENT: Head: Normocephalic and atraumatic. Eyes:      General: No scleral icterus. Right eye: No discharge. Pupils: Pupils are equal, round, and reactive to light. Neck:      Thyroid: No thyromegaly. Cardiovascular:      Rate and Rhythm: Normal rate and regular rhythm. Heart sounds: Normal heart sounds. No murmur heard. No friction rub. No gallop. Pulmonary:      Effort: Pulmonary effort is normal.      Breath sounds: Normal breath sounds. Abdominal:      General: There is no distension. Tenderness: There is no abdominal tenderness. There is no guarding or rebound. Musculoskeletal:         General: Normal range of motion. Cervical back: Normal range of motion and neck supple. Skin:     General: Skin is warm and dry. Coloration: Skin is not pale. Findings: No erythema or rash. Neurological:      Mental Status: He is alert and oriented to person, place, and time. Coordination: Coordination normal.   Psychiatric:         Behavior: Behavior normal.         Thought Content: Thought content normal.         Judgment: Judgment normal.         This note was completed in part utilizing [employer] Direct Software. Grammatical errors, random word insertions, spelling mistakes, and incomplete sentences can be an occasional consequence of this system secondary to software limitations, ambient noise, and hardware issues. If you have any questions or concerns about the content, text, or information contained within the body of this dictation, please contact the provider for clarification.

## 2023-09-27 NOTE — PROGRESS NOTES
Virtual Brief Visit    This Visit is being completed by telephone. The Patient is located at Home and in the following state in which I hold an active license [place]    The patient was identified by name and date of birth. Angelina Blackmon was informed that this is a telemedicine visit and that the visit is being conducted through Telephone. My office door was closed. No one else was in the room. He acknowledged consent and understanding of privacy and security of the video platform. The patient has agreed to participate and understands they can discontinue the visit at any time. Patient is aware this is a billable service. 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 Patient was referred for biopsy and ablation of the liver mass. Given the location of the mass at dome of liver close to diaphragm, there is increased likelihood of incomplete ablation if safe ablation is to be performed due to the need for avoiding ablation of the diaphragm. Recommendation would be to perform Y90 radiation segmentectomy to more safely treat the tumor. Labs drawn on 7/8/2023 show that patient has normal LFTs with Tbili 0.54. I discussed the case with Dr. Cristin Best. We will start by performing core needle biopsy of the mass. Patient will then be discussed in multidisciplinary tumor conference to come up with the best treatment plan for the mass. Patient and patient's daughter were updated on the plan and are in agreement. Problem List Items Addressed This Visit        Digestive    Liver lesion       Recent Visits  No visits were found meeting these conditions. Showing recent visits within past 7 days and meeting all other requirements  Future Appointments  No visits were found meeting these conditions.   Showing future appointments within next 150 days and meeting all other requirements         Visit Time  Total Visit Duration: 10 min

## 2023-09-29 ENCOUNTER — TELEPHONE (OUTPATIENT)
Dept: SURGICAL ONCOLOGY | Facility: CLINIC | Age: 83
End: 2023-09-29

## 2023-09-29 NOTE — TELEPHONE ENCOUNTER
Patient's daughter called with concerns that the IR liver bx has not been scheduled yet. Dr. Yoandy Ko reached out to IR doctor and was told that they would call the family.

## 2023-10-02 ENCOUNTER — TELEPHONE (OUTPATIENT)
Dept: RADIOLOGY | Facility: HOSPITAL | Age: 83
End: 2023-10-02

## 2023-10-02 RX ORDER — SODIUM CHLORIDE 9 MG/ML
75 INJECTION, SOLUTION INTRAVENOUS CONTINUOUS
OUTPATIENT
Start: 2023-10-02

## 2023-10-02 NOTE — PRE-PROCEDURE INSTRUCTIONS
Pre-procedure Instructions for Interventional Radiology  Raghavendra Doran  [id number] ([place]), [address]  [place] [phone]    You are scheduled for a/an Liver Mass Biopsy. On Monday 10/9/23    Your arrival time is 1030. Our Interventional Radiology nurse will notify you a few days before your procedure with the exact arrival time and location to arrive. To prepare for your procedure:  1. Please arrange for someone to drive you home after the procedure and stay with you until the next morning if you are instructed to do so. This is typically for patients receiving some type of sedative or anesthetic for the procedure. 2. DO NOT EAT OR DRINK ANYTHING after midnight on the evening before your procedure including candy & gum.  3. ONLY SIPS OF WATER with your medications are allowed on the morning of your procedure. 4. TAKE ALL OF YOUR REGULAR MEDICATIONS THE MORNING OF YOUR PROCEDURE with sips of water! We may call you to stop some of your blood sugar, blood pressure and blood thinning medications depending on the procedure. Please take all of these medications unless we instruct you to stop them. 5. If you have an allergy to x-ray dye, please contact Interventional Radiology for an x-ray dye preparation which usually consists of an oral steroid and Benadryl. The day of your procedure:  1. Bring a list of the medications you take at home. 2. Bring medications you take for breathing problems (such as inhalers), medications for chest pain, or both. 3. Bring your insurance card and a form of photo ID.  4. Bring a case for your glasses or contacts. 5. Please leave all valuables such as credit cards and jewelry at home. 6. Report to the registration desk in the main lobby at the [facility]. Ask to be directed to the After Procedure Unit on the 2nd floor. 7. While your procedure is being performed your family may wait in the Waiting Room on the 2nd floor.   8. Be prepared to stay overnight just in case. Sometimes procedures will indicate the need for further observation or treatment. 9. If you are scheduled for a follow-up visit with the Interventional Radiologist after your procedure, you will be called with a date and time. Special Instructions (Medications to alter or stop taking before your procedure etc.):  ASA LD 10/4/23 and restart 10/10/23. Above reviewed with his daughter Arina Cosme.

## 2023-10-05 ENCOUNTER — TELEPHONE (OUTPATIENT)
Dept: SURGICAL ONCOLOGY | Facility: CLINIC | Age: 83
End: 2023-10-05

## 2023-10-05 NOTE — TELEPHONE ENCOUNTER
Spoke to patient's daughter and arranged follow up appt with Dr Rebeca Jaramillo after IR liver bx. She will be on the phone during the visit.

## 2023-10-09 ENCOUNTER — HOSPITAL ENCOUNTER (OUTPATIENT)
Dept: CT IMAGING | Facility: HOSPITAL | Age: 83
Discharge: HOME | End: 2023-10-09
Attending: RADIOLOGY
Payer: MEDICARE

## 2023-10-09 VITALS
WEIGHT: 129.5 LBS | HEIGHT: 63 IN | DIASTOLIC BLOOD PRESSURE: 66 MMHG | RESPIRATION RATE: 16 BRPM | OXYGEN SATURATION: 97 % | SYSTOLIC BLOOD PRESSURE: 139 MMHG | BODY MASS INDEX: 22.95 KG/M2 | HEART RATE: 69 BPM | TEMPERATURE: 97.4 F

## 2023-10-09 DIAGNOSIS — R16.0 LIVER MASS, LEFT LOBE: ICD-10-CM

## 2023-10-09 LAB
ANION GAP SERPL CALCULATED.3IONS-SCNC: 6 MMOL/L
BUN SERPL-MCNC: 22 MG/DL (ref 5–25)
CALCIUM SERPL-MCNC: 10.3 MG/DL (ref 8.4–10.2)
CHLORIDE SERPL-SCNC: 104 MMOL/L (ref 96–108)
CO2 SERPL-SCNC: 25 MMOL/L (ref 21–32)
CREAT SERPL-MCNC: 0.96 MG/DL (ref 0.6–1.3)
ERYTHROCYTE [DISTWIDTH] IN BLOOD BY AUTOMATED COUNT: 12.9 % (ref 11.6–15.1)
GFR SERPL CREATININE-BSD FRML MDRD: 72 ML/MIN/1.73SQ M
GLUCOSE P FAST SERPL-MCNC: 127 MG/DL (ref 65–99)
GLUCOSE SERPL-MCNC: 127 MG/DL (ref 65–140)
GLUCOSE SERPL-MCNC: 139 MG/DL (ref 65–140)
HCT VFR BLD AUTO: 44.5 % (ref 36.5–49.3)
HGB BLD-MCNC: 14.7 G/DL (ref 12–17)
INR PPP: 1.07 (ref 0.84–1.19)
MCH RBC QN AUTO: 28.9 PG (ref 26.8–34.3)
MCHC RBC AUTO-ENTMCNC: 33 G/DL (ref 31.4–37.4)
MCV RBC AUTO: 87 FL (ref 82–98)
PLATELET # BLD AUTO: 126 THOUSANDS/UL (ref 149–390)
PMV BLD AUTO: 10.6 FL (ref 8.9–12.7)
POTASSIUM SERPL-SCNC: 4.2 MMOL/L (ref 3.5–5.3)
PROTHROMBIN TIME: 14.2 SECONDS (ref 11.6–14.5)
RBC # BLD AUTO: 5.09 MILLION/UL (ref 3.88–5.62)
SODIUM SERPL-SCNC: 135 MMOL/L (ref 135–147)
WBC # BLD AUTO: 7.41 THOUSAND/UL (ref 4.31–10.16)

## 2023-10-09 PROCEDURE — 88307 TISSUE EXAM BY PATHOLOGIST: CPT | Performed by: PATHOLOGY

## 2023-10-09 PROCEDURE — 82948 REAGENT STRIP/BLOOD GLUCOSE: CPT

## 2023-10-09 PROCEDURE — 77012 CT SCAN FOR NEEDLE BIOPSY: CPT

## 2023-10-09 PROCEDURE — 88313 SPECIAL STAINS GROUP 2: CPT | Performed by: PATHOLOGY

## 2023-10-09 PROCEDURE — 88342 IMHCHEM/IMCYTCHM 1ST ANTB: CPT | Performed by: PATHOLOGY

## 2023-10-09 PROCEDURE — 88333 PATH CONSLTJ SURG CYTO XM 1: CPT | Performed by: PATHOLOGY

## 2023-10-09 PROCEDURE — 85610 PROTHROMBIN TIME: CPT | Performed by: RADIOLOGY

## 2023-10-09 PROCEDURE — 99152 MOD SED SAME PHYS/QHP 5/>YRS: CPT | Performed by: RADIOLOGY

## 2023-10-09 PROCEDURE — 80048 BASIC METABOLIC PNL TOTAL CA: CPT | Performed by: RADIOLOGY

## 2023-10-09 PROCEDURE — 85027 COMPLETE CBC AUTOMATED: CPT | Performed by: RADIOLOGY

## 2023-10-09 PROCEDURE — 88341 IMHCHEM/IMCYTCHM EA ADD ANTB: CPT | Performed by: PATHOLOGY

## 2023-10-09 PROCEDURE — 47000 NEEDLE BIOPSY OF LIVER PERQ: CPT

## 2023-10-09 PROCEDURE — 77012 CT SCAN FOR NEEDLE BIOPSY: CPT | Performed by: RADIOLOGY

## 2023-10-09 PROCEDURE — 47000 NEEDLE BIOPSY OF LIVER PERQ: CPT | Performed by: RADIOLOGY

## 2023-10-09 RX ORDER — MIDAZOLAM HYDROCHLORIDE 2 MG/2ML
INJECTION, SOLUTION INTRAMUSCULAR; INTRAVENOUS AS NEEDED
Status: COMPLETED | OUTPATIENT
Start: 2023-10-09 | End: 2023-10-09

## 2023-10-09 RX ORDER — ACETAMINOPHEN 325 MG/1
650 TABLET ORAL EVERY 4 HOURS PRN
Status: DISCONTINUED | OUTPATIENT
Start: 2023-10-09 | End: 2023-10-13 | Stop reason: HOSPADM

## 2023-10-09 RX ORDER — LIDOCAINE HYDROCHLORIDE 10 MG/ML
INJECTION, SOLUTION EPIDURAL; INFILTRATION; INTRACAUDAL; PERINEURAL AS NEEDED
Status: COMPLETED | OUTPATIENT
Start: 2023-10-09 | End: 2023-10-09

## 2023-10-09 RX ORDER — SODIUM CHLORIDE 9 MG/ML
75 INJECTION, SOLUTION INTRAVENOUS CONTINUOUS
Status: DISCONTINUED | OUTPATIENT
Start: 2023-10-09 | End: 2023-10-13 | Stop reason: HOSPADM

## 2023-10-09 RX ORDER — FENTANYL CITRATE 50 UG/ML
INJECTION, SOLUTION INTRAMUSCULAR; INTRAVENOUS AS NEEDED
Status: COMPLETED | OUTPATIENT
Start: 2023-10-09 | End: 2023-10-09

## 2023-10-09 RX ADMIN — MIDAZOLAM HYDROCHLORIDE 1 MG: 1 INJECTION, SOLUTION INTRAMUSCULAR; INTRAVENOUS at 12:15

## 2023-10-09 RX ADMIN — FENTANYL CITRATE 25 MCG: 50 INJECTION, SOLUTION INTRAMUSCULAR; INTRAVENOUS at 12:04

## 2023-10-09 RX ADMIN — FENTANYL CITRATE 25 MCG: 50 INJECTION, SOLUTION INTRAMUSCULAR; INTRAVENOUS at 12:14

## 2023-10-09 RX ADMIN — SODIUM CHLORIDE 75 ML/HR: 0.9 INJECTION, SOLUTION INTRAVENOUS at 11:40

## 2023-10-09 RX ADMIN — LIDOCAINE HYDROCHLORIDE 20 ML: 10 INJECTION, SOLUTION EPIDURAL; INFILTRATION; INTRACAUDAL at 12:03

## 2023-10-09 NOTE — NURSING NOTE
Patient ambulated to the bathroom to void. Tolerated ambulating well. Back to bed. Abdominal bandaid clean dry and intact. Patient denies any pain upon questioning.

## 2023-10-09 NOTE — BRIEF OP NOTE (RAD/CATH)
INTERVENTIONAL RADIOLOGY PROCEDURE NOTE    Date: 10/9/2023    Procedure:   Procedure Summary     Date: 10/09/23 Room / Location: [address] [place] [address] CT Scan    Anesthesia Start:  Anesthesia Stop:     Procedure: IR BIOPSY LIVER MASS Diagnosis:       Liver mass, left lobe      (Josi lesion)    Scheduled Providers: Mat Meza MD Responsible Provider:     Anesthesia Type: Not recorded ASA Status: Not recorded          Preoperative diagnosis:   1. Liver mass, left lobe         Postoperative diagnosis: Same. Surgeon: Mat Meza MD     Assistant: None. No qualified resident was available. Blood loss: None    Specimens: 4 core needle samples placed into formalin. Findings: Successful segment 4A liver mass biopsy. Complications: None immediate.     Anesthesia: conscious sedation and local

## 2023-10-09 NOTE — SEDATION DOCUMENTATION
Pt tolerated liver lesion biopsy. Vitals stable.  Pt going to [facility] for 4hrs spine bed rest. Report given to receiving nurse

## 2023-10-09 NOTE — H&P
Interventional Radiology  History and Physical 10/9/2023     Yesi Mosher   1940   [id number]    Assessment/Plan:  79year old male with liver mass presents for biopsy. Problem List Items Addressed This Visit    None  Visit Diagnoses     Liver mass, left lobe        Relevant Orders    IR biopsy liver mass             Subjective:     Patient ID: Mona Holder is a 79 y.o. male. History of Present Illness  Patient with liver mass presents for biopsy. Please refer to my clinic note dated 9/27/2023 for further details. Review of Systems      Past Medical History:   Diagnosis Date   • BPH (benign prostatic hyperplasia)    • CAD (coronary artery disease)    • Diabetes mellitus ([address])     type 2, diet controlled   • Diastolic CHF ([address])    • Epilepsy ([address])    • History of mycosis fungoides    • Hyperlipidemia    • Hypertension    • ILD (interstitial lung disease) ([address])    • Osteoporosis    • Seizures ([address])     partial sensory   • Severe aortic stenosis         Past Surgical History:   Procedure Laterality Date   • CARDIAC CATHETERIZATION     • CARDIAC ELECTROPHYSIOLOGY PROCEDURE N/A 10/24/2022    Procedure: Cardiac pacer implant/ DUAL CHAMBER;  Surgeon: Savi Ugarte DO;  [facility]; Service: Cardiology   • COLONOSCOPY     • ME ECHO TRANSESOPHAG R-T 2D W/PRB IMG ACQUISJ I&R N/A 1/7/2020    Procedure: TRANSESOPHAGEAL ECHOCARDIOGRAM (POOL); Surgeon: Krzysztof Kim DO;  Location: [facility] MA[facility];  Service: Cardiac Surgery   • ME REPLACE AORTIC VALVE OPENFEMORAL ARTERY APPROACH N/A 1/7/2020    Procedure: REPLACEMENT AORTIC VALVE TRANSCATHETER (TAVR) TRANSFEMORAL W/ 23 MM CARROLL KATHLEEN S3 VALVE (ACCESS ON LEFT);   Surgeon: Krzysztof Kim DO;  Location[facility];  Service: Cardiac Surgery   • ME RPR 1ST INGUN HRNA AGE 5 YRS/> REDUCIBLE Right 11/16/2018    Procedure: REPAIR RIGHT INGUINAL HERNIA WITH MESH;  Surgeon: Isabel De Souza MD;  Location: [address] [place] [address] MAIN OR;  Service: General Social History     Tobacco Use   Smoking Status Never   Smokeless Tobacco Never        Social History     Substance and Sexual Activity   Alcohol Use Yes    Comment: 1 glass of wine with dinner         Social History     Substance and Sexual Activity   Drug Use No        Allergies   Allergen Reactions   • Escitalopram Dizziness   • Metformin Diarrhea   • Sertraline Other (See Comments)     weakness   • Duloxetine Other (See Comments)     Restless leg       Current Outpatient Medications   Medication Sig Dispense Refill   • amoxicillin (AMOXIL) 500 mg capsule BEFORE DENTAL WORK  3   • ascorbic acid (VITAMIN C) 1000 MG tablet Take 1,000 mg by mouth daily     • aspirin (ECOTRIN LOW STRENGTH) 81 mg EC tablet Take 1 tablet (81 mg total) by mouth daily     • benzonatate (TESSALON) 200 MG capsule Take 1 capsule (200 mg total) by mouth 3 (three) times a day as needed for cough (Patient not taking: Reported on 12/1/2022) 20 capsule 0   • calcium-vitamin D (OSCAL 500 + D) 500 mg-200 units per tablet Take 1 tablet by mouth daily     • cholecalciferol (VITAMIN D3) 400 units tablet Take 2,000 Units by mouth daily     • Krill Oil 350 MG CAPS Take 350 mg by mouth in the morning     • levETIRAcetam (KEPPRA) 250 mg tablet Take 250 mg by mouth 2 (two) times a day     • metoprolol succinate (TOPROL-XL) 25 mg 24 hr tablet Take 1 tablet (25 mg total) by mouth daily 90 tablet 2   • Multiple Vitamin (DAILY VALUE MULTIVITAMIN PO) Take 1 tablet by mouth daily     • simvastatin (ZOCOR) 10 mg tablet Take 10 mg by mouth daily at bedtime     • tamsulosin (FLOMAX) 0.4 mg Take 0.4 mg by mouth daily with dinner     • traZODone (DESYREL) 50 mg tablet Take 50 mg by mouth daily at bedtime       Current Facility-Administered Medications   Medication Dose Route Frequency Provider Last Rate Last Admin   • sodium chloride 0.9 % infusion  75 mL/hr Intravenous Continuous Jorge Chand MD              Objective:    Vitals:    10/09/23 1205 10/09/23 1209 10/09/23 1216 10/09/23 1219   BP: 130/54 137/58 135/59 127/54   Pulse: 70 70 70 70   Resp: 20 18 18 12   Temp:       TempSrc:       SpO2: 97% 98% 97% 95%   Weight:       Height:            Physical Exam  Constitutional:       Appearance: Normal appearance. Cardiovascular:      Rate and Rhythm: Normal rate. Pulmonary:      Effort: Pulmonary effort is normal.           No results found for: "BNP"   Lab Results   Component Value Date    WBC 6.46 07/08/2023    HGB 13.7 07/08/2023    HCT 43.6 07/08/2023    MCV 93 07/08/2023     07/08/2023     Lab Results   Component Value Date    INR 1.02 10/24/2022    INR 1.10 01/03/2020    INR 1.14 11/02/2019    PROTIME 13.6 10/24/2022    PROTIME 13.8 01/03/2020    PROTIME 14.2 11/02/2019     No results found for: "PTT"      I have personally reviewed pertinent imaging and laboratory results. Code Status: Prior  Advance Directive and Living Will:      Power of :    POLST:      This text is generated with voice recognition software. There may be translation, syntax,  or grammatical errors. If you have any questions, please contact the dictating provider.

## 2023-10-11 PROBLEM — C22.0 HEPATOCELLULAR CARCINOMA (HCC): Status: ACTIVE | Noted: 2023-09-12

## 2023-10-11 PROCEDURE — 88333 PATH CONSLTJ SURG CYTO XM 1: CPT | Performed by: PATHOLOGY

## 2023-10-11 PROCEDURE — 88341 IMHCHEM/IMCYTCHM EA ADD ANTB: CPT | Performed by: PATHOLOGY

## 2023-10-11 PROCEDURE — 88313 SPECIAL STAINS GROUP 2: CPT | Performed by: PATHOLOGY

## 2023-10-11 PROCEDURE — 88342 IMHCHEM/IMCYTCHM 1ST ANTB: CPT | Performed by: PATHOLOGY

## 2023-10-11 PROCEDURE — 88307 TISSUE EXAM BY PATHOLOGIST: CPT | Performed by: PATHOLOGY

## 2023-10-12 ENCOUNTER — DOCUMENTATION (OUTPATIENT)
Dept: HEMATOLOGY ONCOLOGY | Facility: CLINIC | Age: 83
End: 2023-10-12

## 2023-10-12 NOTE — PROGRESS NOTES
RECTAL/GI MULTIDISCIPLINARY CASE REVIEW    DATE: 10/12/2023      PRESENTING DOCTOR: Dr. Desmond Rosales      DIAGNOSIS: Hepatocellular Carcinoma      Gregorio Marie was presented at the Rectal/GI Multidisciplinary Conference today. PHYSICIAN RECOMMENDED PLAN:    -Recommendation from radiation oncology standpoint for patient to have SBRT. Patient will be referred to radiation oncology by surgical oncology after discussion with the patient.   -Patient is scheduled for an office visit with Dr. Desmond Rosales tomorrow 10/13/2023. Team agreed to plan. The final treatment plan will be left to the discretion of the patient and the treating physician. DISCLAIMERS:  TO THE TREATING PHYSICIAN:  This conference is a meeting of clinicians from various specialty areas who evaluate and discuss patients for whom a multidisciplinary treatment approach is being considered. Please note that the above opinion was a consensus of the conference attendees and is intended only to assist in quality care of the discussed patient. The responsibility for follow up on the input given during the conference, along with any final decisions regarding plan of care, is that of the patient and the patient's provider. Accordingly, appointments have only been recommended based on this information and have NOT been scheduled unless otherwise noted. TO THE PATIENT:  This summary is a brief record of major aspects of your cancer treatment. You may choose to share a copy with any of your doctors or nurses. However, this is not a detailed or comprehensive record of your care.       NCCN guidelines were readily available for review at this discussion

## 2023-10-13 ENCOUNTER — OFFICE VISIT (OUTPATIENT)
Dept: SURGICAL ONCOLOGY | Facility: CLINIC | Age: 83
End: 2023-10-13
Payer: MEDICARE

## 2023-10-13 VITALS
BODY MASS INDEX: 22.86 KG/M2 | TEMPERATURE: 97.5 F | SYSTOLIC BLOOD PRESSURE: 120 MMHG | HEIGHT: 63 IN | HEART RATE: 78 BPM | WEIGHT: 129 LBS | OXYGEN SATURATION: 93 % | DIASTOLIC BLOOD PRESSURE: 70 MMHG

## 2023-10-13 DIAGNOSIS — C22.0 HEPATOCELLULAR CARCINOMA (HCC): Primary | ICD-10-CM

## 2023-10-13 PROCEDURE — 99214 OFFICE O/P EST MOD 30 MIN: CPT | Performed by: SURGERY

## 2023-10-13 NOTE — LETTER
October 13, 2023     Minerva Acevedo, [address] [facility] [address]    Patient: Gato Grimaldo   YOB: 1940   Date of Visit: 10/13/2023       Dear Dr. Cheo Valdez: Thank you for referring Gato Grimaldo to me for evaluation. Below are my notes for this consultation. If you have questions, please do not hesitate to call me. I look forward to following your patient along with you. Sincerely,        Elie Montenegro MD        CC: No Recipients    Elie Montenegro MD  10/13/2023  1:49 PM  Sign when Signing Visit     Surgical Oncology Follow Up       [address] [place] [id number]    Gato Grimaldo  1940  [id number]  [facility]  CANCER [facility] SURGICAL ONCOLOGY [address] [id number]    No chief complaint on file. Assessment/Plan:    No problem-specific Assessment & Plan notes found for this encounter. Diagnoses and all orders for this visit:    Hepatocellular carcinoma [facility])  -     Ambulatory referral to Radiation Oncology; Future  -     MRI abdomen w wo contrast and mrcp; Future  -     Alpha fetoprotein, L3 percent; Future  -     Comprehensive metabolic panel; Future      Advance Care Planning/Advance Directives:  Discussed disease status, cancer treatment plans and/or cancer treatment goals with the patient. Oncology History   Hepatocellular carcinoma ([address])   10/9/2023 Biopsy    Liver, lesion, needle core biopsy:  - Moderately differentiated hepatocellular carcinoma. History of Present Illness: 27-year-old man with history of [address], recently diagnosed. He is here to discuss his biopsy proving this diagnosis.  -Interval History: No issues or complaints since biopsy was completed. This case was discussed at multidisciplinary tumor conference. Review of Systems:  Review of Systems   Constitutional: Negative.     HENT: Negative. Eyes: Negative. Respiratory: Negative. Cardiovascular: Negative. Gastrointestinal: Negative. Endocrine: Negative. Genitourinary: Negative. Musculoskeletal: Negative. Skin: Negative. Allergic/Immunologic: Negative. Neurological: Negative. Hematological: Negative. Psychiatric/Behavioral: Negative. All other systems reviewed and are negative. 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[facility])   • Right inguinal hernia   • Nonrheumatic aortic valve stenosis   • Palpitations   • Sinus bradycardia   • S/P TAVR (transcatheter aortic valve replacement)   • Thrombocytopenia (HCC)   • Hyperchloremia   • Mixed hyperlipidemia   • Encounter for postoperative care   • PVC's (premature ventricular contractions)   • Premature atrial contractions   • Dizziness   • Hyperlipidemia   • Bradycardia   • PVC (premature ventricular contraction)   • CAD (coronary artery disease)   • Diabetes mellitus, type 2 (HCC)   • Hepatocellular carcinoma (HCC)     Past Medical History:   Diagnosis Date   • BPH (benign prostatic hyperplasia)    • CAD (coronary artery disease)    • Diabetes mellitus (HCC)     type 2, diet controlled   • Diastolic CHF ([address])    • Epilepsy ([address])    • History of mycosis fungoides    • Hyperlipidemia    • Hypertension    • ILD (interstitial lung disease) ([address])    • Osteoporosis    • Seizures ([address])     partial sensory   • Severe aortic stenosis      Past Surgical History:   Procedure Laterality Date   • CARDIAC CATHETERIZATION     • CARDIAC ELECTROPHYSIOLOGY PROCEDURE N/A 10/24/2022    Procedure: Cardiac pacer implant/ DUAL CHAMBER;  Surgeon: Katia Allen DO;  [facility];   Service: Cardiology   • COLONOSCOPY     • IR BIOPSY LIVER MASS  10/9/2023   • [place] ECHO TRANSESOPHAG R-T 2D W/PRB IMG ACQUISJ I&R N/A 1/7/2020    Procedure: TRANSESOPHAGEAL ECHOCARDIOGRAM (POOL); Surgeon: Mariah Chowdhury DO;  Location:  MAIN OR;  Service: Cardiac Surgery   • KS REPLACE AORTIC VALVE OPENFEMORAL ARTERY APPROACH N/A 1/7/2020    Procedure: REPLACEMENT AORTIC VALVE TRANSCATHETER (TAVR) TRANSFEMORAL W/ 23 MM CARROLL KATHLEEN S3 VALVE (ACCESS ON LEFT);   Surgeon: Mariah Chowdhury DO;  [facility] MA[facility];  Service: Cardiac Surgery   • KS RPR 1ST INGUN HRNA AGE 5 YRS/> REDUCIBLE Right 11/16/2018    Procedure: REPAIR RIGHT INGUINAL HERNIA WITH MESH;  Surgeon: Saulo Montgomery MD;  Location: [address] [place] [address] MAIN OR;  Service: General     Family History   Problem Relation Age of Onset   • Coronary artery disease Family    • Heart disease Family      Social History     Socioeconomic History   • Marital status: /Civil Union     Spouse name: Not on file   • Number of children: Not on file   • Years of education: Not on file   • Highest education level: Not on file   Occupational History   • Not on file   Tobacco Use   • Smoking status: Never   • Smokeless tobacco: Never   Vaping Use   • Vaping Use: Never used   Substance and Sexual Activity   • Alcohol use: Not Currently   • Drug use: No   • Sexual activity: Not Currently   Other Topics Concern   • Not on file   Social History Narrative   • Not on file     Social Determinants of Health     Financial Resource Strain: Not on file   Food Insecurity: Not on file   Transportation Needs: Not on file   Physical Activity: Not on file   Stress: Not on file   Social Connections: Not on file   Intimate Partner Violence: Not on file   Housing Stability: Not on file       Current Outpatient Medications:   •  amoxicillin (AMOXIL) 500 mg capsule, BEFORE DENTAL WORK, Disp: , Rfl: 3  •  ascorbic acid (VITAMIN C) 1000 MG tablet, Take 1,000 mg by mouth daily, Disp: , Rfl:   •  aspirin (ECOTRIN LOW STRENGTH) 81 mg EC tablet, Take 1 tablet (81 mg total) by mouth daily, Disp: , Rfl:   •  benzonatate (TESSALON) 200 MG capsule, Take 1 capsule (200 mg total) by mouth 3 (three) times a day as needed for cough (Patient not taking: Reported on 12/1/2022), Disp: 20 capsule, Rfl: 0  •  calcium-vitamin D (OSCAL 500 + D) 500 mg-200 units per tablet, Take 1 tablet by mouth daily, Disp: , Rfl:   •  cholecalciferol (VITAMIN D3) 400 units tablet, Take 2,000 Units by mouth daily, Disp: , Rfl:   •  Krill Oil 350 MG CAPS, Take 350 mg by mouth in the morning, Disp: , Rfl:   •  levETIRAcetam (KEPPRA) 250 mg tablet, Take 250 mg by mouth 2 (two) times a day, Disp: , Rfl:   •  metoprolol succinate (TOPROL-XL) 25 mg 24 hr tablet, Take 1 tablet (25 mg total) by mouth daily, Disp: 90 tablet, Rfl: 2  •  Multiple Vitamin (DAILY VALUE MULTIVITAMIN PO), Take 1 tablet by mouth daily, Disp: , Rfl:   •  simvastatin (ZOCOR) 10 mg tablet, Take 10 mg by mouth daily at bedtime, Disp: , Rfl:   •  tamsulosin (FLOMAX) 0.4 mg, Take 0.4 mg by mouth daily with dinner, Disp: , Rfl:   •  traZODone (DESYREL) 50 mg tablet, Take 50 mg by mouth daily at bedtime, Disp: , Rfl:   No current facility-administered medications for this visit. Allergies   Allergen Reactions   • Escitalopram Dizziness   • Metformin Diarrhea   • Sertraline Other (See Comments)     weakness   • Duloxetine Other (See Comments)     Restless leg     There were no vitals filed for this visit. Physical Exam  Vitals reviewed. Constitutional:       Appearance: Normal appearance. HENT:      Head: Normocephalic and atraumatic. Right Ear: External ear normal.      Left Ear: External ear normal.      Nose: Nose normal.   Eyes:      Extraocular Movements: Extraocular movements intact. Pupils: Pupils are equal, round, and reactive to light. Cardiovascular:      Rate and Rhythm: Regular rhythm. Pulses: Normal pulses. Heart sounds: Normal heart sounds. Musculoskeletal:         General: Normal range of motion. Cervical back: Normal range of motion and neck supple. Skin:     General: Skin is warm and dry.    Neurological:      General: No focal deficit present. Mental Status: He is alert and oriented to person, place, and time. Psychiatric:         Mood and Affect: Mood normal.         Behavior: Behavior normal.           Results:  Labs:  No results found for: "AFP"    Ref Range & Units 9/7/23  9:07 AM    Alpha Fetoprotein Tumor Marker <9.0 ng/mL 9.8 High        Imaging  IR biopsy liver mass    Result Date: 10/9/2023  Narrative: PROCEDURE: CT-guided liver mass biopsy Procedural Personnel Attending physician(s): Dr. Angela Wilson Pre-procedure diagnosis: Liver mass Post-procedure diagnosis: Same Indication: Enlarging segment 4A liver mass concerning for hepatocellular carcinoma. PROCEDURE SUMMARY: - Percutaneous CT-guided segment 4A liver mass biopsy PROCEDURE DETAILS: Pre-procedure Reference imaging for biopsy target: MRI abdomen 8/22/2023 Consent: Informed consent for the procedure including risks, benefits and alternatives was obtained and time-out was performed prior to the procedure. Preparation: The site was prepared and draped using maximal sterile barrier technique including cutaneous antisepsis. Anesthesia/sedation Level of anesthesia/sedation: Moderate sedation (conscious sedation) Anesthesia/sedation administered by: IR nurse under attending supervision with continuous monitoring of the patient's level of consciousness and physiologic status Total intra-service sedation time (minutes): 30 Biopsy Local anesthesia was administered. Under CT guidance, 17-gauge coaxial introducer needle was advanced to the segment 4A liver mass. 18-gauge core biopsy needle was used to obtain 4 core needle samples which were placed into formalin. Pathologist was present to confirm adequacy of samples. Needle was removed and bandage applied. Post procedure CT showed no hematoma. Radiation Dose CT dose length product (mGy-cm): 893.83 Additional Details Specimens removed: 4 core needle samples placed into formalin.  Estimated blood loss (mL): None Complications: No immediate complications. Impression: CT-guided biopsy of segment 4A liver mass. Plan: Specimens sent for evaluation. Workstation performed: [id number]     I reviewed the above laboratory and imaging data. Discussion/Summary: 26-year-old man, new diagnosis of segment 4 HCC, 3.1 cm in size. Case was discussed at tumor board. He is a candidate for either SBRT versus SIRS therapy. Rationale for this along the risk and benefits of procedure were discussed. We will set him up with radiation oncology to get this process started. All questions answered. Plan for follow-up in 3 months with a follow-up MRI at that time to assess efficacy of treatment. We will also repeat AFP at that time.

## 2023-10-13 NOTE — PROGRESS NOTES
Surgical Oncology Follow Up       [facility] SURGICAL ONCOLOGY [place]  [facility] [address]    Yesi Mosher  1940  [id number]  [facility] SURGICAL ONCOLOGY [address] [address]    No chief complaint on file. Assessment/Plan:    No problem-specific Assessment & Plan notes found for this encounter. Diagnoses and all orders for this visit:    Hepatocellular carcinoma [facility])  -     Ambulatory referral to Radiation Oncology; Future  -     MRI abdomen w wo contrast and mrcp; Future  -     Alpha fetoprotein, L3 percent; Future  -     Comprehensive metabolic panel; Future      Advance Care Planning/Advance Directives:  Discussed disease status, cancer treatment plans and/or cancer treatment goals with the patient. Oncology History   Hepatocellular carcinoma ([address])   10/9/2023 Biopsy    Liver, lesion, needle core biopsy:  - Moderately differentiated hepatocellular carcinoma. History of Present Illness: 44-year-old man with history of [address], recently diagnosed. He is here to discuss his biopsy proving this diagnosis.  -Interval History: No issues or complaints since biopsy was completed. This case was discussed at multidisciplinary tumor conference. Review of Systems:  Review of Systems   Constitutional: Negative. HENT: Negative. Eyes: Negative. Respiratory: Negative. Cardiovascular: Negative. Gastrointestinal: Negative. Endocrine: Negative. Genitourinary: Negative. Musculoskeletal: Negative. Skin: Negative. Allergic/Immunologic: Negative. Neurological: Negative. Hematological: Negative. Psychiatric/Behavioral: Negative. All other systems reviewed and are negative.       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[address])    Right inguinal hernia Nonrheumatic aortic valve stenosis    Palpitations    Sinus bradycardia    S/P TAVR (transcatheter aortic valve replacement)    Thrombocytopenia (HCC)    Hyperchloremia    Mixed hyperlipidemia    Encounter for postoperative care    PVC's (premature ventricular contractions)    Premature atrial contractions    Dizziness    Hyperlipidemia    Bradycardia    PVC (premature ventricular contraction)    CAD (coronary artery disease)    Diabetes mellitus, type 2 ([address])    Hepatocellular carcinoma (HCC)     Past Medical History:   Diagnosis Date    BPH (benign prostatic hyperplasia)     CAD (coronary artery disease)     Diabetes mellitus ([address])     type 2, diet controlled    Diastolic CHF ([address])     Epilepsy ([address])     History of mycosis fungoides     Hyperlipidemia     Hypertension     ILD (interstitial lung disease) ([address])     Osteoporosis     Seizures ([address])     partial sensory    Severe aortic stenosis      Past Surgical History:   Procedure Laterality Date    CARDIAC CATHETERIZATION      CARDIAC ELECTROPHYSIOLOGY PROCEDURE N/A 10/24/2022    Procedure: Cardiac pacer implant/ DUAL CHAMBER;  Surgeon: Radha Atkinson DO;  [facility] CARD[facility]AC C[facility] LAB; Service: Cardiology    COLONOSCOPY      IR BIOPSY LIVER MASS  10/9/2023    RI ECHO TRANSESOPHAG R-T 2D W/PRB IMG ACQUISJ I&R N/A 1/7/2020    Procedure: TRANSESOPHAGEAL ECHOCARDIOGRAM (POOL); Surgeon: Kamla Ga DO;  Location: [facility] MAIN OR;  Service: Cardiac Surgery    RI REPLACE AORTIC VALVE OPENFEMORAL ARTERY APPROACH N/A 1/7/2020    Procedure: REPLACEMENT AORTIC VALVE TRANSCATHETER (TAVR) TRANSFEMORAL W/ 23 MM CARROLL KATHLEEN S3 VALVE (ACCESS ON LEFT);   Surgeon: Kamla Ga DO;  Location: [facility] MAIN OR;  Service: Cardiac Surgery    RI RPR 1ST INGUN HRNA AGE 5 YRS/> REDUCIBLE Right 11/16/2018    Procedure: REPAIR RIGHT INGUINAL HERNIA WITH MESH;  Surgeon: Jefferson Castaneda MD;  Location: [address] [place] [address] MAIN OR;  Service: General     Family History   Problem Relation Age of Onset Coronary artery disease Family     Heart disease Family      Social History     Socioeconomic History    Marital status: /Civil Union     Spouse name: Not on file    Number of children: Not on file    Years of education: Not on file    Highest education level: Not on file   Occupational History    Not on file   Tobacco Use    Smoking status: Never    Smokeless tobacco: Never   Vaping Use    Vaping Use: Never used   Substance and Sexual Activity    Alcohol use: Not Currently    Drug use: No    Sexual activity: Not Currently   Other Topics Concern    Not on file   Social History Narrative    Not on file     Social Determinants of Health     Financial Resource Strain: Not on file   Food Insecurity: Not on file   Transportation Needs: Not on file   Physical Activity: Not on file   Stress: Not on file   Social Connections: Not on file   Intimate Partner Violence: Not on file   Housing Stability: Not on file       Current Outpatient Medications:     amoxicillin (AMOXIL) 500 mg capsule, BEFORE DENTAL WORK, Disp: , Rfl: 3    ascorbic acid (VITAMIN C) 1000 MG tablet, Take 1,000 mg by mouth daily, Disp: , Rfl:     aspirin (ECOTRIN LOW STRENGTH) 81 mg EC tablet, Take 1 tablet (81 mg total) by mouth daily, Disp: , Rfl:     benzonatate (TESSALON) 200 MG capsule, Take 1 capsule (200 mg total) by mouth 3 (three) times a day as needed for cough (Patient not taking: Reported on 12/1/2022), Disp: 20 capsule, Rfl: 0    calcium-vitamin D (OSCAL 500 + D) 500 mg-200 units per tablet, Take 1 tablet by mouth daily, Disp: , Rfl:     cholecalciferol (VITAMIN D3) 400 units tablet, Take 2,000 Units by mouth daily, Disp: , Rfl:     Krill Oil 350 MG CAPS, Take 350 mg by mouth in the morning, Disp: , Rfl:     levETIRAcetam (KEPPRA) 250 mg tablet, Take 250 mg by mouth 2 (two) times a day, Disp: , Rfl:     metoprolol succinate (TOPROL-XL) 25 mg 24 hr tablet, Take 1 tablet (25 mg total) by mouth daily, Disp: 90 tablet, Rfl: 2    Multiple Vitamin (DAILY VALUE MULTIVITAMIN PO), Take 1 tablet by mouth daily, Disp: , Rfl:     simvastatin (ZOCOR) 10 mg tablet, Take 10 mg by mouth daily at bedtime, Disp: , Rfl:     tamsulosin (FLOMAX) 0.4 mg, Take 0.4 mg by mouth daily with dinner, Disp: , Rfl:     traZODone (DESYREL) 50 mg tablet, Take 50 mg by mouth daily at bedtime, Disp: , Rfl:   No current facility-administered medications for this visit. Allergies   Allergen Reactions    Escitalopram Dizziness    Metformin Diarrhea    Sertraline Other (See Comments)     weakness    Duloxetine Other (See Comments)     Restless leg     There were no vitals filed for this visit. Physical Exam  Vitals reviewed. Constitutional:       Appearance: Normal appearance. HENT:      Head: Normocephalic and atraumatic. Right Ear: External ear normal.      Left Ear: External ear normal.      Nose: Nose normal.   Eyes:      Extraocular Movements: Extraocular movements intact. Pupils: Pupils are equal, round, and reactive to light. Cardiovascular:      Rate and Rhythm: Regular rhythm. Pulses: Normal pulses. Heart sounds: Normal heart sounds. Musculoskeletal:         General: Normal range of motion. Cervical back: Normal range of motion and neck supple. Skin:     General: Skin is warm and dry. Neurological:      General: No focal deficit present. Mental Status: He is alert and oriented to person, place, and time.    Psychiatric:         Mood and Affect: Mood normal.         Behavior: Behavior normal.           Results:  Labs:  No results found for: "AFP"    Ref Range & Units 9/7/23  9:07 AM    Alpha Fetoprotein Tumor Marker <9.0 ng/mL 9.8 High        Imaging  IR biopsy liver mass    Result Date: 10/9/2023  Narrative: PROCEDURE: CT-guided liver mass biopsy Procedural Personnel Attending physician(s): Dr. Nelda Lobo Pre-procedure diagnosis: Liver mass Post-procedure diagnosis: Same Indication: Enlarging segment 4A liver mass concerning for hepatocellular carcinoma. PROCEDURE SUMMARY: - Percutaneous CT-guided segment 4A liver mass biopsy PROCEDURE DETAILS: Pre-procedure Reference imaging for biopsy target: MRI abdomen 8/22/2023 Consent: Informed consent for the procedure including risks, benefits and alternatives was obtained and time-out was performed prior to the procedure. Preparation: The site was prepared and draped using maximal sterile barrier technique including cutaneous antisepsis. Anesthesia/sedation Level of anesthesia/sedation: Moderate sedation (conscious sedation) Anesthesia/sedation administered by: IR nurse under attending supervision with continuous monitoring of the patient's level of consciousness and physiologic status Total intra-service sedation time (minutes): 30 Biopsy Local anesthesia was administered. Under CT guidance, 17-gauge coaxial introducer needle was advanced to the segment 4A liver mass. 18-gauge core biopsy needle was used to obtain 4 core needle samples which were placed into formalin. Pathologist was present to confirm adequacy of samples. Needle was removed and bandage applied. Post procedure CT showed no hematoma. Radiation Dose CT dose length product (mGy-cm): 893.83 Additional Details Specimens removed: 4 core needle samples placed into formalin. Estimated blood loss (mL): None Complications: No immediate complications. Impression: CT-guided biopsy of segment 4A liver mass. Plan: Specimens sent for evaluation. Workstation performed: [id number]     I reviewed the above laboratory and imaging data. Discussion/Summary: 79-year-old man, new diagnosis of segment 4 HCC, 3.1 cm in size. Case was discussed at tumor board. He is a candidate for either SBRT versus SIRS therapy. Rationale for this along the risk and benefits of procedure were discussed. We will set him up with radiation oncology to get this process started. All questions answered.   Plan for follow-up in 3 months with a follow-up MRI at that time to assess efficacy of treatment. We will also repeat AFP at that time.

## 2023-10-16 ENCOUNTER — DOCUMENTATION (OUTPATIENT)
Dept: CARDIOLOGY CLINIC | Facility: CLINIC | Age: 83
End: 2023-10-16

## 2023-10-24 ENCOUNTER — RADIATION ONCOLOGY CONSULT (OUTPATIENT)
Dept: RADIATION ONCOLOGY | Facility: HOSPITAL | Age: 83
End: 2023-10-24
Attending: INTERNAL MEDICINE
Payer: MEDICARE

## 2023-10-24 VITALS
WEIGHT: 130 LBS | DIASTOLIC BLOOD PRESSURE: 68 MMHG | TEMPERATURE: 98.1 F | OXYGEN SATURATION: 95 % | SYSTOLIC BLOOD PRESSURE: 112 MMHG | RESPIRATION RATE: 18 BRPM | HEIGHT: 63 IN | BODY MASS INDEX: 23.04 KG/M2 | HEART RATE: 87 BPM

## 2023-10-24 DIAGNOSIS — C22.0 HEPATOCELLULAR CARCINOMA (HCC): Primary | ICD-10-CM

## 2023-10-24 PROCEDURE — 99205 OFFICE O/P NEW HI 60 MIN: CPT | Performed by: INTERNAL MEDICINE

## 2023-10-24 PROCEDURE — 99211 OFF/OP EST MAY X REQ PHY/QHP: CPT | Performed by: INTERNAL MEDICINE

## 2023-10-24 NOTE — PROGRESS NOTES
Consultation - Radiation Oncology      Patient Name: Latha Mckenzie NKR:[id number] : 1940  Encounter: [id number]  Referring Provider: Driss Berger MD     Cancer Staging   Hepatocellular carcinoma [facility])  Staging form: Liver (Excluding Intrahepatic Bile Ducts), AJCC 8th Edition  - Clinical stage from 2023: cT1b, cN0 - Signed by Jamila Horn MD on 10/24/2023  Stage prefix: Initial diagnosis    ASSESSMENT & PLAN  Latha Mckenzie is a 79 y.o. male with a pacemaker who was recently diagnosed cT1bN0 [address] of the left lobe of the liver, s/p biopsy on 10/9/23 showing moderately differentiated hepatocellular carcinoma. His MRI on 2020 revealed a 3.1 cm left hepatic lesion which was previously 1.2 cm in size. His case was reviewed at multidisciplinary tumor board on 10/12/2023, with recommendation for SBRT. We reviewed the options for locoregional therapy for liver HCCs to include resection, ablation, Y90 radioembolization, TACE, and SBRT. Given the location and size of this lesion, multidisciplinary consensus was for SBRT. Treatment will involve 5 fractions at our [facility] facility. We discussed the advantages and disadvantages of this approach, including the associated short-term and long-term risks and toxicities of RT including, not limited to, fatigue, nausea, abdominal discomfort, diarrhea, decreased appetite, elevation of liver enzymes, radiation pneumonitis, injury to other abdominal viscera, bowel adhesions, stomach, esophageal or bowel ulceration, fistula, bleeding, perforation, or stricture, kidney irritation/injury, chest wall pain, rib fracture, injury to nerves/spinal cord, and permanent injury to the liver with risk of hepatic failure. We discussed that there is a risk of progression or recurrence despite appropriate therapy. The patient was given the opportunity to ask multiple questions, all of which were answered to his satisfaction.  He was amenable to proceeding as described, and informed consent was signed. CT simulation to be scheduled at [facility] for 4DCT with treatment at [facility]. Patient has a pacemaker so protocol will be initiated. Thank you for the opportunity to participate in the care of this patient. Dov Salguero MD  Department of [address] [facility]    Orders Placed This Encounter   Procedures    Radiation Simulation Treatment     1. Hepatocellular carcinoma [facility])  Ambulatory referral to Radiation Oncology    Radiation Simulation Treatment        Total Time Spent  60 minutes spent reviewing EMR in preparation for visit, with the patient, coordination with other providers, review of radiology, and documentation. CHIEF COMPLAINT  Chief Complaint   Patient presents with    Consult     Radiation Oncology       History of Present Illness  Radiation Oncology consult for [facility], referred by Dr. Jania Covarrubias to discuss liver SBRT vs SIRSpheres Y-90. Mary Riding 1940 is a 79 y.o. male with history of pacemaker placed in 2022, thrombocytopenia, positive RA factor, DM type 2, seizure disorder taking keppra. He has history of arterially enhancing liver lesion that was being monitored over the past few years, now increasing in size to 3.1 cm in left hepatic lobe, suspicious for HCC. He underwent liver biopsy on 10/9/23 confirming moderately differentiated hepatocellular carcinoma. His case was discussed at GI tumor board with recommendation to refer to Rad Onc for SBRT.         7/8/23 CMP  Total bilirubin: 0.54  Alk Phos: 49  AST: 26  ALT: 32     7/14/23 CT chest high resolution  Nothing to indicate interstitial lung disease. Enlarging lesion in the left hepatic lobe since 2019, previously characterized on MRI as benign focal nodular hyperplasia but recommend confirmation with MRI with Eovist intravenous contrast as previously recommended, particularly given enlargement.      7/17/23 US abdomen complete  Spleen and liver size is within limits. Echogenic lesion measuring 1.9 x 1.9 x 2 cm and may correspond to the previously described lesion on prior MR abdomens. Discussion follows under separate cover of prior reports. 8/22/23 MRI abdomen w wo contrast  Increased size of now 3.1 cm arterial phase enhancing lesion in the left hepatic lobe with new features of washout and pseudocapsule enhancement, without contrast retention on the delayed hepatobiliary phase. Appearance raises suspicion for hepatocellular carcinoma, with adenoma and atypical hemangioma considered less likely. Recommend tissue sampling. 9/7/23 AFP tumor marker [facility]): 9.8     10/9/23 IR biopsy:  Liver, lesion, needle core biopsy:  - Moderately differentiated hepatocellular carcinoma        10/12/23 RECTAL/GI MULTIDISCIPLINARY CASE REVIEW   PHYSICIAN RECOMMENDED PLAN:   -Recommendation from radiation oncology standpoint for patient to have SBRT. Patient will be referred to radiation oncology by surgical oncology after discussion with the patient.   -Patient is scheduled for an office visit with Dr. Meli Spann tomorrow 10/13/2023.         10/13/23 Harriet Hernandez, Dr. Meli Spann  new diagnosis of segment 4 HCC, 3.1 cm in size. Case was discussed at tumor board. He is a candidate for either SBRT versus SIRS therapy. Refer to radiation oncology to get this process started. Plan for follow-up in 3 months with a follow-up MRI at that time to assess efficacy of treatment. We will also repeat AFP at that time. Upcoming appts:  1/17/24 Dr. Meli Spann    Today, the patient reports felling well overall. He presents with his wife and daughter-in-law in person as well as his daughter over speaker phone. He notes some constipation, but denies nausea or vomiting. He denies any other relevant symptoms.     Oncology History   Hepatocellular carcinoma ([address])   8/22/2023 -  Cancer Staged    Staging form: Liver (Excluding Intrahepatic Bile Ducts), AJCC 8th Edition  - Clinical stage from 8/22/2023: cT1b, cN0 - Signed by Abi Friedman MD on 10/24/2023  Stage prefix: Initial diagnosis       10/9/2023 Biopsy    Liver, lesion, needle core biopsy:  - Moderately differentiated hepatocellular carcinoma. Historical Information   Past Medical History:   Diagnosis Date    BPH (benign prostatic hyperplasia)     CAD (coronary artery disease)     Diabetes mellitus ([address])     type 2, diet controlled    Diastolic CHF ([address])     Epilepsy ([address])     History of mycosis fungoides     Hyperlipidemia     Hypertension     ILD (interstitial lung disease) ([address])     Osteoporosis     Seizures ([address])     partial sensory    Severe aortic stenosis      Past Surgical History:   Procedure Laterality Date    CARDIAC CATHETERIZATION      CARDIAC ELECTROPHYSIOLOGY PROCEDURE N/A 10/24/2022    Procedure: Cardiac pacer implant/ DUAL CHAMBER;  Surgeon: Ambreen Patel DO;  [facility] CARDI[facility] C[facility] LAB; Service: Cardiology    COLONOSCOPY      IR BIOPSY LIVER MASS  10/9/2023    ME ECHO TRANSESOPHAG R-T 2D W/PRB IMG ACQUISJ I&R N/A 1/7/2020    Procedure: TRANSESOPHAGEAL ECHOCARDIOGRAM (POOL); Surgeon: Ramona Mina DO;  Location: [facility] MAIN OR;  Service: Cardiac Surgery    ME REPLACE AORTIC VALVE OPENFEMORAL ARTERY APPROACH N/A 1/7/2020    Procedure: REPLACEMENT AORTIC VALVE TRANSCATHETER (TAVR) TRANSFEMORAL W/ 23 MM CARROLL KATHLEEN S3 VALVE (ACCESS ON LEFT);   Surgeon: Ramona Mina DO;  Location: [facility] MAIN OR;  Service: Cardiac Surgery    ME RPR 1ST INGUN HRNA AGE 5 YRS/> REDUCIBLE Right 11/16/2018    Procedure: REPAIR RIGHT INGUINAL HERNIA WITH MESH;  Surgeon: Debbi Dubois MD;  Location: [address] [place] [address] MAIN OR;  Service: General     Family History   Problem Relation Age of Onset    Coronary artery disease Family     Heart disease Family      Social History   Social History     Substance and Sexual Activity   Alcohol Use Not Currently     Social History     Substance and Sexual Activity   Drug Use No     Social History     Tobacco Use Smoking Status Never   Smokeless Tobacco Never     Meds/Allergies     Current Outpatient Medications:     ascorbic acid (VITAMIN C) 1000 MG tablet, Take 1,000 mg by mouth daily, Disp: , Rfl:     aspirin (ECOTRIN LOW STRENGTH) 81 mg EC tablet, Take 1 tablet (81 mg total) by mouth daily, Disp: , Rfl:     calcium-vitamin D (OSCAL 500 + D) 500 mg-200 units per tablet, Take 1 tablet by mouth daily, Disp: , Rfl:     cholecalciferol (VITAMIN D3) 400 units tablet, Take 2,000 Units by mouth daily, Disp: , Rfl:     Krill Oil 350 MG CAPS, Take 350 mg by mouth in the morning, Disp: , Rfl:     levETIRAcetam (KEPPRA) 250 mg tablet, Take 250 mg by mouth 2 (two) times a day, Disp: , Rfl:     metoprolol succinate (TOPROL-XL) 25 mg 24 hr tablet, Take 1 tablet (25 mg total) by mouth daily, Disp: 90 tablet, Rfl: 2    Multiple Vitamin (DAILY VALUE MULTIVITAMIN PO), Take 1 tablet by mouth daily, Disp: , Rfl:     simvastatin (ZOCOR) 10 mg tablet, Take 10 mg by mouth daily at bedtime, Disp: , Rfl:     tamsulosin (FLOMAX) 0.4 mg, Take 0.4 mg by mouth daily with dinner, Disp: , Rfl:     traZODone (DESYREL) 50 mg tablet, Take 50 mg by mouth daily at bedtime, Disp: , Rfl:     amoxicillin (AMOXIL) 500 mg capsule, BEFORE DENTAL WORK (Patient not taking: Reported on 10/24/2023), Disp: , Rfl: 3    benzonatate (TESSALON) 200 MG capsule, Take 1 capsule (200 mg total) by mouth 3 (three) times a day as needed for cough (Patient not taking: Reported on 12/1/2022), Disp: 20 capsule, Rfl: 0  Allergies   Allergen Reactions    Escitalopram Dizziness    Metformin Diarrhea    Sertraline Other (See Comments)     weakness    Duloxetine Other (See Comments)     Restless leg       Pathology:  See above.     Review of Systems  Refer to nursing note    OBJECTIVE:   /68 (BP Location: Left arm, Patient Position: Sitting, Cuff Size: Standard)   Pulse 87   Temp 98.1 °F (36.7 °C) (Temporal)   Resp 18   Ht 5' 3" (1.6 m)   Wt 59 kg (130 lb)   SpO2 95%   BMI 23.03 kg/m²   Pain Assessment:  0  Performance Status: ECO - Symptomatic but completely ambulatory    Physical Exam  General Appearance:  Alert, cooperative, no distress, appears stated age  Cardiovascular:  Extremities warm and well perfused  Lungs: Respirations unlabored, no cyanosis, able to speak in complete sentences without dyspnea. Abdomen: Non-distended  Skin: No generalized rash or dermatitis  Neurologic: ANOx3, speech and cognition intact. Portions of the record may have been created with voice recognition software. Occasional wrong word or "sound a like" substitutions may have occurred due to the inherent limitations of voice recognition software. Read the chart carefully and recognize, using context, where substitutions have occurred.

## 2023-10-24 NOTE — PROGRESS NOTES
Radiation Oncology consult for [address] [facility], referred by Dr. Sabra Teresa to discuss liver SBRT vs SIRSpheres Y-90. Lindsey Tapia 1940 is a 79 y.o. male with history of pacemaker placed in 2022, thrombocytopenia, positive RA factor, DM type 2, seizure disorder taking keppra. He has history of arterially enhancing liver lesion that was being monitored over the past few years, now increasing in size to 3.1 cm in left hepatic lobe, suspicious for HCC. He underwent liver biopsy on 10/9/23 confirming moderately differentiated hepatocellular carcinoma. His case was discussed at GI tumor board with recommendation to refer to Rad Onc for SBRT.       7/8/23 CMP  Total bilirubin: 0.54  Alk Phos: 49  AST: 26  ALT: 32    7/14/23 CT chest high resolution  Nothing to indicate interstitial lung disease. Enlarging lesion in the left hepatic lobe since 2019, previously characterized on MRI as benign focal nodular hyperplasia but recommend confirmation with MRI with Eovist intravenous contrast as previously recommended, particularly given enlargement. 7/17/23 US abdomen complete  Spleen and liver size is within limits. Echogenic lesion measuring 1.9 x 1.9 x 2 cm and may correspond to the previously described lesion on prior MR abdomens. Discussion follows under separate cover of prior reports. 8/22/23 MRI abdomen w wo contrast  Increased size of now 3.1 cm arterial phase enhancing lesion in the left hepatic lobe with new features of washout and pseudocapsule enhancement, without contrast retention on the delayed hepatobiliary phase. Appearance raises suspicion for hepatocellular carcinoma, with adenoma and atypical hemangioma considered less likely. Recommend tissue sampling.     9/7/23 AFP tumor marker [facility]): 9.8    10/9/23 IR biopsy:  Liver, lesion, needle core biopsy:  - Moderately differentiated hepatocellular carcinoma      10/12/23 RECTAL/GI MULTIDISCIPLINARY CASE REVIEW   PHYSICIAN RECOMMENDED PLAN:   -Recommendation from radiation oncology standpoint for patient to have SBRT. Patient will be referred to radiation oncology by surgical oncology after discussion with the patient.   -Patient is scheduled for an office visit with Dr. Chloe Mcdonald tomorrow 10/13/2023.       10/13/23 Louis Short, Dr. Chloe Mcdonald  new diagnosis of segment 4 HCC, 3.1 cm in size. Case was discussed at tumor board. He is a candidate for either SBRT versus SIRS therapy. Refer to radiation oncology to get this process started. Plan for follow-up in 3 months with a follow-up MRI at that time to assess efficacy of treatment. We will also repeat AFP at that time. Upcoming appts:  1/17/24 Dr. Chloe Mcdonald    Oncology History   Hepatocellular carcinoma ([address])   10/9/2023 Biopsy    Liver, lesion, needle core biopsy:  - Moderately differentiated hepatocellular carcinoma. Review of Systems:  Review of Systems   Constitutional: Negative. HENT: Negative. Eyes:         Wears glasses   Respiratory: Negative. Cardiovascular: Negative. Gastrointestinal:  Positive for constipation. Negative for nausea and vomiting. Endocrine: Negative. Genitourinary: Negative. Musculoskeletal:  Positive for arthralgias (Ihntermittent right groin pain). Skin: Negative. Allergic/Immunologic: Negative. Neurological:  Positive for dizziness (Occasional). Hematological: Negative. Psychiatric/Behavioral: Negative.          Clinical Trial: no    Pain assessment: 0    Prior Radiation: No    Teaching: NCI radiation packet    MST: Completed     Implantable Devices (Port, pacemaker, pain stimulator): Pacemaker     Hip Replacement: No     Health Maintenance   Topic Date Due    Medicare Annual Wellness Visit (AWV)  Never done    Diabetic Foot Exam  Never done    DM Eye Exam  Never done    Fall Risk  10/16/2019    COVID-19 Vaccine (4 - Moderna series) 12/24/2021    Influenza Vaccine (1) 09/01/2023    HEMOGLOBIN A1C  12/22/2023    BMI: Adult  10/13/2024    Depression Screening  10/24/2024    Pneumococcal Vaccine: 65+ Years  Completed    HIB Vaccine  Aged Out    IPV Vaccine  Aged Out    Hepatitis A Vaccine  Aged Out    Meningococcal ACWY Vaccine  Aged Out    HPV Vaccine  Aged Out       Past Medical History:   Diagnosis Date    BPH (benign prostatic hyperplasia)     CAD (coronary artery disease)     Diabetes mellitus ([address])     type 2, diet controlled    Diastolic CHF ([address])     Epilepsy ([address])     History of mycosis fungoides     Hyperlipidemia     Hypertension     ILD (interstitial lung disease) ([address])     Osteoporosis     Seizures ([address])     partial sensory    Severe aortic stenosis        Past Surgical History:   Procedure Laterality Date    CARDIAC CATHETERIZATION      CARDIAC ELECTROPHYSIOLOGY PROCEDURE N/A 10/24/2022    Procedure: Cardiac pacer implant/ DUAL CHAMBER;  Surgeon: Amarjit Garcia DO;  [facility] C[facility]; Service: Cardiology    COLONOSCOPY      IR BIOPSY LIVER MASS  10/9/2023    NC ECHO TRANSESOPHAG R-T 2D W/PRB IMG ACQUISJ I&R N/A 1/7/2020    Procedure: TRANSESOPHAGEAL ECHOCARDIOGRAM (POOL); Surgeon: Saira Gramajo DO;  Location: [facility] MAIN OR;  Service: Cardiac Surgery    NC REPLACE AORTIC VALVE OPENFEMORAL ARTERY APPROACH N/A 1/7/2020    Procedure: REPLACEMENT AORTIC VALVE TRANSCATHETER (TAVR) TRANSFEMORAL W/ 23 MM CARROLL KATHLEEN S3 VALVE (ACCESS ON LEFT);   Surgeon: Saira Gramajo DO;  Location: [facility] MAIN OR;  Service: Cardiac Surgery    NC RPR 1ST INGUN HRNA AGE 5 YRS/> REDUCIBLE Right 11/16/2018    Procedure: REPAIR RIGHT INGUINAL HERNIA WITH MESH;  Surgeon: Negra Linton MD;  Location: [facility] MAIN OR;  Service: General       Family History   Problem Relation Age of Onset    Coronary artery disease Family     Heart disease Family        Social History     Tobacco Use    Smoking status: Never    Smokeless tobacco: Never   Vaping Use    Vaping Use: Never used   Substance Use Topics    Alcohol use: Not Currently    Drug use: No          Current Outpatient Medications:     ascorbic acid (VITAMIN C) 1000 MG tablet, Take 1,000 mg by mouth daily, Disp: , Rfl:     aspirin (ECOTRIN LOW STRENGTH) 81 mg EC tablet, Take 1 tablet (81 mg total) by mouth daily, Disp: , Rfl:     calcium-vitamin D (OSCAL 500 + D) 500 mg-200 units per tablet, Take 1 tablet by mouth daily, Disp: , Rfl:     cholecalciferol (VITAMIN D3) 400 units tablet, Take 2,000 Units by mouth daily, Disp: , Rfl:     Krill Oil 350 MG CAPS, Take 350 mg by mouth in the morning, Disp: , Rfl:     levETIRAcetam (KEPPRA) 250 mg tablet, Take 250 mg by mouth 2 (two) times a day, Disp: , Rfl:     metoprolol succinate (TOPROL-XL) 25 mg 24 hr tablet, Take 1 tablet (25 mg total) by mouth daily, Disp: 90 tablet, Rfl: 2    Multiple Vitamin (DAILY VALUE MULTIVITAMIN PO), Take 1 tablet by mouth daily, Disp: , Rfl:     simvastatin (ZOCOR) 10 mg tablet, Take 10 mg by mouth daily at bedtime, Disp: , Rfl:     tamsulosin (FLOMAX) 0.4 mg, Take 0.4 mg by mouth daily with dinner, Disp: , Rfl:     traZODone (DESYREL) 50 mg tablet, Take 50 mg by mouth daily at bedtime, Disp: , Rfl:     amoxicillin (AMOXIL) 500 mg capsule, BEFORE DENTAL WORK (Patient not taking: Reported on 10/24/2023), Disp: , Rfl: 3    benzonatate (TESSALON) 200 MG capsule, Take 1 capsule (200 mg total) by mouth 3 (three) times a day as needed for cough (Patient not taking: Reported on 12/1/2022), Disp: 20 capsule, Rfl: 0    Allergies   Allergen Reactions    Escitalopram Dizziness    Metformin Diarrhea    Sertraline Other (See Comments)     weakness    Duloxetine Other (See Comments)     Restless leg        Vitals:    10/24/23 0836   BP: 112/68   BP Location: Left arm   Patient Position: Sitting   Cuff Size: Standard   Pulse: 87   Resp: 18   Temp: 98.1 °F (36.7 °C)   TempSrc: Temporal   SpO2: 95%   Weight: 59 kg (130 lb)   Height: 5' 3" (1.6 m)       Pain Score: 0-No pain

## 2023-10-24 NOTE — LETTER
2023     Karl Lovell MD  [address]    Patient: Enid Rueda   YOB: 1940   Date of Visit: 10/24/2023       Dear Dr. Manuel Almaraz: Thank you for referring Enid Rueda to me for evaluation. Below are my notes for this consultation. If you have questions, please do not hesitate to call me. I look forward to following your patient along with you. Sincerely,        Alberto Henderson MD        CC: No Recipients    Alberto Henderson MD  10/24/2023  3:58 PM  Sign when Signing Visit  Consultation - Radiation Oncology      Patient Name: Enid Rueda AFS:[id number] : 1940  Encounter: [id number]  Referring Provider: Karl Lovell MD     Cancer Staging   Hepatocellular carcinoma [facility])  Staging form: Liver (Excluding Intrahepatic Bile Ducts), AJCC 8th Edition  - Clinical stage from 2023: cT1b, cN0 - Signed by Alberto Henderson MD on 10/24/2023  Stage prefix: Initial diagnosis    ASSESSMENT & PLAN  Enid Rueda is a 79 y.o. male with a pacemaker who was recently diagnosed cT1bN0 [address] of the left lobe of the liver, s/p biopsy on 10/9/23 showing moderately differentiated hepatocellular carcinoma. His MRI on 2020 revealed a 3.1 cm left hepatic lesion which was previously 1.2 cm in size. His case was reviewed at multidisciplinary tumor board on 10/12/2023, with recommendation for SBRT. We reviewed the options for locoregional therapy for liver HCCs to include resection, ablation, Y90 radioembolization, TACE, and SBRT. Given the location and size of this lesion, multidisciplinary consensus was for SBRT. Treatment will involve 5 fractions at our [facility] facility.  We discussed the advantages and disadvantages of this approach, including the associated short-term and long-term risks and toxicities of RT including, not limited to, fatigue, nausea, abdominal discomfort, diarrhea, decreased appetite, elevation of liver enzymes, radiation pneumonitis, injury to other abdominal viscera, bowel adhesions, stomach, esophageal or bowel ulceration, fistula, bleeding, perforation, or stricture, kidney irritation/injury, chest wall pain, rib fracture, injury to nerves/spinal cord, and permanent injury to the liver with risk of hepatic failure. We discussed that there is a risk of progression or recurrence despite appropriate therapy. The patient was given the opportunity to ask multiple questions, all of which were answered to his satisfaction. He was amenable to proceeding as described, and informed consent was signed. CT simulation to be scheduled at [facility] for 4DCT with treatment at [facility]. Patient has a pacemaker so protocol will be initiated. Thank you for the opportunity to participate in the care of this patient. Jasmyne Lara MD  Department of [facility]    Orders Placed This Encounter   Procedures   • Radiation Simulation Treatment     1. Hepatocellular carcinoma [facility])  Ambulatory referral to Radiation Oncology    Radiation Simulation Treatment        Total Time Spent  60 minutes spent reviewing EMR in preparation for visit, with the patient, coordination with other providers, review of radiology, and documentation. CHIEF COMPLAINT  Chief Complaint   Patient presents with   • Consult     Radiation Oncology       History of Present Illness  Radiation Oncology consult for [address] [facility], referred by Dr. Bushra Zendejas to discuss liver SBRT vs SIRSpheres Y-90. Ernestina Arora 1940 is a 79 y.o. male with history of pacemaker placed in 2022, thrombocytopenia, positive RA factor, DM type 2, seizure disorder taking keppra. He has history of arterially enhancing liver lesion that was being monitored over the past few years, now increasing in size to 3.1 cm in left hepatic lobe, suspicious for HCC.  He underwent liver biopsy on 10/9/23 confirming moderately differentiated hepatocellular carcinoma. His case was discussed at GI tumor board with recommendation to refer to Rad Onc for SBRT.         7/8/23 CMP  Total bilirubin: 0.54  Alk Phos: 49  AST: 26  ALT: 32     7/14/23 CT chest high resolution  Nothing to indicate interstitial lung disease. Enlarging lesion in the left hepatic lobe since 2019, previously characterized on MRI as benign focal nodular hyperplasia but recommend confirmation with MRI with Eovist intravenous contrast as previously recommended, particularly given enlargement. 7/17/23 US abdomen complete  Spleen and liver size is within limits. Echogenic lesion measuring 1.9 x 1.9 x 2 cm and may correspond to the previously described lesion on prior MR abdomens. Discussion follows under separate cover of prior reports. 8/22/23 MRI abdomen w wo contrast  Increased size of now 3.1 cm arterial phase enhancing lesion in the left hepatic lobe with new features of washout and pseudocapsule enhancement, without contrast retention on the delayed hepatobiliary phase. Appearance raises suspicion for hepatocellular carcinoma, with adenoma and atypical hemangioma considered less likely. Recommend tissue sampling. 9/7/23 AFP tumor marker [facility]): 9.8     10/9/23 IR biopsy:  Liver, lesion, needle core biopsy:  - Moderately differentiated hepatocellular carcinoma        10/12/23 RECTAL/GI MULTIDISCIPLINARY CASE REVIEW   PHYSICIAN RECOMMENDED PLAN:   -Recommendation from radiation oncology standpoint for patient to have SBRT. Patient will be referred to radiation oncology by surgical oncology after discussion with the patient.   -Patient is scheduled for an office visit with Dr. Savita Santizo tomorrow 10/13/2023.         10/13/23 Dr. Savita Rodney  new diagnosis of segment 4 HCC, 3.1 cm in size. Case was discussed at tumor board. He is a candidate for either SBRT versus SIRS therapy. Refer to radiation oncology to get this process started.     Plan for follow-up in 3 months with a follow-up MRI at that time to assess efficacy of treatment. We will also repeat AFP at that time. Upcoming appts:  1/17/24 Dr. Lucinda Leal    Today, the patient reports felling well overall. He presents with his wife and daughter-in-law in person as well as his daughter over speaker phone. He notes some constipation, but denies nausea or vomiting. He denies any other relevant symptoms. Oncology History   Hepatocellular carcinoma ([address])   8/22/2023 -  Cancer Staged    Staging form: Liver (Excluding Intrahepatic Bile Ducts), AJCC 8th Edition  - Clinical stage from 8/22/2023: cT1b, cN0 - Signed by Claus Ramesh MD on 10/24/2023  Stage prefix: Initial diagnosis       10/9/2023 Biopsy    Liver, lesion, needle core biopsy:  - Moderately differentiated hepatocellular carcinoma. Historical Information  Past Medical History:   Diagnosis Date   • BPH (benign prostatic hyperplasia)    • CAD (coronary artery disease)    • Diabetes mellitus ([address])     type 2, diet controlled   • Diastolic CHF ([address])    • Epilepsy ([address])    • History of mycosis fungoides    • Hyperlipidemia    • Hypertension    • ILD (interstitial lung disease) ([address])    • Osteoporosis    • Seizures ([address])     partial sensory   • Severe aortic stenosis      Past Surgical History:   Procedure Laterality Date   • CARDIAC CATHETERIZATION     • CARDIAC ELECTROPHYSIOLOGY PROCEDURE N/A 10/24/2022    Procedure: Cardiac pacer implant/ DUAL CHAMBER;  Surgeon: Maite Beckham DO;  [facility] CARD[facility]; Service: Cardiology   • COLONOSCOPY     • IR BIOPSY LIVER MASS  10/9/2023   • IL ECHO TRANSESOPHAG R-T 2D W/PRB IMG ACQUISJ I&R N/A 1/7/2020    Procedure: TRANSESOPHAGEAL ECHOCARDIOGRAM (POOL);   Surgeon: Kayleigh Fajardo DO;  Location: [facility] MAIN OR;  Service: Cardiac Surgery   • IL REPLACE AORTIC VALVE OPENFEMORAL ARTERY APPROACH N/A 1/7/2020    Procedure: REPLACEMENT AORTIC VALVE TRANSCATHETER (TAVR) TRANSFEMORAL W/ 23 MM CARROLL KATHLEEN S3 VALVE (ACCESS ON LEFT);   Surgeon: Bernardo Casper DO;  Location:  MAIN OR;  Service: Cardiac Surgery   • GA RPR 1ST INGUN HRNA AGE 5 YRS/> REDUCIBLE Right 11/16/2018    Procedure: REPAIR RIGHT INGUINAL HERNIA WITH MESH;  Surgeon: Lobo Pierre MD;  Location: [address] [place] [address] MAIN OR;  Service: General     Family History   Problem Relation Age of Onset   • Coronary artery disease Family    • Heart disease Family      Social History  Social History     Substance and Sexual Activity   Alcohol Use Not Currently     Social History     Substance and Sexual Activity   Drug Use No     Social History     Tobacco Use   Smoking Status Never   Smokeless Tobacco Never     Meds/Allergies    Current Outpatient Medications:   •  ascorbic acid (VITAMIN C) 1000 MG tablet, Take 1,000 mg by mouth daily, Disp: , Rfl:   •  aspirin (ECOTRIN LOW STRENGTH) 81 mg EC tablet, Take 1 tablet (81 mg total) by mouth daily, Disp: , Rfl:   •  calcium-vitamin D (OSCAL 500 + D) 500 mg-200 units per tablet, Take 1 tablet by mouth daily, Disp: , Rfl:   •  cholecalciferol (VITAMIN D3) 400 units tablet, Take 2,000 Units by mouth daily, Disp: , Rfl:   •  Krill Oil 350 MG CAPS, Take 350 mg by mouth in the morning, Disp: , Rfl:   •  levETIRAcetam (KEPPRA) 250 mg tablet, Take 250 mg by mouth 2 (two) times a day, Disp: , Rfl:   •  metoprolol succinate (TOPROL-XL) 25 mg 24 hr tablet, Take 1 tablet (25 mg total) by mouth daily, Disp: 90 tablet, Rfl: 2  •  Multiple Vitamin (DAILY VALUE MULTIVITAMIN PO), Take 1 tablet by mouth daily, Disp: , Rfl:   •  simvastatin (ZOCOR) 10 mg tablet, Take 10 mg by mouth daily at bedtime, Disp: , Rfl:   •  tamsulosin (FLOMAX) 0.4 mg, Take 0.4 mg by mouth daily with dinner, Disp: , Rfl:   •  traZODone (DESYREL) 50 mg tablet, Take 50 mg by mouth daily at bedtime, Disp: , Rfl:   •  amoxicillin (AMOXIL) 500 mg capsule, BEFORE DENTAL WORK (Patient not taking: Reported on 10/24/2023), Disp: , Rfl: 3  •  benzonatate (TESSALON) 200 MG capsule, Take 1 capsule (200 mg total) by mouth 3 (three) times a day as needed for cough (Patient not taking: Reported on 2022), Disp: 20 capsule, Rfl: 0  Allergies   Allergen Reactions   • Escitalopram Dizziness   • Metformin Diarrhea   • Sertraline Other (See Comments)     weakness   • Duloxetine Other (See Comments)     Restless leg       Pathology:  See above. Review of Systems  Refer to nursing note    OBJECTIVE:   /68 (BP Location: Left arm, Patient Position: Sitting, Cuff Size: Standard)   Pulse 87   Temp 98.1 °F (36.7 °C) (Temporal)   Resp 18   Ht 5' 3" (1.6 m)   Wt 59 kg (130 lb)   SpO2 95%   BMI 23.03 kg/m²   Pain Assessment:  0  Performance Status: ECO - Symptomatic but completely ambulatory    Physical Exam  General Appearance:  Alert, cooperative, no distress, appears stated age  Cardiovascular:  Extremities warm and well perfused  Lungs: Respirations unlabored, no cyanosis, able to speak in complete sentences without dyspnea. Abdomen: Non-distended  Skin: No generalized rash or dermatitis  Neurologic: ANOx3, speech and cognition intact. Portions of the record may have been created with voice recognition software. Occasional wrong word or "sound a like" substitutions may have occurred due to the inherent limitations of voice recognition software. Read the chart carefully and recognize, using context, where substitutions have occurred.

## 2023-10-25 PROCEDURE — 77263 THER RADIOLOGY TX PLNG CPLX: CPT | Performed by: INTERNAL MEDICINE

## 2023-10-30 ENCOUNTER — RADIATION THERAPY TREATMENT (OUTPATIENT)
Dept: RADIATION ONCOLOGY | Facility: HOSPITAL | Age: 83
End: 2023-10-30
Attending: INTERNAL MEDICINE
Payer: MEDICARE

## 2023-10-30 PROCEDURE — 77334 RADIATION TREATMENT AID(S): CPT | Performed by: INTERNAL MEDICINE

## 2023-10-30 PROCEDURE — 77470 SPECIAL RADIATION TREATMENT: CPT | Performed by: INTERNAL MEDICINE

## 2023-11-06 ENCOUNTER — APPOINTMENT (OUTPATIENT)
Dept: RADIATION ONCOLOGY | Facility: CLINIC | Age: 83
End: 2023-11-06
Payer: MEDICARE

## 2023-11-09 PROCEDURE — 77300 RADIATION THERAPY DOSE PLAN: CPT | Performed by: INTERNAL MEDICINE

## 2023-11-09 PROCEDURE — 77301 RADIOTHERAPY DOSE PLAN IMRT: CPT | Performed by: INTERNAL MEDICINE

## 2023-11-09 PROCEDURE — 77338 DESIGN MLC DEVICE FOR IMRT: CPT | Performed by: INTERNAL MEDICINE

## 2023-11-10 ENCOUNTER — REMOTE DEVICE CLINIC VISIT (OUTPATIENT)
Dept: CARDIOLOGY CLINIC | Facility: CLINIC | Age: 83
End: 2023-11-10

## 2023-11-10 ENCOUNTER — APPOINTMENT (OUTPATIENT)
Dept: RADIATION ONCOLOGY | Facility: CLINIC | Age: 83
End: 2023-11-10
Payer: MEDICARE

## 2023-11-10 DIAGNOSIS — Z95.0 PRESENCE OF CARDIAC PACEMAKER: Primary | ICD-10-CM

## 2023-11-10 PROCEDURE — 77301 RADIOTHERAPY DOSE PLAN IMRT: CPT | Performed by: INTERNAL MEDICINE

## 2023-11-10 PROCEDURE — 77300 RADIATION THERAPY DOSE PLAN: CPT | Performed by: INTERNAL MEDICINE

## 2023-11-10 PROCEDURE — 77338 DESIGN MLC DEVICE FOR IMRT: CPT | Performed by: INTERNAL MEDICINE

## 2023-11-10 PROCEDURE — RECHECK: Performed by: INTERNAL MEDICINE

## 2023-11-10 PROCEDURE — 77373 STRTCTC BDY RAD THER TX DLVR: CPT | Performed by: INTERNAL MEDICINE

## 2023-11-10 NOTE — PROGRESS NOTES
Results for orders placed or performed in visit on 11/10/23   Cardiac EP device report    Narrative    MDT DUAL CHAMBER PPM  (MVP ON) - ACTIVE SYSTEM IS MRI CONDITIONAL  NON-BILLABLE CARELINK TRANSMISSION: PRE RAD/ONCOLOGY: BATTERY VOLTAGE ADEQUATE (12.4 YRS). AP: 99.5%. : <0.1% (MVP-ON). ALL AVAILABLE LEAD PARAMETERS WITHIN NORMAL LIMITS. NO SIGNIFICANT HIGH RATE EPISODES. NORMAL DEVICE FUNCTION.  [id number] [address]

## 2023-11-13 ENCOUNTER — APPOINTMENT (OUTPATIENT)
Dept: RADIATION ONCOLOGY | Facility: CLINIC | Age: 83
End: 2023-11-13
Attending: INTERNAL MEDICINE
Payer: MEDICARE

## 2023-11-13 PROCEDURE — 77301 RADIOTHERAPY DOSE PLAN IMRT: CPT | Performed by: INTERNAL MEDICINE

## 2023-11-13 PROCEDURE — 77373 STRTCTC BDY RAD THER TX DLVR: CPT | Performed by: RADIOLOGY

## 2023-11-13 PROCEDURE — 77300 RADIATION THERAPY DOSE PLAN: CPT | Performed by: INTERNAL MEDICINE

## 2023-11-13 PROCEDURE — 77338 DESIGN MLC DEVICE FOR IMRT: CPT | Performed by: INTERNAL MEDICINE

## 2023-11-15 ENCOUNTER — APPOINTMENT (OUTPATIENT)
Dept: RADIATION ONCOLOGY | Facility: CLINIC | Age: 83
End: 2023-11-15
Payer: MEDICARE

## 2023-11-15 PROCEDURE — 77338 DESIGN MLC DEVICE FOR IMRT: CPT | Performed by: INTERNAL MEDICINE

## 2023-11-15 PROCEDURE — 77301 RADIOTHERAPY DOSE PLAN IMRT: CPT | Performed by: INTERNAL MEDICINE

## 2023-11-15 PROCEDURE — 77300 RADIATION THERAPY DOSE PLAN: CPT | Performed by: INTERNAL MEDICINE

## 2023-11-15 PROCEDURE — 77373 STRTCTC BDY RAD THER TX DLVR: CPT | Performed by: INTERNAL MEDICINE

## 2023-11-17 ENCOUNTER — APPOINTMENT (OUTPATIENT)
Dept: RADIATION ONCOLOGY | Facility: CLINIC | Age: 83
End: 2023-11-17
Payer: MEDICARE

## 2023-11-17 PROCEDURE — 77300 RADIATION THERAPY DOSE PLAN: CPT | Performed by: INTERNAL MEDICINE

## 2023-11-17 PROCEDURE — 77301 RADIOTHERAPY DOSE PLAN IMRT: CPT | Performed by: INTERNAL MEDICINE

## 2023-11-17 PROCEDURE — 77338 DESIGN MLC DEVICE FOR IMRT: CPT | Performed by: INTERNAL MEDICINE

## 2023-11-17 PROCEDURE — 77373 STRTCTC BDY RAD THER TX DLVR: CPT | Performed by: INTERNAL MEDICINE

## 2023-11-20 DIAGNOSIS — C22.0 HEPATOCELLULAR CARCINOMA (HCC): Primary | ICD-10-CM

## 2023-11-21 ENCOUNTER — TELEPHONE (OUTPATIENT)
Dept: SURGICAL ONCOLOGY | Facility: CLINIC | Age: 83
End: 2023-11-21

## 2023-11-22 ENCOUNTER — TELEPHONE (OUTPATIENT)
Dept: SURGICAL ONCOLOGY | Facility: CLINIC | Age: 83
End: 2023-11-22

## 2023-11-22 ENCOUNTER — TELEPHONE (OUTPATIENT)
Dept: HEMATOLOGY ONCOLOGY | Facility: CLINIC | Age: 83
End: 2023-11-22

## 2023-11-22 ENCOUNTER — APPOINTMENT (OUTPATIENT)
Dept: RADIATION ONCOLOGY | Facility: CLINIC | Age: 83
End: 2023-11-22
Payer: MEDICARE

## 2023-11-22 DIAGNOSIS — C22.0 HEPATOCELLULAR CARCINOMA (HCC): Primary | ICD-10-CM

## 2023-11-22 PROCEDURE — 77338 DESIGN MLC DEVICE FOR IMRT: CPT | Performed by: INTERNAL MEDICINE

## 2023-11-22 PROCEDURE — 77300 RADIATION THERAPY DOSE PLAN: CPT | Performed by: INTERNAL MEDICINE

## 2023-11-22 PROCEDURE — 77373 STRTCTC BDY RAD THER TX DLVR: CPT | Performed by: INTERNAL MEDICINE

## 2023-11-22 PROCEDURE — 77301 RADIOTHERAPY DOSE PLAN IMRT: CPT | Performed by: INTERNAL MEDICINE

## 2023-11-22 PROCEDURE — 77336 RADIATION PHYSICS CONSULT: CPT | Performed by: INTERNAL MEDICINE

## 2023-11-22 PROCEDURE — 77435 SBRT MANAGEMENT: CPT | Performed by: INTERNAL MEDICINE

## 2023-11-22 NOTE — TELEPHONE ENCOUNTER
Call Transfer   Who are you speaking with? Child   If it is not the patient, are they listed on an active communication consent form? Yes   Who is the patients HemOnc/SurgOnc provider? Dr. Foreign Sagastume   What is the reason for this call? Patience Rosenbaum states she missed a call form Shaun Kilgore returning Mallory's call. Person/Department that the call was transferred to? Time that call was transferred? Shaun Kilgore @12:26PM   Your call will be transferred now. If you receive a voicemail, please leave a detailed message and a member of the team will return your call as soon as possible. Did you relay this information to the caller?   Yes

## 2023-11-22 NOTE — TELEPHONE ENCOUNTER
- mildly hypotensive with preserved MAP at time of admission   - home meds: carvedilol 3.125 mg BID, lasix 20 mg QD, spironolactone 12.5 mg QD  - monitor    LM for patient's daughter that MRI is 2/22 and f/u with Dr. Tramaine Lim is 3/1. Also made her aware that blood work was ordered for him to do 2 weeks before the MRI. Teams number given for any further questions.

## 2023-11-24 ENCOUNTER — REMOTE DEVICE CLINIC VISIT (OUTPATIENT)
Dept: CARDIOLOGY CLINIC | Facility: CLINIC | Age: 83
End: 2023-11-24
Payer: MEDICARE

## 2023-11-24 DIAGNOSIS — Z95.0 PRESENCE OF CARDIAC PACEMAKER: Primary | ICD-10-CM

## 2023-11-24 PROCEDURE — 93296 REM INTERROG EVL PM/IDS: CPT | Performed by: INTERNAL MEDICINE

## 2023-11-24 PROCEDURE — 93294 REM INTERROG EVL PM/LDLS PM: CPT | Performed by: INTERNAL MEDICINE

## 2023-11-24 NOTE — PROGRESS NOTES
Results for orders placed or performed in visit on 11/24/23   Cardiac EP device report    Narrative    MDT DUAL CHAMBER PPM  (MVP ON) - ACTIVE SYSTEM IS MRI CONDITIONAL  CARELINK TRANSMISSION: POST RAD/ONC: BATTERY VOLTAGE ADEQUATE (12.4 YRS). AP: 99.4%. : 0.2% (MVP-ON). ALL AVAILABLE LEAD PARAMETERS WITHIN NORMAL LIMITS. NO SIGNIFICANT HIGH RATE EPISODES. NORMAL DEVICE FUNCTION.  [address] [address]

## 2023-11-27 PROBLEM — Z95.0 PACEMAKER: Status: ACTIVE | Noted: 2023-11-27

## 2023-11-28 NOTE — PROGRESS NOTES
Cardiology Follow Up    Karen Nicolas  1940  [id number]  [facility] [address]  [phone]    1. Ventricular bigeminy  Basic metabolic panel    Magnesium    CBC and differential      2. Pacemaker  metoprolol succinate (TOPROL-XL) 25 mg 24 hr tablet      3. Hypertension, unspecified type        4. Mixed hyperlipidemia            Interval History:   Mr Karen Nicolas presents to our office with complaints of palpitations. His primary language is [place]. He is accompanied by his wife and daughter via phone. Priscilla Sender does not  need assistance with translation. He has noted a lot of skipped heart beats for the past couple of weeks. He denies chest pain palpitations lightheadedness or dizziness. He notes the palpitations with checking his pulse manually. Medical History   Primary Cardiologist Dr Nayan Hooker previously followed with Dr. 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[address])    Right inguinal hernia    Nonrheumatic aortic valve stenosis    Palpitations    Sinus bradycardia    S/P TAVR (transcatheter aortic valve replacement)    Thrombocytopenia (HCC)    Hyperchloremia    Mixed hyperlipidemia    Encounter for postoperative care    PVC's (premature ventricular contractions)    Premature atrial contractions    Dizziness    Hyperlipidemia    Bradycardia    PVC (premature ventricular contraction)    CAD (coronary artery disease)    Diabetes mellitus, type 2 (HCC)    Hepatocellular carcinoma (HCC)    Pacemaker     Past Medical History:   Diagnosis Date    BPH (benign prostatic hyperplasia)     CAD (coronary artery disease)     Diabetes mellitus (HCC)     type 2, diet controlled    Diastolic CHF ([address])     Epilepsy ([address])     History of mycosis fungoides     Hyperlipidemia     Hypertension     ILD (interstitial lung disease) ([address])     Osteoporosis     Seizures (HCC)     partial sensory    Severe aortic stenosis      Social History     Socioeconomic History    Marital status: /Civil Union     Spouse name: Not on file    Number of children: Not on file    Years of education: Not on file    Highest education level: Not on file   Occupational History    Not on file   Tobacco Use    Smoking status: Never    Smokeless tobacco: Never   Vaping Use    Vaping Use: Never used   Substance and Sexual Activity    Alcohol use: Not Currently    Drug use: No    Sexual activity: Not Currently   Other Topics Concern    Not on file   Social History Narrative    Not on file     Social Determinants of Health     Financial Resource Strain: Not on file   Food Insecurity: Not on file   Transportation Needs: Not on file   Physical Activity: Not on file   Stress: Not on file   Social Connections: Not on file   Intimate Partner Violence: Not on file   Housing Stability: Not on file      Family History   Problem Relation Age of Onset    Coronary artery disease Family     Heart disease Family      Past Surgical History:   Procedure Laterality Date    CARDIAC CATHETERIZATION      CARDIAC ELECTROPHYSIOLOGY PROCEDURE N/A 10/24/2022    Procedure: Cardiac pacer implant/ DUAL CHAMBER;  Surgeon: Ethan Kumari DO;  [facility];   Service: Cardiology    COLONOSCOPY      IR BIOPSY LIVER MASS  10/9/2023    AK ECHO TRANSESOPHAG R-T 2D W/PRB IMG ACQUISJ I&R N/A 1/7/2020    Procedure: TRANSESOPHAGEAL ECHOCARDIOGRAM (POOL); Surgeon: Saira Gramajo DO;  Location: [facility] MAIN OR;  Service: Cardiac Surgery    MO REPLACE AORTIC VALVE OPENFEMORAL ARTERY APPROACH N/A 1/7/2020    Procedure: REPLACEMENT AORTIC VALVE TRANSCATHETER (TAVR) TRANSFEMORAL W/ 23 MM CARROLL KATHLEEN S3 VALVE (ACCESS ON LEFT);   Surgeon: Saira Gramajo DO;  Location: [facility] MAIN OR;  Service: Cardiac Surgery    MO RPR 1ST INGUN HRNA AGE 5 YRS/> REDUCIBLE Right 11/16/2018    Procedure: REPAIR RIGHT INGUINAL HERNIA WITH MESH;  Surgeon: Negra Linton MD;  Location: [address] [place] [address] MAIN OR;  Service: General       Current Outpatient Medications:     amoxicillin (AMOXIL) 500 mg capsule, BEFORE DENTAL WORK (Patient not taking: Reported on 10/24/2023), Disp: , Rfl: 3    ascorbic acid (VITAMIN C) 1000 MG tablet, Take 1,000 mg by mouth daily, Disp: , Rfl:     aspirin (ECOTRIN LOW STRENGTH) 81 mg EC tablet, Take 1 tablet (81 mg total) by mouth daily, Disp: , Rfl:     benzonatate (TESSALON) 200 MG capsule, Take 1 capsule (200 mg total) by mouth 3 (three) times a day as needed for cough (Patient not taking: Reported on 12/1/2022), Disp: 20 capsule, Rfl: 0    calcium-vitamin D (OSCAL 500 + D) 500 mg-200 units per tablet, Take 1 tablet by mouth daily, Disp: , Rfl:     cholecalciferol (VITAMIN D3) 400 units tablet, Take 2,000 Units by mouth daily, Disp: , Rfl:     Krill Oil 350 MG CAPS, Take 350 mg by mouth in the morning, Disp: , Rfl:     levETIRAcetam (KEPPRA) 250 mg tablet, Take 250 mg by mouth 2 (two) times a day, Disp: , Rfl:     metoprolol succinate (TOPROL-XL) 25 mg 24 hr tablet, Take 1 tablet (25 mg total) by mouth daily, Disp: 90 tablet, Rfl: 2    Multiple Vitamin (DAILY VALUE MULTIVITAMIN PO), Take 1 tablet by mouth daily, Disp: , Rfl:     simvastatin (ZOCOR) 10 mg tablet, Take 10 mg by mouth daily at bedtime, Disp: , Rfl:     tamsulosin (FLOMAX) 0.4 mg, Take 0.4 mg by mouth daily with dinner, Disp: , Rfl:     traZODone (DESYREL) 50 mg tablet, Take 50 mg by mouth daily at bedtime, Disp: , Rfl:   Allergies   Allergen Reactions    Escitalopram Dizziness    Metformin Diarrhea    Sertraline Other (See Comments)     weakness    Duloxetine Other (See Comments)     Restless leg       Labs:  Hospital Outpatient Visit on 10/09/2023   Component Date Value    Protime 10/09/2023 14.2     INR 10/09/2023 1.07     Sodium 10/09/2023 135     Potassium 10/09/2023 4.2     Chloride 10/09/2023 104     CO2 10/09/2023 25     ANION GAP 10/09/2023 6     BUN 10/09/2023 22     Creatinine 10/09/2023 0.96     Glucose 10/09/2023 127     Glucose, Fasting 10/09/2023 127 (H)     Calcium 10/09/2023 10.3 (H)     eGFR 10/09/2023 72     WBC 10/09/2023 7.41     RBC 10/09/2023 5.09     Hemoglobin 10/09/2023 14.7     Hematocrit 10/09/2023 44.5     MCV 10/09/2023 87     MCH 10/09/2023 28.9     MCHC 10/09/2023 33.0     RDW 10/09/2023 12.9     Platelets 10/43/6913 126 (L)     MPV 10/09/2023 10.6     Case Report 10/09/2023                      Value:Surgical Pathology Report                         Case: [id number]                                   Authorizing Provider:  Ricky Stewart MD              Collected:           10/09/2023 1218              Ordering Location:     [facility]   Received:            10/09/2023 1326                                     CT Scan                                                                      Pathologist:           Pamela Pineda MD                                                                 Specimen:    Liver, lesion                                                                              Final Diagnosis 10/09/2023                      Value: This result contains rich text formatting which cannot be displayed here. Note 10/09/2023                      Value: This result contains rich text formatting which cannot be displayed here.     Additional Information 10/09/2023 Value:This result contains rich text formatting which cannot be displayed here. Gross Description 10/09/2023                      Value: This result contains rich text formatting which cannot be displayed here. Clinical Information 10/09/2023                      Value:Josi lesion    3.1 x 2.8 cm left hepatic lesion which previously measured 1.2 cm on the 5/15/2021 MRI. Lesion is T1 hypointense (series 7 image 79), and mildly T2 hyperintense (series 14 image 10) with nonperipheral arterial phase     POC Glucose 10/09/2023 139      Imaging: Cardiac EP device report    Result Date: 11/24/2023  Narrative: MDT DUAL CHAMBER PPM  (MVP ON) - ACTIVE SYSTEM IS MRI CONDITIONAL CARELINK TRANSMISSION: POST RAD/ONC: BATTERY VOLTAGE ADEQUATE (12.4 YRS). AP: 99.4%. : 0.2% (MVP-ON). ALL AVAILABLE LEAD PARAMETERS WITHIN NORMAL LIMITS. NO SIGNIFICANT HIGH RATE EPISODES. NORMAL DEVICE FUNCTION. [address] [place] [address]     Cardiac EP device report    Result Date: 11/10/2023  Narrative: MDT DUAL CHAMBER PPM  (MVP ON) - ACTIVE SYSTEM IS MRI CONDITIONAL NON-BILLABLE CARELINK TRANSMISSION: PRE RAD/ONCOLOGY: BATTERY VOLTAGE ADEQUATE (12.4 YRS). AP: 99.5%. : <0.1% (MVP-ON). ALL AVAILABLE LEAD PARAMETERS WITHIN NORMAL LIMITS. NO SIGNIFICANT HIGH RATE EPISODES. NORMAL DEVICE FUNCTION. [address] [place] [address]       Review of Systems:  Review of Systems   Musculoskeletal:  Positive for arthralgias and myalgias. Psychiatric/Behavioral:  The patient is nervous/anxious. All other systems reviewed and are negative. Physical Exam:  Physical Exam  Vitals reviewed. Constitutional:       Appearance: Normal appearance. Cardiovascular:      Rate and Rhythm: Normal rate and regular rhythm. Comments: Frequent ectopic beats  Pulmonary:      Effort: Pulmonary effort is normal.      Breath sounds: Normal breath sounds. Musculoskeletal:         General: Normal range of motion. Cervical back: Normal range of motion and neck supple. Right lower leg: No edema. Left lower leg: No edema. Skin:     General: Skin is warm and dry. Capillary Refill: Capillary refill takes less than 2 seconds. Neurological:      General: No focal deficit present. Mental Status: He is alert and oriented to person, place, and time. Psychiatric:         Mood and Affect: Mood normal.         Behavior: Behavior normal.         Discussion/Summary:  # Bigeminy EKG in the office today shows sinus rhythm with frequent PVCs pattern of bigeminy. He denies dyspnea with minimal minor exertion chest pain palpitation lightheadedness or dizziness. He is rare the irregular beats because he checks his pulse manually. Ventricular rate 80 bpm.  Increase metoprolol succinate from 25 mg daily to additional 12.5 mg daily at bedtime.   Continue on aspirin 81 mg daily,  BMP mag and CBC to be done in the near future up in our office in 2 weeks  # SSS sp MDT dual chamber PPM 10/24/22, Device interrogation on 11/24/23 showed AP 99.4%,  0.2% onl parameters within normal limits no significant high rates normal device function  # Hypertension RUE sitting 124/60,continue on all succinate 25 mg daily will add Metoprolol succinate 12.5 mg daily at bedtime due to bigeminy,  # Hyperlipidemia 10/14/21 LDL 67 continue on Simvastatin 10 mg daily

## 2023-11-29 ENCOUNTER — OFFICE VISIT (OUTPATIENT)
Dept: CARDIOLOGY CLINIC | Facility: CLINIC | Age: 83
End: 2023-11-29
Payer: MEDICARE

## 2023-11-29 VITALS
SYSTOLIC BLOOD PRESSURE: 118 MMHG | OXYGEN SATURATION: 96 % | WEIGHT: 136.8 LBS | HEIGHT: 63 IN | DIASTOLIC BLOOD PRESSURE: 58 MMHG | HEART RATE: 80 BPM | BODY MASS INDEX: 24.24 KG/M2

## 2023-11-29 DIAGNOSIS — I49.8 VENTRICULAR BIGEMINY: Primary | ICD-10-CM

## 2023-11-29 DIAGNOSIS — I10 HYPERTENSION, UNSPECIFIED TYPE: ICD-10-CM

## 2023-11-29 DIAGNOSIS — E78.2 MIXED HYPERLIPIDEMIA: ICD-10-CM

## 2023-11-29 DIAGNOSIS — Z95.0 PACEMAKER: ICD-10-CM

## 2023-11-29 PROCEDURE — 93000 ELECTROCARDIOGRAM COMPLETE: CPT | Performed by: NURSE PRACTITIONER

## 2023-11-29 PROCEDURE — 99215 OFFICE O/P EST HI 40 MIN: CPT | Performed by: NURSE PRACTITIONER

## 2023-11-29 RX ORDER — METOPROLOL SUCCINATE 25 MG/1
TABLET, EXTENDED RELEASE ORAL
Qty: 90 TABLET | Refills: 3 | Status: SHIPPED | OUTPATIENT
Start: 2023-11-29

## 2023-12-01 ENCOUNTER — APPOINTMENT (OUTPATIENT)
Dept: LAB | Facility: IMAGING CENTER | Age: 83
End: 2023-12-01
Payer: MEDICARE

## 2023-12-01 ENCOUNTER — TELEPHONE (OUTPATIENT)
Dept: SURGICAL ONCOLOGY | Facility: CLINIC | Age: 83
End: 2023-12-01

## 2023-12-01 LAB
ANION GAP SERPL CALCULATED.3IONS-SCNC: 6 MMOL/L
BASOPHILS # BLD MANUAL: 0 THOUSAND/UL (ref 0–0.1)
BASOPHILS NFR MAR MANUAL: 0 % (ref 0–1)
BUN SERPL-MCNC: 25 MG/DL (ref 5–25)
CALCIUM SERPL-MCNC: 10.4 MG/DL (ref 8.4–10.2)
CHLORIDE SERPL-SCNC: 105 MMOL/L (ref 96–108)
CO2 SERPL-SCNC: 27 MMOL/L (ref 21–32)
CREAT SERPL-MCNC: 1 MG/DL (ref 0.6–1.3)
EOSINOPHIL # BLD MANUAL: 0.57 THOUSAND/UL (ref 0–0.4)
EOSINOPHIL NFR BLD MANUAL: 9 % (ref 0–6)
ERYTHROCYTE [DISTWIDTH] IN BLOOD BY AUTOMATED COUNT: 14.1 % (ref 11.6–15.1)
GFR SERPL CREATININE-BSD FRML MDRD: 68 ML/MIN/1.73SQ M
GLUCOSE P FAST SERPL-MCNC: 131 MG/DL (ref 65–99)
HCT VFR BLD AUTO: 45.2 % (ref 36.5–49.3)
HGB BLD-MCNC: 14.7 G/DL (ref 12–17)
LYMPHOCYTES # BLD AUTO: 1.64 THOUSAND/UL (ref 0.6–4.47)
LYMPHOCYTES # BLD AUTO: 23 % (ref 14–44)
MAGNESIUM SERPL-MCNC: 2.2 MG/DL (ref 1.9–2.7)
MCH RBC QN AUTO: 29.3 PG (ref 26.8–34.3)
MCHC RBC AUTO-ENTMCNC: 32.5 G/DL (ref 31.4–37.4)
MCV RBC AUTO: 90 FL (ref 82–98)
MONOCYTES # BLD AUTO: 0.57 THOUSAND/UL (ref 0–1.22)
MONOCYTES NFR BLD: 9 % (ref 4–12)
NEUTROPHILS # BLD MANUAL: 3.52 THOUSAND/UL (ref 1.85–7.62)
NEUTS SEG NFR BLD AUTO: 56 % (ref 43–75)
PLATELET # BLD AUTO: 90 THOUSANDS/UL (ref 149–390)
PLATELET BLD QL SMEAR: ABNORMAL
PLATELET CLUMP BLD QL SMEAR: PRESENT
PMV BLD AUTO: 13.5 FL (ref 8.9–12.7)
POTASSIUM SERPL-SCNC: 4.9 MMOL/L (ref 3.5–5.3)
RBC # BLD AUTO: 5.02 MILLION/UL (ref 3.88–5.62)
RBC MORPH BLD: NORMAL
SODIUM SERPL-SCNC: 138 MMOL/L (ref 135–147)
VARIANT LYMPHS # BLD AUTO: 3 %
WBC # BLD AUTO: 6.29 THOUSAND/UL (ref 4.31–10.16)

## 2023-12-01 PROCEDURE — 36415 COLL VENOUS BLD VENIPUNCTURE: CPT | Performed by: NURSE PRACTITIONER

## 2023-12-01 PROCEDURE — 83735 ASSAY OF MAGNESIUM: CPT | Performed by: NURSE PRACTITIONER

## 2023-12-01 PROCEDURE — 85007 BL SMEAR W/DIFF WBC COUNT: CPT | Performed by: NURSE PRACTITIONER

## 2023-12-01 PROCEDURE — 85027 COMPLETE CBC AUTOMATED: CPT | Performed by: NURSE PRACTITIONER

## 2023-12-01 PROCEDURE — 80048 BASIC METABOLIC PNL TOTAL CA: CPT | Performed by: NURSE PRACTITIONER

## 2023-12-01 NOTE — TELEPHONE ENCOUNTER
Spoke to patient's daughter, Nasra Yanez, and let her know that the pacemaker information is now in his chart and that I spoke to central scheduling yesterday and gave them all the information. I let her know that radiology will reach out to her directly to schedule the MRI. She verbalized understanding and thanks. no

## 2023-12-13 ENCOUNTER — OFFICE VISIT (OUTPATIENT)
Dept: CARDIOLOGY CLINIC | Facility: CLINIC | Age: 83
End: 2023-12-13
Payer: MEDICARE

## 2023-12-13 VITALS
HEART RATE: 83 BPM | HEIGHT: 63 IN | OXYGEN SATURATION: 98 % | WEIGHT: 134.7 LBS | DIASTOLIC BLOOD PRESSURE: 58 MMHG | SYSTOLIC BLOOD PRESSURE: 110 MMHG | BODY MASS INDEX: 23.87 KG/M2

## 2023-12-13 DIAGNOSIS — I49.5 SICK SINUS SYNDROME (HCC): ICD-10-CM

## 2023-12-13 DIAGNOSIS — Z95.0 PACEMAKER: ICD-10-CM

## 2023-12-13 DIAGNOSIS — I49.3 PVC (PREMATURE VENTRICULAR CONTRACTION): Primary | ICD-10-CM

## 2023-12-13 PROCEDURE — 99215 OFFICE O/P EST HI 40 MIN: CPT | Performed by: NURSE PRACTITIONER

## 2023-12-13 RX ORDER — METOPROLOL SUCCINATE 25 MG/1
25 TABLET, EXTENDED RELEASE ORAL EVERY 12 HOURS
Qty: 60 TABLET | Refills: 3 | Status: SHIPPED | OUTPATIENT
Start: 2023-12-13 | End: 2023-12-14 | Stop reason: SDUPTHER

## 2023-12-13 NOTE — PROGRESS NOTES
Cardiology Follow Up    Angelina Blackmon  1940  [id number]  [facility] [address]  [phone]    1. PVC (premature ventricular contraction)  metoprolol succinate (TOPROL-XL) 25 mg 24 hr tablet    CANCELED: AMB extended holter monitor      2. Sick sinus syndrome ([address])        3. Pacemaker            Interval History:   Mr Angelina Blackmon presents to our office with complaints of palpitations. His primary language is [place]. He is accompanied by his wife and daughter via phone. Shira Torrez does not  need assistance with translation. He has noted a lot of skipped heart beats for the past couple of weeks. He denies chest pain palpitations lightheadedness or dizziness. He notes the palpitations with checking his pulse manually. He was found to have PVC. Metoprolol Succinate increased to additional 12.5mg at bedtime. Lab studies ordered. Shira Torrez presents to our office for a follow up visit. He is accompanied by his wife. He continues to notice skipped heart beats when taking his pulse. He denies chest pain potation's dyspnea with minimal moderate exertion lightheadedness or dizziness        Medical History   Primary Cardiologist Dr Patrice Rodriguez previously followed with Dr. 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   Aortic valve mean gradient 11 aortic valve area 1.35 cm²  Patient Active Problem List   Diagnosis    BPH (benign prostatic hyperplasia)    Osteoporosis 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HCC)    Right inguinal hernia    Nonrheumatic aortic valve stenosis    Palpitations    Sinus bradycardia    S/P TAVR (transcatheter aortic valve replacement)    Thrombocytopenia (HCC)    Hyperchloremia    Mixed hyperlipidemia    Encounter for postoperative care    PVC's (premature ventricular contractions)    Premature atrial contractions    Dizziness    Hyperlipidemia    Bradycardia    PVC (premature ventricular contraction)    CAD (coronary artery disease)    Diabetes mellitus, type 2 (HCC)    Hepatocellular carcinoma (HCC)    Pacemaker     Past Medical History:   Diagnosis Date    BPH (benign prostatic hyperplasia)     CAD (coronary artery disease)     Diabetes mellitus (HCC)     type 2, diet controlled    Diastolic CHF ([address])     Epilepsy ([address])     History of mycosis fungoides     Hyperlipidemia     Hypertension     ILD (interstitial lung disease) (HCC)     Osteoporosis     Seizures (HCC)     partial sensory    Severe aortic stenosis      Social History     Socioeconomic History    Marital status: /Civil Union     Spouse name: Not on file    Number of children: Not on file    Years of education: Not on file    Highest education level: Not on file   Occupational History    Not on file   Tobacco Use    Smoking status: Never    Smokeless tobacco: Never   Vaping Use    Vaping status: Never Used   Substance and Sexual Activity    Alcohol use: Not Currently    Drug use: No    Sexual activity: Not Currently   Other Topics Concern    Not on file   Social History Narrative    Not on file     Social Determinants of Health     Financial Resource Strain: Not on file   Food Insecurity: Not on file   Transportation Needs: Not on file   Physical Activity: Not on file   Stress: Not on file   Social Connections: Not on file   Intimate Partner Violence: Not on file   Housing Stability: Not on file      Family History   Problem Relation Age of Onset    Coronary artery disease Family Heart disease Family      Past Surgical History:   Procedure Laterality Date    CARDIAC CATHETERIZATION      CARDIAC ELECTROPHYSIOLOGY PROCEDURE N/A 10/24/2022    Procedure: Cardiac pacer implant/ DUAL CHAMBER;  Surgeon: Radha Atkinson DO;  [facility]; Service: Cardiology    COLONOSCOPY      IR BIOPSY LIVER MASS  10/9/2023    MO ECHO TRANSESOPHAG R-T 2D W/PRB IMG ACQUISJ I&R N/A 1/7/2020    Procedure: TRANSESOPHAGEAL ECHOCARDIOGRAM (POOL); Surgeon: Kamla Ga DO;  Location: [facility] MAIN [facility];  Service: Cardiac Surgery    MO REPLACE AORTIC VALVE OPENFEMORAL ARTERY APPROACH N/A 1/7/2020    Procedure: REPLACEMENT AORTIC VALVE TRANSCATHETER (TAVR) TRANSFEMORAL W/ 23 MM CARROLL KATHLEEN S3 VALVE (ACCESS ON LEFT); Surgeon: Kamla Ga DO;  Location[facility];  Service: Cardiac Surgery    MO RPR 1ST INGUN HRNA AGE 5 YRS/> REDUCIBLE Right 11/16/2018    Procedure: REPAIR RIGHT INGUINAL HERNIA WITH MESH;  Surgeon: Jefferson Castaneda MD;  Location: [facility] MAIN OR;  Service: General       Current Outpatient Medications:     amoxicillin (AMOXIL) 500 mg capsule, BEFORE DENTAL WORK (Patient not taking: Reported on 10/24/2023), Disp: , Rfl: 3    ascorbic acid (VITAMIN C) 1000 MG tablet, Take 1,000 mg by mouth daily, Disp: , Rfl:     aspirin (ECOTRIN LOW STRENGTH) 81 mg EC tablet, Take 1 tablet (81 mg total) by mouth daily, Disp: , Rfl:     calcium-vitamin D (OSCAL 500 + D) 500 mg-200 units per tablet, Take 1 tablet by mouth daily, Disp: , Rfl:     cholecalciferol (VITAMIN D3) 400 units tablet, Take 2,000 Units by mouth daily, Disp: , Rfl:     Krill Oil 350 MG CAPS, Take 350 mg by mouth in the morning, Disp: , Rfl:     levETIRAcetam (KEPPRA) 250 mg tablet, Take 250 mg by mouth 2 (two) times a day, Disp: , Rfl:     metoprolol succinate (TOPROL-XL) 25 mg 24 hr tablet, Take on tab daily in am and 1/2 tab daily at bedtime. , Disp: 90 tablet, Rfl: 3    Multiple Vitamin (DAILY VALUE MULTIVITAMIN PO), Take 1 tablet by mouth daily, Disp: , Rfl:     simvastatin (ZOCOR) 10 mg tablet, Take 10 mg by mouth daily at bedtime, Disp: , Rfl:     tamsulosin (FLOMAX) 0.4 mg, Take 0.4 mg by mouth daily with dinner, Disp: , Rfl:     traZODone (DESYREL) 50 mg tablet, Take 50 mg by mouth daily at bedtime, Disp: , Rfl:   Allergies   Allergen Reactions    Escitalopram Dizziness    Metformin Diarrhea    Sertraline Other (See Comments)     weakness    Duloxetine Other (See Comments)     Restless leg       Labs:  Office Visit on 11/29/2023   Component Date Value    Sodium 12/01/2023 138     Potassium 12/01/2023 4.9     Chloride 12/01/2023 105     CO2 12/01/2023 27     ANION GAP 12/01/2023 6     BUN 12/01/2023 25     Creatinine 12/01/2023 1.00     Glucose, Fasting 12/01/2023 131 (H)     Calcium 12/01/2023 10.4 (H)     eGFR 12/01/2023 68     Magnesium 12/01/2023 2.2     WBC 12/01/2023 6.29     RBC 12/01/2023 5.02     Hemoglobin 12/01/2023 14.7     Hematocrit 12/01/2023 45.2     MCV 12/01/2023 90     MCH 12/01/2023 29.3     MCHC 12/01/2023 32.5     RDW 12/01/2023 14.1     MPV 12/01/2023 13.5 (H)     Platelets 18/25/6334 90 (L)     Segmented % 12/01/2023 56     Lymphocytes % 12/01/2023 23     Monocytes % 12/01/2023 9     Eosinophils, % 12/01/2023 9 (H)     Basophils % 12/01/2023 0     Atypical Lymphocytes % 12/01/2023 3 (H)     Absolute Neutrophils 12/01/2023 3.52     Lymphocytes Absolute 12/01/2023 1.64     Monocytes Absolute 12/01/2023 0.57     Eosinophils Absolute 12/01/2023 0.57 (H)     Basophils Absolute 12/01/2023 0.00     RBC Morphology 12/01/2023 Normal     Platelet Estimate 85/03/4845 Unable to Estimate due to clumped platelets (A)     Clumped Platelets 78/47/9685 Present      Imaging: Cardiac EP device report    Result Date: 11/24/2023  Narrative: MDT DUAL CHAMBER PPM  (MVP ON) - ACTIVE SYSTEM IS MRI CONDITIONAL CARELINK TRANSMISSION: POST RAD/ONC: BATTERY VOLTAGE ADEQUATE (12.4 YRS). AP: 99.4%. : 0.2% (MVP-ON).  ALL AVAILABLE LEAD PARAMETERS WITHIN NORMAL LIMITS. NO SIGNIFICANT HIGH RATE EPISODES. NORMAL DEVICE FUNCTION. [id number] [address]       Review of Systems:  Review of Systems   Musculoskeletal:  Positive for arthralgias and myalgias. All other systems reviewed and are negative. Physical Exam:  Physical Exam  Vitals reviewed. Constitutional:       Appearance: Normal appearance. Cardiovascular:      Rate and Rhythm: Normal rate. Rhythm irregular. Comments: Frequent ectopic beats   Pulmonary:      Effort: Pulmonary effort is normal.      Breath sounds: Normal breath sounds. Abdominal:      General: Bowel sounds are normal.      Palpations: Abdomen is soft. Musculoskeletal:         General: Normal range of motion. Right lower leg: No edema. Left lower leg: No edema. Skin:     General: Skin is warm and dry. Capillary Refill: Capillary refill takes less than 2 seconds. Neurological:      General: No focal deficit present. Mental Status: He is alert and oriented to person, place, and time. Psychiatric:         Mood and Affect: Mood normal.         Behavior: Behavior normal.         Discussion/Summary:  # PVC 's on PE, Yesi denies palpitations, lightheadedness or dizziness. I will send Yesi to the device clinic after this office visit to interrogate the PPM.  Upon device interrogation he was found to have ventricular bigeminy. I have called Laura Jolley' s more Marques and instructed her to have her father  Increase the dose of Metoprolol succinate to 25mg BID. Home BP monitoring. Device check in one week evaluate PVC's. # SSS sp MDT dual chamber PPM 10/24/22, Device interrogation on 11/24/23 showed AP 99.4%,  0.2% onl parameters within normal limits no significant high rates normal device function, official report from today's device interrogation pending.

## 2023-12-14 DIAGNOSIS — I49.3 PVC (PREMATURE VENTRICULAR CONTRACTION): ICD-10-CM

## 2023-12-14 RX ORDER — METOPROLOL SUCCINATE 25 MG/1
25 TABLET, EXTENDED RELEASE ORAL EVERY 12 HOURS
Qty: 180 TABLET | Refills: 1 | Status: SHIPPED | OUTPATIENT
Start: 2023-12-14

## 2023-12-20 ENCOUNTER — REMOTE DEVICE CLINIC VISIT (OUTPATIENT)
Dept: CARDIOLOGY CLINIC | Facility: CLINIC | Age: 83
End: 2023-12-20

## 2023-12-20 DIAGNOSIS — Z95.0 PRESENCE OF CARDIAC PACEMAKER: Primary | ICD-10-CM

## 2023-12-20 PROCEDURE — RECHECK: Performed by: INTERNAL MEDICINE

## 2023-12-20 NOTE — PROGRESS NOTES
Results for orders placed or performed in visit on 12/20/23   Cardiac EP device report    Narrative    MDT DUAL CHAMBER PPM  (MVP ON) - ACTIVE SYSTEM IS MRI CONDITIONAL  NB CARELINK TRANSMISSION PER CC-CRNP FOR PVCS: BATTERY VOLTAGE ADEQUATE(12.3 YRS). AP 99.7%  <0.1% ALL AVAILABLE LEAD PARAMETERS WITHIN NORMAL LIMITS. NO SIGNIFICANT HIGH RATE EPISODES; PRESENTING RHYTHM SHOWING BIGEMINY. SINCE 12/13/23: PVC RUNS (2-4 BEATS) 1.1 PER HOUR SINCE 12/13/23; PVC SINGLES 280.4 PER HOUR. TAKES METOPROLOL SUCC. NORMAL DEVICE FUNCTION. AM

## 2024-01-10 ENCOUNTER — TELEMEDICINE (OUTPATIENT)
Dept: RADIATION ONCOLOGY | Facility: CLINIC | Age: 84
End: 2024-01-10

## 2024-01-10 DIAGNOSIS — C22.0 HEPATOCELLULAR CARCINOMA (HCC): Primary | ICD-10-CM

## 2024-01-10 PROCEDURE — 99024 POSTOP FOLLOW-UP VISIT: CPT | Performed by: INTERNAL MEDICINE

## 2024-01-10 NOTE — PROGRESS NOTES
Virtual Brief Visit - Radiation Oncology   Yesi Mosher 1940 83 y.o. male [id number]    REQUIRED DOCUMENTATION:     1. This service was provided via Telemedicine.  2. Provider located at   [facility]  [address]  [facility] [place] [id number]  3. TeleMed provider: Shailesh Henao MD.  4. Identify all parties in room with patient during tele consult:  Daughter and spouse.  5.Patient was then informed that this was a Telemedicine visit and that the exam was being conducted confidentially over secure lines. My office door was closed. No one else was in the room.  Patient acknowledged consent and understanding of privacy and security of the Telemedicine visit, and, if applicable, gave us permission to have an assistant stay in the room in order to assist with the history and to conduct the exam.  I informed the patient that I have reviewed their record in Epic and presented the opportunity for them to ask any questions regarding the visit today.  The patient agreed to participate.       Cancer Staging   Hepatocellular carcinoma (HCC)  Staging form: Liver (Excluding Intrahepatic Bile Ducts), AJCC 8th Edition  - Clinical stage from 8/22/2023: cT1b, cN0 - Signed by Shailesh Henao MD on 10/24/2023  Stage prefix: Initial diagnosis    Assessment/Plan:  Yesi Mosher is a 83 y.o. male with a pacemaker who was recently diagnosed cT1bN0 HCC of the left lobe of the liver, s/p biopsy on 10/9/23 showing moderately differentiated hepatocellular carcinoma.  His MRI on 8/22/2020 revealed a 3.1 cm left hepatic lesion which was previously 1.2 cm in size.  His case was reviewed at multidisciplinary tumor board on 10/12/2023, with recommendation for SBRT, which he completed on 11/22/23.    He presents for routine 1-1.5 month end of treatment follow-up visit. He denies diarrhea, pain, or changes in respiration. He notes significant fatigue. I explained that fatigue, if related to RT,  typically lasts 2-3 weeks after treatment and would not be expected to be present at this interval. There are many other causes of fatigue. Discussed next steps including MRI on 24.  -24 MRI Abdomen  -3/1/24 Surgical oncology f/u  RTC in 6 months.    No orders of the defined types were placed in this encounter.       History of Present Illness   Interval History:  He has history of arterially enhancing liver lesion that was being monitored over the past few years, which has been increasing in size to 3.1 cm in left hepatic lobe, suspicious for HCC. He underwent liver biopsy on 10/9/23 confirming moderately differentiated hepatocellular carcinoma. His case was discussed at GI tumor board with recommendation to refer to Rad Onc for SBRT. He completed SBRT to the liver on 23 and returns today for EOT phone follow-up.     Also has history of pacemaker placed in , thrombocytopenia, positive RA factor, DM type 2, seizure disorder taking keppra.     Upcomin24 MRI Abdomen  3/1/24 Surg onc     He denies diarrhea, pain, or changes in respiration. Notes fatigue.    Historical Information   Oncology History Overview Note   for HCC. He has history of arterially enhancing liver lesion that was being monitored over the past few years, which has been increasing in size to 3.1 cm in left hepatic lobe, suspicious for HCC. He underwent liver biopsy on 10/9/23 confirming moderately differentiated hepatocellular carcinoma. His case was discussed at GI tumor board with recommendation to refer to Rad Onc for SBRT. He completed SBRT to the liver on 23 and returns today for EOT phone follow-up.      Also has history of pacemaker placed in , thrombocytopenia, positive RA factor, DM type 2, seizure disorder taking keppra.     Upcoming:  3/1/24 Surg onc     Hepatocellular carcinoma (HCC)   2023 -  Cancer Staged    Staging form: Liver (Excluding Intrahepatic Bile Ducts), AJCC 8th Edition  - Clinical  stage from 8/22/2023: cT1b, cN0 - Signed by Shailesh Henao MD on 10/24/2023  Stage prefix: Initial diagnosis       10/9/2023 Biopsy    Liver, lesion, needle core biopsy:  - Moderately differentiated hepatocellular carcinoma.      11/10/2023 - 11/22/2023 Radiation    Treatment:  Course: C1 SBRT    Plan ID Energy Fractions Dose per Fraction (cGy) Dose Correction (cGy) Total Dose Delivered (cGy) Elapsed Days   SBRT Liver MT 6X-FFF 5 / 5 700 0 3,500 12      Treatment dates:  C1 SBRT: 11/10/2023 - 11/22/2023       Past Medical History:   Diagnosis Date   • BPH (benign prostatic hyperplasia)    • CAD (coronary artery disease)    • Diabetes mellitus (HCC)     type 2, diet controlled   • Diastolic CHF (HCC)    • Epilepsy (HCC)    • History of mycosis fungoides    • Hyperlipidemia    • Hypertension    • ILD (interstitial lung disease) (HCC)    • Osteoporosis    • Seizures (HCC)     partial sensory   • Severe aortic stenosis      Past Surgical History:   Procedure Laterality Date   • CARDIAC CATHETERIZATION     • CARDIAC ELECTROPHYSIOLOGY PROCEDURE N/A 10/24/2022    Procedure: Cardiac pacer implant/ DUAL CHAMBER;  Surgeon: Daniel Bardales DO;  Location: [facility];  Service: Cardiology   • COLONOSCOPY     • IR BIOPSY LIVER MASS  10/9/2023   • VT ECHO TRANSESOPHAG R-T 2D W/PRB IMG ACQUISJ I&R N/A 1/7/2020    Procedure: TRANSESOPHAGEAL ECHOCARDIOGRAM (POOL);  Surgeon: Tony Archer DO;  Location:  MAIN OR;  Service: Cardiac Surgery   • VT REPLACE AORTIC VALVE OPENFEMORAL ARTERY APPROACH N/A 1/7/2020    Procedure: REPLACEMENT AORTIC VALVE TRANSCATHETER (TAVR) TRANSFEMORAL W/ 23 MM CARROLL KATHLEEN S3 VALVE (ACCESS ON LEFT);  Surgeon: Tony Archer DO;  Location:  MAIN OR;  Service: Cardiac Surgery   • VT RPR 1ST INGUN HRNA AGE 5 YRS/> REDUCIBLE Right 11/16/2018    Procedure: REPAIR RIGHT INGUINAL HERNIA WITH MESH;  Surgeon: Pravin Elizabeth MD;  Location:  MAIN OR;  Service: General     Social History   Social  History     Substance and Sexual Activity   Alcohol Use Not Currently     Social History     Substance and Sexual Activity   Drug Use No     Social History     Tobacco Use   Smoking Status Never   Smokeless Tobacco Never     Meds/Allergies     Current Outpatient Medications:   •  amoxicillin (AMOXIL) 500 mg capsule, BEFORE DENTAL WORK (Patient not taking: Reported on 10/24/2023), Disp: , Rfl: 3  •  ascorbic acid (VITAMIN C) 1000 MG tablet, Take 1,000 mg by mouth daily, Disp: , Rfl:   •  aspirin (ECOTRIN LOW STRENGTH) 81 mg EC tablet, Take 1 tablet (81 mg total) by mouth daily, Disp: , Rfl:   •  calcium-vitamin D (OSCAL 500 + D) 500 mg-200 units per tablet, Take 1 tablet by mouth daily, Disp: , Rfl:   •  cholecalciferol (VITAMIN D3) 400 units tablet, Take 2,000 Units by mouth daily, Disp: , Rfl:   •  Krill Oil 350 MG CAPS, Take 350 mg by mouth in the morning, Disp: , Rfl:   •  levETIRAcetam (KEPPRA) 250 mg tablet, Take 250 mg by mouth 2 (two) times a day, Disp: , Rfl:   •  metoprolol succinate (TOPROL-XL) 25 mg 24 hr tablet, Take 1 tablet (25 mg total) by mouth every 12 (twelve) hours, Disp: 180 tablet, Rfl: 1  •  Multiple Vitamin (DAILY VALUE MULTIVITAMIN PO), Take 1 tablet by mouth daily, Disp: , Rfl:   •  simvastatin (ZOCOR) 10 mg tablet, Take 10 mg by mouth daily at bedtime, Disp: , Rfl:   •  tamsulosin (FLOMAX) 0.4 mg, Take 0.4 mg by mouth daily with dinner, Disp: , Rfl:   •  traZODone (DESYREL) 50 mg tablet, Take 50 mg by mouth daily at bedtime, Disp: , Rfl:   Allergies   Allergen Reactions   • Escitalopram Dizziness   • Metformin Diarrhea   • Sertraline Other (See Comments)     weakness   • Duloxetine Other (See Comments)     Restless leg       OBJECTIVE:   Physical exam limited over telephone encounter.    Shailesh Henao MD  1/10/2024,3:50 PM    VIRTUAL VISIT DISCLAIMER  Patient verbally agrees to participate in Virtual Care Services. Pt is aware that Virtual Care Services could be limited without vital  "signs or the ability to perform a full hands-on physical exam. Patient understands that the patient or the provider may request at any time to terminate the video visit and request the patient to seek care or treatment in person.    Portions of the record may have been created with voice recognition software.  Occasional wrong word or \"sound a like\" substitutions may have occurred due to the inherent limitations of voice recognition software.  Read the chart carefully and recognize, using context, where substitutions have occurred.  "

## 2024-02-22 ENCOUNTER — HOSPITAL ENCOUNTER (OUTPATIENT)
Dept: RADIOLOGY | Facility: IMAGING CENTER | Age: 84
End: 2024-02-22
Payer: MEDICARE

## 2024-02-22 ENCOUNTER — APPOINTMENT (OUTPATIENT)
Dept: LAB | Facility: IMAGING CENTER | Age: 84
End: 2024-02-22
Payer: MEDICARE

## 2024-02-22 DIAGNOSIS — Z95.0 PACEMAKER: ICD-10-CM

## 2024-02-22 DIAGNOSIS — C22.0 HEPATOCELLULAR CARCINOMA (HCC): ICD-10-CM

## 2024-02-22 DIAGNOSIS — R53.83 OTHER FATIGUE: ICD-10-CM

## 2024-02-22 DIAGNOSIS — R06.09 DOE (DYSPNEA ON EXERTION): ICD-10-CM

## 2024-02-22 LAB
ALBUMIN SERPL BCP-MCNC: 4.1 G/DL (ref 3.5–5)
ALP SERPL-CCNC: 48 U/L (ref 34–104)
ALT SERPL W P-5'-P-CCNC: 24 U/L (ref 7–52)
ANION GAP SERPL CALCULATED.3IONS-SCNC: 8 MMOL/L
AST SERPL W P-5'-P-CCNC: 32 U/L (ref 13–39)
BILIRUB SERPL-MCNC: 0.78 MG/DL (ref 0.2–1)
BNP SERPL-MCNC: 159 PG/ML (ref 0–100)
BUN SERPL-MCNC: 19 MG/DL (ref 5–25)
CALCIUM SERPL-MCNC: 10.3 MG/DL (ref 8.4–10.2)
CHLORIDE SERPL-SCNC: 100 MMOL/L (ref 96–108)
CO2 SERPL-SCNC: 28 MMOL/L (ref 21–32)
CREAT SERPL-MCNC: 1.05 MG/DL (ref 0.6–1.3)
GFR SERPL CREATININE-BSD FRML MDRD: 64 ML/MIN/1.73SQ M
GLUCOSE P FAST SERPL-MCNC: 118 MG/DL (ref 65–99)
POTASSIUM SERPL-SCNC: 4.7 MMOL/L (ref 3.5–5.3)
PROT SERPL-MCNC: 7.9 G/DL (ref 6.4–8.4)
SODIUM SERPL-SCNC: 136 MMOL/L (ref 135–147)

## 2024-02-22 PROCEDURE — 83880 ASSAY OF NATRIURETIC PEPTIDE: CPT

## 2024-02-22 PROCEDURE — 36415 COLL VENOUS BLD VENIPUNCTURE: CPT

## 2024-02-22 PROCEDURE — 82107 ALPHA-FETOPROTEIN L3: CPT

## 2024-02-22 PROCEDURE — 80053 COMPREHEN METABOLIC PANEL: CPT

## 2024-02-23 ENCOUNTER — REMOTE DEVICE CLINIC VISIT (OUTPATIENT)
Dept: CARDIOLOGY CLINIC | Facility: CLINIC | Age: 84
End: 2024-02-23
Payer: MEDICARE

## 2024-02-23 DIAGNOSIS — Z95.0 PRESENCE OF PERMANENT CARDIAC PACEMAKER: Primary | ICD-10-CM

## 2024-02-23 PROCEDURE — 93296 REM INTERROG EVL PM/IDS: CPT | Performed by: STUDENT IN AN ORGANIZED HEALTH CARE EDUCATION/TRAINING PROGRAM

## 2024-02-23 PROCEDURE — 93294 REM INTERROG EVL PM/LDLS PM: CPT | Performed by: STUDENT IN AN ORGANIZED HEALTH CARE EDUCATION/TRAINING PROGRAM

## 2024-02-23 NOTE — PROGRESS NOTES
Results for orders placed or performed in visit on 02/23/24   Cardiac EP device report    Narrative    MDT DUAL CHAMBER PPM  (MVP ON) - ACTIVE SYSTEM IS MRI CONDITIONAL  CARELINK TRANSMISSION: BATTERY VOLTAGE ADEQUATE. (12.1 YRS)  AP 99%  <1%. ALL AVAILABLE LEAD PARAMETERS WITHIN NORMAL LIMITS. NO SIGNIFICANT HIGH RATE EPISODES. NORMAL DEVICE FUNCTION.---COLVIN

## 2024-02-26 ENCOUNTER — OFFICE VISIT (OUTPATIENT)
Dept: CARDIOLOGY CLINIC | Facility: CLINIC | Age: 84
End: 2024-02-26
Payer: MEDICARE

## 2024-02-26 VITALS
HEIGHT: 63 IN | SYSTOLIC BLOOD PRESSURE: 114 MMHG | HEART RATE: 72 BPM | BODY MASS INDEX: 24.17 KG/M2 | DIASTOLIC BLOOD PRESSURE: 64 MMHG | WEIGHT: 136.4 LBS

## 2024-02-26 DIAGNOSIS — E11.9 TYPE 2 DIABETES MELLITUS WITHOUT COMPLICATION, UNSPECIFIED WHETHER LONG TERM INSULIN USE (HCC): ICD-10-CM

## 2024-02-26 DIAGNOSIS — I49.3 PVC (PREMATURE VENTRICULAR CONTRACTION): Primary | ICD-10-CM

## 2024-02-26 DIAGNOSIS — I49.5 SICK SINUS SYNDROME (HCC): ICD-10-CM

## 2024-02-26 PROCEDURE — 93000 ELECTROCARDIOGRAM COMPLETE: CPT | Performed by: PHYSICIAN ASSISTANT

## 2024-02-26 PROCEDURE — 99214 OFFICE O/P EST MOD 30 MIN: CPT | Performed by: PHYSICIAN ASSISTANT

## 2024-02-26 NOTE — PROGRESS NOTES
Electrophysiology Office Note    Yesi Rain  1940  [id number]  HEART & VASCULAR [facility]  [address]  [place] [address]    Assessment/Plan     Primary diagnosis:   PVC's   Sent by general cardiology for bigeminy on EKG   Holter  7.5% PVC burden increased   to 15.5%   Patient did activate symptoms with these   Device has counted > 350 ectopic beats/ hr   Metoprolol succinate 12.5 mg added 2023   Metoprolol increased from 12.5 mg daily to 25 mg BID   Echo  with preserved LVEF 60% no WMA   He is not having any significant symptoms with the PVC's   Plan:   Would ideally uptitrate BB however patient states at home bp 90's and already dizzy at times   Check new echo to ensure EF not down   Zio monitor x 2 weeks to assess pvc burden   Consider addition of AAD - dofetilide or flecainide would be preferred   Avoid amio due to #5 and sotalol would limit BB due to bp effect   F/u in 2 months to discuss echo and zio and forumlate concrete treatment plan   SSS s/p MDT dual chamber ppm implanted 10/24/22  Atrial paces 99% limited v pacing   Aortic valve stenosis s/p TAVR 2020  HTN   Hepatocellular carcinoma s/p radiation   DMII   Seizures       Rhythm History:   Atrial fibrillation:     Atrial flutter:     SVT:     VT/VF/PVC:     Device history:   Pacemaker:  MDT DC PPM placed 10/22 for SSS     Defibrillator:    BIV PPM:    BIV ICD:    ILR:      Cardiac Testing:     ECHO: Results for orders placed during the hospital encounter of 20    Echo complete with contrast if indicated    [address] [address]  [phone]    Transthoracic Echocardiogram  2D, M-mode, Doppler, and Color Doppler    Study date:  01-Dec-2020    Patient: ELSA RAIN  MR number: [id number]  Account number: [id number]  : 1940  Age: 80 years  Gender: Male  Status: Outpatient  Location: [facility]  3  Height: 62 in  Weight: 126.7 lb  BP: 112/ 60 mmHg    Indications: S/P TAVR    Diagnoses: Z95.2 - Presence of prosthetic heart valve    Sonographer:  MARLENA Christensen  Primary Physician:  Wilmer Rosenberg MD  Referring Physician:  JACOBO Dietz  Group:  [facility]  Interpreting Physician:  Ana Juarez MD    IMPRESSIONS:  Technical quality: Good  Cardiac rhythm: Sinus    1. Mildly increased left ventricular wall thickness, normal left ventricular systolic function, EF around 65%. Grade 1 diastolic dysfunction with elevated estimated left ventricular filling pressures.  2. Normal right ventricular size and systolic function.  3. Mild left atrial cavity enlargement.  4. Normal functioning bioprosthetic aortic valve with minimal paravalvular and mild valvular regurgitation.  5. Mild plus mitral valve regurgitation.  6. Mild tricuspid valve regurgitation.  7. Possible mild pulmonary hypertension. Estimated RVSP/PASP is 37 mmHg.  8. No pericardial effusion.    Compared to report of previous echocardiogram from January 29, 2020 there is some progression of diastolic dysfunction and possible mild pulmonary hypertension is new.    SUMMARY    LEFT VENTRICLE:  Normal left ventricular cavity size, mildly increased wall thickness, normal left ventricular systolic function normal regional wall motion. Ejection fraction is estimated as around 65%. Doppler evaluation suggests grade 1 diastolic  dysfunction. Estimated left ventricular filling pressures are increased.    RIGHT VENTRICLE:  Normal right ventricular size and systolic function. Estimated right ventricular systolic pressure is increased, 37 mmHg. Possible mild pulmonary hypertension.    LEFT ATRIUM:  Mild left atrial cavity enlargement.    RIGHT ATRIUM:  Normal right atrial cavity size.    MITRAL VALVE:  Mitral valve leaflet sclerosis, adequate leaflet mobility. Mild-plus mitral valve regurgitation.    AORTIC VALVE:  Bioprosthetic  aortic valve present. Valve appears to be well seated with no evidence of dehiscence. Doppler evaluation suggests normal prosthetic valve function. Peak trans prosthetic valve velocity is 2.94 m/sec, mean gradient is 15 mmHg  and calculated valve area is 1.0-1.1 cm2. Doppler velocity index is 0.53. There is minimal paravalvular and mild valvular regurgitation.    TRICUSPID VALVE:  Mild tricuspid valve regurgitation.    PULMONIC VALVE:  Trace pulmonic valve regurgitation.    AORTA:  Aortic root and proximal ascending aorta are normal in size on 2D imaging.    IVC, HEPATIC VEINS:  Inferior vena cava is normal in size and demonstrates appropriate respiratory phasic changes in diameter.    PERICARDIUM:  No pericardial effusion.    SUMMARY MEASUREMENTS  2D measurements:  Unspecified Anatomy:   %FS was 35.5 %.  Ao Diam was 2.2 cm.  EDV(Teich) was 64.3 ml.  EF(Teich) was 65.7 %.  ESV(Teich) was 22.1 ml.  IVSd was 0.9 cm.  LA Area was 17.5 cm2.  LA Diam was 3.4 cm.  LVEDV MOD A4C was 126.6 ml.  LVEF MOD A4C  was 68.4 %.  LVESV MOD A4C was 40 ml.  LVIDd was 3.9 cm.  LVIDs was 2.5 cm.  LVLd A4C was 8.7 cm.  LVLs A4C was 7 cm.  LVOT Diam was 1.7 cm.  LVPWd was 0.9 cm.  RA Area was 14.7 cm2.  RVIDd was 3 cm.  SV MOD A4C was 86.6 ml.  SV(Teich) was  42.2 ml.  CW measurements:  Unspecified Anatomy:   AR Dec [place] was 2.7 m/s2.  AR Dec Time was 1675.7 ms.  AR PHT was 486 ms.  AR Vmax was 4.5 m/s.  AR maxPG was 80.6 mmHg.  AV Env.Ti was 398.2 ms.  AV VTI was 66.7 cm.  AV Vmax was 3 m/s.  AV Vmean was 1.7 m/s.  AV  maxPG was 34.8 mmHg.  AV meanPG was 14.7 mmHg.  TR Vmax was 2.6 m/s.  TR maxPG was 26.7 mmHg.  MM measurements:  Unspecified Anatomy:   TAPSE was 2.4 cm.  PW measurements:  Unspecified Anatomy:   SON (VTI) was 1.1 cm2.  SON Vmax was 1 cm2.  SON Vmax, Pt was 1 cm2.  AVAI (VTI) was 0 cm2/m2.  AVAI Vmax was 0 cm2/m2.  AVAI Vmax, Pt was 0 cm2/m2.  DVI was 0.5 .  E' Sept was 0.1 m/s.  E/E' Sept was 21.3 .  LVOT  Env.Ti was  345.4 ms.  LVOT VTI was 35.2 cm.  LVOT Vmax was 1.3 m/s.  LVOT Vmean was 1 m/s.  LVOT maxPG was 7.2 mmHg.  LVOT meanPG was 4.6 mmHg.  LVSI Dopp was 48.3 ml/m2.  LVSV Dopp was 76.3 ml.  MV A Jaime was 1 m/s.  MV Dec [place] was 4.9  m/s2.  MV DecT was 229.7 ms.  MV E Jaime was 1.1 m/s.  MV E/A Ratio was 1.2 .  MV PHT was 66.6 ms.  MVA By PHT was 3.3 cm2.    HISTORY: PRIOR HISTORY: Aortic stenosis, sinus bradycardia, PVC's, hyperlipidemia    PROCEDURE: The study was performed in the [place] ECHO 3. This was a routine study. The transthoracic approach was used. The study included complete 2D imaging, M-mode, complete spectral Doppler, and color Doppler. The heart rate was 64 bpm, at  the start of the study. Images were obtained from the parasternal, apical, subcostal, and suprasternal notch acoustic windows. Image quality was adequate.    LEFT VENTRICLE: Normal left ventricular cavity size, mildly increased wall thickness, normal left ventricular systolic function normal regional wall motion. Ejection fraction is estimated as around 65%. Doppler evaluation suggests grade 1  diastolic dysfunction. Estimated left ventricular filling pressures are increased.    RIGHT VENTRICLE: Normal right ventricular size and systolic function. Estimated right ventricular systolic pressure is increased, 37 mmHg. Possible mild pulmonary hypertension.    LEFT ATRIUM: Mild left atrial cavity enlargement.    RIGHT ATRIUM: Normal right atrial cavity size.    MITRAL VALVE: Mitral valve leaflet sclerosis, adequate leaflet mobility. Mild-plus mitral valve regurgitation.    AORTIC VALVE: Bioprosthetic aortic valve present. Valve appears to be well seated with no evidence of dehiscence. Doppler evaluation suggests normal prosthetic valve function. Peak trans prosthetic valve velocity is 2.94 m/sec, mean  gradient is 15 mmHg and calculated valve area is 1.0-1.1 cm2. Doppler velocity index is 0.53. There is minimal paravalvular and mild valvular  regurgitation.    TRICUSPID VALVE: Mild tricuspid valve regurgitation.    PULMONIC VALVE: Trace pulmonic valve regurgitation.    PERICARDIUM: No pericardial effusion.    AORTA: Aortic root and proximal ascending aorta are normal in size on 2D imaging.    SYSTEMIC VEINS: IVC: Inferior vena cava is normal in size and demonstrates appropriate respiratory phasic changes in diameter.    SYSTEM MEASUREMENT TABLES    2D  %FS: 35.5 %  Ao Diam: 2.2 cm  EDV(Teich): 64.3 ml  EF(Teich): 65.7 %  ESV(Teich): 22.1 ml  IVSd: 0.9 cm  LA Area: 17.5 cm2  LA Diam: 3.4 cm  LVEDV MOD A4C: 126.6 ml  LVEF MOD A4C: 68.4 %  LVESV MOD A4C: 40 ml  LVIDd: 3.9 cm  LVIDs: 2.5 cm  LVLd A4C: 8.7 cm  LVLs A4C: 7 cm  LVOT Diam: 1.7 cm  LVPWd: 0.9 cm  RA Area: 14.7 cm2  RVIDd: 3 cm  SV MOD A4C: 86.6 ml  SV(Teich): 42.2 ml    CW  AR Dec [place]: 2.7 m/s2  AR Dec Time: 1675.7 ms  AR PHT: 486 ms  AR Vmax: 4.5 m/s  AR maxP.6 mmHg  AV Env.Ti: 398.2 ms  AV VTI: 66.7 cm  AV Vmax: 3 m/s  AV Vmean: 1.7 m/s  AV maxP.8 mmHg  AV meanP.7 mmHg  TR Vmax: 2.6 m/s  TR maxP.7 mmHg    MM  TAPSE: 2.4 cm    PW  SON (VTI): 1.1 cm2  SON Vmax: 1 cm2  SON Vmax, Pt: 1 cm2  AVAI (VTI): 0 cm2/m2  AVAI Vmax: 0 cm2/m2  AVAI Vmax, Pt: 0 cm2/m2  DVI: 0.5  E' Sept: 0.1 m/s  E/E' Sept: 21.3  LVOT Env.Ti: 345.4 ms  LVOT VTI: 35.2 cm  LVOT Vmax: 1.3 m/s  LVOT Vmean: 1 m/s  LVOT maxP.2 mmHg  LVOT meanP.6 mmHg  LVSI Dopp: 48.3 ml/m2  LVSV Dopp: 76.3 ml  MV A Jaime: 1 m/s  MV Dec [place]: 4.9 m/s2  MV DecT: 229.7 ms  MV E Jaime: 1.1 m/s  MV E/A Ratio: 1.2  MV PHT: 66.6 ms  MVA By PHT: 3.3 cm2    Inters\[facility]\"" Commission Accredited Echocardiography Laboratory    Prepared and electronically signed by    Ana Juarez MD  Signed 01-Dec-2020 14:11:30        History of Present Illness     HPI/INTERVAL HISTORY:  Kolby is an 83-year-old male with past medical history of hypertension, hyperlipidemia, aortic valve stenosis status post TAVR, seizures maintained on Keppra,  hepatocellular carcinoma status post radiation, sick sinus syndrome status post [employer] dual-chamber pacemaker who presents to EP clinic to evaluate significant PVC burden.    Briefly, patient has history of sick sinus syndrome for which she had dual-chamber pacemaker placed in October 22.  This was his first experience with electrophysiology.  Recently he has had increasing burden of PVCs for which she is asymptomatic.  He had Holter monitor placed 5/22 by general cardiology showing 7.5% burden with repeat Holter monitor 8/22 showing 15.5% burden.  He was noted to have bigeminy in cardiology office in November 2023 and was started on metoprolol succinate 12.5 mg daily.  He was seen again in December of the same year and his metoprolol was increased to 25 mg twice daily after persistent bigeminy was noted on EKG.  Device checks recently have shown PVC burden per hour exceeding 350 beats going as high as 491 beats. He only notices the skipped beats when he takes his pulse. He reports no other symptoms if he is not checking his pulse.       Review of Systems   Constitutional:  Negative for diaphoresis and fatigue.   Respiratory:  Negative for chest tightness and shortness of breath.    Cardiovascular:  Negative for chest pain and palpitations.   Neurological:  Positive for dizziness. Negative for light-headedness.     ROS as noted above, otherwise 12 point review of systems was performed and is negative.       Historical Information   Social History     Socioeconomic History    Marital status: /Civil Union     Spouse name: Not on file    Number of children: Not on file    Years of education: Not on file    Highest education level: Not on file   Occupational History    Not on file   Tobacco Use    Smoking status: Never    Smokeless tobacco: Never   Vaping Use    Vaping status: Never Used   Substance and Sexual Activity    Alcohol use: Not Currently    Drug use: No    Sexual activity: Not Currently   Other  Topics Concern    Not on file   Social History Narrative    Not on file     Social Determinants of Health     Financial Resource Strain: Not on file   Food Insecurity: Not on file   Transportation Needs: Not on file   Physical Activity: Not on file   Stress: Not on file   Social Connections: Not on file   Intimate Partner Violence: Not on file   Housing Stability: Not on file     Past Medical History:   Diagnosis Date    BPH (benign prostatic hyperplasia)     CAD (coronary artery disease)     Diabetes mellitus (HCC)     type 2, diet controlled    Diastolic CHF (HCC)     Epilepsy (HCC)     History of mycosis fungoides     Hyperlipidemia     Hypertension     ILD (interstitial lung disease) (HCC)     Osteoporosis     Seizures (HCC)     partial sensory    Severe aortic stenosis      Past Surgical History:   Procedure Laterality Date    CARDIAC CATHETERIZATION      CARDIAC ELECTROPHYSIOLOGY PROCEDURE N/A 10/24/2022    Procedure: Cardiac pacer implant/ DUAL CHAMBER;  Surgeon: Daniel Bardales DO;  Location: [facility];  Service: Cardiology    COLONOSCOPY      IR BIOPSY LIVER MASS  10/9/2023    MS ECHO TRANSESOPHAG R-T 2D W/PRB IMG ACQUISJ I&R N/A 1/7/2020    Procedure: TRANSESOPHAGEAL ECHOCARDIOGRAM (POOL);  Surgeon: Tony Archer DO;  Location:  MAIN OR;  Service: Cardiac Surgery    MS REPLACE AORTIC VALVE OPENFEMORAL ARTERY APPROACH N/A 1/7/2020    Procedure: REPLACEMENT AORTIC VALVE TRANSCATHETER (TAVR) TRANSFEMORAL W/ 23 MM CARROLL KATHLEEN S3 VALVE (ACCESS ON LEFT);  Surgeon: Tony Archer DO;  Location:  MAIN OR;  Service: Cardiac Surgery    MS RPR 1ST INGUN HRNA AGE 5 YRS/> REDUCIBLE Right 11/16/2018    Procedure: REPAIR RIGHT INGUINAL HERNIA WITH MESH;  Surgeon: Pravin Elizabeth MD;  Location:  MAIN OR;  Service: General     Social History     Substance and Sexual Activity   Alcohol Use Not Currently     Social History     Substance and Sexual Activity   Drug Use No     Social History     Tobacco  Use   Smoking Status Never   Smokeless Tobacco Never     Family History   Problem Relation Age of Onset    Coronary artery disease Family     Heart disease Family        Meds/Allergies       Current Outpatient Medications:     amoxicillin (AMOXIL) 500 mg capsule, BEFORE DENTAL WORK (Patient not taking: Reported on 10/24/2023), Disp: , Rfl: 3    ascorbic acid (VITAMIN C) 1000 MG tablet, Take 1,000 mg by mouth daily, Disp: , Rfl:     aspirin (ECOTRIN LOW STRENGTH) 81 mg EC tablet, Take 1 tablet (81 mg total) by mouth daily, Disp: , Rfl:     calcium-vitamin D (OSCAL 500 + D) 500 mg-200 units per tablet, Take 1 tablet by mouth daily, Disp: , Rfl:     cholecalciferol (VITAMIN D3) 400 units tablet, Take 2,000 Units by mouth daily, Disp: , Rfl:     Krill Oil 350 MG CAPS, Take 350 mg by mouth in the morning, Disp: , Rfl:     levETIRAcetam (KEPPRA) 250 mg tablet, Take 250 mg by mouth 2 (two) times a day, Disp: , Rfl:     metoprolol succinate (TOPROL-XL) 25 mg 24 hr tablet, Take 1 tablet (25 mg total) by mouth every 12 (twelve) hours, Disp: 180 tablet, Rfl: 1    Multiple Vitamin (DAILY VALUE MULTIVITAMIN PO), Take 1 tablet by mouth daily, Disp: , Rfl:     simvastatin (ZOCOR) 10 mg tablet, Take 10 mg by mouth daily at bedtime, Disp: , Rfl:     tamsulosin (FLOMAX) 0.4 mg, Take 0.4 mg by mouth daily with dinner, Disp: , Rfl:     traZODone (DESYREL) 50 mg tablet, Take 50 mg by mouth daily at bedtime, Disp: , Rfl:     Allergies   Allergen Reactions    Escitalopram Dizziness    Metformin Diarrhea    Sertraline Other (See Comments)     weakness    Duloxetine Other (See Comments)     Restless leg       Objective   Vitals: There were no vitals taken for this visit.        Physical Exam  Vitals and nursing note reviewed.   Constitutional:       General: He is not in acute distress.  Cardiovascular:      Rate and Rhythm: Normal rate. Rhythm irregular.   Pulmonary:      Effort: Pulmonary effort is normal.      Breath sounds: Normal  breath sounds.   Musculoskeletal:      Right lower leg: No edema.      Left lower leg: No edema.   Skin:     General: Skin is warm and dry.   Neurological:      General: No focal deficit present.      Mental Status: He is alert and oriented to person, place, and time.   Psychiatric:         Mood and Affect: Mood normal.           Labs:  Appointment on 02/22/2024   Component Date Value    BNP 02/22/2024 159 (H)     Sodium 02/22/2024 136     Potassium 02/22/2024 4.7     Chloride 02/22/2024 100     CO2 02/22/2024 28     ANION GAP 02/22/2024 8     BUN 02/22/2024 19     Creatinine 02/22/2024 1.05     Glucose, Fasting 02/22/2024 118 (H)     Calcium 02/22/2024 10.3 (H)     AST 02/22/2024 32     ALT 02/22/2024 24     Alkaline Phosphatase 02/22/2024 48     Total Protein 02/22/2024 7.9     Albumin 02/22/2024 4.1     Total Bilirubin 02/22/2024 0.78     eGFR 02/22/2024 64        Imaging: I have personally reviewed pertinent reports.

## 2024-02-27 ENCOUNTER — HOSPITAL ENCOUNTER (OUTPATIENT)
Dept: RADIOLOGY | Facility: HOSPITAL | Age: 84
Discharge: HOME | End: 2024-02-27
Payer: MEDICARE

## 2024-02-27 DIAGNOSIS — C22.0 HEPATOCELLULAR CARCINOMA (HCC): ICD-10-CM

## 2024-02-27 PROCEDURE — 74183 MRI ABD W/O CNTR FLWD CNTR: CPT

## 2024-02-27 PROCEDURE — G1004 CDSM NDSC: HCPCS

## 2024-02-27 PROCEDURE — A9585 GADOBUTROL INJECTION: HCPCS | Performed by: SURGERY

## 2024-02-27 RX ORDER — GADOBUTROL 604.72 MG/ML
6 INJECTION INTRAVENOUS
Status: COMPLETED | OUTPATIENT
Start: 2024-02-27 | End: 2024-02-27

## 2024-02-27 RX ADMIN — GADOBUTROL 6 ML: 604.72 INJECTION INTRAVENOUS at 12:26

## 2024-02-27 NOTE — NURSING NOTE
Device interrogation for MRI.  Normal device function prior to MRI.  Leads and device meet all requirements per policy for MRI.  Device programmed AOO 85bpm per Cardiology for MRI.  Patient has no complaints.  Vital signs monitored throughout by LUIS Kruger RN.  Normal device function post MRI.  Device reprogrammed to prior settings per Cardiology.

## 2024-02-27 NOTE — NURSING NOTE
Pt arrived with son.   NemeriX device interrogation for MRI done by MICHELLE Love clinical specialist. Device set to MRI safe mode @85 bpm    MRI abdomen w/without contrast complete. Pt tolerated well.     VSS throughout scan. Pt alert and oriented on room air. No complaints or visible signs of distress.  Device reprogrammed to prior settings per Cardiology by BEKA Briones RN.

## 2024-02-28 ENCOUNTER — CLINICAL SUPPORT (OUTPATIENT)
Dept: CARDIOLOGY CLINIC | Facility: CLINIC | Age: 84
End: 2024-02-28
Payer: MEDICARE

## 2024-02-28 DIAGNOSIS — I49.3 PVC (PREMATURE VENTRICULAR CONTRACTION): Primary | ICD-10-CM

## 2024-02-28 LAB
AFP L3 MFR SERPL: 4.8 % (ref 0–9.9)
AFP SERPL-MCNC: 8.6 NG/ML (ref 0–6.4)

## 2024-02-28 PROCEDURE — 93246 EXT ECG>7D<15D RECORDING: CPT | Performed by: PHYSICIAN ASSISTANT

## 2024-02-28 NOTE — PROGRESS NOTES
Patient here for a Zio ZT placement.   2 weeks.    Patient will come to office in 2 weeks to have it removed.     No symptoms today. Only hip pain.

## 2024-03-01 ENCOUNTER — OFFICE VISIT (OUTPATIENT)
Dept: SURGICAL ONCOLOGY | Facility: CLINIC | Age: 84
End: 2024-03-01
Payer: MEDICARE

## 2024-03-01 VITALS
HEIGHT: 63 IN | SYSTOLIC BLOOD PRESSURE: 130 MMHG | RESPIRATION RATE: 16 BRPM | WEIGHT: 136 LBS | OXYGEN SATURATION: 97 % | DIASTOLIC BLOOD PRESSURE: 72 MMHG | HEART RATE: 73 BPM | BODY MASS INDEX: 24.1 KG/M2 | TEMPERATURE: 97.2 F

## 2024-03-01 DIAGNOSIS — C22.0 HEPATOCELLULAR CARCINOMA (HCC): Primary | ICD-10-CM

## 2024-03-01 PROCEDURE — 99214 OFFICE O/P EST MOD 30 MIN: CPT | Performed by: SURGERY

## 2024-03-01 NOTE — PROGRESS NOTES
Surgical Oncology Follow Up       [address]  CANCER [facility] SURGICAL ONCOLOGY [place]  [address] [place] [address]    Yesi Mosher  1940  [id number]  [address]  [facility] [place]  [address] [place] [address]    Chief Complaint   Patient presents with    Follow-up     Patient being seen for f/u. Last MRI 2/27/2024.       Assessment/Plan:    No problem-specific Assessment & Plan notes found for this encounter.       Diagnoses and all orders for this visit:    Hepatocellular carcinoma (HCC)  -     MRI abdomen w wo contrast; Future  -     Alpha fetoprotein, L3 percent; Future  -     Comprehensive metabolic panel; Future      Advance Care Planning/Advance Directives:  Discussed disease status, cancer treatment plans and/or cancer treatment goals with the patient.     Oncology History Overview Note   for HCC. He has history of arterially enhancing liver lesion that was being monitored over the past few years, which has been increasing in size to 3.1 cm in left hepatic lobe, suspicious for HCC. He underwent liver biopsy on 10/9/23 confirming moderately differentiated hepatocellular carcinoma. His case was discussed at GI tumor board with recommendation to refer to Rad Onc for SBRT. He completed SBRT to the liver on 11/22/23 and returns today for EOT phone follow-up.      Also has history of pacemaker placed in 2022, thrombocytopenia, positive RA factor, DM type 2, seizure disorder taking keppra.     Upcoming:  3/1/24 Surg onc     Hepatocellular carcinoma (HCC)   8/22/2023 -  Cancer Staged    Staging form: Liver (Excluding Intrahepatic Bile Ducts), AJCC 8th Edition  - Clinical stage from 8/22/2023: cT1b, cN0 - Signed by Shailesh Henao MD on 10/24/2023  Stage prefix: Initial diagnosis       10/9/2023 Biopsy    Liver, lesion, needle core biopsy:  - Moderately differentiated hepatocellular carcinoma.      11/10/2023 - 11/22/2023  Radiation    Treatment:  Course: C1 SBRT    Plan ID Energy Fractions Dose per Fraction (cGy) Dose Correction (cGy) Total Dose Delivered (cGy) Elapsed Days   SBRT Liver MT 6X-FFF 5 / 5 700 0 3,500 12      Treatment dates:  C1 SBRT: 11/10/2023 - 11/22/2023         History of Present Illness: Patient with HCC status post SBRT 3 months ago.  -Interval History: Here for follow-up visit with recent MRI.  Primary complaint is lower back pain and weakness.    Review of Systems:  Review of Systems   Constitutional: Negative.    HENT: Negative.     Eyes: Negative.    Respiratory: Negative.     Cardiovascular: Negative.    Gastrointestinal: Negative.    Endocrine: Negative.    Genitourinary: Negative.    Musculoskeletal:  Positive for arthralgias, back pain, gait problem, joint swelling and myalgias.   Skin: Negative.    Allergic/Immunologic: Negative.    Hematological: Negative.    Psychiatric/Behavioral: Negative.     All other systems reviewed and are negative.      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HCC)    Right inguinal hernia    Nonrheumatic aortic valve stenosis    Palpitations    Sinus bradycardia    S/P TAVR (transcatheter aortic valve replacement)    Thrombocytopenia (HCC)    Hyperchloremia    Mixed hyperlipidemia    Encounter for postoperative care    PVC's (premature ventricular contractions)    Premature atrial contractions    Dizziness    Hyperlipidemia    Bradycardia    PVC (premature ventricular contraction)    CAD (coronary artery disease)    Diabetes mellitus, type 2 (HCC)    Hepatocellular carcinoma (HCC)    Pacemaker    Sick sinus syndrome (HCC)     Past Medical History:   Diagnosis Date    BPH (benign prostatic hyperplasia)     CAD (coronary artery disease)     Diabetes mellitus (HCC)     type 2, diet controlled    Diastolic CHF (HCC)     Epilepsy (HCC)     History of mycosis fungoides     Hyperlipidemia      Hypertension     ILD (interstitial lung disease) (HCC)     Osteoporosis     Seizures (HCC)     partial sensory    Severe aortic stenosis      Past Surgical History:   Procedure Laterality Date    CARDIAC CATHETERIZATION      CARDIAC ELECTROPHYSIOLOGY PROCEDURE N/A 10/24/2022    Procedure: Cardiac pacer implant/ DUAL CHAMBER;  Surgeon: Daniel Bardales DO;  Location:  CARDIAC CATH LAB;  Service: Cardiology    COLONOSCOPY      IR BIOPSY LIVER MASS  10/9/2023    AL ECHO TRANSESOPHAG R-T 2D W/PRB IMG ACQUISJ I&R N/A 1/7/2020    Procedure: TRANSESOPHAGEAL ECHOCARDIOGRAM (POOL);  Surgeon: Tony Archer DO;  Location:  MAIN OR;  Service: Cardiac Surgery    AL REPLACE AORTIC VALVE OPENFEMORAL ARTERY APPROACH N/A 1/7/2020    Procedure: REPLACEMENT AORTIC VALVE TRANSCATHETER (TAVR) TRANSFEMORAL W/ 23 MM CARROLL KATHLEEN S3 VALVE (ACCESS ON LEFT);  Surgeon: Tony Archer DO;  Location:  MAIN OR;  Service: Cardiac Surgery    AL RPR 1ST INGUN HRNA AGE 5 YRS/> REDUCIBLE Right 11/16/2018    Procedure: REPAIR RIGHT INGUINAL HERNIA WITH MESH;  Surgeon: Pravin Elizabeth MD;  Location:  MAIN OR;  Service: General     Family History   Problem Relation Age of Onset    Coronary artery disease Family     Heart disease Family      Social History     Socioeconomic History    Marital status: /Civil Union     Spouse name: Not on file    Number of children: Not on file    Years of education: Not on file    Highest education level: Not on file   Occupational History    Not on file   Tobacco Use    Smoking status: Never    Smokeless tobacco: Never   Vaping Use    Vaping status: Never Used   Substance and Sexual Activity    Alcohol use: Not Currently    Drug use: No    Sexual activity: Not Currently   Other Topics Concern    Not on file   Social History Narrative    Not on file     Social Determinants of Health     Financial Resource Strain: Not on file   Food Insecurity: Not on file   Transportation Needs: Not on file   Physical  Activity: Not on file   Stress: Not on file   Social Connections: Not on file   Intimate Partner Violence: Not on file   Housing Stability: Not on file       Current Outpatient Medications:     amoxicillin (AMOXIL) 500 mg capsule, BEFORE DENTAL WORK, Disp: , Rfl: 3    ascorbic acid (VITAMIN C) 1000 MG tablet, Take 1,000 mg by mouth daily, Disp: , Rfl:     aspirin (ECOTRIN LOW STRENGTH) 81 mg EC tablet, Take 1 tablet (81 mg total) by mouth daily, Disp: , Rfl:     calcium-vitamin D (OSCAL 500 + D) 500 mg-200 units per tablet, Take 1 tablet by mouth daily, Disp: , Rfl:     cholecalciferol (VITAMIN D3) 400 units tablet, Take 2,000 Units by mouth daily, Disp: , Rfl:     Krill Oil 350 MG CAPS, Take 350 mg by mouth in the morning, Disp: , Rfl:     levETIRAcetam (KEPPRA) 250 mg tablet, Take 250 mg by mouth 2 (two) times a day, Disp: , Rfl:     metoprolol succinate (TOPROL-XL) 25 mg 24 hr tablet, Take 1 tablet (25 mg total) by mouth every 12 (twelve) hours, Disp: 180 tablet, Rfl: 1    Multiple Vitamin (DAILY VALUE MULTIVITAMIN PO), Take 1 tablet by mouth daily, Disp: , Rfl:     simvastatin (ZOCOR) 10 mg tablet, Take 10 mg by mouth daily at bedtime, Disp: , Rfl:     tamsulosin (FLOMAX) 0.4 mg, Take 0.4 mg by mouth daily with dinner, Disp: , Rfl:     traZODone (DESYREL) 50 mg tablet, Take 50 mg by mouth daily at bedtime, Disp: , Rfl:   Allergies   Allergen Reactions    Escitalopram Dizziness    Metformin Diarrhea    Sertraline Other (See Comments)     weakness    Duloxetine Other (See Comments)     Restless leg     Vitals:    03/01/24 1039   BP: 130/72   Pulse: 73   Resp: 16   Temp: (!) 97.2 °F (36.2 °C)   SpO2: 97%       Physical Exam  Vitals reviewed.   HENT:      Head: Normocephalic and atraumatic.      Nose: Nose normal.   Eyes:      Extraocular Movements: Extraocular movements intact.      Pupils: Pupils are equal, round, and reactive to light.   Cardiovascular:      Rate and Rhythm: Normal rate and regular rhythm.       Pulses: Normal pulses.      Heart sounds: Normal heart sounds.   Pulmonary:      Effort: Pulmonary effort is normal.      Breath sounds: Normal breath sounds.   Abdominal:      General: Abdomen is flat. There is no distension.      Palpations: Abdomen is soft. There is no mass.      Tenderness: There is no abdominal tenderness. There is no guarding or rebound.      Hernia: No hernia is present.   Genitourinary:     Penis: Normal.       Testes: Normal.   Musculoskeletal:         General: Tenderness present.      Cervical back: Normal range of motion and neck supple.   Skin:     General: Skin is warm and dry.   Neurological:      General: No focal deficit present.      Mental Status: He is alert and oriented to person, place, and time.   Psychiatric:         Behavior: Behavior normal.         Thought Content: Thought content normal.         Judgment: Judgment normal.           Results:  Labs:    omponent  Ref Range & Units 2/22/24  8:14 AM   AFP L3%  0.0 - 9.9 % 4.8   Alpha-Fetoprotein  0.0 - 6.4 ng/mL 8.6 High      Imaging  MRI abdomen w wo contrast    Result Date: 2/29/2024  Narrative: MRI - ABDOMEN - WITH AND WITHOUT CONTRAST INDICATION: 83 years / Male. C22.0: Liver cell carcinoma. Status post SBRT completed on 11/22/2023. COMPARISON: Comparison is made to prior studies, most recent is dated August 22, 2023. TECHNIQUE: Multiplanar/multisequence MRI of the abdomen was performed before and after administration of contrast. IV Contrast: 6 mL of Gadobutrol injection (SINGLE-DOSE) FINDINGS: LOWER CHEST: Unremarkable. LIVER: Normal in size and morphology. The previously treated lesion in the left lobe currently measures approximately 2.9 x 2.6 cm, previously measured 2.9 x 2.8 cm. This lesion was remeasured on the prior study for purposes of direct comparison. There is decreased arterial phase hyperenhancement in the lesion. No definite correlate on T2 weighted images. There is arterial phase hyperenhancement in the  surrounding treatment zone, which is an expected finding following SBRT no new areas of tumor. Patent hepatic and portal veins. BILE DUCTS: No intrahepatic or extrahepatic bile duct dilation. GALLBLADDER: Normal PANCREAS: Unremarkable. ADRENAL GLANDS: Unremarkable. SPLEEN: Normal. KIDNEYS/PROXIMAL URETERS: No hydroureteronephrosis. No suspicious renal mass. BOWEL: No dilated loops of bowel. PERITONEUM/RETROPERITONEUM: No ascites. LYMPH NODES: No abdominal lymphadenopathy. VESSELS: No aneurysm. ABDOMINAL WALL: Unremarkable BONES: No suspicious osseous lesion.     Impression: Status post SBRT to left lobe liver lesion with expected early posttreatment imaging appearance, equivocal for viable tumor. Recommend continued follow-up in approximately 3 months to evaluate the evolution of these findings. No new findings in the abdomen. Workstation performed: [id number]     Cardiac EP device report    Result Date: 2/23/2024  Narrative: MDT DUAL CHAMBER PPM  (MVP ON) - ACTIVE SYSTEM IS MRI CONDITIONAL CARELINK TRANSMISSION: BATTERY VOLTAGE ADEQUATE. (12.1 YRS)  AP 99%  <1%. ALL AVAILABLE LEAD PARAMETERS WITHIN NORMAL LIMITS. NO SIGNIFICANT HIGH RATE EPISODES. NORMAL DEVICE FUNCTION.---COLVIN     XR follow up    Result Date: 2/22/2024  Narrative: CHEST INDICATION: Z95.0: Presence of cardiac pacemaker. MRI safety clearance. Evaluate for abandoned or fractured pacemaker leads. COMPARISON: CT of the chest from 7/14/2023. TECHNIQUE: PA and lateral views of the chest - 2 images. FINDINGS: A dual-lead left-sided pacemaker is in place. There are no fractured or abandoned pacemaker leads. The lungs are clear.  No pleural effusions.  No evidence of pneumothorax. The cardiac silhouette is at the upper limits of normal. The patient is also status post TAVR. The visualized osseous and soft tissue structures are unremarkable.     Impression: No fractures or abandoned pacemaker leads to contraindicate MRI. Workstation performed: [id number]      I reviewed the above laboratory and imaging data.    Discussion/Summary: Liver cancer, stage I, solitary site segment 3 status post SBRT.  Follow-up in 3 months with repeat MRI and blood work at that time for continued assessment of response to therapy.  All questions answered and copies of reports given him for his records.

## 2024-03-06 ENCOUNTER — HOSPITAL ENCOUNTER (OUTPATIENT)
Dept: RADIOLOGY | Facility: IMAGING CENTER | Age: 84
Discharge: HOME | End: 2024-03-06
Payer: MEDICARE

## 2024-03-06 DIAGNOSIS — M54.50 LOW BACK PAIN, UNSPECIFIED BACK PAIN LATERALITY, UNSPECIFIED CHRONICITY, UNSPECIFIED WHETHER SCIATICA PRESENT: ICD-10-CM

## 2024-03-06 PROCEDURE — 72110 X-RAY EXAM L-2 SPINE 4/>VWS: CPT

## 2024-03-12 ENCOUNTER — HOSPITAL ENCOUNTER (OUTPATIENT)
Dept: NON INVASIVE DIAGNOSTICS | Facility: CLINIC | Age: 84
Discharge: HOME | End: 2024-03-12
Payer: MEDICARE

## 2024-03-12 VITALS
DIASTOLIC BLOOD PRESSURE: 72 MMHG | WEIGHT: 136 LBS | SYSTOLIC BLOOD PRESSURE: 130 MMHG | BODY MASS INDEX: 24.1 KG/M2 | HEIGHT: 63 IN | HEART RATE: 74 BPM

## 2024-03-12 DIAGNOSIS — I49.3 PVC (PREMATURE VENTRICULAR CONTRACTION): ICD-10-CM

## 2024-03-12 LAB
AORTIC ROOT: 2.5 CM
AORTIC VALVE MEAN VELOCITY: 14.7 M/S
APICAL FOUR CHAMBER EJECTION FRACTION: 61 %
ASCENDING AORTA: 3.2 CM
AV AREA BY CONTINUOUS VTI: 1.1 CM2
AV AREA PEAK VELOCITY: 1 CM2
AV LVOT MEAN GRADIENT: 2 MMHG
AV LVOT PEAK GRADIENT: 4 MMHG
AV MEAN GRADIENT: 10 MMHG
AV PEAK GRADIENT: 19 MMHG
AV REGURGITATION PRESSURE HALF TIME: 569 MS
AV VALVE AREA: 1.76 CM2
AV VELOCITY RATIO: 0.48
BSA FOR ECHO PROCEDURE: 1.64 M2
DOP CALC AO PEAK VEL: 2.16 M/S
DOP CALC AO VTI: 41.79 CM
DOP CALC LVOT AREA: 3.14 CM2
DOP CALC LVOT CARDIAC INDEX: 2.09 L/MIN/M2
DOP CALC LVOT CARDIAC OUTPUT: 3.43 L/MIN
DOP CALC LVOT DIAMETER: 2 CM
DOP CALC LVOT PEAK VEL VTI: 23.41 CM
DOP CALC LVOT PEAK VEL: 1.03 M/S
DOP CALC LVOT STROKE INDEX: 28 ML/M2
DOP CALC LVOT STROKE VOLUME: 73.51 CM3
E WAVE DECELERATION TIME: 244 MS
E/A RATIO: 0.5
FRACTIONAL SHORTENING: 36 (ref 28–44)
INTERVENTRICULAR SEPTUM IN DIASTOLE (PARASTERNAL SHORT AXIS VIEW): 0.7 CM
INTERVENTRICULAR SEPTUM: 0.7 CM (ref 0.6–1.1)
LAAS-AP2: 10 CM2
LAAS-AP4: 13 CM2
LEFT ATRIUM SIZE: 2.8 CM
LEFT ATRIUM VOLUME (MOD BIPLANE): 27 ML
LEFT ATRIUM VOLUME INDEX (MOD BIPLANE): 16.5 ML/M2
LEFT INTERNAL DIMENSION IN SYSTOLE: 2.5 CM (ref 2.1–4)
LEFT VENTRICULAR INTERNAL DIMENSION IN DIASTOLE: 3.9 CM (ref 3.5–6)
LEFT VENTRICULAR POSTERIOR WALL IN END DIASTOLE: 1 CM
LEFT VENTRICULAR STROKE VOLUME: 44 ML
LVSV (TEICH): 44 ML
MV E'TISSUE VEL-SEP: 4 CM/S
MV PEAK A VEL: 1.03 M/S
MV PEAK E VEL: 51 CM/S
MV STENOSIS PRESSURE HALF TIME: 71 MS
MV VALVE AREA P 1/2 METHOD: 3.1
RIGHT ATRIUM AREA SYSTOLE A4C: 9.8 CM2
RIGHT VENTRICLE ID DIMENSION: 3.1 CM
SL CV AV DECELERATION TIME RETROGRADE: 1962 MS
SL CV AV PEAK GRADIENT RETROGRADE: 69 MMHG
SL CV LEFT ATRIUM LENGTH A2C: 3.8 CM
SL CV LV EF: 60
SL CV PED ECHO LEFT VENTRICLE DIASTOLIC VOLUME (MOD BIPLANE) 2D: 65 ML
SL CV PED ECHO LEFT VENTRICLE SYSTOLIC VOLUME (MOD BIPLANE) 2D: 22 ML
TR MAX PG: 18 MMHG
TR PEAK VELOCITY: 2.1 M/S
TRICUSPID ANNULAR PLANE SYSTOLIC EXCURSION: 2.3 CM
TRICUSPID VALVE PEAK REGURGITATION VELOCITY: 2.13 M/S

## 2024-03-12 PROCEDURE — 93306 TTE W/DOPPLER COMPLETE: CPT | Performed by: INTERNAL MEDICINE

## 2024-03-12 PROCEDURE — 93306 TTE W/DOPPLER COMPLETE: CPT

## 2024-03-13 ENCOUNTER — TELEPHONE (OUTPATIENT)
Dept: CARDIOLOGY CLINIC | Facility: CLINIC | Age: 84
End: 2024-03-13

## 2024-03-13 NOTE — TELEPHONE ENCOUNTER
----- Message from Ellis Weeks PA-C sent at 3/13/2024  1:20 PM EDT -----  Can we give this patient as his wife a call and let them know his EF on echo was normal and that the PVC's are not effecting the hearst ability to pump.   ----- Message -----  From: Vishal Britt MD  Sent: 3/12/2024   2:16 PM EDT  To: Ellis Weeks PA-C

## 2024-03-20 ENCOUNTER — CLINICAL SUPPORT (OUTPATIENT)
Dept: CARDIOLOGY CLINIC | Facility: CLINIC | Age: 84
End: 2024-03-20
Payer: MEDICARE

## 2024-03-20 DIAGNOSIS — I49.3 PVC (PREMATURE VENTRICULAR CONTRACTION): ICD-10-CM

## 2024-03-20 PROCEDURE — 93248 EXT ECG>7D<15D REV&INTERPJ: CPT | Performed by: INTERNAL MEDICINE

## 2024-03-22 NOTE — RESULT ENCOUNTER NOTE
No significant arrhythmias    Patient had a min HR of 63 bpm, max HR of 119 bpm, and avg HR of 73  bpm. Predominant underlying rhythm was Possible Atrial and Ventricular  Pacing. 1 run of Supraventricular Tachycardia occurred lasting 19 beats  with a max rate of 108 bpm (avg 95 bpm). Isolated SVEs were rare  (<1.0%), SVE Couplets were rare (<1.0%), and SVE Triplets were rare  (<1.0%). Isolated VEs were frequent (6.6%, [id number]), VE Couplets were rare  (<1.0%, 292), and VE Triplets were rare (<1.0%, 5). Ventricular Bigeminy  and Trigeminy were present

## 2024-03-25 ENCOUNTER — TELEPHONE (OUTPATIENT)
Dept: CARDIOLOGY CLINIC | Facility: CLINIC | Age: 84
End: 2024-03-25

## 2024-03-25 NOTE — TELEPHONE ENCOUNTER
----- Message from Az Bardales DO sent at 3/22/2024  6:01 PM EDT -----  Please let him know echo and holter look good. F/u with EP in 1 year. Thanks. az

## 2024-04-26 ENCOUNTER — OFFICE VISIT (OUTPATIENT)
Dept: CARDIOLOGY CLINIC | Facility: CLINIC | Age: 84
End: 2024-04-26
Payer: MEDICARE

## 2024-04-26 VITALS
SYSTOLIC BLOOD PRESSURE: 110 MMHG | BODY MASS INDEX: 23.74 KG/M2 | HEIGHT: 63 IN | WEIGHT: 134 LBS | HEART RATE: 74 BPM | DIASTOLIC BLOOD PRESSURE: 60 MMHG

## 2024-04-26 DIAGNOSIS — Z95.2 S/P TAVR (TRANSCATHETER AORTIC VALVE REPLACEMENT): ICD-10-CM

## 2024-04-26 DIAGNOSIS — I10 BENIGN ESSENTIAL HTN: ICD-10-CM

## 2024-04-26 DIAGNOSIS — I25.10 CORONARY ARTERY DISEASE INVOLVING NATIVE CORONARY ARTERY OF NATIVE HEART WITHOUT ANGINA PECTORIS: Primary | ICD-10-CM

## 2024-04-26 DIAGNOSIS — E78.5 DYSLIPIDEMIA: ICD-10-CM

## 2024-04-26 PROCEDURE — 99214 OFFICE O/P EST MOD 30 MIN: CPT | Performed by: INTERNAL MEDICINE

## 2024-04-26 NOTE — PROGRESS NOTES
Cardiology             Yesi Mosher  1940  [id number]              Assessment/Plan:     Fatigue/atypical chest pain  Sick sinus syndrome status post Medtronic dual-chamber permanent pacemaker placed 10/24/2022  PVCs  Hypertension  Aortic valve stenosis status post TAVR 01/2020--mild-to-moderate prosthetic valve regurgitation on prior echocardiogram  Dyslipidemia  Hepatocellular carcinoma, following with hematology/oncology, status post SBRT, completed 11/22/2023           Patient with complaints of atypical chest pain today, with longstanding history of the same.  Chronic catheterization -2019 for low suspicion of anginal chest pain.  Patient reassured.  Frequent PVCs, on Zio monitor he has a 6.6% burden.  He denies any symptoms at today's visit.  He is not willing to follow-up with electrophysiology.  May consider starting flecainide in the future if he becomes symptomatic.  Metoprolol.  Continue simvastatin for dyslipidemia  Normally functioning bioprosthetic aortic valve on recent echocardiogram              Follow-up in 1 year              Interval History:      This is an 84-year-old male with history of aortic stenosis status post transcatheter aortic valve placement 01/2020.  He also has dyslipidemia, mildly symptomatic PVCs, and asymptomatic sinus bradycardia.  He has been following Dr. Ferris in the past.     He was last seen by him 06/17/2022.  Prior to that appointment, he had a Zio monitor which revealed a minimum heart rate of 41 beats per minute without any pauses exceeding 3 seconds.  His dizziness statin more related to orthostasis.  Subsequently was seen by electrophysiology 07/07/2022.  It was thought that it some time he may need a permanent pacemaker without any obvious indication for the same at this time.  He did have brief runs of nonsustained atrial tachycardia on his Zio monitor.     He was then seen by his PCP at which time his heart rate was 36 beats per minute with  symptoms of fatigue.  Subsequent Zio monitor 08/19/2022 demonstrated minimal heart rate of 38 beats per minute with average heart rate 53 beats per minute.  There was a 15 beat atrial run with frequent PVCs up to 15.5% of total beats.  Patient triggers correlated with ventricular bigeminy with 1 trigger correlating with sinus rhythm.  Thereafter he saw electrophysiology recommended dual-chamber pacemaker placement.  On 10/24/22 underwent placement of a dual-chamber Medtronic permanent pacemaker.     He went to the ER 10/28/2022 with mild lightheadedness and dyspnea.  He states his heart rate was slow when he checked it at home and presented to the ER.  Device interrogation demonstrated a normally functioning device and it was thought that his PVCs were reporting a falsely low heart rate on his monitor.  Metoprolol 25 mg daily was initiated by electrophysiology for PVCs.     Echocardiogram 6/1/2023 demonstrates a normally functioning bioprosthetic aortic valve with normal left and ejection fraction and mild mitral vegetation.    He was last seen 6/2023 at which time he had complaints of fatigue and atypical chest pain.  Subsequent CMP, TSH, BNP, and CBC were unremarkable.  His platelets were low normal with a history of thrombocytopenia in 2022.      He was seen by our nurse practitioner 11/29/2023 at which time he had complaints of palpitations.  He was sent to electrophysiology for frequent PVCs.  Subsequent Zio monitor revealed 6.6% PVC burden with no evidence of dysrhythmia otherwise.    Repeat echocardiogram 3/12/2024 demonstrate left ejection fraction 60% with a normally functioning bioprosthetic aortic valve, mean gradient 10 mmHg, aortic valve area 1.76 cm², peak velocity 2.1, and dimensionless index of 0.48.    He presents today for follow-up.  Electrophysiology has not seen him for follow-up since his Zio monitor.  Today he has no complaints other than chest discomfort on the left side that occurs quite  "randomly.  He states it does not occur with walking, and usually he is at rest.  His pain is usually self-limited with no associated symptoms.        Vitals:  Vitals:    04/26/24 1039   BP: 110/60   BP Location: Right arm   Patient Position: Sitting   Cuff Size: Adult   Pulse: 74   Weight: 60.8 kg (134 lb)   Height: 5' 3\" (1.6 m)         Past Medical History:   Diagnosis Date   • BPH (benign prostatic hyperplasia)    • CAD (coronary artery disease)    • Diabetes mellitus ([facility])     type 2, diet controlled   • Diastolic CHF ([facility])    • Epilepsy ([facility])    • History of mycosis fungoides    • Hyperlipidemia    • Hypertension    • ILD (interstitial lung disease) ([facility])    • Osteoporosis    • Seizures ([facility])     partial sensory   • Severe aortic stenosis      Social History     Socioeconomic History   • Marital status: /Civil Union     Spouse name: Not on file   • Number of children: Not on file   • Years of education: Not on file   • Highest education level: Not on file   Occupational History   • Not on file   Tobacco Use   • Smoking status: Never   • Smokeless tobacco: Never   Vaping Use   • Vaping status: Never Used   Substance and Sexual Activity   • Alcohol use: Not Currently   • Drug use: No   • Sexual activity: Not Currently   Other Topics Concern   • Not on file   Social History Narrative   • Not on file     Social Determinants of Health     Financial Resource Strain: Not on file   Food Insecurity: Not on file   Transportation Needs: Not on file   Physical Activity: Not on file   Stress: Not on file   Social Connections: Not on file   Intimate Partner Violence: Not on file   Housing Stability: Not on file      Family History   Problem Relation Age of Onset   • Coronary artery disease Family    • Heart disease Family      Past Surgical History:   Procedure Laterality Date   • CARDIAC CATHETERIZATION     • CARDIAC ELECTROPHYSIOLOGY PROCEDURE N/A 10/24/2022    Procedure: Cardiac pacer implant/ DUAL CHAMBER;  " Surgeon: Daniel Bardales DO;  Location: [facility] CARDIAC C[facility] LAB;  Service: Cardiology   • COLONOSCOPY     • IR BIOPSY LIVER MASS  10/9/2023   • AR ECHO TRANSESOPHAG R-T 2D W/PRB IMG ACQUISJ I&R N/A 1/7/2020    Procedure: TRANSESOPHAGEAL ECHOCARDIOGRAM (POOL);  Surgeon: Tony Archer DO;  Location:  MAIN OR;  Service: Cardiac Surgery   • AR REPLACE AORTIC VALVE OPENFEMORAL ARTERY APPROACH N/A 1/7/2020    Procedure: REPLACEMENT AORTIC VALVE TRANSCATHETER (TAVR) TRANSFEMORAL W/ 23 MM CARROLL KATHLEEN S3 VALVE (ACCESS ON LEFT);  Surgeon: Tony Archer DO;  Location: [facility] MAIN OR;  Service: Cardiac Surgery   • AR RPR 1ST INGUN HRNA AGE 5 YRS/> REDUCIBLE Right 11/16/2018    Procedure: REPAIR RIGHT INGUINAL HERNIA WITH MESH;  Surgeon: Pravin Elizabeth MD;  Location:  MAIN OR;  Service: General       Current Outpatient Medications:   •  amoxicillin (AMOXIL) 500 mg capsule, BEFORE DENTAL WORK, Disp: , Rfl: 3  •  ascorbic acid (VITAMIN C) 1000 MG tablet, Take 1,000 mg by mouth daily, Disp: , Rfl:   •  aspirin (ECOTRIN LOW STRENGTH) 81 mg EC tablet, Take 1 tablet (81 mg total) by mouth daily, Disp: , Rfl:   •  calcium-vitamin D (OSCAL 500 + D) 500 mg-200 units per tablet, Take 1 tablet by mouth daily, Disp: , Rfl:   •  cholecalciferol (VITAMIN D3) 400 units tablet, Take 2,000 Units by mouth daily, Disp: , Rfl:   •  Krill Oil 350 MG CAPS, Take 350 mg by mouth in the morning, Disp: , Rfl:   •  levETIRAcetam (KEPPRA) 250 mg tablet, Take 250 mg by mouth 2 (two) times a day, Disp: , Rfl:   •  metoprolol succinate (TOPROL-XL) 25 mg 24 hr tablet, Take 1 tablet (25 mg total) by mouth every 12 (twelve) hours, Disp: 180 tablet, Rfl: 1  •  Multiple Vitamin (DAILY VALUE MULTIVITAMIN PO), Take 1 tablet by mouth daily, Disp: , Rfl:   •  simvastatin (ZOCOR) 10 mg tablet, Take 10 mg by mouth daily at bedtime, Disp: , Rfl:   •  tamsulosin (FLOMAX) 0.4 mg, Take 0.4 mg by mouth daily at bedtime, Disp: , Rfl:   •  traZODone (DESYREL) 50 mg  tablet, Take 25 mg by mouth daily at bedtime, Disp: , Rfl:         Review of Systems:  Review of Systems   Respiratory: Negative.     Cardiovascular:  Positive for chest pain.   All other systems reviewed and are negative.        Physical Exam:  Physical Exam  Constitutional:       General: He is not in acute distress.     Appearance: He is well-developed. He is not diaphoretic.   HENT:      Head: Normocephalic and atraumatic.   Eyes:      General: No scleral icterus.        Right eye: No discharge.      Pupils: Pupils are equal, round, and reactive to light.   Neck:      Thyroid: No thyromegaly.   Cardiovascular:      Rate and Rhythm: Normal rate and regular rhythm.      Heart sounds: Normal heart sounds. No murmur heard.     No friction rub. No gallop.   Pulmonary:      Effort: Pulmonary effort is normal.      Breath sounds: Normal breath sounds.   Abdominal:      General: There is no distension.      Tenderness: There is no abdominal tenderness. There is no guarding or rebound.   Musculoskeletal:         General: Normal range of motion.      Cervical back: Normal range of motion and neck supple.      Right lower leg: No edema.      Left lower leg: No edema.   Skin:     General: Skin is warm and dry.      Coloration: Skin is not pale.      Findings: No erythema or rash.   Neurological:      Mental Status: He is alert and oriented to person, place, and time.      Coordination: Coordination normal.   Psychiatric:         Behavior: Behavior normal.         Thought Content: Thought content normal.         Judgment: Judgment normal.         This note was completed in part utilizing M-Modal Fluency Direct Software.  Grammatical errors, random word insertions, spelling mistakes, and incomplete sentences can be an occasional consequence of this system secondary to software limitations, ambient noise, and hardware issues.  If you have any questions or concerns about the content, text, or information contained within the  body of this dictation, please contact the provider for clarification.

## 2024-04-29 ENCOUNTER — TELEPHONE (OUTPATIENT)
Dept: HEMATOLOGY ONCOLOGY | Facility: CLINIC | Age: 84
End: 2024-04-29

## 2024-04-29 NOTE — TELEPHONE ENCOUNTER
Patient Call    Who are you speaking with? Child    If it is not the patient, are they listed on an active communication consent form? Yes   What is the reason for this call? Lara is calling in requesting an MRI appt for her dad.     Does this require a call back? Yes   If a call back is required, please list best call back number [phone]    If a call back is required, advise that a message will be forwarded to their care team and someone will return their call as soon as possible.   Did you relay this information to the patient? Yes

## 2024-05-07 ENCOUNTER — APPOINTMENT (OUTPATIENT)
Dept: LAB | Facility: IMAGING CENTER | Age: 84
End: 2024-05-07
Payer: MEDICARE

## 2024-05-07 DIAGNOSIS — C22.0 HEPATOCELLULAR CARCINOMA (HCC): ICD-10-CM

## 2024-05-07 LAB
ALBUMIN SERPL BCP-MCNC: 4 G/DL (ref 3.5–5)
ALP SERPL-CCNC: 50 U/L (ref 34–104)
ALT SERPL W P-5'-P-CCNC: 19 U/L (ref 7–52)
ANION GAP SERPL CALCULATED.3IONS-SCNC: 4 MMOL/L (ref 4–13)
AST SERPL W P-5'-P-CCNC: 23 U/L (ref 13–39)
BILIRUB SERPL-MCNC: 0.8 MG/DL (ref 0.2–1)
BUN SERPL-MCNC: 19 MG/DL (ref 5–25)
CALCIUM SERPL-MCNC: 10.5 MG/DL (ref 8.4–10.2)
CHLORIDE SERPL-SCNC: 101 MMOL/L (ref 96–108)
CO2 SERPL-SCNC: 28 MMOL/L (ref 21–32)
CREAT SERPL-MCNC: 1 MG/DL (ref 0.6–1.3)
GFR SERPL CREATININE-BSD FRML MDRD: 68 ML/MIN/1.73SQ M
GLUCOSE P FAST SERPL-MCNC: 119 MG/DL (ref 65–99)
POTASSIUM SERPL-SCNC: 4.5 MMOL/L (ref 3.5–5.3)
PROT SERPL-MCNC: 8.1 G/DL (ref 6.4–8.4)
SODIUM SERPL-SCNC: 133 MMOL/L (ref 135–147)

## 2024-05-07 PROCEDURE — 36415 COLL VENOUS BLD VENIPUNCTURE: CPT

## 2024-05-07 PROCEDURE — 82107 ALPHA-FETOPROTEIN L3: CPT

## 2024-05-07 PROCEDURE — 80053 COMPREHEN METABOLIC PANEL: CPT

## 2024-05-09 LAB
AFP L3 MFR SERPL: 5.2 % (ref 0–9.9)
AFP SERPL-MCNC: 9.1 NG/ML (ref 0–6.4)

## 2024-05-20 DIAGNOSIS — I49.3 PVC (PREMATURE VENTRICULAR CONTRACTION): ICD-10-CM

## 2024-05-20 NOTE — TELEPHONE ENCOUNTER
Patient calling to see status for metoprolol 25mg.  Advised patient that pharmacy sent in for refill and medication is pending for provider approval.

## 2024-05-21 RX ORDER — METOPROLOL SUCCINATE 25 MG/1
25 TABLET, EXTENDED RELEASE ORAL EVERY 12 HOURS
Qty: 180 TABLET | Refills: 1 | Status: SHIPPED | OUTPATIENT
Start: 2024-05-21

## 2024-05-28 ENCOUNTER — HOSPITAL ENCOUNTER (OUTPATIENT)
Dept: RADIOLOGY | Facility: HOSPITAL | Age: 84
Discharge: HOME | End: 2024-05-28
Payer: MEDICARE

## 2024-05-28 DIAGNOSIS — C22.0 HEPATOCELLULAR CARCINOMA (HCC): ICD-10-CM

## 2024-05-28 PROCEDURE — 74183 MRI ABD W/O CNTR FLWD CNTR: CPT

## 2024-05-28 PROCEDURE — A9585 GADOBUTROL INJECTION: HCPCS | Performed by: SURGERY

## 2024-05-28 RX ORDER — GADOBUTROL 604.72 MG/ML
6 INJECTION INTRAVENOUS
Status: COMPLETED | OUTPATIENT
Start: 2024-05-28 | End: 2024-05-28

## 2024-05-28 RX ADMIN — GADOBUTROL 6 ML: 604.72 INJECTION INTRAVENOUS at 11:29

## 2024-05-28 NOTE — NURSING NOTE
[employer] device interrogation for MRI done by BEKA Briones, MICHELLE clinical specialist. Device set to MRI safe mode @85 bpm.  MRI abdomen w/without contrast complete. Pt tolerated well.    VSS throughout scan. Pt alert and oriented on room air. No complaints or visible signs of distress.  Device reprogrammed to prior settings per Cardiology by BEKA Briones RN.

## 2024-05-28 NOTE — NURSING NOTE
Device interrogation for MRI.  Normal device function prior to MRI.  Leads and device meet all requirements per policy for MRI.  Device programmed DOO 90bpm per Cardiology for MRI.  Patient has no complaints.  Vital signs monitored throughout by LUIS Kruger RN.  Normal device function post MRI.  Device reprogrammed to prior settings per Cardiology.

## 2024-06-05 ENCOUNTER — OFFICE VISIT (OUTPATIENT)
Dept: SURGICAL ONCOLOGY | Facility: CLINIC | Age: 84
End: 2024-06-05
Payer: MEDICARE

## 2024-06-05 VITALS
TEMPERATURE: 96.9 F | BODY MASS INDEX: 23.64 KG/M2 | SYSTOLIC BLOOD PRESSURE: 108 MMHG | HEART RATE: 83 BPM | WEIGHT: 133.4 LBS | DIASTOLIC BLOOD PRESSURE: 80 MMHG | HEIGHT: 63 IN | RESPIRATION RATE: 18 BRPM | OXYGEN SATURATION: 94 %

## 2024-06-05 DIAGNOSIS — C22.0 HEPATOCELLULAR CARCINOMA (HCC): Primary | ICD-10-CM

## 2024-06-05 PROCEDURE — 99214 OFFICE O/P EST MOD 30 MIN: CPT | Performed by: SURGERY

## 2024-06-05 NOTE — PROGRESS NOTES
Surgical Oncology Follow Up       [address]  [facility]  [address] [place] [address]    Yesi Mosher  1940  [phone]  [address]  CANCER [facility] SURGICAL ONCOLOGY [place]  [address] [place] [address]    Chief Complaint   Patient presents with    Follow-up       Assessment/Plan:    No problem-specific Assessment & Plan notes found for this encounter.       Diagnoses and all orders for this visit:    Hepatocellular carcinoma (HCC)  -     Cancel: Alpha fetoprotein, L3 percent; Future  -     Cancel: Comprehensive metabolic panel; Future  -     Cancel: MRI abdomen w wo contrast; Future  -     MRI abdomen w wo contrast; Future  -     Alpha fetoprotein, L3 percent; Future  -     Comprehensive metabolic panel; Future      Advance Care Planning/Advance Directives:  Discussed disease status, cancer treatment plans and/or cancer treatment goals with the patient.     Oncology History Overview Note   for HCC. He has history of arterially enhancing liver lesion that was being monitored over the past few years, which has been increasing in size to 3.1 cm in left hepatic lobe, suspicious for HCC. He underwent liver biopsy on 10/9/23 confirming moderately differentiated hepatocellular carcinoma. His case was discussed at GI tumor board with recommendation to refer to Rad Onc for SBRT. He completed SBRT to the liver on 11/22/23 and returns today for EOT phone follow-up.      Also has history of pacemaker placed in 2022, thrombocytopenia, positive RA factor, DM type 2, seizure disorder taking keppra.     Upcoming:  3/1/24 Surg onc     Hepatocellular carcinoma (HCC)   8/22/2023 -  Cancer Staged    Staging form: Liver (Excluding Intrahepatic Bile Ducts), AJCC 8th Edition  - Clinical stage from 8/22/2023: cT1b, cN0 - Signed by Shailesh Henao MD on 10/24/2023  Stage prefix: Initial diagnosis       10/9/2023 Biopsy    Liver, lesion,  needle core biopsy:  - Moderately differentiated hepatocellular carcinoma.      11/10/2023 - 11/22/2023 Radiation    Treatment:  Course: C1 SBRT    Plan ID Energy Fractions Dose per Fraction (cGy) Dose Correction (cGy) Total Dose Delivered (cGy) Elapsed Days   SBRT Liver MT 6X-FFF 5 / 5 700 0 3,500 12      Treatment dates:  C1 SBRT: 11/10/2023 - 11/22/2023         History of Present Illness: 84-year-old man status post SBRT to stage I liver cancer  -Interval History: Here for surveillance with no new GI complaints to report.    Review of Systems:  Review of Systems   Constitutional:  Positive for fatigue.   HENT: Negative.     Eyes: Negative.    Respiratory: Negative.     Cardiovascular: Negative.    Gastrointestinal: Negative.    Endocrine: Negative.    Genitourinary: Negative.    Musculoskeletal: Negative.    Skin: Negative.    Allergic/Immunologic: Negative.    Neurological: Negative.    Hematological: Negative.    Psychiatric/Behavioral: Negative.     All other systems reviewed and are negative.      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HCC)    Right inguinal hernia    Nonrheumatic aortic valve stenosis    Palpitations    Sinus bradycardia    S/P TAVR (transcatheter aortic valve replacement)    Thrombocytopenia (HCC)    Hyperchloremia    Mixed hyperlipidemia    Encounter for postoperative care    PVC's (premature ventricular contractions)    Premature atrial contractions    Dizziness    Hyperlipidemia    Bradycardia    PVC (premature ventricular contraction)    CAD (coronary artery disease)    Diabetes mellitus, type 2 (HCC)    Hepatocellular carcinoma (HCC)    Pacemaker    Sick sinus syndrome (HCC)     Past Medical History:   Diagnosis Date    BPH (benign prostatic hyperplasia)     CAD (coronary artery disease)     Diabetes mellitus (HCC)     type 2, diet controlled    Diastolic CHF (HCC)     Epilepsy (HCC)     History  of mycosis fungoides     Hyperlipidemia     Hypertension     ILD (interstitial lung disease) (HCC)     Osteoporosis     Seizures (HCC)     partial sensory    Severe aortic stenosis      Past Surgical History:   Procedure Laterality Date    CARDIAC CATHETERIZATION      CARDIAC ELECTROPHYSIOLOGY PROCEDURE N/A 10/24/2022    Procedure: Cardiac pacer implant/ DUAL CHAMBER;  Surgeon: Daniel Bardales DO;  Location:  CARDIAC CATH LAB;  Service: Cardiology    COLONOSCOPY      IR BIOPSY LIVER MASS  10/9/2023    KS ECHO TRANSESOPHAG R-T 2D W/PRB IMG ACQUISJ I&R N/A 1/7/2020    Procedure: TRANSESOPHAGEAL ECHOCARDIOGRAM (POOL);  Surgeon: Tony Archer DO;  Location:  MAIN OR;  Service: Cardiac Surgery    KS REPLACE AORTIC VALVE OPENFEMORAL ARTERY APPROACH N/A 1/7/2020    Procedure: REPLACEMENT AORTIC VALVE TRANSCATHETER (TAVR) TRANSFEMORAL W/ 23 MM CARROLL KATHLEEN S3 VALVE (ACCESS ON LEFT);  Surgeon: Tony Archer DO;  Location:  MAIN OR;  Service: Cardiac Surgery    KS RPR 1ST INGUN HRNA AGE 5 YRS/> REDUCIBLE Right 11/16/2018    Procedure: REPAIR RIGHT INGUINAL HERNIA WITH MESH;  Surgeon: Pravin Elizabeth MD;  Location:  MAIN OR;  Service: General     Family History   Problem Relation Age of Onset    Coronary artery disease Family     Heart disease Family      Social History     Socioeconomic History    Marital status: /Civil Union     Spouse name: Not on file    Number of children: Not on file    Years of education: Not on file    Highest education level: Not on file   Occupational History    Not on file   Tobacco Use    Smoking status: Never    Smokeless tobacco: Never   Vaping Use    Vaping status: Never Used   Substance and Sexual Activity    Alcohol use: Not Currently    Drug use: No    Sexual activity: Not Currently   Other Topics Concern    Not on file   Social History Narrative    Not on file     Social Determinants of Health     Financial Resource Strain: Not on file   Food Insecurity: Not on file    Transportation Needs: Not on file   Physical Activity: Not on file   Stress: Not on file   Social Connections: Not on file   Intimate Partner Violence: Not on file   Housing Stability: Not on file       Current Outpatient Medications:     amoxicillin (AMOXIL) 500 mg capsule, BEFORE DENTAL WORK, Disp: , Rfl: 3    ascorbic acid (VITAMIN C) 1000 MG tablet, Take 1,000 mg by mouth daily, Disp: , Rfl:     aspirin (ECOTRIN LOW STRENGTH) 81 mg EC tablet, Take 1 tablet (81 mg total) by mouth daily, Disp: , Rfl:     calcium-vitamin D (OSCAL 500 + D) 500 mg-200 units per tablet, Take 1 tablet by mouth daily, Disp: , Rfl:     cholecalciferol (VITAMIN D3) 400 units tablet, Take 2,000 Units by mouth daily, Disp: , Rfl:     Krill Oil 350 MG CAPS, Take 350 mg by mouth in the morning, Disp: , Rfl:     levETIRAcetam (KEPPRA) 250 mg tablet, Take 250 mg by mouth 2 (two) times a day, Disp: , Rfl:     metoprolol succinate (TOPROL-XL) 25 mg 24 hr tablet, TAKE 1 TABLET EVERY 12 HOURS, Disp: 180 tablet, Rfl: 1    Multiple Vitamin (DAILY VALUE MULTIVITAMIN PO), Take 1 tablet by mouth daily, Disp: , Rfl:     simvastatin (ZOCOR) 10 mg tablet, Take 10 mg by mouth daily at bedtime, Disp: , Rfl:     tamsulosin (FLOMAX) 0.4 mg, Take 0.4 mg by mouth daily at bedtime, Disp: , Rfl:     traZODone (DESYREL) 50 mg tablet, Take 25 mg by mouth daily at bedtime, Disp: , Rfl:   Allergies   Allergen Reactions    Escitalopram Dizziness    Metformin Diarrhea    Sertraline Other (See Comments)     weakness    Duloxetine Other (See Comments)     Restless leg     Vitals:    06/05/24 1129   BP: 108/80   Pulse: 83   Resp: 18   Temp: (!) 96.9 °F (36.1 °C)   SpO2: 94%       Physical Exam  Vitals reviewed.   Constitutional:       Appearance: Normal appearance.   HENT:      Head: Normocephalic and atraumatic.      Right Ear: External ear normal.      Left Ear: External ear normal.      Nose: Nose normal.   Eyes:      Extraocular Movements: Extraocular movements  intact.      Pupils: Pupils are equal, round, and reactive to light.   Cardiovascular:      Rate and Rhythm: Normal rate and regular rhythm.      Heart sounds: Normal heart sounds.   Pulmonary:      Effort: Pulmonary effort is normal.      Breath sounds: Normal breath sounds.   Abdominal:      General: Abdomen is flat. There is no distension.      Palpations: Abdomen is soft. There is no mass.      Tenderness: There is no abdominal tenderness. There is no guarding or rebound.      Hernia: No hernia is present.   Musculoskeletal:         General: Normal range of motion.      Cervical back: Normal range of motion and neck supple.   Skin:     General: Skin is warm and dry.   Neurological:      General: No focal deficit present.      Mental Status: He is alert and oriented to person, place, and time.   Psychiatric:         Mood and Affect: Mood normal.         Behavior: Behavior normal.         Thought Content: Thought content normal.         Judgment: Judgment normal.           Results:  Labs:  Component  Ref Range & Units 5/7/24  7:37 AM 2/22/24  8:14 AM   AFP L3%  0.0 - 9.9 % 5.2 4.8   Alpha-Fetoprotein  0.0 - 6.4 ng/mL 9.1 High       Lab Results   Component Value Date    SODIUM 133 (L) 05/07/2024    K 4.5 05/07/2024     05/07/2024    CO2 28 05/07/2024    AGAP 4 05/07/2024    BUN 19 05/07/2024    CREATININE 1.00 05/07/2024    GLUC 127 10/09/2023    GLUF 119 (H) 05/07/2024    CALCIUM 10.5 (H) 05/07/2024    AST 23 05/07/2024    ALT 19 05/07/2024    ALKPHOS 50 05/07/2024    TP 8.1 05/07/2024    TBILI 0.80 05/07/2024    EGFR 68 05/07/2024       Imaging  MRI abdomen w wo contrast    Result Date: 6/4/2024  Narrative: MRI - ABDOMEN - WITH AND WITHOUT CONTRAST INDICATION: 84 years / Male. C22.0: Liver cell carcinoma.... COMPARISON: TECHNIQUE: Multiplanar/multisequence MRI of the abdomen was performed before and after administration of contrast. IV Contrast: 6 mL of Gadobutrol injection (SINGLE-DOSE) FINDINGS: LOWER  "CHEST: Susceptibility artifact from aortic valve replacement and pacemaker device. LIVER: Normal in size and morphology.. Treated lesion: 1 Location: Superior left lobe straddling segments 4A and 2 Pretreatment category: Biopsy proven moderately differentiated hepatocellular carcinoma Type of most recent treatment: Stereotactic body radiation therapy (SBRT.) (Radiation treatment.) Date of most recent treatment: 11/22/2023 Size of treatment zone: 3 cm Unchanged. Masslike enhancement: None. Diffusion restriction: None. Mild-Moderate T2 hyperintensity: None. LR-TR category: LR-TR Nonprogressing. Management recommendation: Continue monitoring in approximately 3 months. Multidisciplinary conference discussion in unusual/complex cases. Observation: 2 Size: 4 mm series 12 image 154 Location: Right hepatic dome Enhancement: No arterial phase hyperenhancement. Nonperipheral \"washout\": No Enhancing \"capsule\": No Threshold growth: No in retrospect, this lesion may have been present on the August 22, 2023 study and is unchanged in size (series 8 images 199 and 313 on that study). Ancillary features: *  Favoring HCC in particular: None *  Favoring malignancy: None *  Favoring benignity: None LI-RADS Category: LR-3 The hepatic veins and portal veins are patent. BILE DUCTS: No intrahepatic or extrahepatic bile duct dilation. GALLBLADDER: Normal. PANCREAS: Unremarkable. ADRENAL GLANDS: Unremarkable. SPLEEN: Normal. KIDNEYS/PROXIMAL URETERS: No hydroureteronephrosis. No suspicious renal mass. BOWEL: No dilated loops of bowel. PERITONEUM/RETROPERITONEUM: No ascites. LYMPH NODES: No abdominal lymphadenopathy. VESSELS: No aneurysm. ABDOMINAL WALL: Unremarkable BONES: No suspicious osseous lesion.     Impression: Status post SBRT of lesion in the left lobe, LR TR non- progressing. 4 mm observation in the right hepatic dome with no major LI-RADS features and may have been present on an MRI dated August 22, 2023 unchanged in size. " Recommend attention to this observation on follow-up imaging. Workstation performed: [id number]     I reviewed the above laboratory and imaging data.    Discussion/Summary: History of stage I HCC.  Good response to SBRT at this point.  Plan to follow-up in 3 months with MRI for continued close surveillance at that time.

## 2024-06-07 ENCOUNTER — IN-CLINIC DEVICE VISIT (OUTPATIENT)
Dept: CARDIOLOGY CLINIC | Facility: CLINIC | Age: 84
End: 2024-06-07
Payer: MEDICARE

## 2024-06-07 DIAGNOSIS — I49.5 SSS (SICK SINUS SYNDROME) (HCC): Primary | ICD-10-CM

## 2024-06-07 PROCEDURE — 93280 PM DEVICE PROGR EVAL DUAL: CPT | Performed by: INTERNAL MEDICINE

## 2024-06-07 NOTE — PROGRESS NOTES
Results for orders placed or performed in visit on 06/07/24   Cardiac EP device report    Narrative    MDT DUAL CHAMBER PPM  (MVP ON) - ACTIVE SYSTEM IS MRI CONDITIONAL  DEVICE INTERROGATED IN THE [facility] OFFICE. BATTERY VOLTAGE ADEQUATE (11.9 YRS). AP: 99.5%. : <0.1% (MVP-ON). ALL LEAD PARAMETERS WITHIN NORMAL LIMITS.  NO SIGNIFICANT HIGH RATE EPISODES. NO PROGRAMMING CHANGES MADE TO DEVICE PARAMETERS. NORMAL DEVICE FUNCTION. CH

## 2024-07-17 ENCOUNTER — OFFICE VISIT (OUTPATIENT)
Dept: CARDIOLOGY CLINIC | Facility: CLINIC | Age: 84
End: 2024-07-17
Payer: MEDICARE

## 2024-07-17 VITALS
HEIGHT: 63 IN | WEIGHT: 138 LBS | BODY MASS INDEX: 24.45 KG/M2 | DIASTOLIC BLOOD PRESSURE: 62 MMHG | OXYGEN SATURATION: 94 % | HEART RATE: 69 BPM | SYSTOLIC BLOOD PRESSURE: 116 MMHG

## 2024-07-17 DIAGNOSIS — I35.0 NONRHEUMATIC AORTIC VALVE STENOSIS: Primary | ICD-10-CM

## 2024-07-17 DIAGNOSIS — I49.3 PVC'S (PREMATURE VENTRICULAR CONTRACTIONS): ICD-10-CM

## 2024-07-17 DIAGNOSIS — R00.1 SINUS BRADYCARDIA: ICD-10-CM

## 2024-07-17 PROCEDURE — G2211 COMPLEX E/M VISIT ADD ON: HCPCS | Performed by: PHYSICIAN ASSISTANT

## 2024-07-17 PROCEDURE — 99214 OFFICE O/P EST MOD 30 MIN: CPT | Performed by: PHYSICIAN ASSISTANT

## 2024-07-17 RX ORDER — PREDNISONE 20 MG/1
10 TABLET ORAL
COMMUNITY
Start: 2024-07-03

## 2024-07-17 NOTE — ASSESSMENT & PLAN NOTE
S/p Dual chamber PPM implantation  6/7/2024 device interrogation showed normally functioning device.

## 2024-07-17 NOTE — ASSESSMENT & PLAN NOTE
Chronic problem for which the patient was taking Toprol 25 mg q12h.  PCP recently changed the Toprol to 25 mg QAM & 12. 5 mg QHS.  On this dose it appears patient is in Bigeminy. He is asxs of the bigeminy. His HR is still overall controlled @ 90 BPM.

## 2024-07-17 NOTE — PATIENT INSTRUCTIONS
Please keep hydrated with at least 2 liters of water daily.    Resume taking the metoprolol 25 mg every 12 hours (9 AM & 9 PM).    Check your blood pressure at home every morning and write it down. Our office will call you in 1 week to review the blood pressures.

## 2024-07-18 ENCOUNTER — DOCUMENTATION (OUTPATIENT)
Dept: CARDIOLOGY CLINIC | Facility: CLINIC | Age: 84
End: 2024-07-18

## 2024-07-24 ENCOUNTER — CLINICAL SUPPORT (OUTPATIENT)
Dept: CARDIOLOGY CLINIC | Facility: CLINIC | Age: 84
End: 2024-07-24
Payer: MEDICARE

## 2024-07-24 VITALS
SYSTOLIC BLOOD PRESSURE: 115 MMHG | BODY MASS INDEX: 23.32 KG/M2 | DIASTOLIC BLOOD PRESSURE: 60 MMHG | HEART RATE: 75 BPM | HEIGHT: 63 IN | WEIGHT: 131.6 LBS

## 2024-07-24 DIAGNOSIS — Z95.0 PRESENCE OF PERMANENT CARDIAC PACEMAKER: ICD-10-CM

## 2024-07-24 DIAGNOSIS — I49.3 PVC'S (PREMATURE VENTRICULAR CONTRACTIONS): Primary | ICD-10-CM

## 2024-07-24 DIAGNOSIS — I49.5 SSS (SICK SINUS SYNDROME) (HCC): ICD-10-CM

## 2024-07-24 PROCEDURE — 93000 ELECTROCARDIOGRAM COMPLETE: CPT

## 2024-07-24 PROCEDURE — 99211 OFF/OP EST MAY X REQ PHY/QHP: CPT

## 2024-07-24 NOTE — PROGRESS NOTES
Patient here for EKG per Yuki Leon.  Yesi had runs of ventricular bygenimy at last ov on 7/17/24.  He was advised to remain well hydrated and increase Toprol XL back to 25 mgs twice daily to suppress PVC's.     Dr Medina who is in the office today will read EKG.    Yesi is upset today that he had only a nurse visit and was not seeing his physician, Dr Thao.     Per Dr Medina, bygenimy has resolved with the increased Metoprolol 25 bid.

## 2024-07-26 ENCOUNTER — OFFICE VISIT (OUTPATIENT)
Dept: RADIATION ONCOLOGY | Facility: CLINIC | Age: 84
End: 2024-07-26
Attending: INTERNAL MEDICINE
Payer: MEDICARE

## 2024-07-26 VITALS
SYSTOLIC BLOOD PRESSURE: 102 MMHG | BODY MASS INDEX: 23.21 KG/M2 | WEIGHT: 131 LBS | OXYGEN SATURATION: 98 % | HEIGHT: 63 IN | HEART RATE: 74 BPM | TEMPERATURE: 95.7 F | DIASTOLIC BLOOD PRESSURE: 67 MMHG

## 2024-07-26 DIAGNOSIS — C22.0 HEPATOCELLULAR CARCINOMA (HCC): Primary | ICD-10-CM

## 2024-07-26 PROCEDURE — 99213 OFFICE O/P EST LOW 20 MIN: CPT | Performed by: INTERNAL MEDICINE

## 2024-07-26 PROCEDURE — 99211 OFF/OP EST MAY X REQ PHY/QHP: CPT | Performed by: INTERNAL MEDICINE

## 2024-07-26 NOTE — PROGRESS NOTES
Follow-up - Radiation Oncology   Yesi Mosher 1940 84 y.o. male [id number]    Cancer Staging   Hepatocellular carcinoma (HCC)  Staging form: Liver (Excluding Intrahepatic Bile Ducts), AJCC 8th Edition  - Clinical stage from 8/22/2023: cT1b, cN0 - Signed by Shailesh Henao MD on 10/24/2023  Stage prefix: Initial diagnosis    Assessment/Plan:  Yesi Mosher is an 84 y.o. male with a pacemaker with cT1bN0 HCC of the left lobe of the liver, s/p biopsy on 10/9/23 showing moderately differentiated hepatocellular carcinoma. His case was reviewed at multidisciplinary tumor board on 10/12/2023, with recommendation for SBRT, which he completed on 11/22/23.     He presents for routine 8 month post treatment follow-up visit. He denies diarrhea, pain, or changes in respiration.  He has no significant late toxicity related to radiation therapy.  Latest MRI abdomen on 5/28/2024 shows stability in the treated area, rated LR-TR non-progressing, with continued monitoring recommended.  There is a 4 mm observation in the right hepatic dome which may have been present since 2023 for which continued attention is also recommended.  Reviewed continued surveillance  MRI brain ordered by surgical oncology  12/5/24  Surgical Oncology  RTC in 6 months, however he reports he will be having an extended stay in [place] and prefers to follow-up with me when he returns.  Advised him to continue MRI surveillance with his doctors in [place] if needed.    Shailesh Henao MD  Department of Radiation Oncology  [facility]    Total Time Spent  20 minutes spent reviewing EMR in preparation for visit, with the patient, coordination with other providers, and documentation.    No orders of the defined types were placed in this encounter.       History of Present Illness   Interval History:  With h/o HCC.   He has history of arterially enhancing liver lesion that was being monitored over the past few years, which has been  increasing in size to 3.1 cm in left hepatic lobe, suspicious for HCC. He underwent liver biopsy on 10/9/23 confirming moderately differentiated hepatocellular carcinoma. His case was discussed at GI tumor board with recommendation to refer to Rad Onc for SBRT. He completed SBRT to the liver on 11/22/23   He was last seen in radiation oncology via telemedicine on 1/10/24.  Today's visit is for routine f/u.     ALPHA-FETOPROTEIN   Latest Ref Rng 0.0 - 6.4 ng/mL   2/22/2024 8.6 (H)    5/7/2024 9.1 (H)       Component      Latest Ref Rng 2/22/2024 5/7/2024   BUN      5 - 25 mg/dL 19  19    Creatinine      0.60 - 1.30 mg/dL 1.05  1.00    Sodium      135 - 147 mmol/L 136  133 (L)    Potassium      3.5 - 5.3 mmol/L 4.7  4.5    Chloride      96 - 108 mmol/L 100  101    Carbon Dioxide      21 - 32 mmol/L 28  28    Calcium      8.4 - 10.2 mg/dL 10.3 (H)  10.5 (H)    ANION GAP      4 - 13 mmol/L 8  4    GFR, Calculated      ml/min/1.73sq m 64  68    GLUCOSE, FASTING      65 - 99 mg/dL 118 (H)  119 (H)    ALK PHOS      34 - 104 U/L 48  50    Albumin      3.5 - 5.0 g/dL 4.1  4.0    Total Bilirubin      0.20 - 1.00 mg/dL 0.78  0.80    Total Protein      6.4 - 8.4 g/dL 7.9  8.1    AST      13 - 39 U/L 32  23    ALT      7 - 52 U/L 24  19         5/28/24  MRI abdomen  LIVER:  Normal in size and morphology..  Treated lesion: 1  Location: Superior left lobe straddling segments 4A and 2  Pretreatment category: Biopsy proven moderately differentiated hepatocellular carcinoma  Type of most recent treatment: Stereotactic body radiation therapy (SBRT.) (Radiation treatment.)  Date of most recent treatment: 11/22/2023  Size of treatment zone: 3 cm Unchanged.  Masslike enhancement: None.  Diffusion restriction: None.  Mild-Moderate T2 hyperintensity: None.  LR-TR category: LR-TR Nonprogressing.  Management recommendation: Continue monitoring in approximately 3 months. Multidisciplinary conference discussion in unusual/complex cases.    "  Observation: 2  Size: 4 mm series 12 image 154  Location: Right hepatic dome  Enhancement: No arterial phase hyperenhancement.  Nonperipheral \"washout\": No  Enhancing \"capsule\": No  Threshold growth: No in retrospect, this lesion may have been present on the 2023 study and is unchanged in size (series 8 images 199 and 313 on that study).  Ancillary features:  *  Favoring HCC in particular: None  *  Favoring malignancy: None  *  Favoring benignity: None  LI-RADS Category: LR-3  The hepatic veins and portal veins are patent.  IMPRESSION:   Status post SBRT of lesion in the left lobe, LR TR non- progressing.   4 mm observation in the right hepatic dome with no major LI-RADS features and may have been present on an MRI dated 2023 unchanged in size. Recommend attention to this observation on follow-up imaging.     24  Dr. Rios  Good response from treatment.  F/U in 3 months with MRI prior (orders placed).    Upcomin24  Surgical Oncology    He notes feeling well overall. He has intermittent fatigue.  He denies abdominal pain, blood with bowel movements, changes in respiration, significant cough.  He feels well overall.  He presents with his spouse.      Historical Information   Oncology History   Hepatocellular carcinoma (HCC)   2023 -  Cancer Staged    Staging form: Liver (Excluding Intrahepatic Bile Ducts), AJCC 8th Edition  - Clinical stage from 2023: cT1b, cN0 - Signed by Shailesh Henao MD on 10/24/2023  Stage prefix: Initial diagnosis       10/9/2023 Biopsy    Liver, lesion, needle core biopsy:  - Moderately differentiated hepatocellular carcinoma.      11/10/2023 - 2023 Radiation    Treatment:  Course: C1 SBRT    Plan ID Energy Fractions Dose per Fraction (cGy) Dose Correction (cGy) Total Dose Delivered (cGy) Elapsed Days   SBRT Liver MT 6X-FFF 5 / 5 700 0 3,500 12      Treatment dates:  C1 SBRT: 11/10/2023 - 2023       Past Medical History:   Diagnosis " Date    BPH (benign prostatic hyperplasia)     CAD (coronary artery disease)     Diabetes mellitus (HCC)     type 2, diet controlled    Diastolic CHF (HCC)     Epilepsy (HCC)     History of mycosis fungoides     Hyperlipidemia     Hypertension     ILD (interstitial lung disease) (HCC)     Osteoporosis     Seizures (HCC)     partial sensory    Severe aortic stenosis      Past Surgical History:   Procedure Laterality Date    CARDIAC CATHETERIZATION      CARDIAC ELECTROPHYSIOLOGY PROCEDURE N/A 10/24/2022    Procedure: Cardiac pacer implant/ DUAL CHAMBER;  Surgeon: Daniel Bardales DO;  [facility];  Service: Cardiology    COLONOSCOPY      IR BIOPSY LIVER MASS  10/9/2023    TX ECHO TRANSESOPHAG R-T 2D W/PRB IMG ACQUISJ I&R N/A 1/7/2020    Procedure: TRANSESOPHAGEAL ECHOCARDIOGRAM (POOL);  Surgeon: Tony Archer DO;  Location: [facility];  Service: Cardiac Surgery    TX REPLACE AORTIC VALVE OPENFEMORAL ARTERY APPROACH N/A 1/7/2020    Procedure: REPLACEMENT AORTIC VALVE TRANSCATHETER (TAVR) TRANSFEMORAL W/ 23 MM CARROLL KATHLEEN S3 VALVE (ACCESS ON LEFT);  Surgeon: Tony Archer DO;  Location:  MAIN OR;  Service: Cardiac Surgery    TX RPR 1ST INGUN HRNA AGE 5 YRS/> REDUCIBLE Right 11/16/2018    Procedure: REPAIR RIGHT INGUINAL HERNIA WITH MESH;  Surgeon: Pravin Elizabeth MD;  Location:  MAIN OR;  Service: General     Social History   Social History     Substance and Sexual Activity   Alcohol Use Not Currently     Social History     Substance and Sexual Activity   Drug Use No     Social History     Tobacco Use   Smoking Status Never   Smokeless Tobacco Never       Meds/Allergies     Current Outpatient Medications:     ascorbic acid (VITAMIN C) 1000 MG tablet, Take 1,000 mg by mouth daily, Disp: , Rfl:     aspirin (ECOTRIN LOW STRENGTH) 81 mg EC tablet, Take 1 tablet (81 mg total) by mouth daily, Disp: , Rfl:     calcium-vitamin D (OSCAL 500 + D) 500 mg-200 units per tablet, Take 1 tablet by mouth  "daily, Disp: , Rfl:     cholecalciferol (VITAMIN D3) 400 units tablet, Take 2,000 Units by mouth daily, Disp: , Rfl:     Krill Oil 350 MG CAPS, Take 350 mg by mouth in the morning, Disp: , Rfl:     levETIRAcetam (KEPPRA) 250 mg tablet, Take 250 mg by mouth 2 (two) times a day, Disp: , Rfl:     metoprolol succinate (TOPROL-XL) 25 mg 24 hr tablet, TAKE 1 TABLET EVERY 12 HOURS, Disp: 180 tablet, Rfl: 1    Multiple Vitamin (DAILY VALUE MULTIVITAMIN PO), Take 1 tablet by mouth daily, Disp: , Rfl:     simvastatin (ZOCOR) 10 mg tablet, Take 10 mg by mouth daily at bedtime, Disp: , Rfl:     tamsulosin (FLOMAX) 0.4 mg, Take 0.4 mg by mouth daily at bedtime, Disp: , Rfl:     traZODone (DESYREL) 50 mg tablet, Take 25 mg by mouth daily at bedtime, Disp: , Rfl:     amoxicillin (AMOXIL) 500 mg capsule, BEFORE DENTAL WORK (Patient not taking: Reported on 7/24/2024), Disp: , Rfl: 3    predniSONE 20 mg tablet, Take 10 mg by mouth (Patient not taking: Reported on 7/24/2024), Disp: , Rfl:   Allergies   Allergen Reactions    Escitalopram Dizziness    Metformin Diarrhea    Sertraline Other (See Comments)     weakness    Duloxetine Other (See Comments)     Restless leg       Review of Systems:  Refer to nursing note    OBJECTIVE:   /67 (BP Location: Left arm, Patient Position: Sitting, Cuff Size: Adult)   Pulse 74   Temp (!) 95.7 °F (35.4 °C) (Temporal)   Ht 5' 3\" (1.6 m)   Wt 59.4 kg (131 lb)   SpO2 98%   BMI 23.21 kg/m²     Physical Exam:   General Appearance:  Alert, cooperative, no distress, appears stated age  Lungs: Respirations unlabored, no cyanosis, able to speak in complete sentences without dyspnea.  Abdomen: Non-distended  Extremities: No cyanosis or edema  Skin: No generalized rash or dermatitis  Neurologic: ANOx3, speech and cognition intact.    Portions of the record may have been created with voice recognition software.  Occasional wrong word or \"sound a like\" substitutions may have occurred due to the " inherent limitations of voice recognition software.  Read the chart carefully and recognize, using context, where substitutions have occurred.

## 2024-07-26 NOTE — PROGRESS NOTES
Yesi Mosher 1940 is a 84 y.o. male With h/o HCC.   He has history of arterially enhancing liver lesion that was being monitored over the past few years, which has been increasing in size to 3.1 cm in left hepatic lobe, suspicious for HCC. He underwent liver biopsy on 10/9/23 confirming moderately differentiated hepatocellular carcinoma. His case was discussed at GI tumor board with recommendation to refer to Rad Onc for SBRT. He completed SBRT to the liver on 11/22/23   He was last seen in radiation oncology via telemedicine on 1/10/24.  Today's visit is for routine f/u.     ALPHA-FETOPROTEIN   Latest Ref Rng 0.0 - 6.4 ng/mL   2/22/2024 8.6 (H)    5/7/2024 9.1 (H)       Component      Latest Ref Rng 2/22/2024 5/7/2024   BUN      5 - 25 mg/dL 19  19    Creatinine      0.60 - 1.30 mg/dL 1.05  1.00    Sodium      135 - 147 mmol/L 136  133 (L)    Potassium      3.5 - 5.3 mmol/L 4.7  4.5    Chloride      96 - 108 mmol/L 100  101    Carbon Dioxide      21 - 32 mmol/L 28  28    Calcium      8.4 - 10.2 mg/dL 10.3 (H)  10.5 (H)    ANION GAP      4 - 13 mmol/L 8  4    GFR, Calculated      ml/min/1.73sq m 64  68    GLUCOSE, FASTING      65 - 99 mg/dL 118 (H)  119 (H)    ALK PHOS      34 - 104 U/L 48  50    Albumin      3.5 - 5.0 g/dL 4.1  4.0    Total Bilirubin      0.20 - 1.00 mg/dL 0.78  0.80    Total Protein      6.4 - 8.4 g/dL 7.9  8.1    AST      13 - 39 U/L 32  23    ALT      7 - 52 U/L 24  19         5/28/24  MRI abdomen  LIVER:  Normal in size and morphology..  Treated lesion: 1  Location: Superior left lobe straddling segments 4A and 2  Pretreatment category: Biopsy proven moderately differentiated hepatocellular carcinoma  Type of most recent treatment: Stereotactic body radiation therapy (SBRT.) (Radiation treatment.)  Date of most recent treatment: 11/22/2023  Size of treatment zone: 3 cm Unchanged.  Masslike enhancement: None.  Diffusion restriction: None.  Mild-Moderate T2 hyperintensity: None.  LR-TR  "category: LR-TR Nonprogressing.  Management recommendation: Continue monitoring in approximately 3 months. Multidisciplinary conference discussion in unusual/complex cases.     Observation: 2  Size: 4 mm series 12 image 154  Location: Right hepatic dome  Enhancement: No arterial phase hyperenhancement.  Nonperipheral \"washout\": No  Enhancing \"capsule\": No  Threshold growth: No in retrospect, this lesion may have been present on the 2023 study and is unchanged in size (series 8 images 199 and 313 on that study).  Ancillary features:  *  Favoring HCC in particular: None  *  Favoring malignancy: None  *  Favoring benignity: None  LI-RADS Category: LR-3  The hepatic veins and portal veins are patent.  IMPRESSION:   Status post SBRT of lesion in the left lobe, LR TR non- progressing.   4 mm observation in the right hepatic dome with no major LI-RADS features and may have been present on an MRI dated 2023 unchanged in size. Recommend attention to this observation on follow-up imaging.     24  Dr. Rios  Good response from treatment.  F/U in 3 months with MRI prior    Upcomin24  Surgical Oncology      Follow up visit     Oncology History   Hepatocellular carcinoma (HCC)   2023 -  Cancer Staged    Staging form: Liver (Excluding Intrahepatic Bile Ducts), AJCC 8th Edition  - Clinical stage from 2023: cT1b, cN0 - Signed by Shailesh Henao MD on 10/24/2023  Stage prefix: Initial diagnosis       10/9/2023 Biopsy    Liver, lesion, needle core biopsy:  - Moderately differentiated hepatocellular carcinoma.      11/10/2023 - 2023 Radiation    Treatment:  Course: C1 SBRT    Plan ID Energy Fractions Dose per Fraction (cGy) Dose Correction (cGy) Total Dose Delivered (cGy) Elapsed Days   SBRT Liver MT 6X-FFF 5 /  700 0 3,500 12      Treatment dates:  C1 SBRT: 11/10/2023 - 2023         Review of Systems:  Review of Systems   Constitutional:  Positive for fatigue. Negative for " appetite change.   HENT: Negative.     Eyes:         Wears glasses   Respiratory:  Positive for shortness of breath.    Cardiovascular: Negative.    Gastrointestinal:  Positive for constipation. Negative for nausea and vomiting.   Endocrine: Negative.    Genitourinary: Negative.    Musculoskeletal:  Positive for arthralgias (Ihntermittent right groin pain).   Skin: Negative.    Allergic/Immunologic: Negative.    Neurological:  Positive for dizziness (Occasional).   Hematological: Negative.    Psychiatric/Behavioral: Negative.         Clinical Trial: no    Teaching yes    Health Maintenance   Topic Date Due    Medicare Annual Wellness Visit (AWV)  Never done    Diabetic Foot Exam  Never done    DM Eye Exam  Never done    RSV Vaccine Age 60+ Years (1 - 1-dose 60+ series) Never done    Zoster Vaccine (1 of 2) 01/08/2016    Fall Risk  10/16/2019    COVID-19 Vaccine (4 - 2023-24 season) 09/01/2023    HEMOGLOBIN A1C  12/22/2023    Kidney Health Evaluation: Albumin/Creatinine Ratio  06/22/2024    Influenza Vaccine (1) 09/01/2024    Kidney Health Evaluation: GFR  05/07/2025    BMI: Adult  07/24/2025    Depression Screening  07/26/2025    Pneumococcal Vaccine: 65+ Years  Completed    RSV Vaccine age 0-20 Months  Aged Out    HIB Vaccine  Aged Out    IPV Vaccine  Aged Out    Hepatitis A Vaccine  Aged Out    Meningococcal ACWY Vaccine  Aged Out    HPV Vaccine  Aged Out     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HCC)    Right inguinal hernia    Nonrheumatic aortic valve stenosis    Palpitations    Sinus bradycardia    S/P TAVR (transcatheter aortic valve replacement)    Thrombocytopenia (HCC)    Hyperchloremia    Mixed hyperlipidemia    Encounter for postoperative care    PVC's (premature ventricular contractions)    Premature atrial contractions    Dizziness    Hyperlipidemia    Bradycardia    PVC (premature ventricular contraction)     CAD (coronary artery disease)    Diabetes mellitus, type 2 (HCC)    Hepatocellular carcinoma (HCC)    Pacemaker    Sick sinus syndrome (HCC)     Past Medical History:   Diagnosis Date    BPH (benign prostatic hyperplasia)     CAD (coronary artery disease)     Diabetes mellitus (HCC)     type 2, diet controlled    Diastolic CHF (HCC)     Epilepsy (HCC)     History of mycosis fungoides     Hyperlipidemia     Hypertension     ILD (interstitial lung disease) (HCC)     Osteoporosis     Seizures (HCC)     partial sensory    Severe aortic stenosis      Past Surgical History:   Procedure Laterality Date    CARDIAC CATHETERIZATION      CARDIAC ELECTROPHYSIOLOGY PROCEDURE N/A 10/24/2022    Procedure: Cardiac pacer implant/ DUAL CHAMBER;  Surgeon: Daniel Bardales DO;  [facility];  Service: Cardiology    COLONOSCOPY      IR BIOPSY LIVER MASS  10/9/2023    AL ECHO TRANSESOPHAG R-T 2D W/PRB IMG ACQUISJ I&R N/A 1/7/2020    Procedure: TRANSESOPHAGEAL ECHOCARDIOGRAM (POOL);  Surgeon: Tony Archer DO;  Location:  MAIN OR;  Service: Cardiac Surgery    AL REPLACE AORTIC VALVE OPENFEMORAL ARTERY APPROACH N/A 1/7/2020    Procedure: REPLACEMENT AORTIC VALVE TRANSCATHETER (TAVR) TRANSFEMORAL W/ 23 MM CARROLL KATHLEEN S3 VALVE (ACCESS ON LEFT);  Surgeon: Tony Archer DO;  Location:  MAIN OR;  Service: Cardiac Surgery    AL RPR 1ST INGUN HRNA AGE 5 YRS/> REDUCIBLE Right 11/16/2018    Procedure: REPAIR RIGHT INGUINAL HERNIA WITH MESH;  Surgeon: Pravin Elizabeth MD;  Location:  MAIN OR;  Service: General     Family History   Problem Relation Age of Onset    Coronary artery disease Family     Heart disease Family      Social History     Socioeconomic History    Marital status: /Civil Union     Spouse name: Not on file    Number of children: Not on file    Years of education: Not on file    Highest education level: Not on file   Occupational History    Not on file   Tobacco Use    Smoking status: Never     Smokeless tobacco: Never   Vaping Use    Vaping status: Never Used   Substance and Sexual Activity    Alcohol use: Not Currently    Drug use: No    Sexual activity: Not Currently   Other Topics Concern    Not on file   Social History Narrative    Not on file     Social Determinants of Health     Financial Resource Strain: Not on file   Food Insecurity: Not on file   Transportation Needs: Not on file   Physical Activity: Not on file   Stress: Not on file   Social Connections: Not on file   Intimate Partner Violence: Not on file   Housing Stability: Not on file       Current Outpatient Medications:     ascorbic acid (VITAMIN C) 1000 MG tablet, Take 1,000 mg by mouth daily, Disp: , Rfl:     aspirin (ECOTRIN LOW STRENGTH) 81 mg EC tablet, Take 1 tablet (81 mg total) by mouth daily, Disp: , Rfl:     calcium-vitamin D (OSCAL 500 + D) 500 mg-200 units per tablet, Take 1 tablet by mouth daily, Disp: , Rfl:     cholecalciferol (VITAMIN D3) 400 units tablet, Take 2,000 Units by mouth daily, Disp: , Rfl:     Krill Oil 350 MG CAPS, Take 350 mg by mouth in the morning, Disp: , Rfl:     levETIRAcetam (KEPPRA) 250 mg tablet, Take 250 mg by mouth 2 (two) times a day, Disp: , Rfl:     metoprolol succinate (TOPROL-XL) 25 mg 24 hr tablet, TAKE 1 TABLET EVERY 12 HOURS, Disp: 180 tablet, Rfl: 1    Multiple Vitamin (DAILY VALUE MULTIVITAMIN PO), Take 1 tablet by mouth daily, Disp: , Rfl:     simvastatin (ZOCOR) 10 mg tablet, Take 10 mg by mouth daily at bedtime, Disp: , Rfl:     tamsulosin (FLOMAX) 0.4 mg, Take 0.4 mg by mouth daily at bedtime, Disp: , Rfl:     traZODone (DESYREL) 50 mg tablet, Take 25 mg by mouth daily at bedtime, Disp: , Rfl:     amoxicillin (AMOXIL) 500 mg capsule, BEFORE DENTAL WORK (Patient not taking: Reported on 7/24/2024), Disp: , Rfl: 3    predniSONE 20 mg tablet, Take 10 mg by mouth (Patient not taking: Reported on 7/24/2024), Disp: , Rfl:   Allergies   Allergen Reactions    Escitalopram Dizziness    Metformin  "Diarrhea    Sertraline Other (See Comments)     weakness    Duloxetine Other (See Comments)     Restless leg     Vitals:    07/26/24 1444   BP: 102/67   BP Location: Left arm   Patient Position: Sitting   Cuff Size: Adult   Pulse: 74   Temp: (!) 95.7 °F (35.4 °C)   TempSrc: Temporal   SpO2: 98%   Weight: 59.4 kg (131 lb)   Height: 5' 3\" (1.6 m)           "

## 2024-08-09 ENCOUNTER — OFFICE VISIT (OUTPATIENT)
Dept: CARDIOLOGY CLINIC | Facility: CLINIC | Age: 84
End: 2024-08-09
Payer: MEDICARE

## 2024-08-09 VITALS
SYSTOLIC BLOOD PRESSURE: 99 MMHG | BODY MASS INDEX: 23.21 KG/M2 | DIASTOLIC BLOOD PRESSURE: 53 MMHG | HEIGHT: 63 IN | WEIGHT: 131 LBS | HEART RATE: 73 BPM

## 2024-08-09 DIAGNOSIS — I95.9 HYPOTENSION, UNSPECIFIED HYPOTENSION TYPE: Primary | ICD-10-CM

## 2024-08-09 PROCEDURE — 99214 OFFICE O/P EST MOD 30 MIN: CPT | Performed by: INTERNAL MEDICINE

## 2024-08-09 RX ORDER — MIDODRINE HYDROCHLORIDE 2.5 MG/1
2.5 TABLET ORAL
Qty: 90 TABLET | Refills: 3 | Status: SHIPPED | OUTPATIENT
Start: 2024-08-09

## 2024-08-09 NOTE — PROGRESS NOTES
Cardiology             Riteshlucaanibal Arizmendiector  1940  [id number]              Assessment/Plan:     Fatigue  Sick sinus syndrome status post Medtronic dual-chamber permanent pacemaker placed 10/24/2022  PVCs  Hypertension  Aortic valve stenosis status post TAVR 01/2020--mild-to-moderate prosthetic valve regurgitation on prior echocardiogram  Dyslipidemia  Hepatocellular carcinoma, following with hematology/oncology, status post SBRT, completed 11/22/2023             Patient primarily complaining about fatigue which has been chronic.  He also expresses concern about his low blood pressure today and symptoms of intermittent lightheadedness.  Metoprolol has been increased during his prior visit due to frequent PVCs.  I have offered to start him on midodrine and he is agreeable to start the same at 2.5 mg twice daily, and then progressing to 3 times daily if well-tolerated.  I have strongly encouraged him to check his blood pressure twice daily and call me right away if his systolic blood pressure increases more than 150 mmHg.  He is in agreement and both he and his wife expressed understanding.  I will see him back in 1 month to review his home blood pressure readings and to be sure he is tolerating midodrine with no adverse drug reactions.  Continue simvastatin for dyslipidemia  Continue aspirin for history of TAVR  Normally functioning bioprosthetic aortic valve on recent echocardiogram              Follow-up in 1 month              Interval History:      This is an 84-year-old male with history of aortic stenosis status post transcatheter aortic valve placement 01/2020.  He also has dyslipidemia, mildly symptomatic PVCs, and asymptomatic sinus bradycardia.  He has been following Dr. Ferris in the past.     He was last seen by him 06/17/2022.  Prior to that appointment, he had a Zio monitor which revealed a minimum heart rate of 41 beats per minute without any pauses exceeding 3 seconds.  His dizziness statin  more related to orthostasis.  Subsequently was seen by electrophysiology 07/07/2022.  It was thought that it some time he may need a permanent pacemaker without any obvious indication for the same at this time.  He did have brief runs of nonsustained atrial tachycardia on his Zio monitor.     He was then seen by his PCP at which time his heart rate was 36 beats per minute with symptoms of fatigue.  Subsequent Zio monitor 08/19/2022 demonstrated minimal heart rate of 38 beats per minute with average heart rate 53 beats per minute.  There was a 15 beat atrial run with frequent PVCs up to 15.5% of total beats.  Patient triggers correlated with ventricular bigeminy with 1 trigger correlating with sinus rhythm.  Thereafter he saw electrophysiology recommended dual-chamber pacemaker placement.  On 10/24/22 underwent placement of a dual-chamber Medtronic permanent pacemaker.     He went to the ER 10/28/2022 with mild lightheadedness and dyspnea.  He states his heart rate was slow when he checked it at home and presented to the ER.  Device interrogation demonstrated a normally functioning device and it was thought that his PVCs were reporting a falsely low heart rate on his monitor.  Metoprolol 25 mg daily was initiated by electrophysiology for PVCs.     Echocardiogram 6/1/2023 demonstrates a normally functioning bioprosthetic aortic valve with normal left and ejection fraction and mild mitral vegetation.    He was last seen 6/2023 at which time he had complaints of fatigue and atypical chest pain.  Subsequent CMP, TSH, BNP, and CBC were unremarkable.  His platelets were low normal with a history of thrombocytopenia in 2022.      He was seen by our nurse practitioner 11/29/2023 at which time he had complaints of palpitations.  He was sent to electrophysiology for frequent PVCs.  Subsequent Zio monitor revealed 6.6% PVC burden with no evidence of dysrhythmia otherwise.    Repeat echocardiogram 3/12/2024 demonstrate left  "ejection fraction 60% with a normally functioning bioprosthetic aortic valve, mean gradient 10 mmHg, aortic valve area 1.76 cm², peak velocity 2.1, and dimensionless index of 0.48.    He was seen by our nurse practitioner 7/17/2024 with complaints of feeling woozy/intermittently lightheaded along with feeling tired.  As he was in ventricular bigeminy, his metoprolol was increased back to 25 mg twice daily.  Adequate hydration was encouraged.    He presents today for follow-up.  He still expresses concern about his fatigue and feels that his blood pressure is too low, 99/53 on today's visit.  He admits to intermittent lightheadedness but overall fatigue seems to be his main complaint.        Vitals:  Vitals:    08/09/24 1240 08/09/24 1255   BP: 96/58 99/53   Pulse: 72 73   Weight: 59.4 kg (131 lb)    Height: 5' 3\" (1.6 m)          Past Medical History:   Diagnosis Date   • BPH (benign prostatic hyperplasia)    • CAD (coronary artery disease)    • Diabetes mellitus (HCC)     type 2, diet controlled   • Diastolic CHF ([facility])    • Epilepsy ([facility])    • History of mycosis fungoides    • Hyperlipidemia    • Hypertension    • ILD (interstitial lung disease) ([facility])    • Osteoporosis    • Seizures ([facility])     partial sensory   • Severe aortic stenosis      Social History     Socioeconomic History   • Marital status: /Civil Union     Spouse name: Not on file   • Number of children: Not on file   • Years of education: Not on file   • Highest education level: Not on file   Occupational History   • Not on file   Tobacco Use   • Smoking status: Never   • Smokeless tobacco: Never   Vaping Use   • Vaping status: Never Used   Substance and Sexual Activity   • Alcohol use: Not Currently   • Drug use: No   • Sexual activity: Not Currently   Other Topics Concern   • Not on file   Social History Narrative   • Not on file     Social Determinants of Health     Financial Resource Strain: Not on file   Food Insecurity: Not on file "   Transportation Needs: Not on file   Physical Activity: Not on file   Stress: Not on file   Social Connections: Not on file   Intimate Partner Violence: Not on file   Housing Stability: Not on file      Family History   Problem Relation Age of Onset   • Coronary artery disease Family    • Heart disease Family      Past Surgical History:   Procedure Laterality Date   • CARDIAC CATHETERIZATION     • CARDIAC ELECTROPHYSIOLOGY PROCEDURE N/A 10/24/2022    Procedure: Cardiac pacer implant/ DUAL CHAMBER;  Surgeon: Daniel Bardales DO;  [facility];  Service: Cardiology   • COLONOSCOPY     • IR BIOPSY LIVER MASS  10/9/2023   • WA ECHO TRANSESOPHAG R-T 2D W/PRB IMG ACQUISJ I&R N/A 1/7/2020    Procedure: TRANSESOPHAGEAL ECHOCARDIOGRAM (POOL);  Surgeon: Tony Archer DO;  Location:  MAIN OR;  Service: Cardiac Surgery   • WA REPLACE AORTIC VALVE OPENFEMORAL ARTERY APPROACH N/A 1/7/2020    Procedure: REPLACEMENT AORTIC VALVE TRANSCATHETER (TAVR) TRANSFEMORAL W/ 23 MM CARROLL KATHLEEN S3 VALVE (ACCESS ON LEFT);  Surgeon: Tony Archer DO;  Location:  MAIN OR;  Service: Cardiac Surgery   • WA RPR 1ST INGUN HRNA AGE 5 YRS/> REDUCIBLE Right 11/16/2018    Procedure: REPAIR RIGHT INGUINAL HERNIA WITH MESH;  Surgeon: Pravin Elizabeth MD;  Location:  MAIN OR;  Service: General       Current Outpatient Medications:   •  amoxicillin (AMOXIL) 500 mg capsule, BEFORE DENTAL WORK, Disp: , Rfl: 3  •  ascorbic acid (VITAMIN C) 1000 MG tablet, Take 1,000 mg by mouth daily, Disp: , Rfl:   •  aspirin (ECOTRIN LOW STRENGTH) 81 mg EC tablet, Take 1 tablet (81 mg total) by mouth daily, Disp: , Rfl:   •  calcium-vitamin D (OSCAL 500 + D) 500 mg-200 units per tablet, Take 1 tablet by mouth daily, Disp: , Rfl:   •  cholecalciferol (VITAMIN D3) 400 units tablet, Take 2,000 Units by mouth daily, Disp: , Rfl:   •  Krill Oil 350 MG CAPS, Take 350 mg by mouth in the morning, Disp: , Rfl:   •  levETIRAcetam (KEPPRA) 250 mg tablet,  Take 250 mg by mouth 2 (two) times a day, Disp: , Rfl:   •  metoprolol succinate (TOPROL-XL) 25 mg 24 hr tablet, TAKE 1 TABLET EVERY 12 HOURS, Disp: 180 tablet, Rfl: 1  •  midodrine (PROAMATINE) 2.5 mg tablet, Take 1 tablet (2.5 mg total) by mouth 3 (three) times a day before meals, Disp: 90 tablet, Rfl: 3  •  Multiple Vitamin (DAILY VALUE MULTIVITAMIN PO), Take 1 tablet by mouth daily, Disp: , Rfl:   •  simvastatin (ZOCOR) 10 mg tablet, Take 10 mg by mouth daily at bedtime, Disp: , Rfl:   •  tamsulosin (FLOMAX) 0.4 mg, Take 0.4 mg by mouth daily at bedtime, Disp: , Rfl:   •  traZODone (DESYREL) 50 mg tablet, Take 25 mg by mouth daily at bedtime, Disp: , Rfl:   •  predniSONE 20 mg tablet, Take 10 mg by mouth (Patient not taking: Reported on 7/24/2024), Disp: , Rfl:         Review of Systems:  Review of Systems   Constitutional:  Positive for fatigue.   Respiratory: Negative.     Cardiovascular:  Positive for chest pain.   All other systems reviewed and are negative.        Physical Exam:  Physical Exam  Constitutional:       General: He is not in acute distress.     Appearance: He is well-developed. He is not diaphoretic.   HENT:      Head: Normocephalic and atraumatic.   Eyes:      General: No scleral icterus.        Right eye: No discharge.      Pupils: Pupils are equal, round, and reactive to light.   Neck:      Thyroid: No thyromegaly.   Cardiovascular:      Rate and Rhythm: Normal rate and regular rhythm.      Heart sounds: Normal heart sounds. No murmur heard.     No friction rub. No gallop.   Pulmonary:      Effort: Pulmonary effort is normal.      Breath sounds: Normal breath sounds.   Abdominal:      General: There is no distension.      Tenderness: There is no abdominal tenderness. There is no guarding or rebound.   Musculoskeletal:         General: Normal range of motion.      Cervical back: Normal range of motion and neck supple.      Right lower leg: No edema.      Left lower leg: No edema.   Skin:      General: Skin is warm and dry.      Coloration: Skin is not pale.      Findings: No erythema or rash.   Neurological:      Mental Status: He is alert and oriented to person, place, and time.      Coordination: Coordination normal.      Gait: Gait abnormal.   Psychiatric:         Behavior: Behavior normal.         Thought Content: Thought content normal.         Judgment: Judgment normal.         This note was completed in part utilizing M-Modal Fluency Direct Software.  Grammatical errors, random word insertions, spelling mistakes, and incomplete sentences can be an occasional consequence of this system secondary to software limitations, ambient noise, and hardware issues.  If you have any questions or concerns about the content, text, or information contained within the body of this dictation, please contact the provider for clarification.

## 2024-08-09 NOTE — PATIENT INSTRUCTIONS
Start midodrine 2.5 mg 3 times daily  Check and log BP twice daily, call if BP > 150 (top number)  Follow up in 1 month

## 2024-08-20 ENCOUNTER — APPOINTMENT (OUTPATIENT)
Dept: LAB | Facility: IMAGING CENTER | Age: 84
End: 2024-08-20
Payer: MEDICARE

## 2024-08-20 DIAGNOSIS — C22.0 HEPATOCELLULAR CARCINOMA (HCC): ICD-10-CM

## 2024-08-20 LAB
ALBUMIN SERPL BCG-MCNC: 3.7 G/DL (ref 3.5–5)
ALP SERPL-CCNC: 51 U/L (ref 34–104)
ALT SERPL W P-5'-P-CCNC: 24 U/L (ref 7–52)
ANION GAP SERPL CALCULATED.3IONS-SCNC: 7 MMOL/L (ref 4–13)
AST SERPL W P-5'-P-CCNC: 26 U/L (ref 13–39)
BILIRUB SERPL-MCNC: 0.48 MG/DL (ref 0.2–1)
BUN SERPL-MCNC: 17 MG/DL (ref 5–25)
CALCIUM SERPL-MCNC: 10.1 MG/DL (ref 8.4–10.2)
CHLORIDE SERPL-SCNC: 104 MMOL/L (ref 96–108)
CO2 SERPL-SCNC: 26 MMOL/L (ref 21–32)
CREAT SERPL-MCNC: 0.96 MG/DL (ref 0.6–1.3)
GFR SERPL CREATININE-BSD FRML MDRD: 71 ML/MIN/1.73SQ M
GLUCOSE P FAST SERPL-MCNC: 211 MG/DL (ref 65–99)
POTASSIUM SERPL-SCNC: 4.5 MMOL/L (ref 3.5–5.3)
PROT SERPL-MCNC: 7.7 G/DL (ref 6.4–8.4)
SODIUM SERPL-SCNC: 137 MMOL/L (ref 135–147)

## 2024-08-20 PROCEDURE — 80053 COMPREHEN METABOLIC PANEL: CPT

## 2024-08-20 PROCEDURE — 82107 ALPHA-FETOPROTEIN L3: CPT

## 2024-08-20 PROCEDURE — 36415 COLL VENOUS BLD VENIPUNCTURE: CPT

## 2024-08-23 LAB
AFP L3 MFR SERPL: 5 % (ref 0–9.9)
AFP SERPL-MCNC: 7.8 NG/ML (ref 0–6.4)

## 2024-09-03 ENCOUNTER — HOSPITAL ENCOUNTER (OUTPATIENT)
Dept: RADIOLOGY | Facility: HOSPITAL | Age: 84
Discharge: HOME | End: 2024-09-03
Payer: MEDICARE

## 2024-09-03 DIAGNOSIS — C22.0 HEPATOCELLULAR CARCINOMA (HCC): ICD-10-CM

## 2024-09-03 PROCEDURE — A9585 GADOBUTROL INJECTION: HCPCS | Performed by: SURGERY

## 2024-09-03 PROCEDURE — 74183 MRI ABD W/O CNTR FLWD CNTR: CPT

## 2024-09-03 RX ORDER — GADOBUTROL 604.72 MG/ML
5 INJECTION INTRAVENOUS
Status: COMPLETED | OUTPATIENT
Start: 2024-09-03 | End: 2024-09-03

## 2024-09-03 RX ADMIN — GADOBUTROL 5 ML: 604.72 INJECTION INTRAVENOUS at 11:59

## 2024-09-03 NOTE — NURSING NOTE
Medtronic device interrogation for MRI done by [facility]  Device set to MRI safe mode @85 bpm    MRI abdomen w/without contrast complete. Pt tolerated well.     VSS throughout scan. Pt alert and oriented on room air. No complaints or visible signs of distress.    Device reprogrammed to prior settings per Cardiology by Torito Pimentel.

## 2024-09-09 ENCOUNTER — TELEPHONE (OUTPATIENT)
Dept: SURGICAL ONCOLOGY | Facility: CLINIC | Age: 84
End: 2024-09-09

## 2024-09-09 NOTE — TELEPHONE ENCOUNTER
Patients daughter Lara called to go over MRI results. Per Lara she assist her father with all his medical issues due to language barrier. [place] and is not on the communication consent. Communication consent needs to be updated by office. Lara will like to discuss MRI results and see if patient needs to come in for a follow up appointment sooner than  12/5 with Radha. MRI was completed on 9/3. Patients primary number is daughter Lara.       Thank you!

## 2024-09-11 ENCOUNTER — REMOTE DEVICE CLINIC VISIT (OUTPATIENT)
Dept: CARDIOLOGY CLINIC | Facility: CLINIC | Age: 84
End: 2024-09-11
Payer: MEDICARE

## 2024-09-11 DIAGNOSIS — Z95.0 PRESENCE OF CARDIAC PACEMAKER: Primary | ICD-10-CM

## 2024-09-11 PROCEDURE — 93294 REM INTERROG EVL PM/LDLS PM: CPT | Performed by: INTERNAL MEDICINE

## 2024-09-11 PROCEDURE — 93296 REM INTERROG EVL PM/IDS: CPT | Performed by: INTERNAL MEDICINE

## 2024-09-11 NOTE — PROGRESS NOTES
MDT DUAL CHAMBER PPM  (MVP ON) - ACTIVE SYSTEM IS MRI CONDITIONAL   CARELINK TRANSMISSION: BATTERY VOLTAGE ADEQUATE (11.6 YR).  96.9%  <0.1% (AAIR-DDDR 70PPM). ALL AVAILABLE LEAD PARAMETERS WITHIN NORMAL LIMITS. NO SIGNIFICANT HIGH RATE EPISODES. PRESENTING EGM AP,VS, BIGEMINY PVC @ AVG 70 BPM. PVC COUNT INCREASED (533.6 SINGLE/HR PAST 7 DAYS). EF 60% (3/12/23 ECHO). HX: SAME & PATIENT TAKING ASA 81 MG, METOPROLOL SUCC. NORMAL DEVICE FUNCTION.   ES

## 2024-09-12 DIAGNOSIS — C22.0 HEPATOCELLULAR CARCINOMA (HCC): Primary | ICD-10-CM

## 2024-09-12 NOTE — TELEPHONE ENCOUNTER
Spoke to patient's daughter and arranged next 3 month f/u MRI for beginning of December. Appt with nurse practitioner may have to be pushed out. They will call central scheduling to arrange. She verbalized understanding and thanks.

## 2024-09-13 ENCOUNTER — OFFICE VISIT (OUTPATIENT)
Dept: CARDIOLOGY CLINIC | Facility: CLINIC | Age: 84
End: 2024-09-13
Payer: MEDICARE

## 2024-09-13 VITALS
HEART RATE: 80 BPM | SYSTOLIC BLOOD PRESSURE: 110 MMHG | WEIGHT: 134 LBS | DIASTOLIC BLOOD PRESSURE: 60 MMHG | BODY MASS INDEX: 23.74 KG/M2

## 2024-09-13 DIAGNOSIS — I49.3 PVC'S (PREMATURE VENTRICULAR CONTRACTIONS): Primary | ICD-10-CM

## 2024-09-13 PROCEDURE — 99214 OFFICE O/P EST MOD 30 MIN: CPT | Performed by: INTERNAL MEDICINE

## 2024-09-13 NOTE — PROGRESS NOTES
Cardiology             Yesi Mosher  1940  [id number]              Assessment/Plan:     Fatigue  Sick sinus syndrome status post Medtronic dual-chamber permanent pacemaker placed 10/24/2022  PVCs  Hypertension  Aortic valve stenosis status post TAVR 01/2020--mild-to-moderate prosthetic valve regurgitation on prior echocardiogram  Dyslipidemia  Hepatocellular carcinoma, following with hematology/oncology, status post SBRT, completed 11/22/2023             Patient continues to about fatigue was recorded low heart rates at home in the high 30s and low 40s.  His lower rate limit is set at 70 bpm on his pacemaker, therefore I suspect the low functional heart rates are due to frequent PVCs which has been noted before.  Dr. Physiology know reviewed from February at which time his metoprolol was adjusted and flecainide was considered.  Will refer back to electrophysiology for consideration of initiation of flecainide or considering PVC ablation.  Continue simvastatin for dyslipidemia  Continue aspirin for history of TAVR  Normal bioprosthetic aortic valve on prior echocardiogram                Follow-up with electrophysiology           Interval History:      This is an 84-year-old male with history of aortic stenosis status post transcatheter aortic valve placement 01/2020.  He also has dyslipidemia, mildly symptomatic PVCs, and asymptomatic sinus bradycardia.  He has been following Dr. Ferris in the past.     He was last seen by him 06/17/2022.  Prior to that appointment, he had a Zio monitor which revealed a minimum heart rate of 41 beats per minute without any pauses exceeding 3 seconds.  His dizziness statin more related to orthostasis.  Subsequently was seen by electrophysiology 07/07/2022.  It was thought that it some time he may need a permanent pacemaker without any obvious indication for the same at this time.  He did have brief runs of nonsustained atrial tachycardia on his Zio monitor.     He was  then seen by his PCP at which time his heart rate was 36 beats per minute with symptoms of fatigue.  Subsequent Zio monitor 08/19/2022 demonstrated minimal heart rate of 38 beats per minute with average heart rate 53 beats per minute.  There was a 15 beat atrial run with frequent PVCs up to 15.5% of total beats.  Patient triggers correlated with ventricular bigeminy with 1 trigger correlating with sinus rhythm.  Thereafter he saw electrophysiology recommended dual-chamber pacemaker placement.  On 10/24/22 underwent placement of a dual-chamber Medtronic permanent pacemaker.     He went to the ER 10/28/2022 with mild lightheadedness and dyspnea.  He states his heart rate was slow when he checked it at home and presented to the ER.  Device interrogation demonstrated a normally functioning device and it was thought that his PVCs were reporting a falsely low heart rate on his monitor.  Metoprolol 25 mg daily was initiated by electrophysiology for PVCs.     Echocardiogram 6/1/2023 demonstrates a normally functioning bioprosthetic aortic valve with normal left and ejection fraction and mild mitral vegetation.    He was last seen 6/2023 at which time he had complaints of fatigue and atypical chest pain.  Subsequent CMP, TSH, BNP, and CBC were unremarkable.  His platelets were low normal with a history of thrombocytopenia in 2022.      He was seen by our nurse practitioner 11/29/2023 at which time he had complaints of palpitations.  He was sent to electrophysiology for frequent PVCs.  Subsequent Zio monitor revealed 6.6% PVC burden with no evidence of dysrhythmia otherwise.    Repeat echocardiogram 3/12/2024 demonstrate left ejection fraction 60% with a normally functioning bioprosthetic aortic valve, mean gradient 10 mmHg, aortic valve area 1.76 cm², peak velocity 2.1, and dimensionless index of 0.48.    He was seen by our nurse practitioner 7/17/2024 with complaints of feeling woozy/intermittently lightheaded along with  feeling tired.  As he was in ventricular bigeminy, his metoprolol was increased back to 25 mg twice daily.  Adequate hydration was encouraged.    During his prior visit 8/9/2024 he had complaints of chronic fatigue and had concerns about low blood pressure readings.  Midodrine was started at 2.5 mg twice daily.    He presents today for follow-up.  He expresses concern about his fatigue and has recorded low heart rates at home in the high 30s and low 40s.  His systolic blood pressure readings have mostly been in the 1 teens and 120s.        Vitals:  Vitals:    09/13/24 1357   BP: 110/60   BP Location: Right arm   Patient Position: Sitting   Cuff Size: Adult   Pulse: 80   Weight: 60.8 kg (134 lb)         Past Medical History:   Diagnosis Date    BPH (benign prostatic hyperplasia)     CAD (coronary artery disease)     Diabetes mellitus (HCC)     type 2, diet controlled    Diastolic CHF (HCC)     Epilepsy (HCC)     History of mycosis fungoides     Hyperlipidemia     Hypertension     ILD (interstitial lung disease) ([facility])     Osteoporosis     Seizures ([facility])     partial sensory    Severe aortic stenosis      Social History     Socioeconomic History    Marital status: /Civil Union     Spouse name: Not on file    Number of children: Not on file    Years of education: Not on file    Highest education level: Not on file   Occupational History    Not on file   Tobacco Use    Smoking status: Never    Smokeless tobacco: Never   Vaping Use    Vaping status: Never Used   Substance and Sexual Activity    Alcohol use: Not Currently    Drug use: No    Sexual activity: Not Currently   Other Topics Concern    Not on file   Social History Narrative    Not on file     Social Determinants of Health     Financial Resource Strain: Not on file   Food Insecurity: Not on file   Transportation Needs: Not on file   Physical Activity: Not on file   Stress: Not on file   Social Connections: Not on file   Intimate Partner Violence: Not on  file   Housing Stability: Not on file      Family History   Problem Relation Age of Onset    Coronary artery disease Family     Heart disease Family      Past Surgical History:   Procedure Laterality Date    CARDIAC CATHETERIZATION      CARDIAC ELECTROPHYSIOLOGY PROCEDURE N/A 10/24/2022    Procedure: Cardiac pacer implant/ DUAL CHAMBER;  Surgeon: Daniel Bardales DO;  Location:  CARDIAC CATH LAB;  Service: Cardiology    COLONOSCOPY      IR BIOPSY LIVER MASS  10/9/2023    IN ECHO TRANSESOPHAG R-T 2D W/PRB IMG ACQUISJ I&R N/A 1/7/2020    Procedure: TRANSESOPHAGEAL ECHOCARDIOGRAM (POOL);  Surgeon: Tony Archer DO;  Location:  MAIN OR;  Service: Cardiac Surgery    IN REPLACE AORTIC VALVE OPENFEMORAL ARTERY APPROACH N/A 1/7/2020    Procedure: REPLACEMENT AORTIC VALVE TRANSCATHETER (TAVR) TRANSFEMORAL W/ 23 MM CARROLL KATHLEEN S3 VALVE (ACCESS ON LEFT);  Surgeon: Tony Archer DO;  Location:  MAIN OR;  Service: Cardiac Surgery    IN RPR 1ST INGUN HRNA AGE 5 YRS/> REDUCIBLE Right 11/16/2018    Procedure: REPAIR RIGHT INGUINAL HERNIA WITH MESH;  Surgeon: Pravin Elizabeth MD;  Location:  MAIN OR;  Service: General       Current Outpatient Medications:     amoxicillin (AMOXIL) 500 mg capsule, BEFORE DENTAL WORK, Disp: , Rfl: 3    ascorbic acid (VITAMIN C) 1000 MG tablet, Take 1,000 mg by mouth daily, Disp: , Rfl:     aspirin (ECOTRIN LOW STRENGTH) 81 mg EC tablet, Take 1 tablet (81 mg total) by mouth daily, Disp: , Rfl:     calcium-vitamin D (OSCAL 500 + D) 500 mg-200 units per tablet, Take 1 tablet by mouth daily, Disp: , Rfl:     cholecalciferol (VITAMIN D3) 400 units tablet, Take 2,000 Units by mouth daily, Disp: , Rfl:     Krill Oil 350 MG CAPS, Take 350 mg by mouth in the morning, Disp: , Rfl:     levETIRAcetam (KEPPRA) 250 mg tablet, Take 250 mg by mouth 2 (two) times a day, Disp: , Rfl:     metoprolol succinate (TOPROL-XL) 25 mg 24 hr tablet, TAKE 1 TABLET EVERY 12 HOURS (Patient taking differently: 25 mg  every 12 (twelve) hours One tablet am and 1/2 tablet pm), Disp: 180 tablet, Rfl: 1    midodrine (PROAMATINE) 2.5 mg tablet, Take 1 tablet (2.5 mg total) by mouth 3 (three) times a day before meals (Patient taking differently: Take 2.5 mg by mouth 2 (two) times a day), Disp: 90 tablet, Rfl: 3    Multiple Vitamin (DAILY VALUE MULTIVITAMIN PO), Take 1 tablet by mouth daily, Disp: , Rfl:     simvastatin (ZOCOR) 10 mg tablet, Take 10 mg by mouth daily at bedtime, Disp: , Rfl:     tamsulosin (FLOMAX) 0.4 mg, Take 0.4 mg by mouth daily at bedtime, Disp: , Rfl:     traZODone (DESYREL) 50 mg tablet, Take 25 mg by mouth daily at bedtime, Disp: , Rfl:     predniSONE 20 mg tablet, Take 10 mg by mouth (Patient not taking: Reported on 7/24/2024), Disp: , Rfl:         Review of Systems:  Review of Systems   Constitutional:  Positive for fatigue.   Respiratory: Negative.     Cardiovascular:  Positive for chest pain.   All other systems reviewed and are negative.        Physical Exam:  Physical Exam  Constitutional:       General: He is not in acute distress.     Appearance: He is well-developed. He is not diaphoretic.   HENT:      Head: Normocephalic and atraumatic.   Eyes:      General: No scleral icterus.        Right eye: No discharge.      Pupils: Pupils are equal, round, and reactive to light.   Neck:      Thyroid: No thyromegaly.   Cardiovascular:      Rate and Rhythm: Normal rate and regular rhythm.      Heart sounds: Normal heart sounds. No murmur heard.     No friction rub. No gallop.   Pulmonary:      Effort: Pulmonary effort is normal.      Breath sounds: Normal breath sounds.   Abdominal:      General: There is no distension.      Tenderness: There is no abdominal tenderness. There is no guarding or rebound.   Musculoskeletal:         General: Normal range of motion.      Cervical back: Normal range of motion and neck supple.      Right lower leg: No edema.      Left lower leg: No edema.   Skin:     General: Skin is  warm and dry.      Coloration: Skin is not pale.      Findings: No erythema or rash.   Neurological:      Mental Status: He is alert and oriented to person, place, and time.      Coordination: Coordination normal.      Gait: Gait abnormal.   Psychiatric:         Behavior: Behavior normal.         Thought Content: Thought content normal.         Judgment: Judgment normal.         This note was completed in part utilizing M-Modal Fluency Direct Software.  Grammatical errors, random word insertions, spelling mistakes, and incomplete sentences can be an occasional consequence of this system secondary to software limitations, ambient noise, and hardware issues.  If you have any questions or concerns about the content, text, or information contained within the body of this dictation, please contact the provider for clarification.

## 2024-10-07 ENCOUNTER — TELEPHONE (OUTPATIENT)
Dept: CARDIOLOGY CLINIC | Facility: CLINIC | Age: 84
End: 2024-10-07

## 2024-10-07 NOTE — TELEPHONE ENCOUNTER
Oj from Dr. Rosenberg's office called to see if we can pt in for a sooner appt than 11/13 with Dr. Kent. He c/o extreme fatigue, irregular HB, occasional dizziness and pulse currently dropping in the 30's bpm.

## 2024-10-07 NOTE — TELEPHONE ENCOUNTER
Pt does not wish to report to the ER, Oj stated pt is just feeling lousy and Dr. Rosenberg feels the pt shouldn't wait a whole month to be seen. I addressed Oj's concern stating that our docs have a very busy schedule and would inform her if we have anything sooner available.

## 2024-10-08 ENCOUNTER — APPOINTMENT (OUTPATIENT)
Dept: LAB | Facility: IMAGING CENTER | Age: 84
End: 2024-10-08
Payer: MEDICARE

## 2024-10-08 DIAGNOSIS — I49.3 PVC'S (PREMATURE VENTRICULAR CONTRACTIONS): Primary | ICD-10-CM

## 2024-10-08 DIAGNOSIS — R53.83 FATIGUE, UNSPECIFIED TYPE: ICD-10-CM

## 2024-10-08 DIAGNOSIS — E11.9 TYPE 2 DIABETES MELLITUS WITHOUT COMPLICATION, UNSPECIFIED WHETHER LONG TERM INSULIN USE (HCC): ICD-10-CM

## 2024-10-08 DIAGNOSIS — R06.09 DOE (DYSPNEA ON EXERTION): ICD-10-CM

## 2024-10-08 DIAGNOSIS — E78.5 HYPERLIPIDEMIA, UNSPECIFIED HYPERLIPIDEMIA TYPE: ICD-10-CM

## 2024-10-08 DIAGNOSIS — R53.83 OTHER FATIGUE: ICD-10-CM

## 2024-10-08 DIAGNOSIS — G40.909: ICD-10-CM

## 2024-10-08 LAB
ANION GAP SERPL CALCULATED.3IONS-SCNC: 8 MMOL/L (ref 4–13)
BASOPHILS # BLD AUTO: 0.04 THOUSANDS/ΜL (ref 0–0.1)
BASOPHILS NFR BLD AUTO: 1 % (ref 0–1)
BUN SERPL-MCNC: 18 MG/DL (ref 5–25)
CALCIUM SERPL-MCNC: 9.9 MG/DL (ref 8.4–10.2)
CHLORIDE SERPL-SCNC: 103 MMOL/L (ref 96–108)
CHOLEST SERPL-MCNC: 115 MG/DL
CO2 SERPL-SCNC: 25 MMOL/L (ref 21–32)
CREAT SERPL-MCNC: 0.96 MG/DL (ref 0.6–1.3)
CREAT UR-MCNC: 65.2 MG/DL
EOSINOPHIL # BLD AUTO: 1.2 THOUSAND/ΜL (ref 0–0.61)
EOSINOPHIL NFR BLD AUTO: 18 % (ref 0–6)
ERYTHROCYTE [DISTWIDTH] IN BLOOD BY AUTOMATED COUNT: 13.4 % (ref 11.6–15.1)
EST. AVERAGE GLUCOSE BLD GHB EST-MCNC: 134 MG/DL
GFR SERPL CREATININE-BSD FRML MDRD: 71 ML/MIN/1.73SQ M
GLUCOSE P FAST SERPL-MCNC: 112 MG/DL (ref 65–99)
HBA1C MFR BLD: 6.3 %
HCT VFR BLD AUTO: 44.7 % (ref 36.5–49.3)
HDLC SERPL-MCNC: 38 MG/DL
HGB BLD-MCNC: 14.1 G/DL (ref 12–17)
IMM GRANULOCYTES # BLD AUTO: 0.01 THOUSAND/UL (ref 0–0.2)
IMM GRANULOCYTES NFR BLD AUTO: 0 % (ref 0–2)
LDLC SERPL CALC-MCNC: 63 MG/DL (ref 0–100)
LEVETIRACETAM SERPL-MCNC: 10.3 UG/ML (ref 12–46)
LYMPHOCYTES # BLD AUTO: 1.63 THOUSANDS/ΜL (ref 0.6–4.47)
LYMPHOCYTES NFR BLD AUTO: 25 % (ref 14–44)
MCH RBC QN AUTO: 29.2 PG (ref 26.8–34.3)
MCHC RBC AUTO-ENTMCNC: 31.5 G/DL (ref 31.4–37.4)
MCV RBC AUTO: 93 FL (ref 82–98)
MICROALBUMIN UR-MCNC: 7.1 MG/L
MICROALBUMIN/CREAT 24H UR: 11 MG/G CREATININE (ref 0–30)
MONOCYTES # BLD AUTO: 0.72 THOUSAND/ΜL (ref 0.17–1.22)
MONOCYTES NFR BLD AUTO: 11 % (ref 4–12)
NEUTROPHILS # BLD AUTO: 2.96 THOUSANDS/ΜL (ref 1.85–7.62)
NEUTS SEG NFR BLD AUTO: 45 % (ref 43–75)
NONHDLC SERPL-MCNC: 77 MG/DL
NRBC BLD AUTO-RTO: 0 /100 WBCS
PLATELET # BLD AUTO: 106 THOUSANDS/UL (ref 149–390)
PLATELET BLD QL SMEAR: ABNORMAL
PMV BLD AUTO: 13.7 FL (ref 8.9–12.7)
POTASSIUM SERPL-SCNC: 4.6 MMOL/L (ref 3.5–5.3)
RBC # BLD AUTO: 4.83 MILLION/UL (ref 3.88–5.62)
RBC MORPH BLD: NORMAL
SODIUM SERPL-SCNC: 136 MMOL/L (ref 135–147)
TRIGL SERPL-MCNC: 69 MG/DL
TSH SERPL DL<=0.05 MIU/L-ACNC: 0.95 UIU/ML (ref 0.45–4.5)
WBC # BLD AUTO: 6.56 THOUSAND/UL (ref 4.31–10.16)

## 2024-10-08 PROCEDURE — 80061 LIPID PANEL: CPT

## 2024-10-08 PROCEDURE — 83036 HEMOGLOBIN GLYCOSYLATED A1C: CPT

## 2024-10-08 PROCEDURE — 84443 ASSAY THYROID STIM HORMONE: CPT

## 2024-10-08 PROCEDURE — 85025 COMPLETE CBC W/AUTO DIFF WBC: CPT

## 2024-10-08 PROCEDURE — 84403 ASSAY OF TOTAL TESTOSTERONE: CPT

## 2024-10-08 PROCEDURE — 84402 ASSAY OF FREE TESTOSTERONE: CPT

## 2024-10-08 PROCEDURE — 82570 ASSAY OF URINE CREATININE: CPT

## 2024-10-08 PROCEDURE — 80048 BASIC METABOLIC PNL TOTAL CA: CPT

## 2024-10-08 PROCEDURE — 82043 UR ALBUMIN QUANTITATIVE: CPT

## 2024-10-08 PROCEDURE — 36415 COLL VENOUS BLD VENIPUNCTURE: CPT

## 2024-10-08 PROCEDURE — 83520 IMMUNOASSAY QUANT NOS NONAB: CPT

## 2024-10-08 NOTE — PROGRESS NOTES
Spoke with Dr. Kent about this patient's case who is suggesting consideration of nuclear stress test and starting flecainide prior to electrophysiology appointment next month.  Patient has been feeling fatigue and PCP did reach out to Dr. Kent office about this.    Spoke with patient's daughter over the phone who states she is agreeable and feels that her father will be agreeable with a stress test as well.

## 2024-10-09 ENCOUNTER — TELEPHONE (OUTPATIENT)
Dept: CARDIOLOGY CLINIC | Facility: CLINIC | Age: 84
End: 2024-10-09

## 2024-10-09 LAB
TESTOST FREE SERPL-MCNC: 2.4 PG/ML (ref 6.6–18.1)
TESTOST SERPL-MCNC: 474 NG/DL (ref 264–916)

## 2024-10-10 ENCOUNTER — HOSPITAL ENCOUNTER (OUTPATIENT)
Dept: RADIOLOGY | Facility: IMAGING CENTER | Age: 84
End: 2024-10-10
Payer: MEDICARE

## 2024-10-10 DIAGNOSIS — M81.0 AGE-RELATED OSTEOPOROSIS WITHOUT CURRENT PATHOLOGICAL FRACTURE: ICD-10-CM

## 2024-10-10 PROCEDURE — 77080 DXA BONE DENSITY AXIAL: CPT

## 2024-10-16 ENCOUNTER — HOSPITAL ENCOUNTER (OUTPATIENT)
Dept: NON INVASIVE DIAGNOSTICS | Facility: CLINIC | Age: 84
Discharge: HOME | End: 2024-10-16
Payer: MEDICARE

## 2024-10-16 VITALS
HEART RATE: 78 BPM | HEIGHT: 63 IN | SYSTOLIC BLOOD PRESSURE: 120 MMHG | OXYGEN SATURATION: 96 % | DIASTOLIC BLOOD PRESSURE: 70 MMHG | WEIGHT: 134 LBS | BODY MASS INDEX: 23.74 KG/M2

## 2024-10-16 DIAGNOSIS — R06.09 DOE (DYSPNEA ON EXERTION): ICD-10-CM

## 2024-10-16 DIAGNOSIS — I49.3 PVC'S (PREMATURE VENTRICULAR CONTRACTIONS): ICD-10-CM

## 2024-10-16 DIAGNOSIS — R53.83 OTHER FATIGUE: ICD-10-CM

## 2024-10-16 LAB
CHEST PAIN STATEMENT: NORMAL
MAX DIASTOLIC BP: 70 MMHG
MAX PREDICTED HEART RATE: 135 BPM
PROTOCOL NAME: NORMAL
RATE PRESSURE PRODUCT: 9200
REASON FOR TERMINATION: NORMAL
SL CV REST NUCLEAR ISOTOPE DOSE: 10.68 MCI
SL CV STRESS NUCLEAR ISOTOPE DOSE: 31.6 MCI
SL CV STRESS RECOVERY BP: NORMAL MMHG
SL CV STRESS RECOVERY HR: 82 BPM
SL CV STRESS RECOVERY O2 SAT: 98 %
STRESS ANGINA INDEX: 0
STRESS BASELINE BP: NORMAL MMHG
STRESS BASELINE HR: 78 BPM
STRESS O2 SAT REST: 96 %
STRESS PEAK HR: 100 BPM
STRESS POST EXERCISE DUR MIN: 3 MIN
STRESS POST EXERCISE DUR SEC: 0 SEC
STRESS POST O2 SAT PEAK: 99 %
STRESS POST PEAK BP: 92 MMHG
STRESS POST PEAK HR: 100 BPM
STRESS POST PEAK SYSTOLIC BP: 120 MMHG
STRESS/REST PERFUSION RATIO: 1.04
TARGET HR FORMULA: NORMAL
TEST INDICATION: NORMAL

## 2024-10-16 PROCEDURE — 93016 CV STRESS TEST SUPVJ ONLY: CPT | Performed by: INTERNAL MEDICINE

## 2024-10-16 PROCEDURE — 78452 HT MUSCLE IMAGE SPECT MULT: CPT

## 2024-10-16 PROCEDURE — A9502 TC99M TETROFOSMIN: HCPCS

## 2024-10-16 PROCEDURE — 93018 CV STRESS TEST I&R ONLY: CPT | Performed by: INTERNAL MEDICINE

## 2024-10-16 PROCEDURE — 93017 CV STRESS TEST TRACING ONLY: CPT

## 2024-10-16 PROCEDURE — 78452 HT MUSCLE IMAGE SPECT MULT: CPT | Performed by: INTERNAL MEDICINE

## 2024-10-16 RX ORDER — REGADENOSON 0.08 MG/ML
0.4 INJECTION, SOLUTION INTRAVENOUS ONCE
Status: COMPLETED | OUTPATIENT
Start: 2024-10-16 | End: 2024-10-16

## 2024-10-16 RX ADMIN — REGADENOSON 0.4 MG: 0.08 INJECTION, SOLUTION INTRAVENOUS at 12:28

## 2024-10-21 ENCOUNTER — OFFICE VISIT (OUTPATIENT)
Dept: CARDIOLOGY CLINIC | Facility: CLINIC | Age: 84
End: 2024-10-21
Payer: MEDICARE

## 2024-10-21 VITALS
BODY MASS INDEX: 23.37 KG/M2 | HEART RATE: 78 BPM | DIASTOLIC BLOOD PRESSURE: 60 MMHG | SYSTOLIC BLOOD PRESSURE: 110 MMHG | WEIGHT: 131.9 LBS | HEIGHT: 63 IN

## 2024-10-21 DIAGNOSIS — I49.5 SICK SINUS SYNDROME (HCC): ICD-10-CM

## 2024-10-21 DIAGNOSIS — R00.1 BRADYCARDIA: ICD-10-CM

## 2024-10-21 DIAGNOSIS — R00.1 SINUS BRADYCARDIA: ICD-10-CM

## 2024-10-21 DIAGNOSIS — I49.1 PREMATURE ATRIAL CONTRACTIONS: ICD-10-CM

## 2024-10-21 DIAGNOSIS — Z95.0 PACEMAKER: ICD-10-CM

## 2024-10-21 DIAGNOSIS — I35.0 NONRHEUMATIC AORTIC VALVE STENOSIS: ICD-10-CM

## 2024-10-21 DIAGNOSIS — I49.3 PVC'S (PREMATURE VENTRICULAR CONTRACTIONS): Primary | ICD-10-CM

## 2024-10-21 DIAGNOSIS — I49.3 PVC (PREMATURE VENTRICULAR CONTRACTION): ICD-10-CM

## 2024-10-21 DIAGNOSIS — I25.10 CORONARY ARTERY DISEASE INVOLVING NATIVE CORONARY ARTERY OF NATIVE HEART WITHOUT ANGINA PECTORIS: ICD-10-CM

## 2024-10-21 PROCEDURE — 99214 OFFICE O/P EST MOD 30 MIN: CPT | Performed by: INTERNAL MEDICINE

## 2024-10-21 PROCEDURE — 93000 ELECTROCARDIOGRAM COMPLETE: CPT | Performed by: INTERNAL MEDICINE

## 2024-10-21 RX ORDER — FLECAINIDE ACETATE 50 MG/1
50 TABLET ORAL 2 TIMES DAILY
Qty: 180 TABLET | Refills: 3 | Status: SHIPPED | OUTPATIENT
Start: 2024-10-21

## 2024-10-21 NOTE — PROGRESS NOTES
EPS Consultation/New Patient Evaluation - Yesi Mosher 84 y.o. male MRN: [id number]           ASSESSMENT:  1. PVC's (premature ventricular contractions)  Ambulatory referral to Cardiac Electrophysiology    POCT ECG      2. PVC (premature ventricular contraction)        3. Sick sinus syndrome (HCC)        4. Coronary artery disease involving native coronary artery of native heart without angina pectoris        5. Sinus bradycardia        6. Nonrheumatic aortic valve stenosis                PLAN:  #1.  Ventricular bigeminy it is quite possible that his fatigue is caused by his PVCs.  I explained to him that his pulse is not actually 40 bpm but because of the pulse deficit related to PVCs this is likely why he has a low pulse registered on his blood pressure machine.  I explained to treatment options for the PVCs 1 antiarrhythmic therapy flecainide 50 mg twice daily or to ablation therapy.  Given he is 84 years old certainly a conservative approach would be better.  I am going to start him on flecainide 50 mg twice daily and because of his low blood pressure and fatigue am going to back down on his metoprolol to 12.5 mg twice daily.  He should continue on his midodrine as prescribed by Dr. Thao 2.5 mg twice daily    His pacemaker is working appropriately    In summary medication changes made today start flecainide 50 mg twice daily, decrease metoprolol to 12.5 mg twice daily continue midodrine as prescribed EKG in the office October 30    CC/HPI:   This patient is very fatigued and he believes his pacemaker may not be working appropriately because when he measures his heart rate he is frequently 40 bpm.  He has been started on midodrine for low blood pressure.  He has lots of PVCs presents today in ventricular bigeminy.  His main complaint is fatigue and shortness of breath he does not have it all the time.  It comes and goes but he has it a lot of the time.  Stress test done 10/16/2024 shows no blockage no new  "ischemia.  He is accompanied by his wife today and I spoke with his daughter on the phone          ROS   Positive for fatigue lightheadedness shortness of breath low blood pressure negative for chest discomfort all other 12 point review of systems negative for complaints today    Objective:     Vitals: Blood pressure 110/60, pulse 78, height 5' 3\" (1.6 m), weight 59.8 kg (131 lb 14.4 oz)., Body mass index is 23.37 kg/m².,        Physical Exam:    GEN: Yesi Mosher appears well, alert and oriented x 3, pleasant and cooperative   HEENT: pupils equal, round, and reactive to light; extraocular muscles intact  NECK: supple, no carotid bruits   HEART: regular rhythm, normal S1 and S2, no murmurs, clicks, gallops or rubs   LUNGS: clear to auscultation bilaterally; no wheezes, rales, or rhonchi   ABDOMEN: normal bowel sounds, soft, no tenderness, no distention  EXTREMITIES: peripheral pulses normal; no clubbing, cyanosis, or edema  NEURO: no focal findings   SKIN: normal without suspicious lesions on exposed skin    Medications:      Current Outpatient Medications:     amoxicillin (AMOXIL) 500 mg capsule, BEFORE DENTAL WORK, Disp: , Rfl: 3    ascorbic acid (VITAMIN C) 1000 MG tablet, Take 1,000 mg by mouth daily, Disp: , Rfl:     aspirin (ECOTRIN LOW STRENGTH) 81 mg EC tablet, Take 1 tablet (81 mg total) by mouth daily, Disp: , Rfl:     calcium-vitamin D (OSCAL 500 + D) 500 mg-200 units per tablet, Take 1 tablet by mouth daily, Disp: , Rfl:     cholecalciferol (VITAMIN D3) 400 units tablet, Take 2,000 Units by mouth daily, Disp: , Rfl:     Krill Oil 350 MG CAPS, Take 350 mg by mouth in the morning, Disp: , Rfl:     levETIRAcetam (KEPPRA) 250 mg tablet, Take 250 mg by mouth 2 (two) times a day, Disp: , Rfl:     metoprolol succinate (TOPROL-XL) 25 mg 24 hr tablet, TAKE 1 TABLET EVERY 12 HOURS (Patient taking differently: 25 mg every 12 (twelve) hours One tablet am and 1/2 tablet pm), Disp: 180 tablet, Rfl: 1    midodrine " (PROAMATINE) 2.5 mg tablet, Take 1 tablet (2.5 mg total) by mouth 3 (three) times a day before meals (Patient taking differently: Take 2.5 mg by mouth 2 (two) times a day), Disp: 90 tablet, Rfl: 3    Multiple Vitamin (DAILY VALUE MULTIVITAMIN PO), Take 1 tablet by mouth daily, Disp: , Rfl:     simvastatin (ZOCOR) 10 mg tablet, Take 10 mg by mouth daily at bedtime, Disp: , Rfl:     tamsulosin (FLOMAX) 0.4 mg, Take 0.4 mg by mouth daily at bedtime, Disp: , Rfl:     traZODone (DESYREL) 50 mg tablet, Take 25 mg by mouth daily at bedtime, Disp: , Rfl:     predniSONE 20 mg tablet, Take 10 mg by mouth (Patient not taking: Reported on 7/24/2024), Disp: , Rfl:      Family History   Problem Relation Age of Onset    Coronary artery disease Family     Heart disease Family      Social History     Socioeconomic History    Marital status: /Civil Union     Spouse name: Not on file    Number of children: Not on file    Years of education: Not on file    Highest education level: Not on file   Occupational History    Not on file   Tobacco Use    Smoking status: Never    Smokeless tobacco: Never   Vaping Use    Vaping status: Never Used   Substance and Sexual Activity    Alcohol use: Not Currently    Drug use: No    Sexual activity: Not Currently   Other Topics Concern    Not on file   Social History Narrative    Not on file     Social Determinants of Health     Financial Resource Strain: Not on file   Food Insecurity: Not on file   Transportation Needs: Not on file   Physical Activity: Not on file   Stress: Not on file   Social Connections: Not on file   Intimate Partner Violence: Not on file   Housing Stability: Not on file     Social History     Tobacco Use   Smoking Status Never   Smokeless Tobacco Never     Social History     Substance and Sexual Activity   Alcohol Use Not Currently       Labs & Results:  Below is the patient's most recent value for Albumin, ALT, AST, BUN, Calcium, Chloride, Cholesterol, CO2, Creatinine,  GFR, Glucose, HDL, Hematocrit, Hemoglobin, Hemoglobin A1C, LDL, Magnesium, Phosphorus, Platelets, Potassium, PSA, Sodium, Triglycerides, and WBC.   Lab Results   Component Value Date    ALT 24 2024    AST 26 2024    BUN 18 10/08/2024    CALCIUM 9.9 10/08/2024     10/08/2024    CO2 25 10/08/2024    CREATININE 0.96 10/08/2024    HDL 38 (L) 10/08/2024    HCT 44.7 10/08/2024    HGB 14.1 10/08/2024    HGBA1C 6.3 (H) 10/08/2024    MG 2.2 2023     (L) 10/08/2024    K 4.6 10/08/2024    TRIG 69 10/08/2024    WBC 6.56 10/08/2024     Note: for a comprehensive list of the patient's lab results, access the Results Review activity.            Cardiac testing:   Results for orders placed during the hospital encounter of 20    Echo complete with [place] [address]  [phone]    Transthoracic Echocardiogram  2D, M-mode, Doppler, and Color Doppler    Study date:  01-Dec-2020    Patient: ELSA RAIN  MR number: [id number]  Account number: [id number]  : 1940  Age: 80 years  Gender: Male  Status: Outpatient  Location: [facility]  Height: 62 in  Weight: 126.7 lb  BP: 112/ 60 mmHg    Indications: S/P TAVR    Diagnoses: Z95.2 - Presence of prosthetic heart valve    Sonographer:  MARLENA Christensen  Primary Physician:  Wilmer Rosenberg MD  Referring Physician:  JACOBO Dietz  Group:  [facility]  Interpreting Physician:  Ana Juarez MD    IMPRESSIONS:  Technical quality: Good  Cardiac rhythm: Sinus    1. Mildly increased left ventricular wall thickness, normal left ventricular systolic function, EF around 65%. Grade 1 diastolic dysfunction with elevated estimated left ventricular filling pressures.  2. Normal right ventricular size and systolic function.  3. Mild left atrial cavity enlargement.  4. Normal functioning bioprosthetic aortic valve with minimal  paravalvular and mild valvular regurgitation.  5. Mild plus mitral valve regurgitation.  6. Mild tricuspid valve regurgitation.  7. Possible mild pulmonary hypertension. Estimated RVSP/PASP is 37 mmHg.  8. No pericardial effusion.    Compared to report of previous echocardiogram from January 29, 2020 there is some progression of diastolic dysfunction and possible mild pulmonary hypertension is new.    SUMMARY    LEFT VENTRICLE:  Normal left ventricular cavity size, mildly increased wall thickness, normal left ventricular systolic function normal regional wall motion. Ejection fraction is estimated as around 65%. Doppler evaluation suggests grade 1 diastolic  dysfunction. Estimated left ventricular filling pressures are increased.    RIGHT VENTRICLE:  Normal right ventricular size and systolic function. Estimated right ventricular systolic pressure is increased, 37 mmHg. Possible mild pulmonary hypertension.    LEFT ATRIUM:  Mild left atrial cavity enlargement.    RIGHT ATRIUM:  Normal right atrial cavity size.    MITRAL VALVE:  Mitral valve leaflet sclerosis, adequate leaflet mobility. Mild-plus mitral valve regurgitation.    AORTIC VALVE:  Bioprosthetic aortic valve present. Valve appears to be well seated with no evidence of dehiscence. Doppler evaluation suggests normal prosthetic valve function. Peak trans prosthetic valve velocity is 2.94 m/sec, mean gradient is 15 mmHg  and calculated valve area is 1.0-1.1 cm2. Doppler velocity index is 0.53. There is minimal paravalvular and mild valvular regurgitation.    TRICUSPID VALVE:  Mild tricuspid valve regurgitation.    PULMONIC VALVE:  Trace pulmonic valve regurgitation.    AORTA:  Aortic root and proximal ascending aorta are normal in size on 2D imaging.    IVC, HEPATIC VEINS:  Inferior vena cava is normal in size and demonstrates appropriate respiratory phasic changes in diameter.    PERICARDIUM:  No pericardial effusion.    SUMMARY MEASUREMENTS  2D  measurements:  Unspecified Anatomy:   %FS was 35.5 %.  Ao Diam was 2.2 cm.  EDV(Teich) was 64.3 ml.  EF(Teich) was 65.7 %.  ESV(Teich) was 22.1 ml.  IVSd was 0.9 cm.  LA Area was 17.5 cm2.  LA Diam was 3.4 cm.  LVEDV MOD A4C was 126.6 ml.  LVEF MOD A4C  was 68.4 %.  LVESV MOD A4C was 40 ml.  LVIDd was 3.9 cm.  LVIDs was 2.5 cm.  LVLd A4C was 8.7 cm.  LVLs A4C was 7 cm.  LVOT Diam was 1.7 cm.  LVPWd was 0.9 cm.  RA Area was 14.7 cm2.  RVIDd was 3 cm.  SV MOD A4C was 86.6 ml.  SV(Teich) was  42.2 ml.  CW measurements:  Unspecified Anatomy:   AR Dec [place] was 2.7 m/s2.  AR Dec Time was 1675.7 ms.  AR PHT was 486 ms.  AR Vmax was 4.5 m/s.  AR maxPG was 80.6 mmHg.  AV Env.Ti was 398.2 ms.  AV VTI was 66.7 cm.  AV Vmax was 3 m/s.  AV Vmean was 1.7 m/s.  AV  maxPG was 34.8 mmHg.  AV meanPG was 14.7 mmHg.  TR Vmax was 2.6 m/s.  TR maxPG was 26.7 mmHg.  MM measurements:  Unspecified Anatomy:   TAPSE was 2.4 cm.  PW measurements:  Unspecified Anatomy:   SON (VTI) was 1.1 cm2.  SON Vmax was 1 cm2.  SON Vmax, Pt was 1 cm2.  AVAI (VTI) was 0 cm2/m2.  AVAI Vmax was 0 cm2/m2.  AVAI Vmax, Pt was 0 cm2/m2.  DVI was 0.5 .  E' Sept was 0.1 m/s.  E/E' Sept was 21.3 .  LVOT  Env.Ti was 345.4 ms.  LVOT VTI was 35.2 cm.  LVOT Vmax was 1.3 m/s.  LVOT Vmean was 1 m/s.  LVOT maxPG was 7.2 mmHg.  LVOT meanPG was 4.6 mmHg.  LVSI Dopp was 48.3 ml/m2.  LVSV Dopp was 76.3 ml.  MV A Jaime was 1 m/s.  MV Dec [place] was 4.9  m/s2.  MV DecT was 229.7 ms.  MV E Jaime was 1.1 m/s.  MV E/A Ratio was 1.2 .  MV PHT was 66.6 ms.  MVA By PHT was 3.3 cm2.    HISTORY: PRIOR HISTORY: Aortic stenosis, sinus bradycardia, PVC's, hyperlipidemia    PROCEDURE: The study was performed in the [facility] ECHO [facility]. This was a routine study. The transthoracic approach was used. The study included complete 2D imaging, M-mode, complete spectral Doppler, and color Doppler. The heart rate was 64 bpm, at  the start of the study. Images were obtained from the parasternal, apical, subcostal,  and suprasternal notch acoustic windows. Image quality was adequate.    LEFT VENTRICLE: Normal left ventricular cavity size, mildly increased wall thickness, normal left ventricular systolic function normal regional wall motion. Ejection fraction is estimated as around 65%. Doppler evaluation suggests grade 1  diastolic dysfunction. Estimated left ventricular filling pressures are increased.    RIGHT VENTRICLE: Normal right ventricular size and systolic function. Estimated right ventricular systolic pressure is increased, 37 mmHg. Possible mild pulmonary hypertension.    LEFT ATRIUM: Mild left atrial cavity enlargement.    RIGHT ATRIUM: Normal right atrial cavity size.    MITRAL VALVE: Mitral valve leaflet sclerosis, adequate leaflet mobility. Mild-plus mitral valve regurgitation.    AORTIC VALVE: Bioprosthetic aortic valve present. Valve appears to be well seated with no evidence of dehiscence. Doppler evaluation suggests normal prosthetic valve function. Peak trans prosthetic valve velocity is 2.94 m/sec, mean  gradient is 15 mmHg and calculated valve area is 1.0-1.1 cm2. Doppler velocity index is 0.53. There is minimal paravalvular and mild valvular regurgitation.    TRICUSPID VALVE: Mild tricuspid valve regurgitation.    PULMONIC VALVE: Trace pulmonic valve regurgitation.    PERICARDIUM: No pericardial effusion.    AORTA: Aortic root and proximal ascending aorta are normal in size on 2D imaging.    SYSTEMIC VEINS: IVC: Inferior vena cava is normal in size and demonstrates appropriate respiratory phasic changes in diameter.    SYSTEM MEASUREMENT TABLES    2D  %FS: 35.5 %  Ao Diam: 2.2 cm  EDV(Teich): 64.3 ml  EF(Teich): 65.7 %  ESV(Teich): 22.1 ml  IVSd: 0.9 cm  LA Area: 17.5 cm2  LA Diam: 3.4 cm  LVEDV MOD A4C: 126.6 ml  LVEF MOD A4C: 68.4 %  LVESV MOD A4C: 40 ml  LVIDd: 3.9 cm  LVIDs: 2.5 cm  LVLd A4C: 8.7 cm  LVLs A4C: 7 cm  LVOT Diam: 1.7 cm  LVPWd: 0.9 cm  RA Area: 14.7 cm2  RVIDd: 3 cm  SV MOD A4C: 86.6  ml  SV(Teich): 42.2 ml    CW  AR Dec [place]: 2.7 m/s2  AR Dec Time: 1675.7 ms  AR PHT: 486 ms  AR Vmax: 4.5 m/s  AR maxP.6 mmHg  AV Env.Ti: 398.2 ms  AV VTI: 66.7 cm  AV Vmax: 3 m/s  AV Vmean: 1.7 m/s  AV maxP.8 mmHg  AV meanP.7 mmHg  TR Vmax: 2.6 m/s  TR maxP.7 mmHg    MM  TAPSE: 2.4 cm    PW  SON (VTI): 1.1 cm2  SON Vmax: 1 cm2  SON Vmax, Pt: 1 cm2  AVAI (VTI): 0 cm2/m2  AVAI Vmax: 0 cm2/m2  AVAI Vmax, Pt: 0 cm2/m2  DVI: 0.5  E' Sept: 0.1 m/s  E/E' Sept: 21.3  LVOT Env.Ti: 345.4 ms  LVOT VTI: 35.2 cm  LVOT Vmax: 1.3 m/s  LVOT Vmean: 1 m/s  LVOT maxP.2 mmHg  LVOT meanP.6 mmHg  LVSI Dopp: 48.3 ml/m2  LVSV Dopp: 76.3 ml  MV A Jaime: 1 m/s  MV Dec [place]: 4.9 m/s2  MV DecT: 229.7 ms  MV E Jaime: 1.1 m/s  MV E/A Ratio: 1.2  MV PHT: 66.6 ms  MVA By PHT: 3.3 cm2    [facility] Accredited [facility]    Prepared and electronically signed by    Ana Juarez MD  Signed 01-Dec-2020 14:11:30    No results found for this or any previous visit.    No results found for this or any previous visit.    No results found for this or any previous visit.

## 2024-10-21 NOTE — PATIENT INSTRUCTIONS
Start flecainide 50 mg twice daily at 9am and 9 pm    Cut metoprolol back to 1/2 tablet in morning and 1/2 tablet at night    October 30th ecg

## 2024-11-01 ENCOUNTER — CLINICAL SUPPORT (OUTPATIENT)
Dept: CARDIOLOGY CLINIC | Facility: CLINIC | Age: 84
End: 2024-11-01
Payer: MEDICARE

## 2024-11-01 VITALS
DIASTOLIC BLOOD PRESSURE: 60 MMHG | BODY MASS INDEX: 23.92 KG/M2 | HEIGHT: 62 IN | WEIGHT: 130 LBS | HEART RATE: 80 BPM | SYSTOLIC BLOOD PRESSURE: 92 MMHG

## 2024-11-01 DIAGNOSIS — I49.3 PVC'S (PREMATURE VENTRICULAR CONTRACTIONS): Primary | ICD-10-CM

## 2024-11-01 PROCEDURE — 93000 ELECTROCARDIOGRAM COMPLETE: CPT

## 2024-11-01 PROCEDURE — RECHECK

## 2024-11-01 NOTE — PROGRESS NOTES
Patient presented for one week EKG post start of Flecainide for PVC's.      MEDS:  Flecainide 50mg bid and metoprolol succ 12.5mg bid    BP 92/60 (takes midodrine for hypotension)  HR 80 bpm    EKG reviewed by Swathi Syed PA-C - no PVC's seen today.      Patient instructed to continue current medical regimen.  We schedules spring f/u with Dr. Bardales per patient request.

## 2024-11-15 ENCOUNTER — TELEPHONE (OUTPATIENT)
Age: 84
End: 2024-11-15

## 2024-11-15 NOTE — TELEPHONE ENCOUNTER
FYI:  Dgtr calling in pt needing labs ordered for MRI sched for 12/3/24. Has f/u appt sched for 12/6/24.  Communication consent needs to be updated by office to have dgtr added.    Pls advise

## 2024-11-18 DIAGNOSIS — C22.0 HEPATOCELLULAR CARCINOMA (HCC): Primary | ICD-10-CM

## 2024-11-18 NOTE — TELEPHONE ENCOUNTER
Call to pt, spoke with tracey Bermudez informed that pt has lab orders in his file and he may go anytime now. Janestr appreciative of the call back.

## 2024-11-20 ENCOUNTER — APPOINTMENT (OUTPATIENT)
Dept: LAB | Facility: IMAGING CENTER | Age: 84
End: 2024-11-20
Payer: MEDICARE

## 2024-11-20 ENCOUNTER — HOSPITAL ENCOUNTER (OUTPATIENT)
Dept: RADIOLOGY | Facility: IMAGING CENTER | Age: 84
Discharge: HOME | End: 2024-11-20
Payer: MEDICARE

## 2024-11-20 DIAGNOSIS — Z95.0 PACEMAKER: ICD-10-CM

## 2024-11-20 DIAGNOSIS — C22.0 HEPATOCELLULAR CARCINOMA (HCC): ICD-10-CM

## 2024-11-20 LAB
ALBUMIN SERPL BCG-MCNC: 4 G/DL (ref 3.5–5)
ALP SERPL-CCNC: 48 U/L (ref 34–104)
ALT SERPL W P-5'-P-CCNC: 22 U/L (ref 7–52)
ANION GAP SERPL CALCULATED.3IONS-SCNC: 6 MMOL/L (ref 4–13)
AST SERPL W P-5'-P-CCNC: 27 U/L (ref 13–39)
BILIRUB SERPL-MCNC: 0.57 MG/DL (ref 0.2–1)
BUN SERPL-MCNC: 22 MG/DL (ref 5–25)
CALCIUM SERPL-MCNC: 10.1 MG/DL (ref 8.4–10.2)
CHLORIDE SERPL-SCNC: 103 MMOL/L (ref 96–108)
CO2 SERPL-SCNC: 28 MMOL/L (ref 21–32)
CREAT SERPL-MCNC: 0.91 MG/DL (ref 0.6–1.3)
GFR SERPL CREATININE-BSD FRML MDRD: 76 ML/MIN/1.73SQ M
GLUCOSE SERPL-MCNC: 87 MG/DL (ref 65–140)
POTASSIUM SERPL-SCNC: 4.6 MMOL/L (ref 3.5–5.3)
PROT SERPL-MCNC: 7.9 G/DL (ref 6.4–8.4)
SODIUM SERPL-SCNC: 137 MMOL/L (ref 135–147)

## 2024-11-20 PROCEDURE — 82107 ALPHA-FETOPROTEIN L3: CPT

## 2024-11-20 PROCEDURE — 36415 COLL VENOUS BLD VENIPUNCTURE: CPT

## 2024-11-20 PROCEDURE — 80053 COMPREHEN METABOLIC PANEL: CPT

## 2024-11-28 LAB
AFP L3 MFR SERPL: 8.3 % (ref 0–9.9)
AFP SERPL-MCNC: 8.3 NG/ML (ref 0–6.4)

## 2024-12-03 ENCOUNTER — HOSPITAL ENCOUNTER (OUTPATIENT)
Dept: RADIOLOGY | Facility: HOSPITAL | Age: 84
Discharge: HOME | End: 2024-12-03
Payer: MEDICARE

## 2024-12-03 DIAGNOSIS — C22.0 HEPATOCELLULAR CARCINOMA (HCC): ICD-10-CM

## 2024-12-03 PROCEDURE — 74183 MRI ABD W/O CNTR FLWD CNTR: CPT

## 2024-12-03 PROCEDURE — A9585 GADOBUTROL INJECTION: HCPCS | Performed by: SURGERY

## 2024-12-03 RX ORDER — GADOBUTROL 604.72 MG/ML
7 INJECTION INTRAVENOUS
Status: DISCONTINUED | OUTPATIENT
Start: 2024-12-03 | End: 2024-12-03

## 2024-12-03 RX ORDER — GADOBUTROL 604.72 MG/ML
6 INJECTION INTRAVENOUS
Status: COMPLETED | OUTPATIENT
Start: 2024-12-03 | End: 2024-12-03

## 2024-12-03 RX ADMIN — GADOBUTROL 6 ML: 604.72 INJECTION INTRAVENOUS at 11:33

## 2024-12-03 NOTE — NURSING NOTE
[employer] device interrogation for MRI done by BEKA Briones, MICHELLE clinical specialist. Device set to MRI safe mode @ 85 bpm    MRI abdomen w/without contrast complete. Pt tolerated well.    VSS throughout scan. Pt alert and oriented on room air no complaints or visible signs of distress.  Device reprogrammed to prior settings per Cardiology by BEKA Briones RN.

## 2024-12-06 ENCOUNTER — OFFICE VISIT (OUTPATIENT)
Dept: SURGICAL ONCOLOGY | Facility: CLINIC | Age: 84
End: 2024-12-06
Payer: MEDICARE

## 2024-12-06 VITALS
WEIGHT: 125 LBS | DIASTOLIC BLOOD PRESSURE: 74 MMHG | SYSTOLIC BLOOD PRESSURE: 118 MMHG | TEMPERATURE: 97.2 F | HEART RATE: 86 BPM | BODY MASS INDEX: 23 KG/M2 | HEIGHT: 62 IN | OXYGEN SATURATION: 98 %

## 2024-12-06 DIAGNOSIS — C22.0 HEPATOCELLULAR CARCINOMA (HCC): Primary | ICD-10-CM

## 2024-12-06 PROCEDURE — 99214 OFFICE O/P EST MOD 30 MIN: CPT | Performed by: SURGERY

## 2024-12-06 NOTE — LETTER
December 6, 2024     No Recipients    Patient: Yesi Mosher   YOB: 1940   Date of Visit: 12/6/2024       Dear Dr. Ching Recipients:    Thank you for referring Yesi Mosher to me for evaluation. Below are my notes for this consultation.    If you have questions, please do not hesitate to call me. I look forward to following your patient along with you.         Sincerely,        Yahir Rios MD        CC: No Recipients    Yahir Rios MD  12/6/2024  9:11 AM  Sign when Signing Visit     Surgical Oncology Follow Up       [address]   CANCER [facility] SURGICAL ONCOLOGY [place]  [address]  [facility] [address]    Yesi Mosher  1940  [id number]  [address]  [facility]  [address] [facility] [address]    Chief Complaint   Patient presents with   • Follow-up       Assessment/Plan:    No problem-specific Assessment & Plan notes found for this encounter.       Diagnoses and all orders for this visit:    Hepatocellular carcinoma (HCC)      Advance Care Planning/Advance Directives:  Discussed disease status, cancer treatment plans and/or cancer treatment goals with the patient.     Oncology History   Hepatocellular carcinoma (HCC)   8/22/2023 -  Cancer Staged    Staging form: Liver (Excluding Intrahepatic Bile Ducts), AJCC 8th Edition  - Clinical stage from 8/22/2023: cT1b, cN0 - Signed by Shailesh Henao MD on 10/24/2023  Stage prefix: Initial diagnosis       10/9/2023 Biopsy    Liver, lesion, needle core biopsy:  - Moderately differentiated hepatocellular carcinoma.      11/10/2023 - 11/22/2023 Radiation    Treatment:  Course: C1 SBRT    Plan ID Energy Fractions Dose per Fraction (cGy) Dose Correction (cGy) Total Dose Delivered (cGy) Elapsed Days   SBRT Liver MT 6X-FFF 5 / 5 700 0 3,500 12      Treatment dates:  C1 SBRT: 11/10/2023 - 11/22/2023         History of Present Illness: 84-year-old man status post SBRT  to segment 4 liver cancer.  -Interval History: For follow-up visit.  No new GI complaints to report.    Review of Systems:  Review of Systems   Constitutional:  Positive for fatigue.   HENT: Negative.     Eyes: Negative.    Respiratory: Negative.     Cardiovascular: Negative.    Gastrointestinal:  Positive for diarrhea.   Endocrine: Negative.    Genitourinary: Negative.    Musculoskeletal: Negative.    Skin: Negative.    Allergic/Immunologic: Negative.    Neurological: Negative.    Hematological: Negative.    Psychiatric/Behavioral: Negative.     All other systems reviewed and are negative.      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HCC)   • Right inguinal hernia   • Nonrheumatic aortic valve stenosis   • Palpitations   • Sinus bradycardia   • S/P TAVR (transcatheter aortic valve replacement)   • Thrombocytopenia (HCC)   • Hyperchloremia   • Mixed hyperlipidemia   • Encounter for postoperative care   • PVC's (premature ventricular contractions)   • Premature atrial contractions   • Dizziness   • Hyperlipidemia   • Bradycardia   • PVC (premature ventricular contraction)   • CAD (coronary artery disease)   • Diabetes mellitus, type 2 (HCC)   • Hepatocellular carcinoma (HCC)   • Pacemaker   • Sick sinus syndrome (HCC)     Past Medical History:   Diagnosis Date   • BPH (benign prostatic hyperplasia)    • CAD (coronary artery disease)    • Diabetes mellitus (HCC)     type 2, diet controlled   • Diastolic CHF (HCC)    • Epilepsy (HCC)    • History of mycosis fungoides    • Hyperlipidemia    • Hypertension    • ILD (interstitial lung disease) (HCC)    • Osteoporosis    • Seizures (HCC)     partial sensory   • Severe aortic stenosis      Past Surgical History:   Procedure Laterality Date   • CARDIAC CATHETERIZATION     • CARDIAC ELECTROPHYSIOLOGY PROCEDURE N/A 10/24/2022    Procedure: Cardiac pacer implant/ DUAL CHAMBER;   Surgeon: Daniel Bardales DO;  Location: [facility] CARD[facility] C[facility];  Service: Cardiology   • COLONOSCOPY     • IR BIOPSY LIVER MASS  10/9/2023   • AZ ECHO TRANSESOPHAG R-T 2D W/PRB IMG ACQUISJ I&R N/A 1/7/2020    Procedure: TRANSESOPHAGEAL ECHOCARDIOGRAM (POOL);  Surgeon: Tony Archer DO;  Location:  MAIN OR;  Service: Cardiac Surgery   • AZ REPLACE AORTIC VALVE OPENFEMORAL ARTERY APPROACH N/A 1/7/2020    Procedure: REPLACEMENT AORTIC VALVE TRANSCATHETER (TAVR) TRANSFEMORAL W/ 23 MM CARROLL KATHLEEN S3 VALVE (ACCESS ON LEFT);  Surgeon: Tony Archer DO;  Location:  MAIN OR;  Service: Cardiac Surgery   • AZ RPR 1ST INGUN HRNA AGE 5 YRS/> REDUCIBLE Right 11/16/2018    Procedure: REPAIR RIGHT INGUINAL HERNIA WITH MESH;  Surgeon: Pravin Elizabeth MD;  Location:  MAIN OR;  Service: General     Family History   Problem Relation Age of Onset   • Coronary artery disease Family    • Heart disease Family      Social History     Socioeconomic History   • Marital status: /Civil Union     Spouse name: Not on file   • Number of children: Not on file   • Years of education: Not on file   • Highest education level: Not on file   Occupational History   • Not on file   Tobacco Use   • Smoking status: Never   • Smokeless tobacco: Never   Vaping Use   • Vaping status: Never Used   Substance and Sexual Activity   • Alcohol use: Not Currently   • Drug use: No   • Sexual activity: Not Currently   Other Topics Concern   • Not on file   Social History Narrative   • Not on file     Social Drivers of Health     Financial Resource Strain: Not on file   Food Insecurity: Not on file   Transportation Needs: Not on file   Physical Activity: Not on file   Stress: Not on file   Social Connections: Not on file   Intimate Partner Violence: Not on file   Housing Stability: Not on file       Current Outpatient Medications:   •  amoxicillin (AMOXIL) 500 mg capsule, BEFORE DENTAL WORK (Patient taking differently: if needed BEFORE DENTAL WORK),  Disp: , Rfl: 3  •  ascorbic acid (VITAMIN C) 1000 MG tablet, Take 1,000 mg by mouth daily, Disp: , Rfl:   •  aspirin (ECOTRIN LOW STRENGTH) 81 mg EC tablet, Take 1 tablet (81 mg total) by mouth daily, Disp: , Rfl:   •  calcium-vitamin D (OSCAL 500 + D) 500 mg-200 units per tablet, Take 1 tablet by mouth daily, Disp: , Rfl:   •  cholecalciferol (VITAMIN D3) 400 units tablet, Take 2,000 Units by mouth daily, Disp: , Rfl:   •  flecainide (TAMBOCOR) 50 mg tablet, Take 1 tablet (50 mg total) by mouth 2 (two) times a day, Disp: 180 tablet, Rfl: 3  •  Krill Oil 350 MG CAPS, Take 350 mg by mouth in the morning, Disp: , Rfl:   •  levETIRAcetam (KEPPRA) 250 mg tablet, Take 250 mg by mouth 2 (two) times a day, Disp: , Rfl:   •  metoprolol succinate (TOPROL-XL) 25 mg 24 hr tablet, TAKE 1 TABLET EVERY 12 HOURS (Patient taking differently: 12.5 mg every 12 (twelve) hours Take half tablet of 25mg twice daily), Disp: 180 tablet, Rfl: 1  •  midodrine (PROAMATINE) 2.5 mg tablet, Take 1 tablet (2.5 mg total) by mouth 3 (three) times a day before meals (Patient taking differently: Take 2.5 mg by mouth 2 (two) times a day), Disp: 90 tablet, Rfl: 3  •  Multiple Vitamin (DAILY VALUE MULTIVITAMIN PO), Take 1 tablet by mouth daily, Disp: , Rfl:   •  simvastatin (ZOCOR) 10 mg tablet, Take 10 mg by mouth daily at bedtime, Disp: , Rfl:   •  tamsulosin (FLOMAX) 0.4 mg, Take 0.4 mg by mouth daily at bedtime, Disp: , Rfl:   •  traZODone (DESYREL) 50 mg tablet, Take 25 mg by mouth daily at bedtime, Disp: , Rfl:   •  predniSONE 20 mg tablet, Take 10 mg by mouth (Patient not taking: Reported on 7/24/2024), Disp: , Rfl:   Allergies   Allergen Reactions   • Escitalopram Dizziness   • Metformin Diarrhea   • Sertraline Other (See Comments)     weakness   • Duloxetine Other (See Comments)     Restless leg     Vitals:    12/06/24 0823   BP: 118/74   Pulse: 86   Temp: (!) 97.2 °F (36.2 °C)   SpO2: 98%       Physical Exam  Vitals reviewed.   Constitutional:        Appearance: Normal appearance.   HENT:      Head: Normocephalic and atraumatic.      Nose: Nose normal.   Eyes:      Extraocular Movements: Extraocular movements intact.      Pupils: Pupils are equal, round, and reactive to light.   Cardiovascular:      Rate and Rhythm: Normal rate and regular rhythm.      Heart sounds: Normal heart sounds.   Pulmonary:      Effort: Pulmonary effort is normal.      Breath sounds: Normal breath sounds.   Abdominal:      General: Abdomen is flat. There is no distension.      Palpations: Abdomen is soft. There is no mass.      Tenderness: There is no abdominal tenderness. There is no guarding or rebound.      Hernia: No hernia is present.   Musculoskeletal:         General: Normal range of motion.      Cervical back: Normal range of motion and neck supple.   Skin:     General: Skin is warm and dry.   Neurological:      General: No focal deficit present.      Mental Status: He is alert and oriented to person, place, and time.   Psychiatric:         Mood and Affect: Mood normal.         Behavior: Behavior normal.           Results:  Labs:         Component  Ref Range & Units (hover) 11/20/24 10:44 AM 8/20/24  1:43 PM 5/7/24  7:37 AM 2/22/24  8:14 AM   AFP L3% 8.3 5.0 5.2 4.8   Alpha-Fetoprotein 8.3 High              Imaging  MRI abdomen w wo contrast  Result Date: 12/5/2024  Narrative: MRI - ABDOMEN - WITH AND WITHOUT CONTRAST INDICATION: 84 years / Male. C22.0: Liver cell carcinoma. COMPARISON: Numerous prior studies, most recent is MRI abdomen 9/3/2024. TECHNIQUE: Multiplanar/multisequence MRI of the abdomen was performed before and after administration of contrast. IV Contrast: 6 mL of Gadobutrol injection (SINGLE-DOSE) DIAGNOSTIC QUALITY: Severely motion limited examination. FINDINGS: LOWER CHEST: Unremarkable. LIVER: Cirrhotic morphology. Treated lesion: 1 Location: Segment 4A Pretreatment category: Biopsy-proven hepatocellular carcinoma. Type of most recent treatment:  Stereotactic body radiation therapy (SBRT.) (Radiation treatment.) Date of most recent treatment: 11/12/2023. Size of treatment zone: 3.2 cm (#14/27.). Unchanged. Masslike enhancement: Persistent but decreased masslike enhancement at the treatment site #11/31 and #38419/31. Diffusion restriction: None Mild-Moderate T2 hyperintensity: None LR-TR category: LR-TR Nonviable. Management recommendation: Continue monitoring in approximately 3 months. Multidisciplinary conference discussion in unusual/complex cases. Patent hepatic and portal veins. BILE DUCTS: No intrahepatic or extrahepatic bile duct dilation. Common bile duct is normal in caliber. No choledocholithiasis, biliary stricture or suspicious mass. GALLBLADDER: Normal. PANCREAS: Unremarkable. ADRENAL GLANDS: Unremarkable. SPLEEN: Normal. KIDNEYS/PROXIMAL URETERS: No hydroureteronephrosis. No suspicious renal mass. BOWEL: No dilated loops of bowel. PERITONEUM/RETROPERITONEUM: No ascites. LYMPH NODES: No abdominal lymphadenopathy. VESSELS: No aneurysm. ABDOMINAL WALL: Unremarkable BONES: No suspicious osseous lesion.     Impression: Very motion limited exam Unchanged appearance of the segment 4A treatment site LI- RADS LR TR nonprogressing. No new observations. Workstation performed: [id number]     XR follow up  Result Date: 12/3/2024  Narrative: CHEST INDICATION: Pacemaker placement. COMPARISON: None VIEWS:  Frontal and lateral projections; 2 images FINDINGS: Left chest wall pacemaker is present. TAVR is noted. The cardiomediastinal silhouette is unremarkable. The lungs are clear. No pleural effusions. Osseous structures are age appropriate.     Impression: No active pulmonary disease. Workstation performed: [id number]     I reviewed the above laboratory and imaging data.    Discussion/Summary: 84-year-old man, HCC status post SBRT.  Stable disease.  Follow-up MRI in 3 months.  He will be going to [place] for next 6 months however.  Will plan on seeing him  when he returns from his trip.

## 2024-12-06 NOTE — PROGRESS NOTES
Surgical Oncology Follow Up       [address]  [facility]  [address] [facility] [address]    Yesi Mosher  1940  [id number]  [address]  [facility]  [address] [place] [address]    Chief Complaint   Patient presents with    Follow-up       Assessment/Plan:    No problem-specific Assessment & Plan notes found for this encounter.       Diagnoses and all orders for this visit:    Hepatocellular carcinoma (HCC)      Advance Care Planning/Advance Directives:  Discussed disease status, cancer treatment plans and/or cancer treatment goals with the patient.     Oncology History   Hepatocellular carcinoma (HCC)   8/22/2023 -  Cancer Staged    Staging form: Liver (Excluding Intrahepatic Bile Ducts), AJCC 8th Edition  - Clinical stage from 8/22/2023: cT1b, cN0 - Signed by Shailesh Henao MD on 10/24/2023  Stage prefix: Initial diagnosis       10/9/2023 Biopsy    Liver, lesion, needle core biopsy:  - Moderately differentiated hepatocellular carcinoma.      11/10/2023 - 11/22/2023 Radiation    Treatment:  Course: C1 SBRT    Plan ID Energy Fractions Dose per Fraction (cGy) Dose Correction (cGy) Total Dose Delivered (cGy) Elapsed Days   SBRT Liver MT 6X-FFF 5 / 5 700 0 3,500 12      Treatment dates:  C1 SBRT: 11/10/2023 - 11/22/2023         History of Present Illness: 84-year-old man status post SBRT to segment 4 liver cancer.  -Interval History: For follow-up visit.  No new GI complaints to report.    Review of Systems:  Review of Systems   Constitutional:  Positive for fatigue.   HENT: Negative.     Eyes: Negative.    Respiratory: Negative.     Cardiovascular: Negative.    Gastrointestinal:  Positive for diarrhea.   Endocrine: Negative.    Genitourinary: Negative.    Musculoskeletal: Negative.    Skin: Negative.    Allergic/Immunologic: Negative.    Neurological: Negative.    Hematological: Negative.     Psychiatric/Behavioral: Negative.     All other systems reviewed and are negative.      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HCC)    Right inguinal hernia    Nonrheumatic aortic valve stenosis    Palpitations    Sinus bradycardia    S/P TAVR (transcatheter aortic valve replacement)    Thrombocytopenia (HCC)    Hyperchloremia    Mixed hyperlipidemia    Encounter for postoperative care    PVC's (premature ventricular contractions)    Premature atrial contractions    Dizziness    Hyperlipidemia    Bradycardia    PVC (premature ventricular contraction)    CAD (coronary artery disease)    Diabetes mellitus, type 2 (HCC)    Hepatocellular carcinoma (HCC)    Pacemaker    Sick sinus syndrome (HCC)     Past Medical History:   Diagnosis Date    BPH (benign prostatic hyperplasia)     CAD (coronary artery disease)     Diabetes mellitus (HCC)     type 2, diet controlled    Diastolic CHF (HCC)     Epilepsy (HCC)     History of mycosis fungoides     Hyperlipidemia     Hypertension     ILD (interstitial lung disease) (HCC)     Osteoporosis     Seizures (HCC)     partial sensory    Severe aortic stenosis      Past Surgical History:   Procedure Laterality Date    CARDIAC CATHETERIZATION      CARDIAC ELECTROPHYSIOLOGY PROCEDURE N/A 10/24/2022    Procedure: Cardiac pacer implant/ DUAL CHAMBER;  Surgeon: Daniel Bardales DO;  Location: [facility] CARD[facility] CATH LAB;  Service: Cardiology    COLONOSCOPY      IR BIOPSY LIVER MASS  10/9/2023    AZ ECHO TRANSESOPHAG R-T 2D W/PRB IMG ACQUISJ I&R N/A 1/7/2020    Procedure: TRANSESOPHAGEAL ECHOCARDIOGRAM (POOL);  Surgeon: Tony Archer DO;  Location: [facility] MAIN OR;  Service: Cardiac Surgery    AZ REPLACE AORTIC VALVE OPENFEMORAL ARTERY APPROACH N/A 1/7/2020    Procedure: REPLACEMENT AORTIC VALVE TRANSCATHETER (TAVR) TRANSFEMORAL W/ 23 MM CARROLL KATHLEEN S3 VALVE (ACCESS ON LEFT);  Surgeon: Tony Archer DO;   Location:  MAIN OR;  Service: Cardiac Surgery    IA RPR 1ST INGUN HRNA AGE 5 YRS/> REDUCIBLE Right 11/16/2018    Procedure: REPAIR RIGHT INGUINAL HERNIA WITH MESH;  Surgeon: Pravin Elizabeth MD;  Location:  MAIN OR;  Service: General     Family History   Problem Relation Age of Onset    Coronary artery disease Family     Heart disease Family      Social History     Socioeconomic History    Marital status: /Civil Union     Spouse name: Not on file    Number of children: Not on file    Years of education: Not on file    Highest education level: Not on file   Occupational History    Not on file   Tobacco Use    Smoking status: Never    Smokeless tobacco: Never   Vaping Use    Vaping status: Never Used   Substance and Sexual Activity    Alcohol use: Not Currently    Drug use: No    Sexual activity: Not Currently   Other Topics Concern    Not on file   Social History Narrative    Not on file     Social Drivers of Health     Financial Resource Strain: Not on file   Food Insecurity: Not on file   Transportation Needs: Not on file   Physical Activity: Not on file   Stress: Not on file   Social Connections: Not on file   Intimate Partner Violence: Not on file   Housing Stability: Not on file       Current Outpatient Medications:     amoxicillin (AMOXIL) 500 mg capsule, BEFORE DENTAL WORK (Patient taking differently: if needed BEFORE DENTAL WORK), Disp: , Rfl: 3    ascorbic acid (VITAMIN C) 1000 MG tablet, Take 1,000 mg by mouth daily, Disp: , Rfl:     aspirin (ECOTRIN LOW STRENGTH) 81 mg EC tablet, Take 1 tablet (81 mg total) by mouth daily, Disp: , Rfl:     calcium-vitamin D (OSCAL 500 + D) 500 mg-200 units per tablet, Take 1 tablet by mouth daily, Disp: , Rfl:     cholecalciferol (VITAMIN D3) 400 units tablet, Take 2,000 Units by mouth daily, Disp: , Rfl:     flecainide (TAMBOCOR) 50 mg tablet, Take 1 tablet (50 mg total) by mouth 2 (two) times a day, Disp: 180 tablet, Rfl: 3    Krill Oil 350 MG CAPS, Take 350 mg  by mouth in the morning, Disp: , Rfl:     levETIRAcetam (KEPPRA) 250 mg tablet, Take 250 mg by mouth 2 (two) times a day, Disp: , Rfl:     metoprolol succinate (TOPROL-XL) 25 mg 24 hr tablet, TAKE 1 TABLET EVERY 12 HOURS (Patient taking differently: 12.5 mg every 12 (twelve) hours Take half tablet of 25mg twice daily), Disp: 180 tablet, Rfl: 1    midodrine (PROAMATINE) 2.5 mg tablet, Take 1 tablet (2.5 mg total) by mouth 3 (three) times a day before meals (Patient taking differently: Take 2.5 mg by mouth 2 (two) times a day), Disp: 90 tablet, Rfl: 3    Multiple Vitamin (DAILY VALUE MULTIVITAMIN PO), Take 1 tablet by mouth daily, Disp: , Rfl:     simvastatin (ZOCOR) 10 mg tablet, Take 10 mg by mouth daily at bedtime, Disp: , Rfl:     tamsulosin (FLOMAX) 0.4 mg, Take 0.4 mg by mouth daily at bedtime, Disp: , Rfl:     traZODone (DESYREL) 50 mg tablet, Take 25 mg by mouth daily at bedtime, Disp: , Rfl:     predniSONE 20 mg tablet, Take 10 mg by mouth (Patient not taking: Reported on 7/24/2024), Disp: , Rfl:   Allergies   Allergen Reactions    Escitalopram Dizziness    Metformin Diarrhea    Sertraline Other (See Comments)     weakness    Duloxetine Other (See Comments)     Restless leg     Vitals:    12/06/24 0823   BP: 118/74   Pulse: 86   Temp: (!) 97.2 °F (36.2 °C)   SpO2: 98%       Physical Exam  Vitals reviewed.   Constitutional:       Appearance: Normal appearance.   HENT:      Head: Normocephalic and atraumatic.      Nose: Nose normal.   Eyes:      Extraocular Movements: Extraocular movements intact.      Pupils: Pupils are equal, round, and reactive to light.   Cardiovascular:      Rate and Rhythm: Normal rate and regular rhythm.      Heart sounds: Normal heart sounds.   Pulmonary:      Effort: Pulmonary effort is normal.      Breath sounds: Normal breath sounds.   Abdominal:      General: Abdomen is flat. There is no distension.      Palpations: Abdomen is soft. There is no mass.      Tenderness: There is no  abdominal tenderness. There is no guarding or rebound.      Hernia: No hernia is present.   Musculoskeletal:         General: Normal range of motion.      Cervical back: Normal range of motion and neck supple.   Skin:     General: Skin is warm and dry.   Neurological:      General: No focal deficit present.      Mental Status: He is alert and oriented to person, place, and time.   Psychiatric:         Mood and Affect: Mood normal.         Behavior: Behavior normal.           Results:  Labs:         Component  Ref Range & Units (hover) 11/20/24 10:44 AM 8/20/24  1:43 PM 5/7/24  7:37 AM 2/22/24  8:14 AM   AFP L3% 8.3 5.0 5.2 4.8   Alpha-Fetoprotein 8.3 High              Imaging  MRI abdomen w wo contrast  Result Date: 12/5/2024  Narrative: MRI - ABDOMEN - WITH AND WITHOUT CONTRAST INDICATION: 84 years / Male. C22.0: Liver cell carcinoma. COMPARISON: Numerous prior studies, most recent is MRI abdomen 9/3/2024. TECHNIQUE: Multiplanar/multisequence MRI of the abdomen was performed before and after administration of contrast. IV Contrast: 6 mL of Gadobutrol injection (SINGLE-DOSE) DIAGNOSTIC QUALITY: Severely motion limited examination. FINDINGS: LOWER CHEST: Unremarkable. LIVER: Cirrhotic morphology. Treated lesion: 1 Location: Segment 4A Pretreatment category: Biopsy-proven hepatocellular carcinoma. Type of most recent treatment: Stereotactic body radiation therapy (SBRT.) (Radiation treatment.) Date of most recent treatment: 11/12/2023. Size of treatment zone: 3.2 cm (#14/27.). Unchanged. Masslike enhancement: Persistent but decreased masslike enhancement at the treatment site #11/31 and #58780/31. Diffusion restriction: None Mild-Moderate T2 hyperintensity: None LR-TR category: LR-TR Nonviable. Management recommendation: Continue monitoring in approximately 3 months. Multidisciplinary conference discussion in unusual/complex cases. Patent hepatic and portal veins. BILE DUCTS: No intrahepatic or extrahepatic bile duct  dilation. Common bile duct is normal in caliber. No choledocholithiasis, biliary stricture or suspicious mass. GALLBLADDER: Normal. PANCREAS: Unremarkable. ADRENAL GLANDS: Unremarkable. SPLEEN: Normal. KIDNEYS/PROXIMAL URETERS: No hydroureteronephrosis. No suspicious renal mass. BOWEL: No dilated loops of bowel. PERITONEUM/RETROPERITONEUM: No ascites. LYMPH NODES: No abdominal lymphadenopathy. VESSELS: No aneurysm. ABDOMINAL WALL: Unremarkable BONES: No suspicious osseous lesion.     Impression: Very motion limited exam Unchanged appearance of the segment 4A treatment site LI- RADS LR TR nonprogressing. No new observations. Workstation performed: [id number]     XR follow up  Result Date: 12/3/2024  Narrative: CHEST INDICATION: Pacemaker placement. COMPARISON: None VIEWS:  Frontal and lateral projections; 2 images FINDINGS: Left chest wall pacemaker is present. TAVR is noted. The cardiomediastinal silhouette is unremarkable. The lungs are clear. No pleural effusions. Osseous structures are age appropriate.     Impression: No active pulmonary disease. Workstation performed: [id number]     I reviewed the above laboratory and imaging data.    Discussion/Summary: 84-year-old man, HCC status post SBRT.  Stable disease.  Follow-up MRI in 3 months.  He will be going to [place] for next 6 months however.  Will plan on seeing him when he returns from his trip.

## 2024-12-06 NOTE — LETTER
December 6, 2024     Wilmer Rosenberg MD  [address] [facility] [address]    Patient: Yesi Mosher   YOB: 1940   Date of Visit: 12/6/2024       Dear Dr. Rosenberg:    Thank you for referring Yesi Mosher to me for evaluation. Below are my notes for this consultation.    If you have questions, please do not hesitate to call me. I look forward to following your patient along with you.         Sincerely,        Yahir Rios MD        CC: DO Shailesh Brock MD Roderick Quiros, MD  12/6/2024  9:11 AM  Sign when Signing Visit     Surgical Oncology Follow Up       [address] [facility] SURGICAL ONCOLOGY [place]  [address]  [facility] [address]    Yesi Mosher  1940  [id number]  [address] [facility] SURGICAL ONCOLOGY [place]  [address]  [facility] [address]    Chief Complaint   Patient presents with   • Follow-up       Assessment/Plan:    No problem-specific Assessment & Plan notes found for this encounter.       Diagnoses and all orders for this visit:    Hepatocellular carcinoma (HCC)      Advance Care Planning/Advance Directives:  Discussed disease status, cancer treatment plans and/or cancer treatment goals with the patient.     Oncology History   Hepatocellular carcinoma (HCC)   8/22/2023 -  Cancer Staged    Staging form: Liver (Excluding Intrahepatic Bile Ducts), AJCC 8th Edition  - Clinical stage from 8/22/2023: cT1b, cN0 - Signed by Shailesh Henao MD on 10/24/2023  Stage prefix: Initial diagnosis       10/9/2023 Biopsy    Liver, lesion, needle core biopsy:  - Moderately differentiated hepatocellular carcinoma.      11/10/2023 - 11/22/2023 Radiation    Treatment:  Course: C1 SBRT    Plan ID Energy Fractions Dose per Fraction (cGy) Dose Correction (cGy) Total Dose Delivered (cGy) Elapsed Days   SBRT Liver MT 6X-FFF 5 / 5 700 0 3,500 12      Treatment dates:  C1 SBRT: 11/10/2023 - 11/22/2023          History of Present Illness: 84-year-old man status post SBRT to segment 4 liver cancer.  -Interval History: For follow-up visit.  No new GI complaints to report.    Review of Systems:  Review of Systems   Constitutional:  Positive for fatigue.   HENT: Negative.     Eyes: Negative.    Respiratory: Negative.     Cardiovascular: Negative.    Gastrointestinal:  Positive for diarrhea.   Endocrine: Negative.    Genitourinary: Negative.    Musculoskeletal: Negative.    Skin: Negative.    Allergic/Immunologic: Negative.    Neurological: Negative.    Hematological: Negative.    Psychiatric/Behavioral: Negative.     All other systems reviewed and are negative.      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HCC)   • Right inguinal hernia   • Nonrheumatic aortic valve stenosis   • Palpitations   • Sinus bradycardia   • S/P TAVR (transcatheter aortic valve replacement)   • Thrombocytopenia (HCC)   • Hyperchloremia   • Mixed hyperlipidemia   • Encounter for postoperative care   • PVC's (premature ventricular contractions)   • Premature atrial contractions   • Dizziness   • Hyperlipidemia   • Bradycardia   • PVC (premature ventricular contraction)   • CAD (coronary artery disease)   • Diabetes mellitus, type 2 (HCC)   • Hepatocellular carcinoma (HCC)   • Pacemaker   • Sick sinus syndrome (HCC)     Past Medical History:   Diagnosis Date   • BPH (benign prostatic hyperplasia)    • CAD (coronary artery disease)    • Diabetes mellitus (HCC)     type 2, diet controlled   • Diastolic CHF (HCC)    • Epilepsy (HCC)    • History of mycosis fungoides    • Hyperlipidemia    • Hypertension    • ILD (interstitial lung disease) (HCC)    • Osteoporosis    • Seizures (HCC)     partial sensory   • Severe aortic stenosis      Past Surgical History:   Procedure Laterality Date   • CARDIAC CATHETERIZATION     • CARDIAC ELECTROPHYSIOLOGY PROCEDURE N/A  10/24/2022    Procedure: Cardiac pacer implant/ DUAL CHAMBER;  Surgeon: Daniel Bardales DO;  Location: [facility] CARDIAC C[facility] LAB;  Service: Cardiology   • COLONOSCOPY     • IR BIOPSY LIVER MASS  10/9/2023   • WA ECHO TRANSESOPHAG R-T 2D W/PRB IMG ACQUISJ I&R N/A 1/7/2020    Procedure: TRANSESOPHAGEAL ECHOCARDIOGRAM (POOL);  Surgeon: Toyn Archer DO;  Location:  MAIN OR;  Service: Cardiac Surgery   • WA REPLACE AORTIC VALVE OPENFEMORAL ARTERY APPROACH N/A 1/7/2020    Procedure: REPLACEMENT AORTIC VALVE TRANSCATHETER (TAVR) TRANSFEMORAL W/ 23 MM CARROLL KATHLEEN S3 VALVE (ACCESS ON LEFT);  Surgeon: Tony Archer DO;  Location:  MAIN OR;  Service: Cardiac Surgery   • WA RPR 1ST INGUN HRNA AGE 5 YRS/> REDUCIBLE Right 11/16/2018    Procedure: REPAIR RIGHT INGUINAL HERNIA WITH MESH;  Surgeon: Pravin Elizabeth MD;  Location:  MAIN OR;  Service: General     Family History   Problem Relation Age of Onset   • Coronary artery disease Family    • Heart disease Family      Social History     Socioeconomic History   • Marital status: /Civil Union     Spouse name: Not on file   • Number of children: Not on file   • Years of education: Not on file   • Highest education level: Not on file   Occupational History   • Not on file   Tobacco Use   • Smoking status: Never   • Smokeless tobacco: Never   Vaping Use   • Vaping status: Never Used   Substance and Sexual Activity   • Alcohol use: Not Currently   • Drug use: No   • Sexual activity: Not Currently   Other Topics Concern   • Not on file   Social History Narrative   • Not on file     Social Drivers of Health     Financial Resource Strain: Not on file   Food Insecurity: Not on file   Transportation Needs: Not on file   Physical Activity: Not on file   Stress: Not on file   Social Connections: Not on file   Intimate Partner Violence: Not on file   Housing Stability: Not on file       Current Outpatient Medications:   •  amoxicillin (AMOXIL) 500 mg capsule, BEFORE DENTAL WORK  (Patient taking differently: if needed BEFORE DENTAL WORK), Disp: , Rfl: 3  •  ascorbic acid (VITAMIN C) 1000 MG tablet, Take 1,000 mg by mouth daily, Disp: , Rfl:   •  aspirin (ECOTRIN LOW STRENGTH) 81 mg EC tablet, Take 1 tablet (81 mg total) by mouth daily, Disp: , Rfl:   •  calcium-vitamin D (OSCAL 500 + D) 500 mg-200 units per tablet, Take 1 tablet by mouth daily, Disp: , Rfl:   •  cholecalciferol (VITAMIN D3) 400 units tablet, Take 2,000 Units by mouth daily, Disp: , Rfl:   •  flecainide (TAMBOCOR) 50 mg tablet, Take 1 tablet (50 mg total) by mouth 2 (two) times a day, Disp: 180 tablet, Rfl: 3  •  Krill Oil 350 MG CAPS, Take 350 mg by mouth in the morning, Disp: , Rfl:   •  levETIRAcetam (KEPPRA) 250 mg tablet, Take 250 mg by mouth 2 (two) times a day, Disp: , Rfl:   •  metoprolol succinate (TOPROL-XL) 25 mg 24 hr tablet, TAKE 1 TABLET EVERY 12 HOURS (Patient taking differently: 12.5 mg every 12 (twelve) hours Take half tablet of 25mg twice daily), Disp: 180 tablet, Rfl: 1  •  midodrine (PROAMATINE) 2.5 mg tablet, Take 1 tablet (2.5 mg total) by mouth 3 (three) times a day before meals (Patient taking differently: Take 2.5 mg by mouth 2 (two) times a day), Disp: 90 tablet, Rfl: 3  •  Multiple Vitamin (DAILY VALUE MULTIVITAMIN PO), Take 1 tablet by mouth daily, Disp: , Rfl:   •  simvastatin (ZOCOR) 10 mg tablet, Take 10 mg by mouth daily at bedtime, Disp: , Rfl:   •  tamsulosin (FLOMAX) 0.4 mg, Take 0.4 mg by mouth daily at bedtime, Disp: , Rfl:   •  traZODone (DESYREL) 50 mg tablet, Take 25 mg by mouth daily at bedtime, Disp: , Rfl:   •  predniSONE 20 mg tablet, Take 10 mg by mouth (Patient not taking: Reported on 7/24/2024), Disp: , Rfl:   Allergies   Allergen Reactions   • Escitalopram Dizziness   • Metformin Diarrhea   • Sertraline Other (See Comments)     weakness   • Duloxetine Other (See Comments)     Restless leg     Vitals:    12/06/24 0823   BP: 118/74   Pulse: 86   Temp: (!) 97.2 °F (36.2 °C)   SpO2:  98%       Physical Exam  Vitals reviewed.   Constitutional:       Appearance: Normal appearance.   HENT:      Head: Normocephalic and atraumatic.      Nose: Nose normal.   Eyes:      Extraocular Movements: Extraocular movements intact.      Pupils: Pupils are equal, round, and reactive to light.   Cardiovascular:      Rate and Rhythm: Normal rate and regular rhythm.      Heart sounds: Normal heart sounds.   Pulmonary:      Effort: Pulmonary effort is normal.      Breath sounds: Normal breath sounds.   Abdominal:      General: Abdomen is flat. There is no distension.      Palpations: Abdomen is soft. There is no mass.      Tenderness: There is no abdominal tenderness. There is no guarding or rebound.      Hernia: No hernia is present.   Musculoskeletal:         General: Normal range of motion.      Cervical back: Normal range of motion and neck supple.   Skin:     General: Skin is warm and dry.   Neurological:      General: No focal deficit present.      Mental Status: He is alert and oriented to person, place, and time.   Psychiatric:         Mood and Affect: Mood normal.         Behavior: Behavior normal.           Results:  Labs:         Component  Ref Range & Units (hover) 11/20/24 10:44 AM 8/20/24  1:43 PM 5/7/24  7:37 AM 2/22/24  8:14 AM   AFP L3% 8.3 5.0 5.2 4.8   Alpha-Fetoprotein 8.3 High              Imaging  MRI abdomen w wo contrast  Result Date: 12/5/2024  Narrative: MRI - ABDOMEN - WITH AND WITHOUT CONTRAST INDICATION: 84 years / Male. C22.0: Liver cell carcinoma. COMPARISON: Numerous prior studies, most recent is MRI abdomen 9/3/2024. TECHNIQUE: Multiplanar/multisequence MRI of the abdomen was performed before and after administration of contrast. IV Contrast: 6 mL of Gadobutrol injection (SINGLE-DOSE) DIAGNOSTIC QUALITY: Severely motion limited examination. FINDINGS: LOWER CHEST: Unremarkable. LIVER: Cirrhotic morphology. Treated lesion: 1 Location: Segment 4A Pretreatment category: Biopsy-proven  hepatocellular carcinoma. Type of most recent treatment: Stereotactic body radiation therapy (SBRT.) (Radiation treatment.) Date of most recent treatment: 11/12/2023. Size of treatment zone: 3.2 cm (#14/27.). Unchanged. Masslike enhancement: Persistent but decreased masslike enhancement at the treatment site #11/31 and #50202/31. Diffusion restriction: None Mild-Moderate T2 hyperintensity: None LR-TR category: LR-TR Nonviable. Management recommendation: Continue monitoring in approximately 3 months. Multidisciplinary conference discussion in unusual/complex cases. Patent hepatic and portal veins. BILE DUCTS: No intrahepatic or extrahepatic bile duct dilation. Common bile duct is normal in caliber. No choledocholithiasis, biliary stricture or suspicious mass. GALLBLADDER: Normal. PANCREAS: Unremarkable. ADRENAL GLANDS: Unremarkable. SPLEEN: Normal. KIDNEYS/PROXIMAL URETERS: No hydroureteronephrosis. No suspicious renal mass. BOWEL: No dilated loops of bowel. PERITONEUM/RETROPERITONEUM: No ascites. LYMPH NODES: No abdominal lymphadenopathy. VESSELS: No aneurysm. ABDOMINAL WALL: Unremarkable BONES: No suspicious osseous lesion.     Impression: Very motion limited exam Unchanged appearance of the segment 4A treatment site LI- RADS LR TR nonprogressing. No new observations. Workstation performed: [id number]     XR follow up  Result Date: 12/3/2024  Narrative: CHEST INDICATION: Pacemaker placement. COMPARISON: None VIEWS:  Frontal and lateral projections; 2 images FINDINGS: Left chest wall pacemaker is present. TAVR is noted. The cardiomediastinal silhouette is unremarkable. The lungs are clear. No pleural effusions. Osseous structures are age appropriate.     Impression: No active pulmonary disease. Workstation performed: [id number]     I reviewed the above laboratory and imaging data.    Discussion/Summary: 84-year-old man, HCC status post SBRT.  Stable disease.  Follow-up MRI in 3 months.  He will be going to  [place] for next 6 months however.  Will plan on seeing him when he returns from his trip.

## 2024-12-11 ENCOUNTER — REMOTE DEVICE CLINIC VISIT (OUTPATIENT)
Dept: CARDIOLOGY CLINIC | Facility: CLINIC | Age: 84
End: 2024-12-11
Payer: MEDICARE

## 2024-12-11 DIAGNOSIS — Z95.0 CARDIAC PACEMAKER IN SITU: Primary | ICD-10-CM

## 2024-12-11 PROCEDURE — 93296 REM INTERROG EVL PM/IDS: CPT | Performed by: INTERNAL MEDICINE

## 2024-12-11 PROCEDURE — 93294 REM INTERROG EVL PM/LDLS PM: CPT | Performed by: INTERNAL MEDICINE

## 2024-12-17 NOTE — PROGRESS NOTES
"  Results for orders placed or performed in visit on 12/11/24   Cardiac EP device report    Narrative    MDT DUAL CHAMBER PPM  (MVP ON) - ACTIVE SYSTEM IS MRI CONDITIONAL  CARELINK TRANSMISSION: BATTERY STATUS \"11 YRS.\" %  0%. ALL AVAILABLE LEAD PARAMETERS WITHIN NORMAL LIMITS. NO SIGNIFICANT HIGH RATE EPISODES. NORMAL DEVICE FUNCTION. NC         "

## 2024-12-22 ENCOUNTER — RESULTS FOLLOW-UP (OUTPATIENT)
Dept: CARDIOLOGY CLINIC | Facility: CLINIC | Age: 84
End: 2024-12-22

## 2024-12-26 DIAGNOSIS — I49.3 PVC (PREMATURE VENTRICULAR CONTRACTION): ICD-10-CM

## 2024-12-27 RX ORDER — METOPROLOL SUCCINATE 25 MG/1
25 TABLET, EXTENDED RELEASE ORAL EVERY 12 HOURS
Qty: 180 TABLET | Refills: 1 | Status: SHIPPED | OUTPATIENT
Start: 2024-12-27

## 2024-12-31 DIAGNOSIS — I95.9 HYPOTENSION, UNSPECIFIED HYPOTENSION TYPE: ICD-10-CM

## 2024-12-31 RX ORDER — MIDODRINE HYDROCHLORIDE 2.5 MG/1
2.5 TABLET ORAL 2 TIMES DAILY
Qty: 180 TABLET | Refills: 3 | Status: SHIPPED | OUTPATIENT
Start: 2024-12-31

## 2025-02-24 ENCOUNTER — TELEPHONE (OUTPATIENT)
Age: 85
End: 2025-02-24

## 2025-02-24 NOTE — TELEPHONE ENCOUNTER
Placed call to Daughter to explain that I was unable to attach order to MyChart, being that patient's code has . I was unable to speak with her, but left a message for her to call the [facility] number at her convenience.

## 2025-02-24 NOTE — TELEPHONE ENCOUNTER
Received call from patient's daughter, Lara. Patient to get MRI and blood work done in [place] and she wanted to conform if the orders PCP placed were correct. Confirmed blood work orders per Dr Rios last visit and Informed her, [facility] She was appreciative and had no other questions.    Unable to send MRI order through [facility] as chart stated code .

## 2025-03-12 ENCOUNTER — REMOTE DEVICE CLINIC VISIT (OUTPATIENT)
Dept: CARDIOLOGY CLINIC | Facility: CLINIC | Age: 85
End: 2025-03-12
Payer: MEDICARE

## 2025-03-12 DIAGNOSIS — Z95.0 PRESENCE OF PERMANENT CARDIAC PACEMAKER: Primary | ICD-10-CM

## 2025-03-12 PROCEDURE — 93296 REM INTERROG EVL PM/IDS: CPT | Performed by: INTERNAL MEDICINE

## 2025-03-12 PROCEDURE — 93294 REM INTERROG EVL PM/LDLS PM: CPT | Performed by: INTERNAL MEDICINE

## 2025-03-12 NOTE — PROGRESS NOTES
Results for orders placed or performed in visit on 03/12/25   Cardiac EP device report    Narrative    MDT DUAL CHAMBER PPM  (MVP ON) - ACTIVE SYSTEM IS MRI CONDITIONAL  CARELINK TRANSMISSION: BATTERY VOLTAGE ADEQUATE. (11 YRS) AP 99%  <1%. ALL AVAILABLE LEAD PARAMETERS WITHIN NORMAL LIMITS. NO SIGNIFICANT HIGH RATE EPISODES. NORMAL DEVICE FUNCTION.---COLVIN

## 2025-03-23 ENCOUNTER — RESULTS FOLLOW-UP (OUTPATIENT)
Dept: CARDIOLOGY CLINIC | Facility: CLINIC | Age: 85
End: 2025-03-23

## 2025-03-24 ENCOUNTER — TELEPHONE (OUTPATIENT)
Age: 85
End: 2025-03-24

## 2025-03-24 NOTE — TELEPHONE ENCOUNTER
Pt daughter joaquín called stating her father got an MRI in [place] on 3/17/25 that office said they faxed it over. I gave pt daughter our fax to make sure they faxed it to that number. She will call and check and call us back.     Once we do receive she wants Dr. Rios to check them out

## 2025-03-25 ENCOUNTER — TELEPHONE (OUTPATIENT)
Age: 85
End: 2025-03-25

## 2025-03-25 NOTE — TELEPHONE ENCOUNTER
Spoke to Lara and let her know that we do have the report, that I will be requesting the imaging. She verbalized understanding and agrees to have them send the AFP lab work results. She will also have patient repeat AFP here in June when he is back from [place] and before he sees Dr Rios.

## 2025-03-25 NOTE — TELEPHONE ENCOUNTER
Patients daughter Lara calling stating MRI scan was sent to right fax, and patient is waiting for results. Please reach out to patient, Daughter was not on Communication Consent. Please ask Dr. Rios to review for patient will reach back out.

## 2025-04-03 ENCOUNTER — TELEPHONE (OUTPATIENT)
Dept: SURGICAL ONCOLOGY | Facility: CLINIC | Age: 85
End: 2025-04-03

## 2025-04-03 ENCOUNTER — TELEPHONE (OUTPATIENT)
Age: 85
End: 2025-04-03

## 2025-04-03 NOTE — TELEPHONE ENCOUNTER
Spoke to Dr Ayala's office in [place], [place] and requested results of blood work be faxed to us at [phone]. They state that we should see if within the hour.

## 2025-04-03 NOTE — TELEPHONE ENCOUNTER
Spoke to Lara and confirmed that we received the MRI images. I will have Dr Rios review them tomorrow and call her back. She states that she spoke to PCP office in [place] and asked them to also send blood work results, but we have not received it. I will reach out to Dr. Ayala, [phone] to have them send it. Lara verbalized understanding and thanks

## 2025-04-03 NOTE — TELEPHONE ENCOUNTER
Patients daughter is calling, asking for a call back to discuss the MRI results that were sent from [place]. She said last time she spoke with Mallory she was told it was under review and would be getting a call  back

## 2025-04-04 NOTE — TELEPHONE ENCOUNTER
Spoke to Lara after Dr Rios reviewed the MRI and blood work done in [place]. I let her know that Dr Rios is going to present his case to our multidisciplinary board and that we will reach back out with their recommendations. She verbalized understanding and thanks.

## 2025-04-07 ENCOUNTER — DOCUMENTATION (OUTPATIENT)
Dept: HEMATOLOGY ONCOLOGY | Facility: CLINIC | Age: 85
End: 2025-04-07

## 2025-04-07 NOTE — PROGRESS NOTES
In-basket message received from Dr. Rios  to add patient to the liver MDCC on 4/25/2025. Chart reviewed and prep completed.

## 2025-04-22 ENCOUNTER — TELEPHONE (OUTPATIENT)
Age: 85
End: 2025-04-22

## 2025-04-22 NOTE — TELEPHONE ENCOUNTER
Received a phone call from patient's daughter, Lara.  Lara stated that she sent imaging from tests that patient had done while in [place].  Lara is requesting Dr. Rios to review and call back to discuss.  Imaging is in chart.

## 2025-04-25 ENCOUNTER — DOCUMENTATION (OUTPATIENT)
Dept: HEMATOLOGY ONCOLOGY | Facility: CLINIC | Age: 85
End: 2025-04-25

## 2025-04-25 ENCOUNTER — TELEPHONE (OUTPATIENT)
Facility: MEDICAL CENTER | Age: 85
End: 2025-04-25

## 2025-04-25 DIAGNOSIS — C22.0 HEPATOCELLULAR CARCINOMA (HCC): Primary | ICD-10-CM

## 2025-04-25 NOTE — QUICK NOTE
Note for Medtronic pacer/ICD implant:  Device investigated:   Medtronic pacer/icd and leads            Method investigated (surgical report, imaging report, vendor contacted, website used etc):  MRI Verify website    Time spent investigating in minutes: 15    Investigation findings:  MRI Conditional implant    Risk vs Benefit performed.  If so, list physician and outcome:  No

## 2025-04-25 NOTE — PROGRESS NOTES
LIVER MULTIDISCIPLINARY CANCER CONFERENCE    DATE: 4/25/2025       PRESENTING DOCTOR: Dr Rios      DIAGNOSIS: hepatocellular carcinoma      Yesi Mosher is a 85 y.o. male who was presented at the Liver Multidisciplinary Cancer Conference today.      PHYSICIAN RECOMMENDED PLAN:  -Recommend to obtain new MRI to evaluate for progression and chest CT.    Team agreed to plan.    The final treatment plan will be left to the discretion of the patient and the treating physician.     DISCLAIMERS:  TO THE TREATING PHYSICIAN:  This conference is a meeting of clinicians from various specialty areas who evaluate and discuss patients for whom a multidisciplinary treatment approach is being considered. Please note that the above opinion was a consensus of the conference attendees and is intended only to assist in quality care of the discussed patient.  The responsibility for follow up on the input given during the conference, along with any final decisions regarding plan of care, is that of the patient and the patient's provider. Accordingly, appointments have only been recommended based on this information and have NOT been scheduled unless otherwise noted.      TO THE PATIENT:  This summary is a brief record of major aspects of your cancer treatment. You may choose to share a copy with any of your doctors or nurses. However, this is not a detailed or comprehensive record of your care.      NCCN guidelines were readily available for review at this discussion

## 2025-04-25 NOTE — TELEPHONE ENCOUNTER
6-3-25 AT 1000AM AT [facility]   Note in chart after scheduling pacemaker:  Topics covered in our conversation:  MRI scheduled on above date and time.  Reminded to get chest x-ray follow-up prior to MRI appt.

## 2025-05-10 ENCOUNTER — TELEPHONE (OUTPATIENT)
Dept: OTHER | Facility: OTHER | Age: 85
End: 2025-05-10

## 2025-05-10 NOTE — TELEPHONE ENCOUNTER
"Pt stated, \" I am going to travel on 5/22/2025 for one day. I am going to disconnect my Pace maker and re connect it once I get back home. The pace maker ID is [email or web address] and was placed in 2022.\"    Please follow up and advise, thank you.   "

## 2025-05-12 NOTE — TELEPHONE ENCOUNTER
Patient returning from [place] on 5/22. Daughter contacted and advised okay to disconnect and replug in once home.

## 2025-05-13 DIAGNOSIS — I95.9 HYPOTENSION, UNSPECIFIED HYPOTENSION TYPE: ICD-10-CM

## 2025-05-14 RX ORDER — MIDODRINE HYDROCHLORIDE 2.5 MG/1
2.5 TABLET ORAL 2 TIMES DAILY
Qty: 180 TABLET | Refills: 3 | Status: SHIPPED | OUTPATIENT
Start: 2025-05-14

## 2025-05-23 ENCOUNTER — APPOINTMENT (OUTPATIENT)
Dept: LAB | Facility: IMAGING CENTER | Age: 85
End: 2025-05-23
Payer: MEDICARE

## 2025-05-23 ENCOUNTER — HOSPITAL ENCOUNTER (OUTPATIENT)
Dept: RADIOLOGY | Facility: IMAGING CENTER | Age: 85
End: 2025-05-23
Payer: MEDICARE

## 2025-05-23 DIAGNOSIS — C22.0 HEPATOCELLULAR CARCINOMA (HCC): ICD-10-CM

## 2025-05-23 LAB
AFP-TM SERPL-MCNC: 9.28 NG/ML (ref 0–9)
ALBUMIN SERPL BCG-MCNC: 3.7 G/DL (ref 3.5–5)
ALP SERPL-CCNC: 45 U/L (ref 34–104)
ALT SERPL W P-5'-P-CCNC: 20 U/L (ref 7–52)
ANION GAP SERPL CALCULATED.3IONS-SCNC: 4 MMOL/L (ref 4–13)
AST SERPL W P-5'-P-CCNC: 27 U/L (ref 13–39)
BILIRUB SERPL-MCNC: 0.74 MG/DL (ref 0.2–1)
BUN SERPL-MCNC: 20 MG/DL (ref 5–25)
CALCIUM SERPL-MCNC: 10.4 MG/DL (ref 8.4–10.2)
CHLORIDE SERPL-SCNC: 103 MMOL/L (ref 96–108)
CO2 SERPL-SCNC: 29 MMOL/L (ref 21–32)
CREAT SERPL-MCNC: 0.93 MG/DL (ref 0.6–1.3)
GFR SERPL CREATININE-BSD FRML MDRD: 74 ML/MIN/1.73SQ M
GLUCOSE P FAST SERPL-MCNC: 107 MG/DL (ref 65–99)
POTASSIUM SERPL-SCNC: 4.7 MMOL/L (ref 3.5–5.3)
PROT SERPL-MCNC: 7.9 G/DL (ref 6.4–8.4)
SODIUM SERPL-SCNC: 136 MMOL/L (ref 135–147)

## 2025-05-23 PROCEDURE — 82105 ALPHA-FETOPROTEIN SERUM: CPT

## 2025-05-23 PROCEDURE — 80053 COMPREHEN METABOLIC PANEL: CPT

## 2025-05-23 PROCEDURE — 36415 COLL VENOUS BLD VENIPUNCTURE: CPT

## 2025-05-23 PROCEDURE — 82107 ALPHA-FETOPROTEIN L3: CPT

## 2025-06-03 ENCOUNTER — HOSPITAL ENCOUNTER (OUTPATIENT)
Dept: RADIOLOGY | Facility: HOSPITAL | Age: 85
Discharge: HOME | End: 2025-06-03
Payer: MEDICARE

## 2025-06-03 DIAGNOSIS — C22.0 HEPATOCELLULAR CARCINOMA (HCC): ICD-10-CM

## 2025-06-03 PROCEDURE — 76018 MR SAFETY IMPLANT ELEC PREPJ: CPT

## 2025-06-03 PROCEDURE — 76014 MR SFTY IMPLT&/FB ASMT STF 1: CPT

## 2025-06-03 PROCEDURE — A9585 GADOBUTROL INJECTION: HCPCS | Performed by: SURGERY

## 2025-06-03 PROCEDURE — 74183 MRI ABD W/O CNTR FLWD CNTR: CPT

## 2025-06-03 RX ORDER — GADOBUTROL 604.72 MG/ML
5 INJECTION INTRAVENOUS
Status: COMPLETED | OUTPATIENT
Start: 2025-06-03 | End: 2025-06-03

## 2025-06-03 RX ADMIN — GADOBUTROL 5 ML: 604.72 INJECTION INTRAVENOUS at 11:20

## 2025-06-03 NOTE — NURSING NOTE
Medtronic device interrogation for MRI done by BEKA Briones, MICHELLE clinical specialist. Device set to MRI safe mode @85 bpm    MRI abdomen w/without contrast complete. Pt tolerated well.    VSS throughout scan. Pt alert and oriented on room air. No complaints or visible signs of distress.  Device reprogrammed to prior settings per Cardiology by MANOJ Nazario.

## 2025-06-04 LAB
AFP L3 MFR SERPL: 26.5 % (ref 0–9.9)
AFP SERPL-MCNC: 10.4 NG/ML (ref 0–6.4)

## 2025-06-06 ENCOUNTER — TELEPHONE (OUTPATIENT)
Facility: MEDICAL CENTER | Age: 85
End: 2025-06-06

## 2025-06-06 ENCOUNTER — OFFICE VISIT (OUTPATIENT)
Dept: SURGICAL ONCOLOGY | Facility: CLINIC | Age: 85
End: 2025-06-06
Payer: MEDICARE

## 2025-06-06 VITALS
WEIGHT: 126 LBS | DIASTOLIC BLOOD PRESSURE: 72 MMHG | SYSTOLIC BLOOD PRESSURE: 110 MMHG | TEMPERATURE: 97.7 F | HEART RATE: 84 BPM | HEIGHT: 62 IN | BODY MASS INDEX: 23.19 KG/M2 | OXYGEN SATURATION: 95 %

## 2025-06-06 DIAGNOSIS — C22.0 HEPATOCELLULAR CARCINOMA (HCC): Primary | ICD-10-CM

## 2025-06-06 PROCEDURE — 99214 OFFICE O/P EST MOD 30 MIN: CPT | Performed by: SURGERY

## 2025-06-06 NOTE — ASSESSMENT & PLAN NOTE
Orders:    MRI abdomen w wo contrast; Future    Alpha fetoprotein, L3 percent; Future    Comprehensive metabolic panel; Future

## 2025-06-06 NOTE — TELEPHONE ENCOUNTER
Note in chart after leaving a message for someone to schedule there MRI appointment:  I left a message today to call me at [phone] to schedule their MRI appointment.

## 2025-06-06 NOTE — PROGRESS NOTES
Name: Yesi Mosher      : 1940      MRN: [id number]  Encounter Provider: Yahir Rios MD  Encounter Date: 2025   Encounter department: [facility] [place]  :  Assessment & Plan  Hepatocellular carcinoma (HCC)    Orders:    MRI abdomen w wo contrast; Future    Alpha fetoprotein, L3 percent; Future    Comprehensive metabolic panel; Future        History of Present Illness   Chief Complaint   Patient presents with    Follow-up     85-year-old man with history of HCC status post previous SBRT, approximately a year and a half ago.  This was segment 4 lesion.  Comes in for surveillance with no new complaints report.  He had MRI done recently along with blood work.    He otherwise feels well and is recently back from [place].    Oncology History   Cancer Staging   Hepatocellular carcinoma (HCC)  Staging form: Liver (Excluding Intrahepatic Bile Ducts), AJCC 8th Edition  - Clinical stage from 2023: cT1b, cN0 - Signed by Shailesh Henao MD on 10/24/2023  Stage prefix: Initial diagnosis  Oncology History   Hepatocellular carcinoma (HCC)   2023 -  Cancer Staged    Staging form: Liver (Excluding Intrahepatic Bile Ducts), AJCC 8th Edition  - Clinical stage from 2023: cT1b, cN0 - Signed by Shailesh Henao MD on 10/24/2023  Stage prefix: Initial diagnosis       10/9/2023 Biopsy    Liver, lesion, needle core biopsy:  - Moderately differentiated hepatocellular carcinoma.      11/10/2023 - 2023 Radiation    Treatment:  Course: C1 SBRT    Plan ID Energy Fractions Dose per Fraction (cGy) Dose Correction (cGy) Total Dose Delivered (cGy) Elapsed Days   SBRT Liver MT 6X-FFF 5 /  700 0 3,500 12      Treatment dates:  C1 SBRT: 11/10/2023 - 2023          Review of Systems   Constitutional: Negative.    HENT: Negative.     Eyes: Negative.    Respiratory: Negative.     Cardiovascular: Negative.    Gastrointestinal: Negative.    Endocrine: Negative.   "  Genitourinary: Negative.    Musculoskeletal: Negative.    Skin: Negative.    Allergic/Immunologic: Negative.    Neurological: Negative.    Hematological: Negative.    Psychiatric/Behavioral: Negative.     All other systems reviewed and are negative.   A complete review of systems is negative other than that noted above in the HPI.         Current Medications[1]   Objective   /72 (BP Location: Left arm, Patient Position: Sitting, Cuff Size: Standard)   Pulse 84   Temp 97.7 °F (36.5 °C) (Temporal)   Ht 5' 2\" (1.575 m)   Wt 57.2 kg (126 lb)   SpO2 95%   BMI 23.05 kg/m²     Pain Screening:  Pain Score: 0-No pain  ECOG    Physical Exam  Vitals reviewed.   Constitutional:       Appearance: Normal appearance.   HENT:      Head: Normocephalic and atraumatic.      Right Ear: External ear normal.      Left Ear: External ear normal.      Nose: Nose normal.     Eyes:      Extraocular Movements: Extraocular movements intact.      Pupils: Pupils are equal, round, and reactive to light.       Cardiovascular:      Rate and Rhythm: Regular rhythm.      Heart sounds: Normal heart sounds.   Pulmonary:      Effort: Pulmonary effort is normal.      Breath sounds: Normal breath sounds.   Abdominal:      General: Abdomen is flat.      Palpations: Abdomen is soft.     Musculoskeletal:         General: Normal range of motion.      Cervical back: Normal range of motion and neck supple.     Skin:     General: Skin is warm and dry.     Neurological:      General: No focal deficit present.      Mental Status: He is alert and oriented to person, place, and time.     Psychiatric:         Mood and Affect: Mood normal.         Behavior: Behavior normal.         Labs: I have reviewed pertinent labs.           Component  Ref Range & Units (hover) 5/23/25  9:23 AM 11/20/24 10:44 AM 8/20/24  1:43 PM 5/7/24  7:37 AM 2/22/24  8:14 AM   AFP L3% 26.5 High  8.3 5.0 5.2 4.8   Alpha-Fetoprotein 10.4 High             Lab Results   Component Value " Date    SODIUM 136 05/23/2025    K 4.7 05/23/2025     05/23/2025    CO2 29 05/23/2025    AGAP 4 05/23/2025    BUN 20 05/23/2025    CREATININE 0.93 05/23/2025    GLUC 87 11/20/2024    GLUF 107 (H) 05/23/2025    CALCIUM 10.4 (H) 05/23/2025    AST 27 05/23/2025    ALT 20 05/23/2025    ALKPHOS 45 05/23/2025    TP 7.9 05/23/2025    TBILI 0.74 05/23/2025    EGFR 74 05/23/2025       MRI - ABDOMEN - WITH AND WITHOUT CONTRAST     INDICATION: 85 years / Male. C22.0: Liver cell carcinoma.     COMPARISON: 3/17/2025 MRI, [facility].     TECHNIQUE: Multiplanar/multisequence MRI of the abdomen was performed before and after administration of contrast.     IV Contrast: 5 mL of Gadobutrol injection (SINGLE-DOSE)     FINDINGS:     LOWER CHEST: Unremarkable.     LIVER:  Cirrhotic morphology.  Treated lesion: 1  Location: Segment 4A  Pretreatment category: Biopsy-proven hepatocellular carcinoma.  Type of most recent treatment: Stereotactic body radiation therapy (SBRT.) (Radiation treatment.)  Date of most recent treatment: 11/12/2023.  Size of treatment zone: 3 x 2.5 cm, unchanged from 3/17/2025 (image 176 series 11)  Masslike enhancement: Persistent but decreased masslike delayed enhancement at the treatment site #11/176. No washout  Diffusion restriction: None  Mild-Moderate T2 hyperintensity: None  LR-TR category: LR-TR Nonviable.  Management recommendation: Continue monitoring in approximately 3 months. Multidisciplinary conference discussion in unusual/complex cases.     Patent hepatic and portal veins.     BILE DUCTS: No intrahepatic or extrahepatic bile duct dilation. Common bile duct is normal in caliber. No choledocholithiasis, biliary stricture or suspicious mass.     GALLBLADDER: Normal.     PANCREAS: Unremarkable.     ADRENAL GLANDS: Unremarkable.     SPLEEN: Normal.     KIDNEYS/PROXIMAL URETERS: No hydroureteronephrosis. No suspicious renal mass.     BOWEL: No dilated loops of bowel.      PERITONEUM/RETROPERITONEUM: No ascites.     LYMPH NODES: No abdominal lymphadenopathy.     VESSELS: No aneurysm.     ABDOMINAL WALL: Unremarkable     BONES: No suspicious osseous lesion.     IMPRESSION:  Similar size and appearance of the treatment zone in segment 4A, as detailed above.        Workstation performed: [id number]               [1]   Current Outpatient Medications   Medication Sig Dispense Refill    amoxicillin (AMOXIL) 500 mg capsule BEFORE DENTAL WORK  3    ascorbic acid (VITAMIN C) 1000 MG tablet Take 1,000 mg by mouth in the morning.      aspirin (ECOTRIN LOW STRENGTH) 81 mg EC tablet Take 1 tablet (81 mg total) by mouth daily      calcium-vitamin D (OSCAL 500 + D) 500 mg-200 units per tablet Take 1 tablet by mouth in the morning.      cholecalciferol (VITAMIN D3) 400 units tablet Take 2,000 Units by mouth in the morning.      flecainide (TAMBOCOR) 50 mg tablet Take 1 tablet (50 mg total) by mouth 2 (two) times a day 180 tablet 3    Krill Oil 350 MG CAPS Take 350 mg by mouth in the morning      levETIRAcetam (KEPPRA) 250 mg tablet Take 250 mg by mouth in the morning and 250 mg in the evening.      metoprolol succinate (TOPROL-XL) 25 mg 24 hr tablet TAKE 1 TABLET EVERY 12 HOURS 180 tablet 1    midodrine (PROAMATINE) 2.5 mg tablet TAKE 1 TABLET BY MOUTH TWICE A  tablet 3    Multiple Vitamin (DAILY VALUE MULTIVITAMIN PO) Take 1 tablet by mouth in the morning.      simvastatin (ZOCOR) 10 mg tablet Take 10 mg by mouth daily at bedtime      tamsulosin (FLOMAX) 0.4 mg Take 0.4 mg by mouth daily at bedtime      traZODone (DESYREL) 50 mg tablet Take 25 mg by mouth daily at bedtime      predniSONE 20 mg tablet Take 10 mg by mouth (Patient not taking: Reported on 7/24/2024)       No current facility-administered medications for this visit.

## 2025-06-12 NOTE — PROGRESS NOTES
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  He has an adequate appetite.  He has chronic diarrhea for which she takes Imodium every day.  Related to history of cancer?  Recent MRI was good result.  I told him to discuss with his PCP.  I do not think any of his cardiac medications could be the cause for his weight loss.  Might just need little bit increased protein.    No cardiac complaints.  He does not think flecainide helped his fatigue.  Denies medication side effects.    No medication changes made today.    He does not really like to see advanced practitioners, prefers to see his physician.  For this purpose we will schedule next visit with Dr. Thao 6-7 months from now.    Interval History:   Yesi Mosher is a 85 y.o. male who presents to the office for routine cardiovascular follow-up.  Patient reports fatigue during the day he is still able to complete his ADLs and IADLs.  Denies palpitations or feeling extra heartbeats.  Denies lightheadedness or dizziness.    Physical Exam:  Vitals:    06/17/25 1317   BP: 116/62   Pulse: 74   SpO2: 97%     Vitals:    06/17/25 1317   Weight: 57.7 kg (127 lb 3.2 oz)     Height: 5' 2\" (157.5 cm) Body mass index is 23.27 kg/m².    Physical Exam  Vitals and nursing note reviewed.   Constitutional:       General: He is not in acute distress.     Appearance: He is not ill-appearing.   HENT:      " Head: Normocephalic and atraumatic.      Nose: No congestion.      Mouth/Throat:      Mouth: Mucous membranes are moist.     Eyes:      Conjunctiva/sclera: Conjunctivae normal.     Neck:      Comments: Cardiac murmur radiates to the carotids  Cardiovascular:      Rate and Rhythm: Normal rate and regular rhythm. No extrasystoles are present.     Chest Wall: No thrill.      Heart sounds: S1 normal. Murmur heard.      Crescendo decrescendo systolic murmur is present with a grade of 2/6.      Decrescendo diastolic murmur is present.   Pulmonary:      Effort: Pulmonary effort is normal.      Breath sounds: No wheezing, rhonchi or rales.   Abdominal:      General: Abdomen is flat.     Musculoskeletal:         General: No swelling. Normal range of motion.     Skin:     General: Skin is warm and dry.     Neurological:      Mental Status: He is alert and oriented to person, place, and time.     Psychiatric:         Behavior: Behavior normal.     Yuki Leon PA-C  [facility]

## 2025-06-17 ENCOUNTER — OFFICE VISIT (OUTPATIENT)
Dept: CARDIOLOGY CLINIC | Facility: CLINIC | Age: 85
End: 2025-06-17
Payer: MEDICARE

## 2025-06-17 VITALS
BODY MASS INDEX: 23.41 KG/M2 | HEIGHT: 62 IN | OXYGEN SATURATION: 97 % | HEART RATE: 74 BPM | WEIGHT: 127.2 LBS | DIASTOLIC BLOOD PRESSURE: 62 MMHG | SYSTOLIC BLOOD PRESSURE: 116 MMHG

## 2025-06-17 DIAGNOSIS — I35.0 NONRHEUMATIC AORTIC VALVE STENOSIS: Primary | ICD-10-CM

## 2025-06-17 PROCEDURE — 99214 OFFICE O/P EST MOD 30 MIN: CPT | Performed by: PHYSICIAN ASSISTANT

## 2025-06-18 ENCOUNTER — OFFICE VISIT (OUTPATIENT)
Dept: CARDIOLOGY CLINIC | Facility: CLINIC | Age: 85
End: 2025-06-18
Payer: MEDICARE

## 2025-06-18 ENCOUNTER — IN-CLINIC DEVICE VISIT (OUTPATIENT)
Dept: CARDIOLOGY CLINIC | Facility: CLINIC | Age: 85
End: 2025-06-18
Payer: MEDICARE

## 2025-06-18 VITALS
SYSTOLIC BLOOD PRESSURE: 118 MMHG | DIASTOLIC BLOOD PRESSURE: 62 MMHG | WEIGHT: 127.3 LBS | HEIGHT: 62 IN | BODY MASS INDEX: 23.42 KG/M2

## 2025-06-18 DIAGNOSIS — I49.3 PVC'S (PREMATURE VENTRICULAR CONTRACTIONS): Primary | ICD-10-CM

## 2025-06-18 DIAGNOSIS — Z95.0 PRESENCE OF CARDIAC PACEMAKER: Primary | ICD-10-CM

## 2025-06-18 DIAGNOSIS — Z95.0 PACEMAKER: ICD-10-CM

## 2025-06-18 DIAGNOSIS — R00.1 SINUS BRADYCARDIA: ICD-10-CM

## 2025-06-18 DIAGNOSIS — I49.1 PREMATURE ATRIAL CONTRACTIONS: ICD-10-CM

## 2025-06-18 DIAGNOSIS — Z95.2 S/P TAVR (TRANSCATHETER AORTIC VALVE REPLACEMENT): ICD-10-CM

## 2025-06-18 DIAGNOSIS — I49.5 SICK SINUS SYNDROME (HCC): ICD-10-CM

## 2025-06-18 PROCEDURE — 99214 OFFICE O/P EST MOD 30 MIN: CPT | Performed by: INTERNAL MEDICINE

## 2025-06-18 PROCEDURE — 93280 PM DEVICE PROGR EVAL DUAL: CPT | Performed by: INTERNAL MEDICINE

## 2025-06-18 NOTE — PROGRESS NOTES
Results for orders placed or performed in visit on 06/18/25   Cardiac EP device report    Narrative    MDT DUAL CHAMBER PPM  (MVP ON) - ACTIVE SYSTEM IS MRI CONDITIONAL  DEVICE INTERROGATED IN THE [facility] OFFICE. BENNY W/ DR. GALEANA. BATTERY VOLTAGE ADEQUATE (10.8 YR). AP 99.9%.  <0.1%. ALL LEAD PARAMETERS WITHIN NORMAL LIMITS. NO SIGNIFICANT HIGH RATE EPISODES. NO PROGRAMMING CHANGES MADE TO DEVICE PARAMETERS. NORMAL DEVICE FUNCTION. [facility]

## 2025-06-18 NOTE — PROGRESS NOTES
EPS Progress Note - Yesi Mosher 85 y.o. male MRN: [id number]           ASSESSMENT:  1. PVC's (premature ventricular contractions)        2. Premature atrial contractions        3. Sick sinus syndrome (HCC)        4. Sinus bradycardia        5. S/P TAVR (transcatheter aortic valve replacement)        6. Pacemaker                PLAN:  I am going to reduce the metoprolol from 12-1/2 mg twice daily to 12-1/2 mg morning only.  I offered to also reduce the flecainide to once daily but he is concerned about developing PVCs and PACs which cause symptoms in the past.  If he continues to be fatigued we could simply discontinue the metoprolol altogether I will leave that up to his primary cardiologist Dr. Thao who will be following up with him in the near future    HPI:   Interim history he has become more fatigued.  He is not having chest pain shortness of breath or palpitations he states that he is just tired all the time.  Whenever he sits down he essentially falls asleep.  He is accompanied by his wife.  He is used to being very active.  His palpitations are under control with his current medicines and his pacemaker is working appropriately.          Review of Systems   Constitutional: Positive for malaise/fatigue. Negative for chills and fever.   HENT:  Negative for congestion and sore throat.    Eyes:  Negative for blurred vision and double vision.   Cardiovascular:  Negative for chest pain, claudication, dyspnea on exertion, leg swelling, near-syncope, orthopnea, palpitations, paroxysmal nocturnal dyspnea and syncope.   Respiratory:  Negative for cough, shortness of breath and sputum production.    Hematologic/Lymphatic: Negative for bleeding problem. Does not bruise/bleed easily.   Skin:  Negative for itching and rash.   Musculoskeletal:  Negative for arthritis and joint pain.   Gastrointestinal:  Negative for abdominal pain, nausea and vomiting.   Genitourinary:  Negative for hematuria.   Neurological:   "Negative for dizziness, focal weakness, headaches, light-headedness and weakness.   Psychiatric/Behavioral:  Negative for depression. The patient is not nervous/anxious.         Objective:     Vitals: Blood pressure 118/62, height 5' 2\" (1.575 m), weight 57.7 kg (127 lb 4.8 oz)., Body mass index is 23.28 kg/m².,        Physical Exam:    GEN: Yesi Mosher appears well, alert and oriented x 3, pleasant and cooperative   HEENT: pupils equal, round, and reactive to light; extraocular muscles intact  NECK: supple, no carotid bruits   HEART: regular rhythm, normal S1 and S2, no murmurs, clicks, gallops or rubs   LUNGS: clear to auscultation bilaterally; no wheezes, rales, or rhonchi   ABDOMEN: normal bowel sounds, soft, no tenderness, no distention  EXTREMITIES: peripheral pulses normal; no clubbing, cyanosis, or edema  NEURO: no focal findings   SKIN: normal without suspicious lesions on exposed skin    Medications:    Current Medications[1]     Family History[2]  Social History     Socioeconomic History    Marital status: /Civil Union     Spouse name: Not on file    Number of children: Not on file    Years of education: Not on file    Highest education level: Not on file   Occupational History    Not on file   Tobacco Use    Smoking status: Never    Smokeless tobacco: Never   Vaping Use    Vaping status: Never Used   Substance and Sexual Activity    Alcohol use: Not Currently    Drug use: No    Sexual activity: Not Currently   Other Topics Concern    Not on file   Social History Narrative    Not on file     Social Drivers of Health     Financial Resource Strain: Not on file   Food Insecurity: Not on file   Transportation Needs: Not on file   Physical Activity: Not on file   Stress: Not on file   Social Connections: Not on file   Intimate Partner Violence: Not on file   Housing Stability: Not on file     Tobacco Use History[3]  Social History     Substance and Sexual Activity   Alcohol Use Not Currently "       Labs & Results:  Below is the patient's most recent value for Albumin, ALT, AST, BUN, Calcium, Chloride, Cholesterol, CO2, Creatinine, GFR, Glucose, HDL, Hematocrit, Hemoglobin, Hemoglobin A1C, LDL, Magnesium, Phosphorus, Platelets, Potassium, PSA, Sodium, Triglycerides, and WBC.   Lab Results   Component Value Date    ALT 20 2025    AST 27 2025    BUN 20 2025    CALCIUM 10.4 (H) 2025     2025    CO2 29 2025    CREATININE 0.93 2025    HDL 38 (L) 10/08/2024    HCT 44.7 10/08/2024    HGB 14.1 10/08/2024    HGBA1C 6.3 (H) 10/08/2024    MG 2.2 2023     (L) 10/08/2024    K 4.7 2025    TRIG 69 10/08/2024    WBC 6.56 10/08/2024     Note: for a comprehensive list of the patient's lab results, access the Results Review activity.            Cardiac testing:   Results for orders placed during the hospital encounter of 20    Echo complete with contrast if indicated    Narrative  [address] [address] [phone]    Transthoracic Echocardiogram  2D, M-mode, Doppler, and Color Doppler    Study date:  01-Dec-2020    Patient: ELSA RAIN  MR number: [id number]  Account number: [id number]  : 1940  Age: 80 years  Gender: Male  Status: Outpatient  Location: [facility]  Height: 62 in  Weight: 126.7 lb  BP: 112/ 60 mmHg    Indications: S/P TAVR    Diagnoses: Z95.2 - Presence of prosthetic heart valve    Sonographer:  MARLENA Christensen  Primary Physician:  Wilmer Rosenberg MD  Referring Physician:  JACOBO Dietz  Group:  [facility]  Interpreting Physician:  Ana Juarez MD    IMPRESSIONS:  Technical quality: Good  Cardiac rhythm: Sinus    1. Mildly increased left ventricular wall thickness, normal left ventricular systolic function, EF around 65%. Grade 1 diastolic dysfunction with elevated estimated left ventricular filling pressures.  2. Normal right  ventricular size and systolic function.  3. Mild left atrial cavity enlargement.  4. Normal functioning bioprosthetic aortic valve with minimal paravalvular and mild valvular regurgitation.  5. Mild plus mitral valve regurgitation.  6. Mild tricuspid valve regurgitation.  7. Possible mild pulmonary hypertension. Estimated RVSP/PASP is 37 mmHg.  8. No pericardial effusion.    Compared to report of previous echocardiogram from January 29, 2020 there is some progression of diastolic dysfunction and possible mild pulmonary hypertension is new.    SUMMARY    LEFT VENTRICLE:  Normal left ventricular cavity size, mildly increased wall thickness, normal left ventricular systolic function normal regional wall motion. Ejection fraction is estimated as around 65%. Doppler evaluation suggests grade 1 diastolic  dysfunction. Estimated left ventricular filling pressures are increased.    RIGHT VENTRICLE:  Normal right ventricular size and systolic function. Estimated right ventricular systolic pressure is increased, 37 mmHg. Possible mild pulmonary hypertension.    LEFT ATRIUM:  Mild left atrial cavity enlargement.    RIGHT ATRIUM:  Normal right atrial cavity size.    MITRAL VALVE:  Mitral valve leaflet sclerosis, adequate leaflet mobility. Mild-plus mitral valve regurgitation.    AORTIC VALVE:  Bioprosthetic aortic valve present. Valve appears to be well seated with no evidence of dehiscence. Doppler evaluation suggests normal prosthetic valve function. Peak trans prosthetic valve velocity is 2.94 m/sec, mean gradient is 15 mmHg  and calculated valve area is 1.0-1.1 cm2. Doppler velocity index is 0.53. There is minimal paravalvular and mild valvular regurgitation.    TRICUSPID VALVE:  Mild tricuspid valve regurgitation.    PULMONIC VALVE:  Trace pulmonic valve regurgitation.    AORTA:  Aortic root and proximal ascending aorta are normal in size on 2D imaging.    IVC, HEPATIC VEINS:  Inferior vena cava is normal in size and  demonstrates appropriate respiratory phasic changes in diameter.    PERICARDIUM:  No pericardial effusion.    SUMMARY MEASUREMENTS  2D measurements:  Unspecified Anatomy:   %FS was 35.5 %.  Ao Diam was 2.2 cm.  EDV(Teich) was 64.3 ml.  EF(Teich) was 65.7 %.  ESV(Teich) was 22.1 ml.  IVSd was 0.9 cm.  LA Area was 17.5 cm2.  LA Diam was 3.4 cm.  LVEDV MOD A4C was 126.6 ml.  LVEF MOD A4C  was 68.4 %.  LVESV MOD A4C was 40 ml.  LVIDd was 3.9 cm.  LVIDs was 2.5 cm.  LVLd A4C was 8.7 cm.  LVLs A4C was 7 cm.  LVOT Diam was 1.7 cm.  LVPWd was 0.9 cm.  RA Area was 14.7 cm2.  RVIDd was 3 cm.  SV MOD A4C was 86.6 ml.  SV(Teich) was  42.2 ml.  CW measurements:  Unspecified Anatomy:   AR Dec [place] was 2.7 m/s2.  AR Dec Time was 1675.7 ms.  AR PHT was 486 ms.  AR Vmax was 4.5 m/s.  AR maxPG was 80.6 mmHg.  AV Env.Ti was 398.2 ms.  AV VTI was 66.7 cm.  AV Vmax was 3 m/s.  AV Vmean was 1.7 m/s.  AV  maxPG was 34.8 mmHg.  AV meanPG was 14.7 mmHg.  TR Vmax was 2.6 m/s.  TR maxPG was 26.7 mmHg.  MM measurements:  Unspecified Anatomy:   TAPSE was 2.4 cm.  PW measurements:  Unspecified Anatomy:   SON (VTI) was 1.1 cm2.  SON Vmax was 1 cm2.  SON Vmax, Pt was 1 cm2.  AVAI (VTI) was 0 cm2/m2.  AVAI Vmax was 0 cm2/m2.  AVAI Vmax, Pt was 0 cm2/m2.  DVI was 0.5 .  E' Sept was 0.1 m/s.  E/E' Sept was 21.3 .  LVOT  Env.Ti was 345.4 ms.  LVOT VTI was 35.2 cm.  LVOT Vmax was 1.3 m/s.  LVOT Vmean was 1 m/s.  LVOT maxPG was 7.2 mmHg.  LVOT meanPG was 4.6 mmHg.  LVSI Dopp was 48.3 ml/m2.  LVSV Dopp was 76.3 ml.  MV A Jaime was 1 m/s.  MV Dec [place] was 4.9  m/s2.  MV DecT was 229.7 ms.  MV E Jaime was 1.1 m/s.  MV E/A Ratio was 1.2 .  MV PHT was 66.6 ms.  MVA By PHT was 3.3 cm2.    HISTORY: PRIOR HISTORY: Aortic stenosis, sinus bradycardia, PVC's, hyperlipidemia    PROCEDURE: The study was performed in the [facility]. This was a routine study. The transthoracic approach was used. The study included complete 2D imaging, M-mode, complete spectral Doppler,  and color Doppler. The heart rate was 64 bpm, at  the start of the study. Images were obtained from the parasternal, apical, subcostal, and suprasternal notch acoustic windows. Image quality was adequate.    LEFT VENTRICLE: Normal left ventricular cavity size, mildly increased wall thickness, normal left ventricular systolic function normal regional wall motion. Ejection fraction is estimated as around 65%. Doppler evaluation suggests grade 1  diastolic dysfunction. Estimated left ventricular filling pressures are increased.    RIGHT VENTRICLE: Normal right ventricular size and systolic function. Estimated right ventricular systolic pressure is increased, 37 mmHg. Possible mild pulmonary hypertension.    LEFT ATRIUM: Mild left atrial cavity enlargement.    RIGHT ATRIUM: Normal right atrial cavity size.    MITRAL VALVE: Mitral valve leaflet sclerosis, adequate leaflet mobility. Mild-plus mitral valve regurgitation.    AORTIC VALVE: Bioprosthetic aortic valve present. Valve appears to be well seated with no evidence of dehiscence. Doppler evaluation suggests normal prosthetic valve function. Peak trans prosthetic valve velocity is 2.94 m/sec, mean  gradient is 15 mmHg and calculated valve area is 1.0-1.1 cm2. Doppler velocity index is 0.53. There is minimal paravalvular and mild valvular regurgitation.    TRICUSPID VALVE: Mild tricuspid valve regurgitation.    PULMONIC VALVE: Trace pulmonic valve regurgitation.    PERICARDIUM: No pericardial effusion.    AORTA: Aortic root and proximal ascending aorta are normal in size on 2D imaging.    SYSTEMIC VEINS: IVC: Inferior vena cava is normal in size and demonstrates appropriate respiratory phasic changes in diameter.    SYSTEM MEASUREMENT TABLES    2D  %FS: 35.5 %  Ao Diam: 2.2 cm  EDV(Teich): 64.3 ml  EF(Teich): 65.7 %  ESV(Teich): 22.1 ml  IVSd: 0.9 cm  LA Area: 17.5 cm2  LA Diam: 3.4 cm  LVEDV MOD A4C: 126.6 ml  LVEF MOD A4C: 68.4 %  LVESV MOD A4C: 40 ml  LVIDd: 3.9  cm  LVIDs: 2.5 cm  LVLd A4C: 8.7 cm  LVLs A4C: 7 cm  LVOT Diam: 1.7 cm  LVPWd: 0.9 cm  RA Area: 14.7 cm2  RVIDd: 3 cm  SV MOD A4C: 86.6 ml  SV(Teich): 42.2 ml    CW  AR Dec [place]: 2.7 m/s2  AR Dec Time: 1675.7 ms  AR PHT: 486 ms  AR Vmax: 4.5 m/s  AR maxP.6 mmHg  AV Env.Ti: 398.2 ms  AV VTI: 66.7 cm  AV Vmax: 3 m/s  AV Vmean: 1.7 m/s  AV maxP.8 mmHg  AV meanP.7 mmHg  TR Vmax: 2.6 m/s  TR maxP.7 mmHg    MM  TAPSE: 2.4 cm    PW  SON (VTI): 1.1 cm2  SON Vmax: 1 cm2  SON Vmax, Pt: 1 cm2  AVAI (VTI): 0 cm2/m2  AVAI Vmax: 0 cm2/m2  AVAI Vmax, Pt: 0 cm2/m2  DVI: 0.5  E' Sept: 0.1 m/s  E/E' Sept: 21.3  LVOT Env.Ti: 345.4 ms  LVOT VTI: 35.2 cm  LVOT Vmax: 1.3 m/s  LVOT Vmean: 1 m/s  LVOT maxP.2 mmHg  LVOT meanP.6 mmHg  LVSI Dopp: 48.3 ml/m2  LVSV Dopp: 76.3 ml  MV A Jaime: 1 m/s  MV Dec [place]: 4.9 m/s2  MV DecT: 229.7 ms  MV E Jamie: 1.1 m/s  MV E/A Ratio: 1.2  MV PHT: 66.6 ms  MVA By PHT: 3.3 cm2    [employer] Accredited Echocardiography Laboratory    Prepared and electronically signed by    Ana Juarez MD  Signed 01-Dec-2020 14:11:30    No results found for this or any previous visit.    No results found for this or any previous visit.    No results found for this or any previous visit.                     [1]   Current Outpatient Medications:     amoxicillin (AMOXIL) 500 mg capsule, BEFORE DENTAL WORK, Disp: , Rfl: 3    ascorbic acid (VITAMIN C) 1000 MG tablet, Take 1,000 mg by mouth in the morning., Disp: , Rfl:     aspirin (ECOTRIN LOW STRENGTH) 81 mg EC tablet, Take 1 tablet (81 mg total) by mouth daily, Disp: , Rfl:     calcium-vitamin D (OSCAL 500 + D) 500 mg-200 units per tablet, Take 1 tablet by mouth in the morning., Disp: , Rfl:     cholecalciferol (VITAMIN D3) 400 units tablet, Take 2,000 Units by mouth in the morning., Disp: , Rfl:     flecainide (TAMBOCOR) 50 mg tablet, Take 1 tablet (50 mg total) by mouth 2 (two) times a day, Disp: 180 tablet, Rfl: 3    Krill Oil 350  MG CAPS, Take 350 mg by mouth in the morning, Disp: , Rfl:     levETIRAcetam (KEPPRA) 250 mg tablet, Take 250 mg by mouth in the morning and 250 mg in the evening., Disp: , Rfl:     metoprolol succinate (TOPROL-XL) 25 mg 24 hr tablet, TAKE 1 TABLET EVERY 12 HOURS, Disp: 180 tablet, Rfl: 1    midodrine (PROAMATINE) 2.5 mg tablet, TAKE 1 TABLET BY MOUTH TWICE A DAY, Disp: 180 tablet, Rfl: 3    Multiple Vitamin (DAILY VALUE MULTIVITAMIN PO), Take 1 tablet by mouth in the morning., Disp: , Rfl:     simvastatin (ZOCOR) 10 mg tablet, Take 10 mg by mouth daily at bedtime, Disp: , Rfl:     tamsulosin (FLOMAX) 0.4 mg, Take 0.4 mg by mouth daily at bedtime, Disp: , Rfl:     traZODone (DESYREL) 50 mg tablet, Take 25 mg by mouth daily at bedtime, Disp: , Rfl:   [2]   Family History  Problem Relation Name Age of Onset    Coronary artery disease Family      Heart disease Family     [3]   Social History  Tobacco Use   Smoking Status Never   Smokeless Tobacco Never

## 2025-07-01 ENCOUNTER — TELEPHONE (OUTPATIENT)
Facility: MEDICAL CENTER | Age: 85
End: 2025-07-01

## 2025-07-01 NOTE — TELEPHONE ENCOUNTER
Note in chart after leaving a message for someone to schedule there MRI appointment:  I left a message today to call me at [phone] to schedule their MRI appointment.

## 2025-07-24 ENCOUNTER — TELEPHONE (OUTPATIENT)
Facility: MEDICAL CENTER | Age: 85
End: 2025-07-24

## 2025-07-24 NOTE — TELEPHONE ENCOUNTER
9-2-25 AT 1100AM AT BE   Note in chart after scheduling pacemaker:  Topics covered in our conversation:  MRI scheduled on above date and time.

## 2025-08-22 ENCOUNTER — TELEPHONE (OUTPATIENT)
Dept: SURGICAL ONCOLOGY | Facility: CLINIC | Age: 85
End: 2025-08-22

## (undated) DEVICE — GLOVE SRG BIOGEL ECLIPSE 7

## (undated) DEVICE — SYRINGE 30ML LL

## (undated) DEVICE — STANDARD SURGICAL GOWN, L: Brand: CONVERTORS

## (undated) DEVICE — ADHESIVE SKIN HIGH VISCOSITY EXOFIN 1ML

## (undated) DEVICE — BETHLEHEM MAJOR GENERAL PACK: Brand: CARDINAL HEALTH

## (undated) DEVICE — INTENDED FOR TISSUE SEPARATION, AND OTHER PROCEDURES THAT REQUIRE A SHARP SURGICAL BLADE TO PUNCTURE OR CUT.: Brand: BARD-PARKER ® CARBON RIB-BACK BLADES

## (undated) DEVICE — RADIFOCUS GLIDEWIRE: Brand: GLIDEWIRE

## (undated) DEVICE — INTENDED FOR TISSUE SEPARATION, AND OTHER PROCEDURES THAT REQUIRE A SHARP SURGICAL BLADE TO PUNCTURE OR CUT.: Brand: BARD-PARKER SAFETY BLADES SIZE 15, STERILE

## (undated) DEVICE — PAD GROUNDING ADULT

## (undated) DEVICE — DEFIB ADULT ELECTRODE CARDINAL

## (undated) DEVICE — ADHESIVE SKN CLSR HISTOACRYL FLEX 0.5ML LF

## (undated) DEVICE — INTRO SHEATH PEEL AWAY 7FR

## (undated) DEVICE — CARDIO PERI-GROIN: Brand: CONVERTORS

## (undated) DEVICE — SYRINGE 10ML LL CONTROL TOP

## (undated) DEVICE — NEEDLE 25G X 1 1/2

## (undated) DEVICE — STERILE POLYISOPRENE POWDER-FREE SURGICAL GLOVES WITH EMOLLIENT COATING: Brand: PROTEXIS

## (undated) DEVICE — NEEDLE BLUNT 18 G X 1 1/2IN

## (undated) DEVICE — 3M™ TEGADERM™ TRANSPARENT FILM DRESSING FRAME STYLE, 1626W, 4 IN X 4-3/4 IN (10 CM X 12 CM), 50/CT 4CT/CASE: Brand: 3M™ TEGADERM™

## (undated) DEVICE — SKIN MARKER DUAL TIP WITH RULER CAP, FLEXIBLE RULER AND LABELS: Brand: DEVON

## (undated) DEVICE — SWABSTCK, BENZOIN TINCTURE, 1/PK, STRL: Brand: APLICARE

## (undated) DEVICE — SUT MONOCRYL 4-0 PS-2 27 IN Y426H

## (undated) DEVICE — BASIC PACK: Brand: CONVERTORS

## (undated) DEVICE — INTRO SHEATH  PEEL AWAY 7FR 15CM

## (undated) DEVICE — SURGICAL CLIPPER BLADE GENERAL USE

## (undated) DEVICE — SUT SILK 0 CT-1 30 IN 424H

## (undated) DEVICE — INTRO SHEATH PEEL AWAY 9 FR

## (undated) DEVICE — NDL CNTR 20CT FM MAG: Brand: MEDLINE INDUSTRIES, INC.

## (undated) DEVICE — TRAY FOLEY 16FR SURESTEP TEMP SENS URIMETER STAT LOK

## (undated) DEVICE — 3M™ TEGADERM™ TRANSPARENT FILM DRESSING FRAME STYLE, 1624W, 2-3/8 IN X 2-3/4 IN (6 CM X 7 CM), 100/CT 4CT/CASE: Brand: 3M™ TEGADERM™

## (undated) DEVICE — 3000CC GUARDIAN II: Brand: GUARDIAN

## (undated) DEVICE — PENROSE DRAIN, 18 X 3 8: Brand: CARDINAL HEALTH

## (undated) DEVICE — Device

## (undated) DEVICE — CHLORAPREP HI-LITE 26ML ORANGE

## (undated) DEVICE — TIBURON TRANSVERSE LAPAROTOMY SHEET: Brand: CONVERTORS

## (undated) DEVICE — SPONGE LAP STERILE 4X18

## (undated) DEVICE — X-RAY DETECTABLE SPONGES,16 PLY: Brand: VISTEC

## (undated) DEVICE — DGW .035 FC J3MM 150CM TEF: Brand: EMERALD

## (undated) DEVICE — 3M™ STERI-STRIP™ REINFORCED ADHESIVE SKIN CLOSURES, R1547, 1/2 IN X 4 IN (12 MM X 100 MM), 6 STRIPS/ENVELOPE: Brand: 3M™ STERI-STRIP™

## (undated) DEVICE — GLOVE INDICATOR PI UNDERGLOVE SZ 7 BLUE

## (undated) DEVICE — CARDIOVASCULAR SPLIT DRAPE: Brand: CONVERTORS

## (undated) DEVICE — UNDERGLOVE PROTEXIS  BLUE SZ 7

## (undated) DEVICE — HEAVY DUTY TABLE COVER: Brand: CONVERTORS

## (undated) DEVICE — STERILE POLYISOPRENE POWDER-FREE SURGICAL GLOVES: Brand: PROTEXIS

## (undated) DEVICE — SUT VICRYL 3-0 SH 27 IN J416H

## (undated) DEVICE — DRESSING ALLEVYN LIFE SACRAL 6.75 X 6.5 IN

## (undated) DEVICE — THERMOFLECT BLANKET, L, 25EA                               TS THERMOFLECT BLANKET, 48" X 84", SILVER, 5/BG, 5 BG/CS NW: Brand: THERMOFLECT

## (undated) DEVICE — SUT PROLENE 2-0 SH 30 IN 8833H

## (undated) DEVICE — PENCIL ELECTROSURG E-Z CLEAN -0035H